# Patient Record
Sex: FEMALE | Race: WHITE | NOT HISPANIC OR LATINO | Employment: OTHER | URBAN - METROPOLITAN AREA
[De-identification: names, ages, dates, MRNs, and addresses within clinical notes are randomized per-mention and may not be internally consistent; named-entity substitution may affect disease eponyms.]

---

## 2017-12-20 RX ORDER — METOPROLOL SUCCINATE 50 MG/1
TABLET, EXTENDED RELEASE ORAL
Qty: 30 TABLET | Refills: 4 | Status: SHIPPED | OUTPATIENT
Start: 2017-12-20 | End: 2018-04-11 | Stop reason: DRUGHIGH

## 2017-12-20 RX ORDER — CITALOPRAM 20 MG/1
TABLET, FILM COATED ORAL
Qty: 30 TABLET | Refills: 4 | Status: SHIPPED | OUTPATIENT
Start: 2017-12-20 | End: 2018-04-11 | Stop reason: SDUPTHER

## 2017-12-20 RX ORDER — PRAMIPEXOLE DIHYDROCHLORIDE 1 MG/1
TABLET ORAL
Qty: 60 TABLET | Refills: 4 | Status: SHIPPED | OUTPATIENT
Start: 2017-12-20 | End: 2018-05-17 | Stop reason: SDUPTHER

## 2018-01-03 ENCOUNTER — OFFICE VISIT (OUTPATIENT)
Dept: PRIMARY CARE CLINIC | Facility: CLINIC | Age: 48
End: 2018-01-03
Payer: MEDICAID

## 2018-01-03 VITALS
TEMPERATURE: 98 F | RESPIRATION RATE: 18 BRPM | WEIGHT: 218 LBS | OXYGEN SATURATION: 100 % | BODY MASS INDEX: 36.32 KG/M2 | SYSTOLIC BLOOD PRESSURE: 146 MMHG | DIASTOLIC BLOOD PRESSURE: 91 MMHG | HEART RATE: 106 BPM | HEIGHT: 65 IN

## 2018-01-03 DIAGNOSIS — N63.0 BREAST NODULE: Primary | ICD-10-CM

## 2018-01-03 PROBLEM — I10 ESSENTIAL HYPERTENSION, BENIGN: Status: ACTIVE | Noted: 2018-01-03

## 2018-01-03 PROBLEM — F32.A DEPRESSION: Status: ACTIVE | Noted: 2018-01-03

## 2018-01-03 PROBLEM — G25.81 RLS (RESTLESS LEGS SYNDROME): Status: ACTIVE | Noted: 2018-01-03

## 2018-01-03 PROBLEM — F41.1 GAD (GENERALIZED ANXIETY DISORDER): Status: ACTIVE | Noted: 2018-01-03

## 2018-01-03 PROBLEM — F41.9 ANXIETY: Status: ACTIVE | Noted: 2018-01-03

## 2018-01-03 PROCEDURE — 99999 PR PBB SHADOW E&M-EST. PATIENT-LVL IV: CPT | Mod: PBBFAC,,, | Performed by: FAMILY MEDICINE

## 2018-01-03 PROCEDURE — 99213 OFFICE O/P EST LOW 20 MIN: CPT | Mod: S$PBB,,, | Performed by: FAMILY MEDICINE

## 2018-01-03 PROCEDURE — 99214 OFFICE O/P EST MOD 30 MIN: CPT | Mod: PBBFAC,PN | Performed by: FAMILY MEDICINE

## 2018-01-03 RX ORDER — LORAZEPAM 1 MG/1
1 TABLET ORAL DAILY PRN
COMMUNITY
End: 2018-01-03 | Stop reason: SDUPTHER

## 2018-01-03 NOTE — PROGRESS NOTES
"Subjective:       Patient ID: Dennis Aldana is a 47 y.o. female.    Chief Complaint: Breast Mass (pt says she has felt 3 lumps in her left breast and 1 in the right )    First noticed single lump in left breast a few months ago, tender initially. Now has 2 or 3 in left breast, and recently noticed similar lump in right breast. Last mammogram >5 years ago, no hx of abnormal mammogram. No known FHx of breast CA. No skin changes or nipple discharge      Review of Systems   Constitutional: Negative for chills, fever and unexpected weight change.   Respiratory: Negative for shortness of breath.    Cardiovascular: Negative for chest pain.       Objective:      Vitals:    01/03/18 1502   BP: (!) 146/91   BP Location: Left arm   Patient Position: Sitting   BP Method: Large (Automatic)   Pulse: 106   Resp: 18   Temp: 98.2 °F (36.8 °C)   TempSrc: Oral   SpO2: 100%   Weight: 98.9 kg (218 lb)   Height: 5' 5" (1.651 m)     Physical Exam   Constitutional: She is oriented to person, place, and time. She appears well-developed and well-nourished.   HENT:   Head: Normocephalic and atraumatic.   Cardiovascular: Normal rate, regular rhythm and normal heart sounds.    Pulmonary/Chest: Effort normal and breath sounds normal. Right breast exhibits mass (subareolar mobile cystic mass ~2cm). Right breast exhibits no inverted nipple, no nipple discharge, no skin change and no tenderness. Left breast exhibits mass (2 mobile central cystic masses, ~2cm each). Left breast exhibits no inverted nipple, no nipple discharge, no skin change and no tenderness. Breasts are symmetrical.   Musculoskeletal: She exhibits no edema.   Neurological: She is alert and oriented to person, place, and time.   Skin: Skin is warm and dry.   Nursing note and vitals reviewed.      Assessment:       1. Breast nodule        Plan:       Breast nodule  Comments:  further treatment pending diagnostic mammography and U/S results  Orders:  -     Mammo Digital Diagnostic " Bilat without CAD; Future; Expected date: 01/03/2018  -     US Breast Bilateral Complete; Future; Expected date: 01/03/2018      Medication List with Changes/Refills   Current Medications    CITALOPRAM (CELEXA) 20 MG TABLET    TAKE ONE TABLET BY MOUTH ONCE DAILY    LORAZEPAM (ATIVAN) 1 MG TABLET    Take 1 mg by mouth daily as needed for Anxiety.    METOPROLOL SUCCINATE (TOPROL-XL) 50 MG 24 HR TABLET    TAKE ONE TABLET BY MOUTH ONCE DAILY    PRAMIPEXOLE (MIRAPEX) 1 MG TABLET    TAKE TWO TABLETS BY MOUTH DAILY AT BEDTIME    RANITIDINE (ZANTAC) 300 MG TABLET    TAKE ONE TABLET BY MOUTH ONCE A DAY AT BEDTIME

## 2018-01-04 RX ORDER — LORAZEPAM 1 MG/1
TABLET ORAL
Qty: 30 TABLET | Refills: 2 | Status: SHIPPED | OUTPATIENT
Start: 2018-01-04 | End: 2018-04-11 | Stop reason: SDUPTHER

## 2018-01-08 ENCOUNTER — OFFICE VISIT (OUTPATIENT)
Dept: PRIMARY CARE CLINIC | Facility: CLINIC | Age: 48
End: 2018-01-08
Payer: MEDICAID

## 2018-01-08 VITALS
TEMPERATURE: 98 F | WEIGHT: 216 LBS | HEART RATE: 101 BPM | DIASTOLIC BLOOD PRESSURE: 86 MMHG | RESPIRATION RATE: 18 BRPM | HEIGHT: 65 IN | BODY MASS INDEX: 35.99 KG/M2 | OXYGEN SATURATION: 97 % | SYSTOLIC BLOOD PRESSURE: 143 MMHG

## 2018-01-08 DIAGNOSIS — J01.00 ACUTE NON-RECURRENT MAXILLARY SINUSITIS: Primary | ICD-10-CM

## 2018-01-08 PROCEDURE — 99213 OFFICE O/P EST LOW 20 MIN: CPT | Mod: S$PBB,,, | Performed by: FAMILY MEDICINE

## 2018-01-08 PROCEDURE — 99213 OFFICE O/P EST LOW 20 MIN: CPT | Mod: PBBFAC,PN | Performed by: FAMILY MEDICINE

## 2018-01-08 PROCEDURE — 99999 PR PBB SHADOW E&M-EST. PATIENT-LVL III: CPT | Mod: PBBFAC,,, | Performed by: FAMILY MEDICINE

## 2018-01-08 RX ORDER — AMOXICILLIN AND CLAVULANATE POTASSIUM 875; 125 MG/1; MG/1
1 TABLET, FILM COATED ORAL EVERY 12 HOURS
Qty: 20 TABLET | Refills: 0 | Status: SHIPPED | OUTPATIENT
Start: 2018-01-08 | End: 2018-01-18

## 2018-01-08 RX ORDER — BETAMETHASONE SODIUM PHOSPHATE AND BETAMETHASONE ACETATE 3; 3 MG/ML; MG/ML
12 INJECTION, SUSPENSION INTRA-ARTICULAR; INTRALESIONAL; INTRAMUSCULAR; SOFT TISSUE
Status: COMPLETED | OUTPATIENT
Start: 2018-01-08 | End: 2018-01-08

## 2018-01-08 RX ORDER — PROMETHAZINE HYDROCHLORIDE AND CODEINE PHOSPHATE 6.25; 1 MG/5ML; MG/5ML
5 SOLUTION ORAL EVERY 6 HOURS PRN
Qty: 180 ML | Refills: 0 | Status: SHIPPED | OUTPATIENT
Start: 2018-01-08 | End: 2018-02-07 | Stop reason: SDUPTHER

## 2018-01-08 RX ADMIN — BETAMETHASONE SODIUM PHOSPHATE AND BETAMETHASONE ACETATE 12 MG: 3; 3 INJECTION, SUSPENSION INTRA-ARTICULAR; INTRALESIONAL; INTRAMUSCULAR at 08:01

## 2018-01-08 NOTE — PROGRESS NOTES
"Subjective:       Patient ID: Dennis Aldana is a 47 y.o. female.    Chief Complaint: Cough and Sore Throat    Sinusitis   This is a new problem. The current episode started in the past 7 days. The problem has been gradually worsening since onset. There has been no fever. Associated symptoms include congestion, coughing, headaches, a hoarse voice, sinus pressure and a sore throat. Pertinent negatives include no chills or shortness of breath. Past treatments include oral decongestants. The treatment provided no relief.     Review of Systems   Constitutional: Negative for chills and fever.   HENT: Positive for congestion, hoarse voice, sinus pressure and sore throat.    Respiratory: Positive for cough. Negative for shortness of breath.    Neurological: Positive for headaches.       Objective:      Vitals:    01/08/18 0825   BP: (!) 143/86   BP Location: Left arm   Patient Position: Sitting   BP Method: Large (Automatic)   Pulse: 101   Resp: 18   Temp: 98.1 °F (36.7 °C)   TempSrc: Oral   SpO2: 97%   Weight: 98 kg (216 lb)   Height: 5' 5" (1.651 m)     Physical Exam   Constitutional: She is oriented to person, place, and time. She appears well-developed and well-nourished.   HENT:   Head: Normocephalic and atraumatic.   Right Ear: Tympanic membrane normal.   Left Ear: Tympanic membrane normal.   Nose: Right sinus exhibits maxillary sinus tenderness. Left sinus exhibits maxillary sinus tenderness.   Mouth/Throat: Oropharynx is clear and moist.   Eyes: EOM are normal. Pupils are equal, round, and reactive to light.   Neck: Neck supple. No JVD present.   Cardiovascular: Normal rate, regular rhythm and normal heart sounds.    Pulmonary/Chest: Effort normal and breath sounds normal.   Musculoskeletal: She exhibits no edema.   Neurological: She is alert and oriented to person, place, and time.   Skin: Skin is warm and dry.   Nursing note and vitals reviewed.      Assessment:       1. Acute non-recurrent maxillary sinusitis  "       Plan:       Acute non-recurrent maxillary sinusitis  -     betamethasone acetate-betamethasone sodium phosphate injection 12 mg; Inject 2 mLs (12 mg total) into the muscle one time.  -     amoxicillin-clavulanate 875-125mg (AUGMENTIN) 875-125 mg per tablet; Take 1 tablet by mouth every 12 (twelve) hours.  Dispense: 20 tablet; Refill: 0  -     promethazine-codeine 6.25-10 mg/5 ml (PHENERGAN WITH CODEINE) 6.25-10 mg/5 mL syrup; Take 5 mLs by mouth every 6 (six) hours as needed for Cough.  Dispense: 180 mL; Refill: 0      Medication List with Changes/Refills   New Medications    AMOXICILLIN-CLAVULANATE 875-125MG (AUGMENTIN) 875-125 MG PER TABLET    Take 1 tablet by mouth every 12 (twelve) hours.    PROMETHAZINE-CODEINE 6.25-10 MG/5 ML (PHENERGAN WITH CODEINE) 6.25-10 MG/5 ML SYRUP    Take 5 mLs by mouth every 6 (six) hours as needed for Cough.   Current Medications    CITALOPRAM (CELEXA) 20 MG TABLET    TAKE ONE TABLET BY MOUTH ONCE DAILY    LORAZEPAM (ATIVAN) 1 MG TABLET    TAKE ONE TABLET BY MOUTH ONCE DAILY AS NEEDED    METOPROLOL SUCCINATE (TOPROL-XL) 50 MG 24 HR TABLET    TAKE ONE TABLET BY MOUTH ONCE DAILY    PRAMIPEXOLE (MIRAPEX) 1 MG TABLET    TAKE TWO TABLETS BY MOUTH DAILY AT BEDTIME    RANITIDINE (ZANTAC) 300 MG TABLET    TAKE ONE TABLET BY MOUTH ONCE A DAY AT BEDTIME

## 2018-01-08 NOTE — PROGRESS NOTES
2 patient identifiers used, ( name &  ), order checked, allergies checked ,  2 Cc's of celestone given per order, using aseptic technique, 0/10 pain scale, patient tolerated well, no bleeding at insertion site; no adverse reactions noted.

## 2018-01-09 ENCOUNTER — TELEPHONE (OUTPATIENT)
Dept: PRIMARY CARE CLINIC | Facility: CLINIC | Age: 48
End: 2018-01-09

## 2018-01-09 DIAGNOSIS — N63.20 LEFT BREAST MASS: Primary | ICD-10-CM

## 2018-01-09 NOTE — TELEPHONE ENCOUNTER
Spoke to patient about ultrasound results and recommendations to follow up with Dr. Huddletson for a biopsy ; patient states understanding, no questions or concerns.

## 2018-01-09 NOTE — TELEPHONE ENCOUNTER
----- Message from Priscila Cherry sent at 1/9/2018  4:57 PM CST -----  Contact: self [phone]  She is being referred to you by Dr Roger for a breast mass.  Your first available is March.  Please call her to fit her in.  Thank you!

## 2018-01-10 ENCOUNTER — PATIENT MESSAGE (OUTPATIENT)
Dept: PRIMARY CARE CLINIC | Facility: CLINIC | Age: 48
End: 2018-01-10

## 2018-01-10 ENCOUNTER — TELEPHONE (OUTPATIENT)
Dept: PRIMARY CARE CLINIC | Facility: CLINIC | Age: 48
End: 2018-01-10

## 2018-01-10 DIAGNOSIS — N63.20 LEFT BREAST MASS: Primary | ICD-10-CM

## 2018-01-10 NOTE — TELEPHONE ENCOUNTER
----- Message from Kaitlin Robertson sent at 1/10/2018  8:48 AM CST -----  Contact: Patient  Dennis, patient [phone], Calling because Dr. Huddleston does not take Medicaid, please refer her to another surgeon. Please advise. Thanks.

## 2018-01-11 ENCOUNTER — PATIENT MESSAGE (OUTPATIENT)
Dept: PRIMARY CARE CLINIC | Facility: CLINIC | Age: 48
End: 2018-01-11

## 2018-01-17 ENCOUNTER — TELEPHONE (OUTPATIENT)
Dept: SURGERY | Facility: CLINIC | Age: 48
End: 2018-01-17

## 2018-01-17 NOTE — TELEPHONE ENCOUNTER
----- Message from Cynthia Calderon LPN sent at 1/9/2018  4:45 PM CST -----  Per Dr. Roger , patient needs to be seen asap for results from US   ----- Message -----  From: Andrew Roger MD  Sent: 1/9/2018   9:28 AM  To: [facility] Staff    Irregular, spiculated mass noted on left breast ultrasound, very suspiciouis.  Needs referral to surgeon for possible biopsy.  Please schedule patient with Dr. Connor Huddleston in [place] ASAP.

## 2018-02-07 ENCOUNTER — TELEPHONE (OUTPATIENT)
Dept: PRIMARY CARE CLINIC | Facility: CLINIC | Age: 48
End: 2018-02-07

## 2018-02-07 DIAGNOSIS — J01.00 ACUTE NON-RECURRENT MAXILLARY SINUSITIS: ICD-10-CM

## 2018-02-07 RX ORDER — AMOXICILLIN AND CLAVULANATE POTASSIUM 875; 125 MG/1; MG/1
1 TABLET, FILM COATED ORAL EVERY 12 HOURS
Qty: 20 TABLET | Refills: 0 | Status: SHIPPED | OUTPATIENT
Start: 2018-02-07 | End: 2018-02-17

## 2018-02-07 RX ORDER — PROMETHAZINE HYDROCHLORIDE AND CODEINE PHOSPHATE 6.25; 1 MG/5ML; MG/5ML
5 SOLUTION ORAL EVERY 6 HOURS PRN
Qty: 180 ML | Refills: 0 | Status: SHIPPED | OUTPATIENT
Start: 2018-02-07 | End: 2018-04-11

## 2018-02-07 NOTE — TELEPHONE ENCOUNTER
"Called patient to come in early for appointment and patient states "can the doctor just call in antibiotics and cough medicine for me? I was just there a few weeks ago. Verbalized to patient will ask provider.   "

## 2018-02-07 NOTE — TELEPHONE ENCOUNTER
Spoke with patient, verbalized provider will call in medications to your pharmacy. Patient was seen on 1/8/18 for same symptoms (sinus pressure and drip, cough) Patient requesting cough medicine also. Patient's pharmacy updated. Please send in scripts. Appointment canceled for today.

## 2018-04-11 ENCOUNTER — OFFICE VISIT (OUTPATIENT)
Dept: PRIMARY CARE CLINIC | Facility: CLINIC | Age: 48
End: 2018-04-11
Payer: MEDICAID

## 2018-04-11 VITALS
HEIGHT: 66 IN | BODY MASS INDEX: 34.39 KG/M2 | TEMPERATURE: 99 F | OXYGEN SATURATION: 100 % | RESPIRATION RATE: 18 BRPM | HEART RATE: 115 BPM | WEIGHT: 214 LBS | SYSTOLIC BLOOD PRESSURE: 154 MMHG | DIASTOLIC BLOOD PRESSURE: 98 MMHG

## 2018-04-11 DIAGNOSIS — I10 ESSENTIAL HYPERTENSION, BENIGN: Primary | ICD-10-CM

## 2018-04-11 DIAGNOSIS — K21.9 GASTROESOPHAGEAL REFLUX DISEASE, ESOPHAGITIS PRESENCE NOT SPECIFIED: ICD-10-CM

## 2018-04-11 DIAGNOSIS — F32.A DEPRESSION, UNSPECIFIED DEPRESSION TYPE: ICD-10-CM

## 2018-04-11 DIAGNOSIS — R00.0 SINUS TACHYCARDIA: ICD-10-CM

## 2018-04-11 DIAGNOSIS — C50.912 MALIGNANT NEOPLASM OF LEFT FEMALE BREAST, UNSPECIFIED ESTROGEN RECEPTOR STATUS, UNSPECIFIED SITE OF BREAST: ICD-10-CM

## 2018-04-11 DIAGNOSIS — F41.1 GAD (GENERALIZED ANXIETY DISORDER): ICD-10-CM

## 2018-04-11 PROCEDURE — 99214 OFFICE O/P EST MOD 30 MIN: CPT | Mod: S$PBB,,, | Performed by: FAMILY MEDICINE

## 2018-04-11 PROCEDURE — 99999 PR PBB SHADOW E&M-EST. PATIENT-LVL III: CPT | Mod: PBBFAC,,, | Performed by: FAMILY MEDICINE

## 2018-04-11 PROCEDURE — 99213 OFFICE O/P EST LOW 20 MIN: CPT | Mod: PBBFAC,PN | Performed by: FAMILY MEDICINE

## 2018-04-11 RX ORDER — METOPROLOL SUCCINATE 100 MG/1
100 TABLET, EXTENDED RELEASE ORAL DAILY
Qty: 30 TABLET | Refills: 5 | Status: SHIPPED | OUTPATIENT
Start: 2018-04-11 | End: 2018-05-11 | Stop reason: SDUPTHER

## 2018-04-11 RX ORDER — TAMOXIFEN CITRATE 20 MG/1
20 TABLET ORAL DAILY
COMMUNITY
End: 2020-01-07 | Stop reason: ALTCHOICE

## 2018-04-11 RX ORDER — LORAZEPAM 1 MG/1
1 TABLET ORAL DAILY
Qty: 30 TABLET | Refills: 5 | Status: SHIPPED | OUTPATIENT
Start: 2018-04-11 | End: 2018-10-22 | Stop reason: SDUPTHER

## 2018-04-11 RX ORDER — CITALOPRAM 20 MG/1
20 TABLET, FILM COATED ORAL DAILY
Qty: 30 TABLET | Refills: 5 | Status: SHIPPED | OUTPATIENT
Start: 2018-04-11 | End: 2018-07-09 | Stop reason: SDUPTHER

## 2018-04-11 RX ORDER — HYDROCODONE BITARTRATE AND ACETAMINOPHEN 7.5; 325 MG/1; MG/1
1 TABLET ORAL EVERY 6 HOURS PRN
Refills: 0 | COMMUNITY
Start: 2018-03-07 | End: 2018-05-11 | Stop reason: SDUPTHER

## 2018-04-11 NOTE — PROGRESS NOTES
"Subjective:       Patient ID: Dennis Aldana is a 47 y.o. female.    Chief Complaint: Hypertension (Patient says she was on 2 medications once before but she can't remember what she was on )    HR and BP have been running consistently high lately. Had been on both metoprolol and verapamil, but stopped taking verapamil a few months ago (says she kept forgetting to take).   Recently had breast CA excised from left breast, started tamoxifen last month, just started radiation last week, will be doing daily until 5/22, and undergoing genetic testing to see if she needs chemo  TATYANA/depression - stable on current regimen, no adverse SE      Review of Systems   Constitutional: Negative for fever.   Respiratory: Negative for shortness of breath.    Cardiovascular: Positive for palpitations. Negative for chest pain.   Gastrointestinal: Positive for nausea.   Skin: Negative for rash.   Neurological: Negative for dizziness and light-headedness.   Hematological: Does not bruise/bleed easily.   Psychiatric/Behavioral: Positive for sleep disturbance. The patient is nervous/anxious.        Objective:      Vitals:    04/11/18 1230   BP: (!) 154/98   BP Location: Right arm   Patient Position: Sitting   BP Method: Large (Automatic)   Pulse: (!) 115   Resp: 18   Temp: 98.6 °F (37 °C)   TempSrc: Oral   SpO2: 100%   Weight: 97.1 kg (214 lb)   Height: 5' 6" (1.676 m)     Physical Exam   Constitutional: She is oriented to person, place, and time. She appears well-developed and well-nourished.   HENT:   Head: Normocephalic and atraumatic.   Eyes: EOM are normal.   Neck: Neck supple. No JVD present.   Cardiovascular: Regular rhythm and normal heart sounds.  Tachycardia present.    Pulmonary/Chest: Effort normal and breath sounds normal.   Musculoskeletal: She exhibits no edema.   Neurological: She is alert and oriented to person, place, and time.   Skin: Skin is warm and dry.   Psychiatric: She has a normal mood and affect. Her behavior is " normal.   Nursing note and vitals reviewed.      Assessment:       1. Essential hypertension, benign Sub-optimally controlled   2. Sinus tachycardia    3. Malignant neoplasm of left female breast, unspecified estrogen receptor status, unspecified site of breast    4. TATYANA (generalized anxiety disorder) Stable   5. Depression, unspecified depression type Stable   6. Gastroesophageal reflux disease, esophagitis presence not specified        Plan:       Essential hypertension, benign  -     metoprolol succinate (TOPROL-XL) 100 MG 24 hr tablet; Take 1 tablet (100 mg total) by mouth once daily.  Dispense: 30 tablet; Refill: 5    Sinus tachycardia    Malignant neoplasm of left female breast, unspecified estrogen receptor status, unspecified site of breast  Comments:  f/u with radiation oncologist as scheduled    TATYANA (generalized anxiety disorder)  -     LORazepam (ATIVAN) 1 MG tablet; Take 1 tablet (1 mg total) by mouth once daily.  Dispense: 30 tablet; Refill: 5    Depression, unspecified depression type  -     citalopram (CELEXA) 20 MG tablet; Take 1 tablet (20 mg total) by mouth once daily.  Dispense: 30 tablet; Refill: 5    Gastroesophageal reflux disease, esophagitis presence not specified  -     ranitidine (ZANTAC) 300 MG tablet; Take 1 tablet (300 mg total) by mouth nightly.  Dispense: 30 tablet; Refill: 5      Medication List with Changes/Refills   New Medications    METOPROLOL SUCCINATE (TOPROL-XL) 100 MG 24 HR TABLET    Take 1 tablet (100 mg total) by mouth once daily.   Current Medications    HYDROCODONE-ACETAMINOPHEN 7.5-325MG (NORCO) 7.5-325 MG PER TABLET    Take 1 tablet by mouth every 6 (six) hours as needed.    PRAMIPEXOLE (MIRAPEX) 1 MG TABLET    TAKE TWO TABLETS BY MOUTH DAILY AT BEDTIME    TAMOXIFEN (NOLVADEX) 20 MG TAB    Take 20 mg by mouth once daily.   Changed and/or Refilled Medications    Modified Medication Previous Medication    CITALOPRAM (CELEXA) 20 MG TABLET citalopram (CELEXA) 20 MG tablet        Take 1 tablet (20 mg total) by mouth once daily.    TAKE ONE TABLET BY MOUTH ONCE DAILY    LORAZEPAM (ATIVAN) 1 MG TABLET LORazepam (ATIVAN) 1 MG tablet       Take 1 tablet (1 mg total) by mouth once daily.    TAKE ONE TABLET BY MOUTH ONCE DAILY AS NEEDED    RANITIDINE (ZANTAC) 300 MG TABLET ranitidine (ZANTAC) 300 MG tablet       Take 1 tablet (300 mg total) by mouth nightly.    TAKE ONE TABLET BY MOUTH ONCE A DAY AT BEDTIME   Discontinued Medications    METOPROLOL SUCCINATE (TOPROL-XL) 50 MG 24 HR TABLET    TAKE ONE TABLET BY MOUTH ONCE DAILY    PROMETHAZINE-CODEINE 6.25-10 MG/5 ML (PHENERGAN WITH CODEINE) 6.25-10 MG/5 ML SYRUP    Take 5 mLs by mouth every 6 (six) hours as needed for Cough.

## 2018-05-11 ENCOUNTER — OFFICE VISIT (OUTPATIENT)
Dept: PRIMARY CARE CLINIC | Facility: CLINIC | Age: 48
End: 2018-05-11
Payer: MEDICAID

## 2018-05-11 VITALS
TEMPERATURE: 98 F | DIASTOLIC BLOOD PRESSURE: 90 MMHG | HEIGHT: 66 IN | OXYGEN SATURATION: 99 % | HEART RATE: 120 BPM | SYSTOLIC BLOOD PRESSURE: 143 MMHG | WEIGHT: 217 LBS | BODY MASS INDEX: 34.87 KG/M2 | RESPIRATION RATE: 18 BRPM

## 2018-05-11 DIAGNOSIS — C50.912 MALIGNANT NEOPLASM OF LEFT FEMALE BREAST, UNSPECIFIED ESTROGEN RECEPTOR STATUS, UNSPECIFIED SITE OF BREAST: ICD-10-CM

## 2018-05-11 DIAGNOSIS — I10 ESSENTIAL HYPERTENSION, BENIGN: Primary | ICD-10-CM

## 2018-05-11 DIAGNOSIS — R00.0 SINUS TACHYCARDIA: ICD-10-CM

## 2018-05-11 PROCEDURE — 99999 PR PBB SHADOW E&M-EST. PATIENT-LVL III: CPT | Mod: PBBFAC,,, | Performed by: FAMILY MEDICINE

## 2018-05-11 PROCEDURE — 99213 OFFICE O/P EST LOW 20 MIN: CPT | Mod: PBBFAC,PN | Performed by: FAMILY MEDICINE

## 2018-05-11 PROCEDURE — 99213 OFFICE O/P EST LOW 20 MIN: CPT | Mod: S$PBB,,, | Performed by: FAMILY MEDICINE

## 2018-05-11 RX ORDER — HYDROCODONE BITARTRATE AND ACETAMINOPHEN 7.5; 325 MG/1; MG/1
1 TABLET ORAL EVERY 6 HOURS PRN
Qty: 30 TABLET | Refills: 0 | Status: SHIPPED | OUTPATIENT
Start: 2018-05-11 | End: 2018-07-09

## 2018-05-11 RX ORDER — METOPROLOL SUCCINATE 100 MG/1
TABLET, EXTENDED RELEASE ORAL
Qty: 45 TABLET | Refills: 5 | Status: SHIPPED | OUTPATIENT
Start: 2018-05-11 | End: 2018-07-09 | Stop reason: SDUPTHER

## 2018-05-17 RX ORDER — PRAMIPEXOLE DIHYDROCHLORIDE 1 MG/1
TABLET ORAL
Qty: 60 TABLET | Refills: 5 | Status: SHIPPED | OUTPATIENT
Start: 2018-05-17 | End: 2018-11-06 | Stop reason: SDUPTHER

## 2018-07-09 ENCOUNTER — OFFICE VISIT (OUTPATIENT)
Dept: PRIMARY CARE CLINIC | Facility: CLINIC | Age: 48
End: 2018-07-09
Payer: MEDICAID

## 2018-07-09 VITALS
DIASTOLIC BLOOD PRESSURE: 87 MMHG | BODY MASS INDEX: 37.09 KG/M2 | TEMPERATURE: 99 F | HEART RATE: 114 BPM | RESPIRATION RATE: 18 BRPM | WEIGHT: 222.63 LBS | SYSTOLIC BLOOD PRESSURE: 142 MMHG | HEIGHT: 65 IN | OXYGEN SATURATION: 98 %

## 2018-07-09 DIAGNOSIS — R00.0 SINUS TACHYCARDIA: ICD-10-CM

## 2018-07-09 DIAGNOSIS — F32.A DEPRESSION, UNSPECIFIED DEPRESSION TYPE: ICD-10-CM

## 2018-07-09 DIAGNOSIS — I10 ESSENTIAL HYPERTENSION, BENIGN: Primary | ICD-10-CM

## 2018-07-09 PROCEDURE — 99213 OFFICE O/P EST LOW 20 MIN: CPT | Mod: PBBFAC,PN | Performed by: FAMILY MEDICINE

## 2018-07-09 PROCEDURE — 99999 PR PBB SHADOW E&M-EST. PATIENT-LVL III: CPT | Mod: PBBFAC,,, | Performed by: FAMILY MEDICINE

## 2018-07-09 PROCEDURE — 99214 OFFICE O/P EST MOD 30 MIN: CPT | Mod: S$PBB,,, | Performed by: FAMILY MEDICINE

## 2018-07-09 RX ORDER — CITALOPRAM 40 MG/1
40 TABLET, FILM COATED ORAL DAILY
Qty: 30 TABLET | Refills: 5 | Status: SHIPPED | OUTPATIENT
Start: 2018-07-09 | End: 2019-01-11 | Stop reason: SDUPTHER

## 2018-07-09 RX ORDER — METOPROLOL SUCCINATE 200 MG/1
TABLET, EXTENDED RELEASE ORAL
Qty: 30 TABLET | Refills: 5 | Status: SHIPPED | OUTPATIENT
Start: 2018-07-09 | End: 2019-01-11 | Stop reason: SDUPTHER

## 2018-07-09 NOTE — PROGRESS NOTES
Subjective:       Patient ID: Dennis Aldana is a 47 y.o. female.    Chief Complaint: Follow-up (hypertension)    Hypertension   This is a chronic problem. The current episode started more than 1 year ago. The problem has been gradually improving since onset. The problem is resistant. Associated symptoms include anxiety, malaise/fatigue and sweats. Pertinent negatives include no blurred vision, chest pain, headaches, neck pain, orthopnea, palpitations, peripheral edema, PND or shortness of breath. There are no associated agents to hypertension. Risk factors for coronary artery disease include family history, obesity and stress. Past treatments include beta blockers. The current treatment provides moderate improvement. Compliance problems include exercise and medication side effects.    Depression   Visit Type: follow-up  Patient presents with the following symptoms: irritability and restlessness.  Patient is not experiencing: chest pain, compulsions, hypersomnia, palpitations, psychomotor agitation, psychomotor retardation, shortness of breath, suicidal ideas, suicidal planning and thoughts of death.      Review of Systems   Constitutional: Positive for irritability and malaise/fatigue. Negative for fever.   HENT: Negative for trouble swallowing.    Eyes: Negative for blurred vision and visual disturbance.   Respiratory: Negative for shortness of breath.    Cardiovascular: Negative for chest pain, palpitations, orthopnea, leg swelling and PND.   Genitourinary: Negative for difficulty urinating.   Musculoskeletal: Negative for neck pain.   Skin: Negative for rash.   Neurological: Negative for dizziness and headaches.   Hematological: Does not bruise/bleed easily.   Psychiatric/Behavioral: Positive for depression. Negative for suicidal ideas.       Objective:      Vitals:    07/09/18 1407   BP: (!) 142/87   BP Location: Right arm   Patient Position: Sitting   BP Method: Large (Automatic)   Pulse: (!) 114   Resp: 18  "  Temp: 98.7 °F (37.1 °C)   TempSrc: Oral   SpO2: 98%   Weight: 101 kg (222 lb 9.6 oz)   Height: 5' 5" (1.651 m)     Physical Exam   Constitutional: She is oriented to person, place, and time. She appears well-developed and well-nourished.   HENT:   Head: Normocephalic and atraumatic.   Neck: No JVD present. Carotid bruit is not present.   Cardiovascular: Normal rate, regular rhythm and normal heart sounds.    Pulmonary/Chest: Effort normal and breath sounds normal.   Musculoskeletal: She exhibits no edema.   Neurological: She is alert and oriented to person, place, and time.   Skin: Skin is warm and dry.   Psychiatric: She has a normal mood and affect. Her behavior is normal.   Nursing note and vitals reviewed.      Assessment:       1. Essential hypertension, benign Sub-optimally controlled   2. Sinus tachycardia    3. Depression, unspecified depression type Sub-optimally controlled       Plan:       Essential hypertension, benign  Comments:  increase beta blocker  Orders:  -     metoprolol succinate (TOPROL-XL) 200 MG 24 hr tablet; 1 tablet by mouth daily  Dispense: 30 tablet; Refill: 5    Sinus tachycardia    Depression, unspecified depression type  -     citalopram (CELEXA) 40 MG tablet; Take 1 tablet (40 mg total) by mouth once daily.  Dispense: 30 tablet; Refill: 5      Medication List with Changes/Refills   Current Medications    LORAZEPAM (ATIVAN) 1 MG TABLET    Take 1 tablet (1 mg total) by mouth once daily.    PRAMIPEXOLE (MIRAPEX) 1 MG TABLET    TAKE TWO TABLETS BY MOUTH DAILY AT BEDTIME    RANITIDINE (ZANTAC) 300 MG TABLET    Take 1 tablet (300 mg total) by mouth nightly.    TAMOXIFEN (NOLVADEX) 20 MG TAB    Take 20 mg by mouth once daily.   Changed and/or Refilled Medications    Modified Medication Previous Medication    CITALOPRAM (CELEXA) 40 MG TABLET citalopram (CELEXA) 20 MG tablet       Take 1 tablet (40 mg total) by mouth once daily.    Take 1 tablet (20 mg total) by mouth once daily.    " METOPROLOL SUCCINATE (TOPROL-XL) 200 MG 24 HR TABLET metoprolol succinate (TOPROL-XL) 100 MG 24 hr tablet       1 tablet by mouth daily    1.5 tablets by mouth daily   Discontinued Medications    HYDROCODONE-ACETAMINOPHEN 7.5-325MG (NORCO) 7.5-325 MG PER TABLET    Take 1 tablet by mouth every 6 (six) hours as needed for Pain.

## 2018-09-10 ENCOUNTER — OFFICE VISIT (OUTPATIENT)
Dept: PRIMARY CARE CLINIC | Facility: CLINIC | Age: 48
End: 2018-09-10

## 2018-09-10 VITALS
HEART RATE: 87 BPM | TEMPERATURE: 98 F | BODY MASS INDEX: 35.16 KG/M2 | WEIGHT: 211 LBS | HEIGHT: 65 IN | DIASTOLIC BLOOD PRESSURE: 89 MMHG | OXYGEN SATURATION: 97 % | SYSTOLIC BLOOD PRESSURE: 143 MMHG | RESPIRATION RATE: 18 BRPM

## 2018-09-10 DIAGNOSIS — F32.A DEPRESSION, UNSPECIFIED DEPRESSION TYPE: ICD-10-CM

## 2018-09-10 DIAGNOSIS — Z12.4 CERVICAL CANCER SCREENING: ICD-10-CM

## 2018-09-10 DIAGNOSIS — I10 ESSENTIAL HYPERTENSION, BENIGN: Primary | ICD-10-CM

## 2018-09-10 PROCEDURE — 99999 PR PBB SHADOW E&M-EST. PATIENT-LVL III: CPT | Mod: PBBFAC,,, | Performed by: FAMILY MEDICINE

## 2018-09-10 PROCEDURE — 99213 OFFICE O/P EST LOW 20 MIN: CPT | Mod: S$PBB,,, | Performed by: FAMILY MEDICINE

## 2018-09-10 PROCEDURE — 99213 OFFICE O/P EST LOW 20 MIN: CPT | Mod: PBBFAC,PN | Performed by: FAMILY MEDICINE

## 2018-09-10 NOTE — PROGRESS NOTES
"Subjective:       Patient ID: Dennis Aldana is a 48 y.o. female.    Chief Complaint: Hypertension (patient is here to follow up since the increase of her Metoprolol )    HTN - not checking HR or BP, but tolerating higher dose of metoprolol w/o difficulty  Depression/anxiety - better since increasing dose of Celexa a few months ago. Eating OK, still not sleeping particularly well. Not currently exercising, but intends to start back soon.      Review of Systems   Constitutional: Negative for unexpected weight change.   Respiratory: Negative for shortness of breath.    Cardiovascular: Negative for chest pain and palpitations.   Gastrointestinal: Negative for nausea and vomiting.   Genitourinary: Negative for difficulty urinating.   Skin: Negative for rash.   Neurological: Negative for dizziness and light-headedness.   Psychiatric/Behavioral: Positive for sleep disturbance. Negative for self-injury and suicidal ideas.       Objective:      Vitals:    09/10/18 0907   BP: (!) 143/89   BP Location: Left arm   Patient Position: Sitting   BP Method: Large (Automatic)   Pulse: 87   Resp: 18   Temp: 98.1 °F (36.7 °C)   TempSrc: Oral   SpO2: 97%   Weight: 95.7 kg (211 lb)   Height: 5' 5" (1.651 m)     Physical Exam   Constitutional: She is oriented to person, place, and time. She appears well-developed and well-nourished.   HENT:   Head: Normocephalic and atraumatic.   Neck: Neck supple. No JVD present.   Cardiovascular: Normal rate, regular rhythm and normal heart sounds.   Pulmonary/Chest: Effort normal and breath sounds normal.   Abdominal: Soft. She exhibits no distension. There is no tenderness.   Musculoskeletal: She exhibits no edema.   Neurological: She is alert and oriented to person, place, and time.   Skin: Skin is warm and dry.   Psychiatric: She has a normal mood and affect. Her behavior is normal.   Nursing note and vitals reviewed.      Assessment:       1. Essential hypertension, benign    2. Depression, " unspecified depression type    3. Cervical cancer screening        Plan:       Essential hypertension, benign  Comments:  stable, continue current Rx    Depression, unspecified depression type  Comments:  better on higher dose of SSRI    Cervical cancer screening  Comments:  recently lost insurance, trying to find GYN         Medication List           Accurate as of 9/10/18  9:20 AM. If you have any questions, ask your nurse or doctor.               CONTINUE taking these medications    citalopram 40 MG tablet  Commonly known as:  CELEXA  Take 1 tablet (40 mg total) by mouth once daily.     LORazepam 1 MG tablet  Commonly known as:  ATIVAN  Take 1 tablet (1 mg total) by mouth once daily.     metoprolol succinate 200 MG 24 hr tablet  Commonly known as:  TOPROL-XL  1 tablet by mouth daily     pramipexole 1 MG tablet  Commonly known as:  MIRAPEX  TAKE TWO TABLETS BY MOUTH DAILY AT BEDTIME     ranitidine 300 MG tablet  Commonly known as:  ZANTAC  Take 1 tablet (300 mg total) by mouth nightly.     tamoxifen 20 MG Tab  Commonly known as:  NOLVADEX

## 2018-10-22 DIAGNOSIS — F41.1 GAD (GENERALIZED ANXIETY DISORDER): ICD-10-CM

## 2018-10-23 RX ORDER — LORAZEPAM 1 MG/1
TABLET ORAL
Qty: 30 TABLET | Refills: 3 | Status: SHIPPED | OUTPATIENT
Start: 2018-10-23 | End: 2020-01-07 | Stop reason: SDUPTHER

## 2018-11-06 DIAGNOSIS — K21.9 GASTROESOPHAGEAL REFLUX DISEASE, ESOPHAGITIS PRESENCE NOT SPECIFIED: ICD-10-CM

## 2018-11-06 RX ORDER — PRAMIPEXOLE DIHYDROCHLORIDE 1 MG/1
TABLET ORAL
Qty: 60 TABLET | Refills: 5 | Status: SHIPPED | OUTPATIENT
Start: 2018-11-06 | End: 2019-04-06 | Stop reason: SDUPTHER

## 2019-01-11 DIAGNOSIS — F32.A DEPRESSION, UNSPECIFIED DEPRESSION TYPE: ICD-10-CM

## 2019-01-11 DIAGNOSIS — I10 ESSENTIAL HYPERTENSION, BENIGN: ICD-10-CM

## 2019-01-11 RX ORDER — METOPROLOL SUCCINATE 200 MG/1
TABLET, EXTENDED RELEASE ORAL
Qty: 30 TABLET | Refills: 5 | Status: SHIPPED | OUTPATIENT
Start: 2019-01-11 | End: 2019-06-16 | Stop reason: SDUPTHER

## 2019-01-11 RX ORDER — CITALOPRAM 40 MG/1
TABLET, FILM COATED ORAL
Qty: 30 TABLET | Refills: 5 | Status: SHIPPED | OUTPATIENT
Start: 2019-01-11 | End: 2019-06-05 | Stop reason: SDUPTHER

## 2019-03-11 ENCOUNTER — OFFICE VISIT (OUTPATIENT)
Dept: PRIMARY CARE CLINIC | Facility: CLINIC | Age: 49
End: 2019-03-11

## 2019-03-11 VITALS
RESPIRATION RATE: 18 BRPM | HEIGHT: 65 IN | TEMPERATURE: 99 F | OXYGEN SATURATION: 100 % | BODY MASS INDEX: 38.32 KG/M2 | WEIGHT: 230 LBS | HEART RATE: 111 BPM | SYSTOLIC BLOOD PRESSURE: 131 MMHG | DIASTOLIC BLOOD PRESSURE: 85 MMHG

## 2019-03-11 DIAGNOSIS — I10 ESSENTIAL HYPERTENSION, BENIGN: Primary | ICD-10-CM

## 2019-03-11 DIAGNOSIS — F51.01 PRIMARY INSOMNIA: ICD-10-CM

## 2019-03-11 DIAGNOSIS — Z12.4 CERVICAL CANCER SCREENING: ICD-10-CM

## 2019-03-11 DIAGNOSIS — F41.1 GAD (GENERALIZED ANXIETY DISORDER): ICD-10-CM

## 2019-03-11 PROCEDURE — 99999 PR PBB SHADOW E&M-EST. PATIENT-LVL III: ICD-10-PCS | Mod: PBBFAC,,, | Performed by: FAMILY MEDICINE

## 2019-03-11 PROCEDURE — 99213 OFFICE O/P EST LOW 20 MIN: CPT | Mod: PBBFAC,PN | Performed by: FAMILY MEDICINE

## 2019-03-11 PROCEDURE — 99213 OFFICE O/P EST LOW 20 MIN: CPT | Mod: S$PBB,,, | Performed by: FAMILY MEDICINE

## 2019-03-11 PROCEDURE — 99999 PR PBB SHADOW E&M-EST. PATIENT-LVL III: CPT | Mod: PBBFAC,,, | Performed by: FAMILY MEDICINE

## 2019-03-11 PROCEDURE — 99213 PR OFFICE/OUTPT VISIT, EST, LEVL III, 20-29 MIN: ICD-10-PCS | Mod: S$PBB,,, | Performed by: FAMILY MEDICINE

## 2019-03-11 RX ORDER — ZOLPIDEM TARTRATE 5 MG/1
5 TABLET ORAL NIGHTLY PRN
Qty: 30 TABLET | Refills: 2 | Status: SHIPPED | OUTPATIENT
Start: 2019-03-11 | End: 2020-01-07 | Stop reason: SDUPTHER

## 2019-03-11 NOTE — PROGRESS NOTES
"Subjective:       Patient ID: Dennis Aldana is a 48 y.o. female.    Chief Complaint: Hypertension (Patient is here for a follow up )    Anxiety - better since her grandmother passed away, had been taking care of her full-time  Hx of breast CA - followed by Dr. Low      Hypertension   This is a chronic problem. The current episode started more than 1 year ago. The problem is controlled. Associated symptoms include anxiety (improving) and peripheral edema. Pertinent negatives include no blurred vision, chest pain, headaches, palpitations, shortness of breath or sweats. There are no associated agents to hypertension. Past treatments include beta blockers. The current treatment provides moderate improvement. There are no compliance problems.      Review of Systems   Constitutional: Negative for chills and fever.   Eyes: Negative for blurred vision.   Respiratory: Negative for shortness of breath.    Cardiovascular: Negative for chest pain and palpitations.   Skin: Negative for rash.   Neurological: Negative for headaches.   Psychiatric/Behavioral: Positive for sleep disturbance.       Objective:      Vitals:    03/11/19 0811   BP: 131/85   BP Location: Left arm   Patient Position: Sitting   BP Method: Large (Automatic)   Pulse: (!) 111   Resp: 18   Temp: 98.8 °F (37.1 °C)   TempSrc: Oral   SpO2: 100%   Weight: 104.3 kg (230 lb)   Height: 5' 5" (1.651 m)     Physical Exam   Constitutional: She is oriented to person, place, and time. She appears well-developed and well-nourished.   HENT:   Head: Normocephalic and atraumatic.   Cardiovascular: Normal rate, regular rhythm and normal heart sounds.   Pulmonary/Chest: Effort normal and breath sounds normal.   Musculoskeletal: She exhibits no edema.   Neurological: She is alert and oriented to person, place, and time.   Skin: Skin is warm and dry.   Psychiatric: She has a normal mood and affect. Her behavior is normal.   Nursing note and vitals reviewed.      Assessment: "       1. Essential hypertension, benign    2. TATYANA (generalized anxiety disorder)    3. Primary insomnia    4. Cervical cancer screening - patient to schedule Pap with GYN       Plan:       Essential hypertension, benign  Stable on current regimen  TATYANA (generalized anxiety disorder)  stable  Primary insomnia  -     zolpidem (AMBIEN) 5 MG Tab; Take 1 tablet (5 mg total) by mouth nightly as needed.  Dispense: 30 tablet; Refill: 2  Advised to use meds sparingly    Medication List with Changes/Refills   New Medications    ZOLPIDEM (AMBIEN) 5 MG TAB    Take 1 tablet (5 mg total) by mouth nightly as needed.   Current Medications    CITALOPRAM (CELEXA) 40 MG TABLET    TAKE 1 TABLET BY MOUTH EVERY DAY    LORAZEPAM (ATIVAN) 1 MG TABLET    TAKE 1 TABLET BY MOUTH EVERY DAY    METOPROLOL SUCCINATE (TOPROL-XL) 200 MG 24 HR TABLET    TAKE 1 TABLET BY MOUTH EVERY DAY    PRAMIPEXOLE (MIRAPEX) 1 MG TABLET    TAKE TWO TABLETS BY MOUTH DAILY AT BEDTIME    RANITIDINE (ZANTAC) 300 MG TABLET    TAKE 1 TABLET BY MOUTH EVERY DAY AT BEDTIME    TAMOXIFEN (NOLVADEX) 20 MG TAB    Take 20 mg by mouth once daily.

## 2019-04-06 DIAGNOSIS — K21.9 GASTROESOPHAGEAL REFLUX DISEASE, ESOPHAGITIS PRESENCE NOT SPECIFIED: ICD-10-CM

## 2019-04-08 RX ORDER — PRAMIPEXOLE DIHYDROCHLORIDE 1 MG/1
TABLET ORAL
Qty: 60 TABLET | Refills: 5 | Status: SHIPPED | OUTPATIENT
Start: 2019-04-08 | End: 2019-10-30 | Stop reason: SDUPTHER

## 2019-06-05 DIAGNOSIS — F32.A DEPRESSION, UNSPECIFIED DEPRESSION TYPE: ICD-10-CM

## 2019-06-06 RX ORDER — CITALOPRAM 40 MG/1
TABLET, FILM COATED ORAL
Qty: 30 TABLET | Refills: 4 | Status: SHIPPED | OUTPATIENT
Start: 2019-06-06 | End: 2019-09-13 | Stop reason: SDUPTHER

## 2019-06-16 DIAGNOSIS — I10 ESSENTIAL HYPERTENSION, BENIGN: ICD-10-CM

## 2019-06-17 RX ORDER — METOPROLOL SUCCINATE 200 MG/1
TABLET, EXTENDED RELEASE ORAL
Qty: 30 TABLET | Refills: 5 | Status: SHIPPED | OUTPATIENT
Start: 2019-06-17 | End: 2020-01-07 | Stop reason: SDUPTHER

## 2019-09-13 DIAGNOSIS — F32.A DEPRESSION, UNSPECIFIED DEPRESSION TYPE: ICD-10-CM

## 2019-09-16 RX ORDER — CITALOPRAM 40 MG/1
TABLET, FILM COATED ORAL
Qty: 90 TABLET | Refills: 1 | Status: SHIPPED | OUTPATIENT
Start: 2019-09-16 | End: 2020-01-07 | Stop reason: SDUPTHER

## 2019-10-31 RX ORDER — PRAMIPEXOLE DIHYDROCHLORIDE 1 MG/1
TABLET ORAL
Qty: 60 TABLET | Refills: 5 | Status: SHIPPED | OUTPATIENT
Start: 2019-10-31 | End: 2020-01-07 | Stop reason: SDUPTHER

## 2020-01-03 ENCOUNTER — PATIENT OUTREACH (OUTPATIENT)
Dept: ADMINISTRATIVE | Facility: OTHER | Age: 50
End: 2020-01-03

## 2020-01-03 DIAGNOSIS — Z12.31 BREAST CANCER SCREENING BY MAMMOGRAM: Primary | ICD-10-CM

## 2020-01-06 ENCOUNTER — TELEPHONE (OUTPATIENT)
Dept: PRIMARY CARE CLINIC | Facility: CLINIC | Age: 50
End: 2020-01-06

## 2020-01-06 DIAGNOSIS — Z12.31 ENCOUNTER FOR SCREENING MAMMOGRAM FOR BREAST CANCER: Primary | ICD-10-CM

## 2020-01-06 NOTE — TELEPHONE ENCOUNTER
Attempted to schedule pt mammo, unable to schedule here due to order type. I see patient has had abnormalities with mammograms in the past, is this the order you wanted her to get? I just wanted to verify this is what you wanted before scheduling the patient elsewhere. Also patient wanted me to let you know she has OB/GYN appointment with Dr. Morris

## 2020-01-06 NOTE — TELEPHONE ENCOUNTER
----- Message from Ciro Olea sent at 1/6/2020 10:23 AM CST -----  Contact: [phone] / self   Patient would like to get a mammogram scheduled. Please Advise.

## 2020-01-07 ENCOUNTER — OFFICE VISIT (OUTPATIENT)
Dept: PRIMARY CARE CLINIC | Facility: CLINIC | Age: 50
End: 2020-01-07
Payer: COMMERCIAL

## 2020-01-07 ENCOUNTER — PATIENT OUTREACH (OUTPATIENT)
Dept: ADMINISTRATIVE | Facility: HOSPITAL | Age: 50
End: 2020-01-07

## 2020-01-07 ENCOUNTER — OFFICE VISIT (OUTPATIENT)
Dept: OBSTETRICS AND GYNECOLOGY | Facility: CLINIC | Age: 50
End: 2020-01-07
Payer: COMMERCIAL

## 2020-01-07 VITALS
SYSTOLIC BLOOD PRESSURE: 164 MMHG | OXYGEN SATURATION: 97 % | BODY MASS INDEX: 36.75 KG/M2 | HEIGHT: 65 IN | DIASTOLIC BLOOD PRESSURE: 98 MMHG | HEART RATE: 120 BPM | WEIGHT: 220.56 LBS | TEMPERATURE: 99 F | RESPIRATION RATE: 18 BRPM

## 2020-01-07 VITALS
BODY MASS INDEX: 36.73 KG/M2 | HEIGHT: 65 IN | SYSTOLIC BLOOD PRESSURE: 162 MMHG | DIASTOLIC BLOOD PRESSURE: 98 MMHG | WEIGHT: 220.44 LBS

## 2020-01-07 DIAGNOSIS — G25.81 RLS (RESTLESS LEGS SYNDROME): ICD-10-CM

## 2020-01-07 DIAGNOSIS — F51.01 PRIMARY INSOMNIA: ICD-10-CM

## 2020-01-07 DIAGNOSIS — Z85.3 HISTORY OF LEFT BREAST CANCER: ICD-10-CM

## 2020-01-07 DIAGNOSIS — K21.9 GASTROESOPHAGEAL REFLUX DISEASE, ESOPHAGITIS PRESENCE NOT SPECIFIED: ICD-10-CM

## 2020-01-07 DIAGNOSIS — Z13.6 ENCOUNTER FOR SCREENING FOR CARDIOVASCULAR DISORDERS: ICD-10-CM

## 2020-01-07 DIAGNOSIS — Z23 NEED FOR VACCINATION: ICD-10-CM

## 2020-01-07 DIAGNOSIS — F32.A DEPRESSION, UNSPECIFIED DEPRESSION TYPE: ICD-10-CM

## 2020-01-07 DIAGNOSIS — F41.1 GAD (GENERALIZED ANXIETY DISORDER): ICD-10-CM

## 2020-01-07 DIAGNOSIS — I10 ESSENTIAL HYPERTENSION, BENIGN: Primary | ICD-10-CM

## 2020-01-07 DIAGNOSIS — M79.672 LEFT FOOT PAIN: ICD-10-CM

## 2020-01-07 DIAGNOSIS — Z11.3 SCREENING EXAMINATION FOR STD (SEXUALLY TRANSMITTED DISEASE): ICD-10-CM

## 2020-01-07 DIAGNOSIS — Z01.419 WELL WOMAN EXAM WITH ROUTINE GYNECOLOGICAL EXAM: ICD-10-CM

## 2020-01-07 DIAGNOSIS — Z85.3 HISTORY OF BREAST CANCER IN FEMALE: Primary | ICD-10-CM

## 2020-01-07 DIAGNOSIS — N93.9 ABNORMAL UTERINE BLEEDING: ICD-10-CM

## 2020-01-07 DIAGNOSIS — N80.9 ENDOMETRIOSIS: ICD-10-CM

## 2020-01-07 PROCEDURE — 3008F BODY MASS INDEX DOCD: CPT | Mod: CPTII,S$GLB,, | Performed by: OBSTETRICS & GYNECOLOGY

## 2020-01-07 PROCEDURE — 99999 PR PBB SHADOW E&M-EST. PATIENT-LVL III: ICD-10-PCS | Mod: PBBFAC,,, | Performed by: OBSTETRICS & GYNECOLOGY

## 2020-01-07 PROCEDURE — 3077F SYST BP >= 140 MM HG: CPT | Mod: CPTII,S$GLB,, | Performed by: OBSTETRICS & GYNECOLOGY

## 2020-01-07 PROCEDURE — 87491 CHLMYD TRACH DNA AMP PROBE: CPT | Mod: 59

## 2020-01-07 PROCEDURE — 3008F BODY MASS INDEX DOCD: CPT | Mod: CPTII,S$GLB,, | Performed by: FAMILY MEDICINE

## 2020-01-07 PROCEDURE — 3008F PR BODY MASS INDEX (BMI) DOCUMENTED: ICD-10-PCS | Mod: CPTII,S$GLB,, | Performed by: OBSTETRICS & GYNECOLOGY

## 2020-01-07 PROCEDURE — 99999 PR PBB SHADOW E&M-EST. PATIENT-LVL IV: CPT | Mod: PBBFAC,,, | Performed by: FAMILY MEDICINE

## 2020-01-07 PROCEDURE — 3008F PR BODY MASS INDEX (BMI) DOCUMENTED: ICD-10-PCS | Mod: CPTII,S$GLB,, | Performed by: FAMILY MEDICINE

## 2020-01-07 PROCEDURE — 3077F SYST BP >= 140 MM HG: CPT | Mod: CPTII,S$GLB,, | Performed by: FAMILY MEDICINE

## 2020-01-07 PROCEDURE — 99214 OFFICE O/P EST MOD 30 MIN: CPT | Mod: 25,S$GLB,, | Performed by: FAMILY MEDICINE

## 2020-01-07 PROCEDURE — 99386 PR PREVENTIVE VISIT,NEW,40-64: ICD-10-PCS | Mod: S$GLB,,, | Performed by: OBSTETRICS & GYNECOLOGY

## 2020-01-07 PROCEDURE — 99386 PREV VISIT NEW AGE 40-64: CPT | Mod: S$GLB,,, | Performed by: OBSTETRICS & GYNECOLOGY

## 2020-01-07 PROCEDURE — 90471 IMMUNIZATION ADMIN: CPT | Mod: S$GLB,,, | Performed by: FAMILY MEDICINE

## 2020-01-07 PROCEDURE — 3080F PR MOST RECENT DIASTOLIC BLOOD PRESSURE >= 90 MM HG: ICD-10-PCS | Mod: CPTII,S$GLB,, | Performed by: OBSTETRICS & GYNECOLOGY

## 2020-01-07 PROCEDURE — 88141 CYTOPATH C/V INTERPRET: CPT | Mod: ,,, | Performed by: PATHOLOGY

## 2020-01-07 PROCEDURE — 87661 TRICHOMONAS VAGINALIS AMPLIF: CPT

## 2020-01-07 PROCEDURE — 87481 CANDIDA DNA AMP PROBE: CPT | Mod: 59

## 2020-01-07 PROCEDURE — 99214 PR OFFICE/OUTPT VISIT, EST, LEVL IV, 30-39 MIN: ICD-10-PCS | Mod: 25,S$GLB,, | Performed by: FAMILY MEDICINE

## 2020-01-07 PROCEDURE — 99999 PR PBB SHADOW E&M-EST. PATIENT-LVL IV: ICD-10-PCS | Mod: PBBFAC,,, | Performed by: FAMILY MEDICINE

## 2020-01-07 PROCEDURE — 90471 TDAP VACCINE GREATER THAN OR EQUAL TO 7YO IM: ICD-10-PCS | Mod: S$GLB,,, | Performed by: FAMILY MEDICINE

## 2020-01-07 PROCEDURE — 3077F PR MOST RECENT SYSTOLIC BLOOD PRESSURE >= 140 MM HG: ICD-10-PCS | Mod: CPTII,S$GLB,, | Performed by: FAMILY MEDICINE

## 2020-01-07 PROCEDURE — 90715 TDAP VACCINE 7 YRS/> IM: CPT | Mod: S$GLB,,, | Performed by: FAMILY MEDICINE

## 2020-01-07 PROCEDURE — 88175 CYTOPATH C/V AUTO FLUID REDO: CPT | Performed by: PATHOLOGY

## 2020-01-07 PROCEDURE — 3080F DIAST BP >= 90 MM HG: CPT | Mod: CPTII,S$GLB,, | Performed by: OBSTETRICS & GYNECOLOGY

## 2020-01-07 PROCEDURE — 3080F PR MOST RECENT DIASTOLIC BLOOD PRESSURE >= 90 MM HG: ICD-10-PCS | Mod: CPTII,S$GLB,, | Performed by: FAMILY MEDICINE

## 2020-01-07 PROCEDURE — 99999 PR PBB SHADOW E&M-EST. PATIENT-LVL III: CPT | Mod: PBBFAC,,, | Performed by: OBSTETRICS & GYNECOLOGY

## 2020-01-07 PROCEDURE — 87624 HPV HI-RISK TYP POOLED RSLT: CPT

## 2020-01-07 PROCEDURE — 88141 PR  CYTOPATH CERV/VAG INTERPRET: ICD-10-PCS | Mod: ,,, | Performed by: PATHOLOGY

## 2020-01-07 PROCEDURE — 90715 TDAP VACCINE GREATER THAN OR EQUAL TO 7YO IM: ICD-10-PCS | Mod: S$GLB,,, | Performed by: FAMILY MEDICINE

## 2020-01-07 PROCEDURE — 3077F PR MOST RECENT SYSTOLIC BLOOD PRESSURE >= 140 MM HG: ICD-10-PCS | Mod: CPTII,S$GLB,, | Performed by: OBSTETRICS & GYNECOLOGY

## 2020-01-07 PROCEDURE — 3080F DIAST BP >= 90 MM HG: CPT | Mod: CPTII,S$GLB,, | Performed by: FAMILY MEDICINE

## 2020-01-07 RX ORDER — METOPROLOL SUCCINATE 200 MG/1
200 TABLET, EXTENDED RELEASE ORAL DAILY
Qty: 90 TABLET | Refills: 1 | Status: SHIPPED | OUTPATIENT
Start: 2020-01-07 | End: 2020-08-13

## 2020-01-07 RX ORDER — LORAZEPAM 1 MG/1
1 TABLET ORAL DAILY
Qty: 30 TABLET | Refills: 3 | Status: SHIPPED | OUTPATIENT
Start: 2020-01-07 | End: 2020-07-27

## 2020-01-07 RX ORDER — ZOLPIDEM TARTRATE 5 MG/1
5 TABLET ORAL NIGHTLY PRN
Qty: 30 TABLET | Refills: 3 | Status: SHIPPED | OUTPATIENT
Start: 2020-01-07 | End: 2020-07-27

## 2020-01-07 RX ORDER — FAMOTIDINE 40 MG/1
40 TABLET, FILM COATED ORAL NIGHTLY
Qty: 90 TABLET | Refills: 1 | Status: SHIPPED | OUTPATIENT
Start: 2020-01-07 | End: 2020-07-01

## 2020-01-07 RX ORDER — TAMOXIFEN CITRATE 20 MG/1
20 TABLET ORAL DAILY
Qty: 30 TABLET | Refills: 1 | Status: SHIPPED | OUTPATIENT
Start: 2020-01-07 | End: 2020-05-07

## 2020-01-07 RX ORDER — PRAMIPEXOLE DIHYDROCHLORIDE 1 MG/1
2 TABLET ORAL NIGHTLY
Qty: 180 TABLET | Refills: 1 | Status: SHIPPED | OUTPATIENT
Start: 2020-01-07 | End: 2020-07-01

## 2020-01-07 RX ORDER — CITALOPRAM 40 MG/1
40 TABLET, FILM COATED ORAL DAILY
Qty: 90 TABLET | Refills: 1 | Status: SHIPPED | OUTPATIENT
Start: 2020-01-07 | End: 2020-07-01

## 2020-01-07 NOTE — PROGRESS NOTES
"Subjective:       Patient ID: Dennis Aldana is a 49 y.o. female.    Chief Complaint: Foot Pain (she says she feels like she's walking [place] golf ball) and Back Pain (says she feels a lump on her back that only hurts when she does a lot )    Lost insurance for a while, so was off all meds except Mirapex and Celexa for several months. Can tell her BP and HR has been high, but no chest pain or palpitations. Not dizzy or lightheaded.  BP generally good on beta blocker.  Noticed non tender 'lump' on left upper back a few months. No injury, no change in size or color, no drainage.  Also c/o left foot pain, feels "like I'm walking on a golf ball" on the ball of foot. No injury. Pain worse when ambulating without shoes  Has GYN appointment this afternoon, overdue for mammogram    Review of Systems   Constitutional: Negative for activity change and unexpected weight change.   HENT: Negative for hearing loss, rhinorrhea and trouble swallowing.    Eyes: Negative for discharge and visual disturbance.   Respiratory: Negative for chest tightness and wheezing.    Cardiovascular: Negative for chest pain and palpitations.   Gastrointestinal: Negative for blood in stool, constipation, diarrhea and vomiting.   Endocrine: Negative for polydipsia and polyuria.   Genitourinary: Positive for menstrual problem. Negative for difficulty urinating, dysuria and hematuria.   Musculoskeletal: Positive for arthralgias. Negative for joint swelling and neck pain.   Neurological: Negative for weakness and headaches.   Psychiatric/Behavioral: Negative for confusion and dysphoric mood.       Objective:      Vitals:    01/07/20 0932   BP: (!) 164/98   BP Location: Left arm   Patient Position: Sitting   BP Method: Large (Manual)   Pulse: (!) 120   Resp: 18   Temp: 99.2 °F (37.3 °C)   TempSrc: Oral   SpO2: 97%   Weight: 100 kg (220 lb 9.1 oz)   Height: 5' 5" (1.651 m)     Physical Exam   Constitutional: She is oriented to person, place, and time. She " appears well-developed and well-nourished.   HENT:   Head: Normocephalic and atraumatic.   Cardiovascular: Regular rhythm and normal heart sounds. Tachycardia present.   Pulmonary/Chest: Effort normal and breath sounds normal.   Musculoskeletal: She exhibits no edema.        Feet:    Neurological: She is alert and oriented to person, place, and time.   Skin: Skin is warm and dry.        Psychiatric: She has a normal mood and affect. Her behavior is normal.   Nursing note and vitals reviewed.      Lab Results   Component Value Date    WBC 12.21 (H) 05/01/2010    HGB 13.8 05/01/2010    HCT 43.3 05/01/2010     (H) 05/01/2010    CHOL 185 05/01/2010    TRIG 89 05/01/2010    HDL 49 05/01/2010    ALT 21 05/01/2010    AST 14 05/01/2010     05/01/2010    K 5.1 05/01/2010     05/01/2010    CREATININE 0.8 05/01/2010    BUN 13 05/01/2010    CO2 27 05/01/2010    TSH 1.51 05/01/2010      Assessment:       1. Essential hypertension, benign    2. Depression, unspecified depression type    3. TATYANA (generalized anxiety disorder)    4. Gastroesophageal reflux disease, esophagitis presence not specified    5. Primary insomnia    6. History of left breast cancer    7. Need for vaccination    8. Encounter for screening for cardiovascular disorders    9. Left foot pain    10. RLS (restless legs syndrome)        Plan:       Essential hypertension, benign  -     metoprolol succinate (TOPROL-XL) 200 MG 24 hr tablet; Take 1 tablet (200 mg total) by mouth once daily.  Dispense: 90 tablet; Refill: 1  -     CBC auto differential; Future; Expected date: 01/07/2020  -     Comprehensive metabolic panel; Future; Expected date: 01/07/2020  -     TSH; Future; Expected date: 01/07/2020  Restart medications, blood pressure check in 2 weeks  Depression, unspecified depression type  -     citalopram (CELEXA) 40 MG tablet; Take 1 tablet (40 mg total) by mouth once daily.  Dispense: 90 tablet; Refill: 1  -     TSH; Future; Expected date:  01/07/2020  Stable on current regimen  TATYANA (generalized anxiety disorder)  -     LORazepam (ATIVAN) 1 MG tablet; Take 1 tablet (1 mg total) by mouth once daily. As needed for anxiety  Dispense: 30 tablet; Refill: 3  -     TSH; Future; Expected date: 01/07/2020    Gastroesophageal reflux disease, esophagitis presence not specified  -     famotidine (PEPCID) 40 MG tablet; Take 1 tablet (40 mg total) by mouth every evening.  Dispense: 90 tablet; Refill: 1    Primary insomnia  -     zolpidem (AMBIEN) 5 MG Tab; Take 1 tablet (5 mg total) by mouth nightly as needed.  Dispense: 30 tablet; Refill: 3    History of left breast cancer  Completed treatment in 2018  Need for vaccination  -     Tdap Vaccine    Encounter for screening for cardiovascular disorders  -     Lipid panel; Future; Expected date: 01/07/2020    Left foot pain  -     X-Ray Foot Complete 3 view Left; Future; Expected date: 01/07/2020  Unclear etiology, will get x-ray.  See Podiatry if symptoms persist  RLS (restless legs syndrome)  -     pramipexole (MIRAPEX) 1 MG tablet; Take 2 tablets (2 mg total) by mouth every evening.  Dispense: 180 tablet; Refill: 1      Medication List with Changes/Refills   New Medications    FAMOTIDINE (PEPCID) 40 MG TABLET    Take 1 tablet (40 mg total) by mouth every evening.   Changed and/or Refilled Medications    Modified Medication Previous Medication    CITALOPRAM (CELEXA) 40 MG TABLET citalopram (CELEXA) 40 MG tablet       Take 1 tablet (40 mg total) by mouth once daily.    TAKE 1 TABLET BY MOUTH EVERY DAY    LORAZEPAM (ATIVAN) 1 MG TABLET LORazepam (ATIVAN) 1 MG tablet       Take 1 tablet (1 mg total) by mouth once daily. As needed for anxiety    TAKE 1 TABLET BY MOUTH EVERY DAY    METOPROLOL SUCCINATE (TOPROL-XL) 200 MG 24 HR TABLET metoprolol succinate (TOPROL-XL) 200 MG 24 hr tablet       Take 1 tablet (200 mg total) by mouth once daily.    TAKE 1 TABLET BY MOUTH EVERY DAY    PRAMIPEXOLE (MIRAPEX) 1 MG TABLET pramipexole  (MIRAPEX) 1 MG tablet       Take 2 tablets (2 mg total) by mouth every evening.    TAKE TWO TABLETS BY MOUTH DAILY AT BEDTIME    ZOLPIDEM (AMBIEN) 5 MG TAB zolpidem (AMBIEN) 5 MG Tab       Take 1 tablet (5 mg total) by mouth nightly as needed.    Take 1 tablet (5 mg total) by mouth nightly as needed.   Discontinued Medications    RANITIDINE (ZANTAC) 300 MG TABLET    TAKE 1 TABLET BY MOUTH EVERY DAY AT BEDTIME    TAMOXIFEN (NOLVADEX) 20 MG TAB    Take 20 mg by mouth once daily.

## 2020-01-07 NOTE — PROGRESS NOTES
SUBJECTIVE:   49 y.o. female   for annual routine Pap and checkup. Patient's last menstrual period was 2019..  PMHx significant for the morbidities listed below. Denies bleeding. Denies vaginal discharge. Denies urinary incontinence. Not menopause. Is not taking HRT. Denies regular exercise. Denies tobacco. Is taking calcium and vitamin D supplements. Is currently sexually active,  no issues. Denies contraception.Denies domestic violence.     In addition she complains of endometriosis, she has had 4 diagnostic laparoscopies, she reports pain but most pain is associated with he menses which are heavy, lasting often longer than 7 days, she had a D&C a decade ago. The menorrhagia was present before starting the Tamoxifen.      Personal history of breast cancer: tx'd in 2018, lumpectomy left, negative nodes, ER/SD positive, hx of tamoxifen dc\d due to loss of insurance, was following with physician at   Family history: denies family history breast, ovarian, endometrial and colon cancer  Abdominal surgeries: 4 diagnostic laparoscopy for endometriosis    Pap : around 10 years ago, hx of colpo but no excision  MMG : 1 year ago, ordered today  Colonoscopy not indicated           Past Medical History:   Diagnosis Date    Anxiety     Depression     Essential hypertension, benign 1/3/2018    Hypertension     RLS (restless legs syndrome)      Past Surgical History:   Procedure Laterality Date    BREAST LUMPECTOMY      KNEE SURGERY      SHOULDER SURGERY      TONSILLECTOMY       Social History     Socioeconomic History    Marital status:      Spouse name: Not on file    Number of children: Not on file    Years of education: Not on file    Highest education level: Not on file   Occupational History    Not on file   Social Needs    Financial resource strain: Not on file    Food insecurity:     Worry: Not on file     Inability: Not on file    Transportation needs:     Medical: Not on  file     Non-medical: Not on file   Tobacco Use    Smoking status: Never Smoker    Smokeless tobacco: Never Used   Substance and Sexual Activity    Alcohol use: Yes     Comment: occ.     Drug use: No    Sexual activity: Yes     Partners: Male     Birth control/protection: None   Lifestyle    Physical activity:     Days per week: Not on file     Minutes per session: Not on file    Stress: Not on file   Relationships    Social connections:     Talks on phone: Not on file     Gets together: Not on file     Attends Synagogue service: Not on file     Active member of club or organization: Not on file     Attends meetings of clubs or organizations: Not on file     Relationship status: Not on file   Other Topics Concern    Not on file   Social History Narrative    Not on file     Family History   Problem Relation Age of Onset    Hypertension Mother     Diabetes Mother     Diabetes Father     Colon cancer Neg Hx     Ovarian cancer Neg Hx      OB History    Para Term  AB Living   1 1 1         SAB TAB Ectopic Multiple Live Births                  # Outcome Date GA Lbr Epi/2nd Weight Sex Delivery Anes PTL Lv   1 Term                  Current Outpatient Medications   Medication Sig Dispense Refill    citalopram (CELEXA) 40 MG tablet Take 1 tablet (40 mg total) by mouth once daily. 90 tablet 1    famotidine (PEPCID) 40 MG tablet Take 1 tablet (40 mg total) by mouth every evening. 90 tablet 1    LORazepam (ATIVAN) 1 MG tablet Take 1 tablet (1 mg total) by mouth once daily. As needed for anxiety 30 tablet 3    metoprolol succinate (TOPROL-XL) 200 MG 24 hr tablet Take 1 tablet (200 mg total) by mouth once daily. 90 tablet 1    pramipexole (MIRAPEX) 1 MG tablet Take 2 tablets (2 mg total) by mouth every evening. 180 tablet 1    zolpidem (AMBIEN) 5 MG Tab Take 1 tablet (5 mg total) by mouth nightly as needed. 30 tablet 3    tamoxifen (NOLVADEX) 20 MG Tab Take 1 tablet (20 mg total) by mouth once  "daily. 30 tablet 1     No current facility-administered medications for this visit.      Allergies: Patient has no known allergies.     ROS:  Constitutional: no weight loss, weight gain, fever, fatigue  Eyes:  No vision changes, glasses/contacts  ENT/Mouth: No ulcers, sinus problems, ears ringing, headache  Cardiovascular: No inability to lie flat, chest pain, exercise intolerance, swelling, heart palpitations  Respiratory: No wheezing, coughing blood, shortness of breath, or cough  Gastrointestinal: No diarrhea, bloody stool, nausea/vomiting, constipation, gas, hemorrhoids  Genitourinary: No blood in urine, painful urination, urgency of urination, frequency of urination, incomplete emptying, incontinence, abnormal bleeding, painful periods, heavy periods, vaginal discharge, vaginal odor, painful intercourse, sexual problems, bleeding after intercourse.  Musculoskeletal: No muscle weakness  Skin/Breast: No painful breasts, nipple discharge, masses, rash, ulcers  Neurological: No passing out, seizures, numbness, headache  Endocrine: No diabetes, hypothyroid, hyperthyroid, hot flashes, hair loss, abnormal hair growth, ance  Psychiatric: No depression, crying  Hematologic: No bruises, bleeding, swollen lymph nodes, anemia.      OBJECTIVE:   The patient appears well, alert, oriented x 3, in no distress.  BP (!) 162/98   Ht 5' 5" (1.651 m)   Wt 100 kg (220 lb 7.4 oz)   LMP 12/30/2019   BMI 36.69 kg/m²   NECK: negative, no thyromegaly, trachea midline  SKIN: normal and no acne, striae, hirsutism  BREAST EXAM: breasts appear normal, no suspicious masses, no skin or nipple changes or axillary nodes, lumpectomy scar present 6 o'clock left  ABDOMEN: soft, non-tender; bowel sounds normal; no masses,  no organomegaly and no hernias, masses, or hepatosplenomegaly  GENITALIA: normal external genitalia, no erythema, no discharge  URETHRA: normal appearing urethra with no masses, tenderness or lesions and normal urethra, normal " urethral meatus  VAGINA: Normal  CERVIX: no lesions or cervical motion tenderness  UTERUS: normal size, contour, position, consistency, mobility, non-tender  ADNEXA: normal adnexa    \  ASSESSMENT:   Dennis was seen today for well woman.    Diagnoses and all orders for this visit:    History of breast cancer in female  -     tamoxifen (NOLVADEX) 20 MG Tab; Take 1 tablet (20 mg total) by mouth once daily. She will follow-up with her primary oncologist for further prescription.    Well woman exam with routine gynecological exam  -     Liquid-Based Pap Smear, Screening  -     HPV High Risk Genotypes, PCR    Endometriosis  - discussed at length management options, patient is 4 years from the average age of menopause and given her history of 4 laparoscopies it is likely she has substantial disease burden, and after discussing this with the patient that menopause will likely improve her symptoms she elects for expectant management given potential for operative complication (e.g. Bowel and/or bladder injury)    Screening examination for STD (sexually transmitted disease)  -     Vaginosis Screen by DNA Probe  -     C. trachomatis/N. gonorrhoeae by AMP DNA  -     HIV 1/2 Ag/Ab (4th Gen); Future  -     RPR; Future  -     Hepatitis panel, acute; Future    Abnormal uterine bleeding  - RTC for embx    Orders Placed This Encounter   Procedures    HPV High Risk Genotypes, PCR    Vaginosis Screen by DNA Probe    C. trachomatis/N. gonorrhoeae by AMP DNA    HIV 1/2 Ag/Ab (4th Gen)    RPR    Hepatitis panel, acute       Follow up in about 2 weeks (around 1/21/2020) for Endometrial biopsy.

## 2020-01-07 NOTE — PROGRESS NOTES
Verified pt ID using name and . NKDA. Administered Tdap vaccine in left deltoid per physician order using aseptic technique. Aspirated and no blood return noted. Pt tolerated well with no adverse reactions noted.

## 2020-01-08 LAB
BACTERIAL VAGINOSIS DNA: NEGATIVE
C TRACH DNA SPEC QL NAA+PROBE: NOT DETECTED
CANDIDA GLABRATA DNA: NEGATIVE
CANDIDA KRUSEI DNA: NEGATIVE
CANDIDA RRNA VAG QL PROBE: NEGATIVE
N GONORRHOEA DNA SPEC QL NAA+PROBE: NOT DETECTED
T VAGINALIS RRNA GENITAL QL PROBE: NEGATIVE

## 2020-01-08 NOTE — PROGRESS NOTES
Immunizations reviewed. [facility] reviewed. Care Everywhere reviewed. [facility] reviewed w/ no results yielded. PAP Smear w/ HPV Co-test done 01/07/20. Fasting labs scheduled for 01/21/20 including Lipid Panel. Mammogram scheduled for 01/27/20.  Pre-visit chart review completed.

## 2020-01-10 LAB
HPV HR 12 DNA SPEC QL NAA+PROBE: POSITIVE
HPV16 AG SPEC QL: POSITIVE
HPV18 DNA SPEC QL NAA+PROBE: NEGATIVE

## 2020-01-22 LAB
FINAL PATHOLOGIC DIAGNOSIS: NORMAL
Lab: NORMAL

## 2020-01-23 ENCOUNTER — TELEPHONE (OUTPATIENT)
Dept: OBSTETRICS AND GYNECOLOGY | Facility: CLINIC | Age: 50
End: 2020-01-23

## 2020-01-23 NOTE — TELEPHONE ENCOUNTER
----- Message from ADA Morris MD sent at 1/23/2020  8:51 AM CST -----  Pap normal but HPV 16 positive, will need colpo, please contact pt and schedule

## 2020-01-24 ENCOUNTER — TELEPHONE (OUTPATIENT)
Dept: OBSTETRICS AND GYNECOLOGY | Facility: CLINIC | Age: 50
End: 2020-01-24

## 2020-01-27 ENCOUNTER — TELEPHONE (OUTPATIENT)
Dept: OBSTETRICS AND GYNECOLOGY | Facility: CLINIC | Age: 50
End: 2020-01-27

## 2020-01-27 NOTE — TELEPHONE ENCOUNTER
----- Message from Steven Longo sent at 1/27/2020  7:51 AM CST -----  Contact: SHIRA GIVENS   Name of Who is Calling: SHIRA GIVENS       What is the request in detail: PATIENT IS REQUESTING TO RESCHEDULE HER PROCEDURE WITH DR. MILLER. Please contact to further advise.      Can the clinic reply by [facility]: NO      What Number [facility] Back [facility] [phone]

## 2020-01-27 NOTE — TELEPHONE ENCOUNTER
Spoke with pt. Pt states she needs to reschedule her procedures for today. Appt rescheduled for 1/30/20. Pt verbalized understanding.

## 2020-01-30 ENCOUNTER — CLINICAL SUPPORT (OUTPATIENT)
Dept: PRIMARY CARE CLINIC | Facility: CLINIC | Age: 50
End: 2020-01-30
Payer: COMMERCIAL

## 2020-01-30 ENCOUNTER — PROCEDURE VISIT (OUTPATIENT)
Dept: OBSTETRICS AND GYNECOLOGY | Facility: CLINIC | Age: 50
End: 2020-01-30
Payer: COMMERCIAL

## 2020-01-30 VITALS
SYSTOLIC BLOOD PRESSURE: 132 MMHG | WEIGHT: 219 LBS | DIASTOLIC BLOOD PRESSURE: 88 MMHG | HEIGHT: 65 IN | BODY MASS INDEX: 36.49 KG/M2

## 2020-01-30 VITALS — SYSTOLIC BLOOD PRESSURE: 124 MMHG | HEART RATE: 83 BPM | DIASTOLIC BLOOD PRESSURE: 68 MMHG

## 2020-01-30 DIAGNOSIS — N89.8 VAGINAL DISCHARGE: ICD-10-CM

## 2020-01-30 DIAGNOSIS — N93.9 ABNORMAL UTERINE BLEEDING (AUB): Primary | ICD-10-CM

## 2020-01-30 DIAGNOSIS — R87.810 CERVICAL HIGH RISK HPV (HUMAN PAPILLOMAVIRUS) TEST POSITIVE: ICD-10-CM

## 2020-01-30 DIAGNOSIS — I10 ESSENTIAL HYPERTENSION, BENIGN: Primary | ICD-10-CM

## 2020-01-30 LAB
B-HCG UR QL: NEGATIVE
CTP QC/QA: YES

## 2020-01-30 PROCEDURE — 88342 IMHCHEM/IMCYTCHM 1ST ANTB: CPT | Mod: 26,,, | Performed by: PATHOLOGY

## 2020-01-30 PROCEDURE — 99999 PR PBB SHADOW E&M-EST. PATIENT-LVL I: ICD-10-PCS | Mod: PBBFAC,,,

## 2020-01-30 PROCEDURE — 57454 BX/CURETT OF CERVIX W/SCOPE: CPT | Mod: S$GLB,,, | Performed by: OBSTETRICS & GYNECOLOGY

## 2020-01-30 PROCEDURE — 88342 CHG IMMUNOCYTOCHEMISTRY: ICD-10-PCS | Mod: 26,,, | Performed by: PATHOLOGY

## 2020-01-30 PROCEDURE — 88305 TISSUE EXAM BY PATHOLOGIST: CPT | Performed by: PATHOLOGY

## 2020-01-30 PROCEDURE — 88305 TISSUE EXAM BY PATHOLOGIST: CPT | Mod: 26,,, | Performed by: PATHOLOGY

## 2020-01-30 PROCEDURE — 57454 COLPOSCOPY: ICD-10-PCS | Mod: S$GLB,,, | Performed by: OBSTETRICS & GYNECOLOGY

## 2020-01-30 PROCEDURE — 99999 PR PBB SHADOW E&M-EST. PATIENT-LVL I: CPT | Mod: PBBFAC,,,

## 2020-01-30 PROCEDURE — 88305 TISSUE EXAM BY PATHOLOGIST: CPT | Mod: 59 | Performed by: PATHOLOGY

## 2020-01-30 PROCEDURE — 88305 TISSUE EXAM BY PATHOLOGIST: ICD-10-PCS | Mod: 26,,, | Performed by: PATHOLOGY

## 2020-01-30 PROCEDURE — 81025 POCT URINE PREGNANCY: ICD-10-PCS | Mod: S$GLB,,, | Performed by: OBSTETRICS & GYNECOLOGY

## 2020-01-30 PROCEDURE — 81025 URINE PREGNANCY TEST: CPT | Mod: S$GLB,,, | Performed by: OBSTETRICS & GYNECOLOGY

## 2020-01-30 NOTE — PROCEDURES
Procedures     01/30/2020    11:15 AM CST    PROCEDURE: Endometrial biopsy    INDICATION: AUB    PATIENT CONSENT:      PRE ENDOMETRIAL BIOPSY COUNSELING:   The patient was informed of the risk of bleeding, infection, uterine perforation and  pain and that the test will rule-out endometrial cancer with accuracy greater than   95%. She was counseled on the alternatives to endometrial biopsy.  All of her   questions were answered.  Patient gives written consent    ANESTHESIA: None    Labs: UPT performed prior to the procedure was negative.    PROCEDURE NOTE:  The cervix was visualized with a speculum and swabbed with Betadinex3.  The anterior lip of the cervix was grasped with a single toothed tenaculum, **patient does not pull** and a sterile endometrial pipelle was inserted into the uterus to a sound length of 9 cm. 1 passes were made with the pipelle and moderate amount of tissue was obtained. The specimen was placed in formalin and sent to Pathology for evaluation.     COMPLICATIONS: None    DISPOSITION: The patient tolerated the above procedure well.      POST ENDOMETRIAL BIOPSY COUNSELING:  - Manage post biopsy cramping with NSAIDs or Tylenol.  - Expect spotting or light bleeding for a few days.  - Report bleeding heavier than a period, fever > 101.0 F, worsening pain or a foul  smelling vaginal discharge.      Vitaliy Morris MD

## 2020-01-30 NOTE — PROCEDURES
Colposcopy  Date/Time: 1/30/2020 11:15 AM  Performed by: ADA Morris MD  Authorized by: ADA Morris MD     Consent Done?:  Yes (Written)  Assistants?: Yes    List of assistants:  Travon Camarillo    Colposcopy Site:  Cervix  Acrowhite Lesion? Yes (Faint near 12 o'clock)    Atypical Vessels: No    Transformation Zone Adequate?: Yes    Biopsy?: Yes         Location:  Cervix ((12 00))  ECC Performed?: Yes    LEEP Performed?: No

## 2020-01-30 NOTE — PROGRESS NOTES
Patient identified by name and date of birth. States here for a blood pressure check. Medications and allergies reviewed. Blood pressure and pulse obtained, tolerated well by pt. Provider notified of results. No changes to medication regimen at this time per Dr. Roger. Patient notified and states understanding.

## 2020-02-07 DIAGNOSIS — R73.01 FASTING HYPERGLYCEMIA: ICD-10-CM

## 2020-02-07 DIAGNOSIS — Z12.11 COLON CANCER SCREENING: Primary | ICD-10-CM

## 2020-02-07 DIAGNOSIS — D50.9 MICROCYTIC ANEMIA: ICD-10-CM

## 2020-02-12 LAB
FINAL PATHOLOGIC DIAGNOSIS: NORMAL
GROSS: NORMAL

## 2020-02-13 ENCOUNTER — CLINICAL SUPPORT (OUTPATIENT)
Dept: PRIMARY CARE CLINIC | Facility: CLINIC | Age: 50
End: 2020-02-13
Payer: COMMERCIAL

## 2020-02-13 ENCOUNTER — TELEPHONE (OUTPATIENT)
Dept: OBSTETRICS AND GYNECOLOGY | Facility: CLINIC | Age: 50
End: 2020-02-13

## 2020-02-13 DIAGNOSIS — R73.01 FASTING HYPERGLYCEMIA: ICD-10-CM

## 2020-02-13 DIAGNOSIS — D50.9 MICROCYTIC ANEMIA: ICD-10-CM

## 2020-02-13 LAB
BASOPHILS # BLD AUTO: 0.1 K/UL (ref 0–0.2)
BASOPHILS NFR BLD: 0.5 % (ref 0–1.9)
DIFFERENTIAL METHOD: ABNORMAL
EOSINOPHIL # BLD AUTO: 0.1 K/UL (ref 0–0.5)
EOSINOPHIL NFR BLD: 1.3 % (ref 0–8)
ERYTHROCYTE [DISTWIDTH] IN BLOOD BY AUTOMATED COUNT: 19.3 % (ref 11.5–14.5)
ESTIMATED AVG GLUCOSE: 160 MG/DL (ref 68–131)
FERRITIN SERPL-MCNC: 2 NG/ML (ref 20–300)
GLUCOSE SERPL-MCNC: 122 MG/DL (ref 74–118)
HBA1C MFR BLD HPLC: 7.2 % (ref 4–5.6)
HCT VFR BLD AUTO: 30.1 % (ref 37–48.5)
HGB BLD-MCNC: 9.2 G/DL (ref 12–16)
IRON SERPL-MCNC: 17 UG/DL (ref 30–160)
LYMPHOCYTES # BLD AUTO: 2 K/UL (ref 1–4.8)
LYMPHOCYTES NFR BLD: 20.4 % (ref 18–48)
MCH RBC QN AUTO: 19.3 PG (ref 27–31)
MCHC RBC AUTO-ENTMCNC: 30.6 G/DL (ref 32–36)
MCV RBC AUTO: 63 FL (ref 82–98)
MONOCYTES # BLD AUTO: 0.6 K/UL (ref 0.3–1)
MONOCYTES NFR BLD: 5.8 % (ref 4–15)
NEUTROPHILS # BLD AUTO: 7.2 K/UL (ref 1.8–7.7)
NEUTROPHILS NFR BLD: 72 % (ref 38–73)
PLATELET # BLD AUTO: 379 K/UL (ref 150–350)
PMV BLD AUTO: 8.5 FL (ref 9.2–12.9)
RBC # BLD AUTO: 4.77 M/UL (ref 4–5.4)
SATURATED IRON: 3 % (ref 20–50)
TOTAL IRON BINDING CAPACITY: 564 UG/DL (ref 250–450)
TRANSFERRIN SERPL-MCNC: 381 MG/DL (ref 200–375)
WBC # BLD AUTO: 10 K/UL (ref 3.9–12.7)

## 2020-02-13 PROCEDURE — 82728 ASSAY OF FERRITIN: CPT

## 2020-02-13 PROCEDURE — 83540 ASSAY OF IRON: CPT

## 2020-02-13 PROCEDURE — 82947 ASSAY GLUCOSE BLOOD QUANT: CPT

## 2020-02-13 PROCEDURE — 83036 HEMOGLOBIN GLYCOSYLATED A1C: CPT

## 2020-02-13 PROCEDURE — 36415 PR COLLECTION VENOUS BLOOD,VENIPUNCTURE: ICD-10-PCS | Mod: S$GLB,,, | Performed by: FAMILY MEDICINE

## 2020-02-13 PROCEDURE — 36415 COLL VENOUS BLD VENIPUNCTURE: CPT | Mod: S$GLB,,, | Performed by: FAMILY MEDICINE

## 2020-02-13 PROCEDURE — 85025 COMPLETE CBC W/AUTO DIFF WBC: CPT

## 2020-02-14 PROBLEM — D50.9 IRON DEFICIENCY ANEMIA: Status: ACTIVE | Noted: 2020-02-14

## 2020-02-14 PROBLEM — E78.5 HYPERLIPIDEMIA: Status: ACTIVE | Noted: 2020-02-14

## 2020-02-14 PROBLEM — E78.5 TYPE 2 DIABETES MELLITUS WITH HYPERLIPIDEMIA: Status: ACTIVE | Noted: 2020-02-14

## 2020-02-14 PROBLEM — E11.69 TYPE 2 DIABETES MELLITUS WITH HYPERLIPIDEMIA: Status: ACTIVE | Noted: 2020-02-14

## 2020-02-14 LAB
FINAL PATHOLOGIC DIAGNOSIS: NORMAL
GROSS: NORMAL

## 2020-02-20 DIAGNOSIS — E11.9 TYPE 2 DIABETES MELLITUS WITHOUT COMPLICATION: ICD-10-CM

## 2020-02-27 ENCOUNTER — OFFICE VISIT (OUTPATIENT)
Dept: PRIMARY CARE CLINIC | Facility: CLINIC | Age: 50
End: 2020-02-27
Payer: COMMERCIAL

## 2020-02-27 VITALS
WEIGHT: 216 LBS | SYSTOLIC BLOOD PRESSURE: 136 MMHG | RESPIRATION RATE: 18 BRPM | OXYGEN SATURATION: 98 % | TEMPERATURE: 99 F | DIASTOLIC BLOOD PRESSURE: 84 MMHG | BODY MASS INDEX: 35.99 KG/M2 | HEIGHT: 65 IN | HEART RATE: 103 BPM

## 2020-02-27 DIAGNOSIS — E11.69 TYPE 2 DIABETES MELLITUS WITH HYPERLIPIDEMIA: ICD-10-CM

## 2020-02-27 DIAGNOSIS — Z85.3 HISTORY OF LEFT BREAST CANCER: ICD-10-CM

## 2020-02-27 DIAGNOSIS — E78.5 TYPE 2 DIABETES MELLITUS WITH HYPERLIPIDEMIA: ICD-10-CM

## 2020-02-27 DIAGNOSIS — J32.9 SINUSITIS, UNSPECIFIED CHRONICITY, UNSPECIFIED LOCATION: Primary | ICD-10-CM

## 2020-02-27 DIAGNOSIS — K21.9 GASTROESOPHAGEAL REFLUX DISEASE, ESOPHAGITIS PRESENCE NOT SPECIFIED: ICD-10-CM

## 2020-02-27 DIAGNOSIS — J40 BRONCHITIS: ICD-10-CM

## 2020-02-27 DIAGNOSIS — R00.0 SINUS TACHYCARDIA: ICD-10-CM

## 2020-02-27 DIAGNOSIS — E78.5 HYPERLIPIDEMIA, UNSPECIFIED HYPERLIPIDEMIA TYPE: ICD-10-CM

## 2020-02-27 DIAGNOSIS — I10 ESSENTIAL HYPERTENSION, BENIGN: ICD-10-CM

## 2020-02-27 PROCEDURE — 3008F PR BODY MASS INDEX (BMI) DOCUMENTED: ICD-10-PCS | Mod: CPTII,S$GLB,, | Performed by: FAMILY MEDICINE

## 2020-02-27 PROCEDURE — 3051F HG A1C>EQUAL 7.0%<8.0%: CPT | Mod: CPTII,S$GLB,, | Performed by: FAMILY MEDICINE

## 2020-02-27 PROCEDURE — 99213 PR OFFICE/OUTPT VISIT, EST, LEVL III, 20-29 MIN: ICD-10-PCS | Mod: 25,S$GLB,, | Performed by: FAMILY MEDICINE

## 2020-02-27 PROCEDURE — 96372 PR INJECTION,THERAP/PROPH/DIAG2ST, IM OR SUBCUT: ICD-10-PCS | Mod: S$GLB,,, | Performed by: FAMILY MEDICINE

## 2020-02-27 PROCEDURE — 3079F PR MOST RECENT DIASTOLIC BLOOD PRESSURE 80-89 MM HG: ICD-10-PCS | Mod: CPTII,S$GLB,, | Performed by: FAMILY MEDICINE

## 2020-02-27 PROCEDURE — 3079F DIAST BP 80-89 MM HG: CPT | Mod: CPTII,S$GLB,, | Performed by: FAMILY MEDICINE

## 2020-02-27 PROCEDURE — 96372 THER/PROPH/DIAG INJ SC/IM: CPT | Mod: S$GLB,,, | Performed by: FAMILY MEDICINE

## 2020-02-27 PROCEDURE — 99999 PR PBB SHADOW E&M-EST. PATIENT-LVL IV: ICD-10-PCS | Mod: PBBFAC,,, | Performed by: FAMILY MEDICINE

## 2020-02-27 PROCEDURE — 99213 OFFICE O/P EST LOW 20 MIN: CPT | Mod: 25,S$GLB,, | Performed by: FAMILY MEDICINE

## 2020-02-27 PROCEDURE — 3075F PR MOST RECENT SYSTOLIC BLOOD PRESS GE 130-139MM HG: ICD-10-PCS | Mod: CPTII,S$GLB,, | Performed by: FAMILY MEDICINE

## 2020-02-27 PROCEDURE — 3051F PR MOST RECENT HEMOGLOBIN A1C LEVEL 7.0 - < 8.0%: ICD-10-PCS | Mod: CPTII,S$GLB,, | Performed by: FAMILY MEDICINE

## 2020-02-27 PROCEDURE — 99999 PR PBB SHADOW E&M-EST. PATIENT-LVL IV: CPT | Mod: PBBFAC,,, | Performed by: FAMILY MEDICINE

## 2020-02-27 PROCEDURE — 3008F BODY MASS INDEX DOCD: CPT | Mod: CPTII,S$GLB,, | Performed by: FAMILY MEDICINE

## 2020-02-27 PROCEDURE — 3075F SYST BP GE 130 - 139MM HG: CPT | Mod: CPTII,S$GLB,, | Performed by: FAMILY MEDICINE

## 2020-02-27 RX ORDER — AZITHROMYCIN 250 MG/1
TABLET, FILM COATED ORAL
Qty: 6 TABLET | Refills: 0 | Status: SHIPPED | OUTPATIENT
Start: 2020-02-27 | End: 2020-03-02

## 2020-02-27 RX ORDER — TRIAMCINOLONE ACETONIDE 40 MG/ML
40 INJECTION, SUSPENSION INTRA-ARTICULAR; INTRAMUSCULAR ONCE
Status: COMPLETED | OUTPATIENT
Start: 2020-02-27 | End: 2020-02-27

## 2020-02-27 RX ORDER — FERROUS SULFATE 324(65)MG
325 TABLET, DELAYED RELEASE (ENTERIC COATED) ORAL DAILY
Qty: 100 TABLET | Refills: 1 | Status: SHIPPED | OUTPATIENT
Start: 2020-02-27 | End: 2020-12-22

## 2020-02-27 RX ORDER — METFORMIN HYDROCHLORIDE 500 MG/1
500 TABLET ORAL
Qty: 60 TABLET | Refills: 5 | Status: SHIPPED | OUTPATIENT
Start: 2020-02-27 | End: 2021-03-12

## 2020-02-27 RX ORDER — PROMETHAZINE HYDROCHLORIDE AND CODEINE PHOSPHATE 6.25; 1 MG/5ML; MG/5ML
5 SOLUTION ORAL EVERY 6 HOURS PRN
Qty: 180 ML | Refills: 0 | Status: SHIPPED | OUTPATIENT
Start: 2020-02-27 | End: 2020-11-30

## 2020-02-27 RX ADMIN — TRIAMCINOLONE ACETONIDE 40 MG: 40 INJECTION, SUSPENSION INTRA-ARTICULAR; INTRAMUSCULAR at 12:02

## 2020-02-27 NOTE — PROGRESS NOTES
Subjective:       Patient ID: Dennis Aldana is a 49 y.o. female.    Chief Complaint: Nasal Congestion; Cough; and Sore Throat    HPI:  49-year-old female in for cold--started 3-4 days ago--fever highest 101.6--+runny nose--+sore throat--+cough--+phlegm--green.  No pneumonia asthma TB no smoking    ROS:  Skin: no psoriasis, eczema, skin cancer  HEENT: No headache, ocular pain, blurred vision, diplopia, epistaxis, hoarseness change in voice, thyroid trouble  Lung: No pneumonia, asthma, Tb, wheezing, SOB, no smoking  Heart: No chest pain, ankle edema, palpitations, MI, minal murmur, +hypertension,no  hyperlipidemia no stent bypass arrhythmia  Abdomen: No nausea, vomiting, diarrhea, constipation, ulcers, hepatitis, gallbladder disease, melena, hematochezia, hematemesis  : no UTI, renal disease, + history stones  GYh LMP 02/02/2020 history breast cancer last mammogram few weeks ago any  MS: no fractures, O/A, lupus, rheumatoid, gout history restless leg syndrome  Neuro: No dizziness, LOC, seizures   No diabetes, no anemia, no anxiety, no depression   , 1 child, work home, lives with son and daughter-- lives in [place] patient [facility]    Objective:   Physical Exam:  General: Well nourished, well developed, no acute distress + obesity   Skin: No lesions  HEENT: Eyes PERRLA, EOM intact, nose clear discharge, throat +1/4 erythematous ears TMs clear  NECK: Supple, no bruits, No JVD, no nodes  Lungs: Clear, no rales, rhonchi, wheezing +coarse cough  Heart: Regular rate and rhythm, no murmurs, gallops, or rubs  Abdomen: flat, bowel sounds positive, no tenderness, or organomegaly  MS: Range of motion and muscle strength intact  Neuro: Alert, CN intact, oriented X 3  Extremities: No cyanosis, clubbing, or edema         Assessment:       1. Sinusitis, unspecified chronicity, unspecified location    2. Bronchitis    3. Essential hypertension, benign    4. Hyperlipidemia, unspecified hyperlipidemia type     5. Sinus tachycardia    6. History of left breast cancer    7. Type 2 diabetes mellitus with hyperlipidemia    8. Gastroesophageal reflux disease, esophagitis presence not specified        Plan:       Sinusitis, unspecified chronicity, unspecified location    Bronchitis    Essential hypertension, benign    Hyperlipidemia, unspecified hyperlipidemia type    Sinus tachycardia    History of left breast cancer    Type 2 diabetes mellitus with hyperlipidemia    Gastroesophageal reflux disease, esophagitis presence not specified    Other orders  -     triamcinolone acetonide injection 40 mg  -     azithromycin (Z-BIGG) 250 MG tablet; 2 tabs by mouth day 1, then 1 tab by mouth daily x 4 days  Dispense: 6 tablet; Refill: 0  -     promethazine-codeine 6.25-10 mg/5 ml (PHENERGAN WITH CODEINE) 6.25-10 mg/5 mL syrup; Take 5 mLs by mouth every 6 (six) hours as needed for Cough.  Dispense: 180 mL; Refill: 0  -     ferrous sulfate 324 mg (65 mg iron) TbEC; Take 1 tablet (324 mg total) by mouth once daily.  Dispense: 100 tablet; Refill: 1  -     metFORMIN (GLUCOPHAGE) 500 MG tablet; Take 1 tablet (500 mg total) by mouth daily with breakfast.  Dispense: 60 tablet; Refill: 5      1800 calorie ADA low-sodium diet  Metformin 500 ad.--needs glucometer with glucose strips lancets--see Luis Mena go over diet and get gulcometer   Cold--Kenalog/Zithromax/Phenergan with codeine--due to diabetes is will hold Medrol Dosepa  Type 2 diet-- glucose 122 hemoglobin A1c 7.2 will start on 1800 calorie ADA diet start on metformin 100 mg q.d.  Anemia--iron deficiency hematocrit 30.1 iron level 17 ferritin level to start on ferrous sulfate 1241 p.o. Q.d.  Ret 3 mo see Dr Roger with accu checks and see if he wants lab then or in 6 mo CBCs CMP lipid hemoglobin A1c serum iron total iron binding capacity

## 2020-03-31 ENCOUNTER — PATIENT MESSAGE (OUTPATIENT)
Dept: OBSTETRICS AND GYNECOLOGY | Facility: CLINIC | Age: 50
End: 2020-03-31

## 2020-05-07 DIAGNOSIS — Z85.3 HISTORY OF BREAST CANCER IN FEMALE: ICD-10-CM

## 2020-05-07 RX ORDER — TAMOXIFEN CITRATE 20 MG/1
TABLET ORAL
Qty: 30 TABLET | Refills: 11 | Status: SHIPPED | OUTPATIENT
Start: 2020-05-07 | End: 2021-04-14

## 2020-05-14 ENCOUNTER — PATIENT OUTREACH (OUTPATIENT)
Dept: ADMINISTRATIVE | Facility: HOSPITAL | Age: 50
End: 2020-05-14

## 2020-05-14 DIAGNOSIS — E78.5 TYPE 2 DIABETES MELLITUS WITH HYPERLIPIDEMIA: Primary | ICD-10-CM

## 2020-05-14 DIAGNOSIS — E11.69 TYPE 2 DIABETES MELLITUS WITH HYPERLIPIDEMIA: Primary | ICD-10-CM

## 2020-05-14 NOTE — PROGRESS NOTES
Received return call from patient before portal message sent. Discussed w/ patient overdue for labs/urine. Patient advised will fast (nothing to eat or drink except h20) midnight the night before her appointment 05.28.20 and have labs drawn the same day as office visit.

## 2020-05-14 NOTE — PROGRESS NOTES
Immunizations reviewed. [facility] reviewed. Care Everywhere reviewed. [facility] reviewed w/ no results yielded. Attempted to contact patient to discuss/schedule overdue HM. LMOM for return call. Portal message sent to patient as alternate point of contact. Chart review completed.

## 2020-05-28 ENCOUNTER — OFFICE VISIT (OUTPATIENT)
Dept: PRIMARY CARE CLINIC | Facility: CLINIC | Age: 50
End: 2020-05-28
Payer: COMMERCIAL

## 2020-05-28 ENCOUNTER — CLINICAL SUPPORT (OUTPATIENT)
Dept: PRIMARY CARE CLINIC | Facility: CLINIC | Age: 50
End: 2020-05-28
Payer: COMMERCIAL

## 2020-05-28 VITALS
HEIGHT: 65 IN | HEART RATE: 97 BPM | WEIGHT: 216.38 LBS | DIASTOLIC BLOOD PRESSURE: 80 MMHG | SYSTOLIC BLOOD PRESSURE: 130 MMHG | TEMPERATURE: 98 F | RESPIRATION RATE: 18 BRPM | OXYGEN SATURATION: 99 % | BODY MASS INDEX: 36.05 KG/M2

## 2020-05-28 DIAGNOSIS — I10 ESSENTIAL HYPERTENSION, BENIGN: ICD-10-CM

## 2020-05-28 DIAGNOSIS — E11.69 TYPE 2 DIABETES MELLITUS WITH HYPERLIPIDEMIA: Primary | ICD-10-CM

## 2020-05-28 DIAGNOSIS — Z11.4 SCREENING FOR HIV (HUMAN IMMUNODEFICIENCY VIRUS): ICD-10-CM

## 2020-05-28 DIAGNOSIS — M17.11 PRIMARY OSTEOARTHRITIS OF RIGHT KNEE: ICD-10-CM

## 2020-05-28 DIAGNOSIS — E11.69 TYPE 2 DIABETES MELLITUS WITH HYPERLIPIDEMIA: ICD-10-CM

## 2020-05-28 DIAGNOSIS — M79.10 MYALGIA: ICD-10-CM

## 2020-05-28 DIAGNOSIS — E78.5 HYPERLIPIDEMIA, UNSPECIFIED HYPERLIPIDEMIA TYPE: ICD-10-CM

## 2020-05-28 DIAGNOSIS — D50.9 IRON DEFICIENCY ANEMIA, UNSPECIFIED IRON DEFICIENCY ANEMIA TYPE: ICD-10-CM

## 2020-05-28 DIAGNOSIS — E78.5 TYPE 2 DIABETES MELLITUS WITH HYPERLIPIDEMIA: Primary | ICD-10-CM

## 2020-05-28 DIAGNOSIS — Z23 NEED FOR VACCINATION: ICD-10-CM

## 2020-05-28 DIAGNOSIS — E78.5 TYPE 2 DIABETES MELLITUS WITH HYPERLIPIDEMIA: ICD-10-CM

## 2020-05-28 LAB
ALBUMIN SERPL BCP-MCNC: 3.7 G/DL (ref 3.5–5.2)
ALP SERPL-CCNC: 65 U/L (ref 38–126)
ALT SERPL W/O P-5'-P-CCNC: 16 U/L (ref 14–54)
ANION GAP SERPL CALC-SCNC: 9 MMOL/L (ref 8–16)
AST SERPL-CCNC: 14 U/L (ref 15–41)
BASOPHILS # BLD AUTO: 0.1 K/UL (ref 0–0.2)
BASOPHILS NFR BLD: 0.5 % (ref 0–1.9)
BILIRUB SERPL-MCNC: 0.8 MG/DL (ref 0.3–1.2)
BUN SERPL-MCNC: 18 MG/DL (ref 6–20)
CALCIUM SERPL-MCNC: 8.7 MG/DL (ref 8.6–10)
CHLORIDE SERPL-SCNC: 106 MMOL/L (ref 101–111)
CHOLEST SERPL-MCNC: 188 MG/DL (ref 80–200)
CHOLEST/HDLC SERPL: 4.1 {RATIO} (ref 2–5)
CK SERPL-CCNC: 39 U/L (ref 38–234)
CO2 SERPL-SCNC: 25 MMOL/L (ref 23–29)
CREAT SERPL-MCNC: 0.9 MG/DL (ref 0.5–1.4)
DIFFERENTIAL METHOD: ABNORMAL
EOSINOPHIL # BLD AUTO: 0.1 K/UL (ref 0–0.5)
EOSINOPHIL NFR BLD: 0.7 % (ref 0–8)
ERYTHROCYTE [DISTWIDTH] IN BLOOD BY AUTOMATED COUNT: 18.4 % (ref 11.5–14.5)
EST. GFR  (AFRICAN AMERICAN): >60 ML/MIN/1.73 M^2
EST. GFR  (NON AFRICAN AMERICAN): >60 ML/MIN/1.73 M^2
FERRITIN SERPL-MCNC: 2 NG/ML (ref 20–300)
GLUCOSE SERPL-MCNC: 105 MG/DL (ref 74–118)
HCT VFR BLD AUTO: 33.5 % (ref 37–48.5)
HDLC SERPL-MCNC: 46 MG/DL (ref 40–75)
HDLC SERPL: 24.5 % (ref 20–50)
HGB BLD-MCNC: 10.2 G/DL (ref 12–16)
IRON SERPL-MCNC: 20 UG/DL (ref 30–160)
LDLC SERPL CALC-MCNC: 115 MG/DL
LYMPHOCYTES # BLD AUTO: 1.9 K/UL (ref 1–4.8)
LYMPHOCYTES NFR BLD: 18 % (ref 18–48)
MCH RBC QN AUTO: 20 PG (ref 27–31)
MCHC RBC AUTO-ENTMCNC: 30.3 G/DL (ref 32–36)
MCV RBC AUTO: 66 FL (ref 82–98)
MONOCYTES # BLD AUTO: 0.8 K/UL (ref 0.3–1)
MONOCYTES NFR BLD: 7.3 % (ref 4–15)
NEUTROPHILS # BLD AUTO: 8 K/UL (ref 1.8–7.7)
NEUTROPHILS NFR BLD: 73.5 % (ref 38–73)
NONHDLC SERPL-MCNC: 142 MG/DL
PLATELET # BLD AUTO: 362 K/UL (ref 150–350)
PMV BLD AUTO: 8.4 FL (ref 9.2–12.9)
POTASSIUM SERPL-SCNC: 4.1 MMOL/L (ref 3.5–5.1)
PROT SERPL-MCNC: 6.9 G/DL (ref 6–8.4)
RBC # BLD AUTO: 5.08 M/UL (ref 4–5.4)
SATURATED IRON: 4 % (ref 20–50)
SODIUM SERPL-SCNC: 140 MMOL/L (ref 136–145)
TOTAL IRON BINDING CAPACITY: 567 UG/DL (ref 250–450)
TRANSFERRIN SERPL-MCNC: 383 MG/DL (ref 200–375)
TRIGL SERPL-MCNC: 137 MG/DL (ref 30–150)
WBC # BLD AUTO: 10.9 K/UL (ref 3.9–12.7)

## 2020-05-28 PROCEDURE — 3075F SYST BP GE 130 - 139MM HG: CPT | Mod: CPTII,S$GLB,, | Performed by: FAMILY MEDICINE

## 2020-05-28 PROCEDURE — 90471 PNEUMOCOCCAL POLYSACCHARIDE VACCINE 23-VALENT =>2YO SQ IM: ICD-10-PCS | Mod: S$GLB,,, | Performed by: FAMILY MEDICINE

## 2020-05-28 PROCEDURE — 86703 HIV-1/HIV-2 1 RESULT ANTBDY: CPT

## 2020-05-28 PROCEDURE — 36415 COLL VENOUS BLD VENIPUNCTURE: CPT | Mod: S$GLB,,, | Performed by: FAMILY MEDICINE

## 2020-05-28 PROCEDURE — 3051F PR MOST RECENT HEMOGLOBIN A1C LEVEL 7.0 - < 8.0%: ICD-10-PCS | Mod: CPTII,S$GLB,, | Performed by: FAMILY MEDICINE

## 2020-05-28 PROCEDURE — 36415 PR COLLECTION VENOUS BLOOD,VENIPUNCTURE: ICD-10-PCS | Mod: S$GLB,,, | Performed by: FAMILY MEDICINE

## 2020-05-28 PROCEDURE — 99999 PR PBB SHADOW E&M-EST. PATIENT-LVL IV: ICD-10-PCS | Mod: PBBFAC,,, | Performed by: FAMILY MEDICINE

## 2020-05-28 PROCEDURE — 82306 VITAMIN D 25 HYDROXY: CPT

## 2020-05-28 PROCEDURE — 3079F DIAST BP 80-89 MM HG: CPT | Mod: CPTII,S$GLB,, | Performed by: FAMILY MEDICINE

## 2020-05-28 PROCEDURE — 80061 LIPID PANEL: CPT

## 2020-05-28 PROCEDURE — 99999 PR PBB SHADOW E&M-EST. PATIENT-LVL IV: CPT | Mod: PBBFAC,,, | Performed by: FAMILY MEDICINE

## 2020-05-28 PROCEDURE — 3079F PR MOST RECENT DIASTOLIC BLOOD PRESSURE 80-89 MM HG: ICD-10-PCS | Mod: CPTII,S$GLB,, | Performed by: FAMILY MEDICINE

## 2020-05-28 PROCEDURE — 82043 UR ALBUMIN QUANTITATIVE: CPT

## 2020-05-28 PROCEDURE — 90471 IMMUNIZATION ADMIN: CPT | Mod: S$GLB,,, | Performed by: FAMILY MEDICINE

## 2020-05-28 PROCEDURE — 83540 ASSAY OF IRON: CPT

## 2020-05-28 PROCEDURE — 99999 PR PBB SHADOW E&M-EST. PATIENT-LVL I: CPT | Mod: PBBFAC,,,

## 2020-05-28 PROCEDURE — 90732 PNEUMOCOCCAL POLYSACCHARIDE VACCINE 23-VALENT =>2YO SQ IM: ICD-10-PCS | Mod: S$GLB,,, | Performed by: FAMILY MEDICINE

## 2020-05-28 PROCEDURE — 99999 PR PBB SHADOW E&M-EST. PATIENT-LVL I: ICD-10-PCS | Mod: PBBFAC,,,

## 2020-05-28 PROCEDURE — 85025 COMPLETE CBC W/AUTO DIFF WBC: CPT

## 2020-05-28 PROCEDURE — 99214 OFFICE O/P EST MOD 30 MIN: CPT | Mod: 25,S$GLB,, | Performed by: FAMILY MEDICINE

## 2020-05-28 PROCEDURE — 82550 ASSAY OF CK (CPK): CPT

## 2020-05-28 PROCEDURE — 3008F BODY MASS INDEX DOCD: CPT | Mod: CPTII,S$GLB,, | Performed by: FAMILY MEDICINE

## 2020-05-28 PROCEDURE — 99214 PR OFFICE/OUTPT VISIT, EST, LEVL IV, 30-39 MIN: ICD-10-PCS | Mod: 25,S$GLB,, | Performed by: FAMILY MEDICINE

## 2020-05-28 PROCEDURE — 80053 COMPREHEN METABOLIC PANEL: CPT

## 2020-05-28 PROCEDURE — 3075F PR MOST RECENT SYSTOLIC BLOOD PRESS GE 130-139MM HG: ICD-10-PCS | Mod: CPTII,S$GLB,, | Performed by: FAMILY MEDICINE

## 2020-05-28 PROCEDURE — 3051F HG A1C>EQUAL 7.0%<8.0%: CPT | Mod: CPTII,S$GLB,, | Performed by: FAMILY MEDICINE

## 2020-05-28 PROCEDURE — 3008F PR BODY MASS INDEX (BMI) DOCUMENTED: ICD-10-PCS | Mod: CPTII,S$GLB,, | Performed by: FAMILY MEDICINE

## 2020-05-28 PROCEDURE — 82728 ASSAY OF FERRITIN: CPT

## 2020-05-28 PROCEDURE — 83036 HEMOGLOBIN GLYCOSYLATED A1C: CPT

## 2020-05-28 PROCEDURE — 90732 PPSV23 VACC 2 YRS+ SUBQ/IM: CPT | Mod: S$GLB,,, | Performed by: FAMILY MEDICINE

## 2020-05-28 RX ORDER — MELOXICAM 15 MG/1
15 TABLET ORAL DAILY PRN
Qty: 30 TABLET | Refills: 3 | Status: SHIPPED | OUTPATIENT
Start: 2020-05-28 | End: 2020-11-04

## 2020-05-28 NOTE — PROGRESS NOTES
Subjective:       Patient ID: Dennis Aldana is a 49 y.o. female.    Chief Complaint: Follow-up (here for follow up on BP and blood sugar, also needs lab)    Also c/o generalized muscle aches the past month or two, not sure if due to meds or due to increased physical activity working around her home.  HTN - BP has been lower at times, so has skipped a few doses of metoprolol    Diabetes   She presents for her follow-up diabetic visit. She has type 2 diabetes mellitus. No MedicAlert identification noted. The initial diagnosis of diabetes was made 4 months ago. Pertinent negatives for hypoglycemia include no confusion, dizziness, headaches, hunger, mood changes, nervousness/anxiousness, pallor, seizures, sleepiness, speech difficulty, sweats or tremors. Associated symptoms include fatigue, foot paresthesias, visual change and weakness. Pertinent negatives for diabetes include no blurred vision, no chest pain, no foot ulcerations, no polydipsia, no polyphagia, no polyuria and no weight loss. Pertinent negatives for hypoglycemia complications include no blackouts, no hospitalization, no nocturnal hypoglycemia, no required assistance and no required glucagon injection. Symptoms are stable. Pertinent negatives for diabetic complications include no autonomic neuropathy, CVA, heart disease, impotence, nephropathy, peripheral neuropathy, PVD or retinopathy. Risk factors for coronary artery disease include hypertension and obesity. Current diabetic treatment includes oral agent (monotherapy). Her weight is stable. She has not had a previous visit with a dietitian. An ACE inhibitor/angiotensin II receptor blocker is not being taken. She does not see a podiatrist.Eye exam is not current.     Review of Systems   Constitutional: Positive for fatigue. Negative for weight loss.   HENT: Negative for trouble swallowing.    Eyes: Negative for blurred vision and visual disturbance.   Respiratory: Negative for shortness of breath.   "  Cardiovascular: Negative for chest pain.   Endocrine: Negative for polydipsia, polyphagia and polyuria.   Genitourinary: Negative for difficulty urinating and impotence.   Musculoskeletal: Positive for myalgias.   Skin: Negative for pallor.   Allergic/Immunologic: Negative for immunocompromised state.   Neurological: Positive for weakness. Negative for dizziness, tremors, seizures, speech difficulty and headaches.   Hematological: Does not bruise/bleed easily.   Psychiatric/Behavioral: Negative for confusion. The patient is not nervous/anxious.        Objective:      Vitals:    05/28/20 0743   BP: 130/80   BP Location: Right arm   Patient Position: Sitting   BP Method: Large (Manual)   Pulse: 97   Resp: 18   Temp: 98.4 °F (36.9 °C)   TempSrc: Oral   SpO2: 99%   Weight: 98.1 kg (216 lb 6.1 oz)   Height: 5' 5" (1.651 m)     Physical Exam   Constitutional: She is oriented to person, place, and time. She appears well-developed and well-nourished.   HENT:   Head: Normocephalic and atraumatic.   Cardiovascular: Normal rate, regular rhythm and normal heart sounds.   Pulses:       Dorsalis pedis pulses are 1+ on the right side, and 1+ on the left side.   Pulmonary/Chest: Effort normal and breath sounds normal.   Musculoskeletal: She exhibits no edema.   Feet:   Right Foot:   Protective Sensation: 10 sites tested. 10 sites sensed.   Skin Integrity: Negative for ulcer, blister or skin breakdown.   Left Foot:   Protective Sensation: 10 sites tested. 10 sites sensed.   Skin Integrity: Negative for ulcer, blister or skin breakdown.   Neurological: She is alert and oriented to person, place, and time.   Skin: Skin is warm and dry.   Nursing note and vitals reviewed.      Lab Results   Component Value Date    WBC 10.00 02/13/2020    HGB 9.2 (L) 02/13/2020    HCT 30.1 (L) 02/13/2020     (H) 02/13/2020    CHOL 180 01/30/2020    TRIG 100 01/30/2020    HDL 40 01/30/2020    ALT 16 01/30/2020    AST 14 (L) 01/30/2020     " 01/30/2020    K 4.1 01/30/2020     01/30/2020    CREATININE 0.9 01/30/2020    BUN 16 01/30/2020    CO2 24 01/30/2020    TSH 2.35 01/30/2020    GLUF 122 (H) 02/13/2020    HGBA1C 7.2 (H) 02/13/2020      Assessment:       1. Type 2 diabetes mellitus with hyperlipidemia    2. Hyperlipidemia, unspecified hyperlipidemia type    3. Essential hypertension, benign    4. Iron deficiency anemia, unspecified iron deficiency anemia type    5. Myalgia    6. Screening for HIV (human immunodeficiency virus)    7. Need for vaccination    8. Primary osteoarthritis of right knee        Plan:       Type 2 diabetes mellitus with hyperlipidemia  -      DIABETES FOOT EXAM  -     Comprehensive metabolic panel; Future; Expected date: 05/28/2020  -     Hemoglobin A1C; Future; Expected date: 05/28/2020  -     Microalbumin/creatinine urine ratio; Future; Expected date: 05/28/2020  Labs today, adjust meds if needed  Hyperlipidemia, unspecified hyperlipidemia type  -     Comprehensive metabolic panel; Future; Expected date: 05/28/2020  -     Lipid Panel; Future; Expected date: 05/28/2020    Essential hypertension, benign  stable  Iron deficiency anemia, unspecified iron deficiency anemia type  -     CBC auto differential; Future; Expected date: 05/28/2020  -     Iron and TIBC; Future; Expected date: 05/28/2020  -     Ferritin; Future; Expected date: 05/28/2020    Myalgia  -     Vitamin D; Future; Expected date: 05/28/2020  -     CK; Future; Expected date: 05/28/2020    Screening for HIV (human immunodeficiency virus)  -     HIV 1/2 Ag/Ab (4th Gen); Future; Expected date: 05/28/2020    Need for vaccination  -     Pneumococcal Polysaccharide Vaccine (23 Valent) (SQ/IM)      Medication List with Changes/Refills   New Medications    MELOXICAM (MOBIC) 15 MG TABLET    Take 1 tablet (15 mg total) by mouth daily as needed for Pain.   Current Medications    CITALOPRAM (CELEXA) 40 MG TABLET    Take 1 tablet (40 mg total) by mouth once daily.     FAMOTIDINE (PEPCID) 40 MG TABLET    Take 1 tablet (40 mg total) by mouth every evening.    FERROUS SULFATE 324 MG (65 MG IRON) TBEC    Take 1 tablet (324 mg total) by mouth once daily.    LORAZEPAM (ATIVAN) 1 MG TABLET    Take 1 tablet (1 mg total) by mouth once daily. As needed for anxiety    METFORMIN (GLUCOPHAGE) 500 MG TABLET    Take 1 tablet (500 mg total) by mouth daily with breakfast.    METOPROLOL SUCCINATE (TOPROL-XL) 200 MG 24 HR TABLET    Take 1 tablet (200 mg total) by mouth once daily.    PRAMIPEXOLE (MIRAPEX) 1 MG TABLET    Take 2 tablets (2 mg total) by mouth every evening.    PROMETHAZINE-CODEINE 6.25-10 MG/5 ML (PHENERGAN WITH CODEINE) 6.25-10 MG/5 ML SYRUP    Take 5 mLs by mouth every 6 (six) hours as needed for Cough.    RANITIDINE (ZANTAC) 300 MG TABLET    Take 300 mg by mouth nightly.    TAMOXIFEN (NOLVADEX) 20 MG TAB    TAKE 1 TABLET BY MOUTH EVERY DAY    ZOLPIDEM (AMBIEN) 5 MG TAB    Take 1 tablet (5 mg total) by mouth nightly as needed.

## 2020-05-29 LAB
25(OH)D3+25(OH)D2 SERPL-MCNC: 24 NG/ML (ref 30–96)
ALBUMIN/CREAT UR: 185.4 UG/MG (ref 0–30)
CREAT UR-MCNC: 48 MG/DL (ref 15–325)
ESTIMATED AVG GLUCOSE: 137 MG/DL (ref 68–131)
HBA1C MFR BLD HPLC: 6.4 % (ref 4–5.6)
HIV 1+2 AB+HIV1 P24 AG SERPL QL IA: NEGATIVE
MICROALBUMIN UR DL<=1MG/L-MCNC: 89 UG/ML

## 2020-06-02 ENCOUNTER — TELEPHONE (OUTPATIENT)
Dept: PRIMARY CARE CLINIC | Facility: CLINIC | Age: 50
End: 2020-06-02

## 2020-06-02 DIAGNOSIS — E55.9 VITAMIN D DEFICIENCY: ICD-10-CM

## 2020-06-02 DIAGNOSIS — D50.9 IRON DEFICIENCY ANEMIA, UNSPECIFIED IRON DEFICIENCY ANEMIA TYPE: Primary | ICD-10-CM

## 2020-06-02 RX ORDER — ERGOCALCIFEROL 1.25 MG/1
50000 CAPSULE ORAL
Qty: 12 CAPSULE | Refills: 1 | Status: SHIPPED | OUTPATIENT
Start: 2020-06-02 | End: 2020-12-16

## 2020-06-02 NOTE — TELEPHONE ENCOUNTER
Spoke to patient, states that Dr. Carranza's office needs office notes and lab results before they can schedule an appointment. Office notes and labs from last visit faxed to Dr. Carranza's office

## 2020-06-21 PROBLEM — Z79.810 USE OF TAMOXIFEN (NOLVADEX): Status: ACTIVE | Noted: 2020-06-21

## 2020-06-21 PROBLEM — Z98.890 S/P LUMPECTOMY, LEFT BREAST: Status: ACTIVE | Noted: 2020-06-21

## 2020-06-21 PROBLEM — D50.9 MICROCYTIC ANEMIA: Status: ACTIVE | Noted: 2020-06-21

## 2020-07-02 DIAGNOSIS — Z12.39 BREAST CANCER SCREENING: ICD-10-CM

## 2020-07-21 DIAGNOSIS — E11.9 TYPE 2 DIABETES MELLITUS: ICD-10-CM

## 2020-07-26 DIAGNOSIS — F51.01 PRIMARY INSOMNIA: ICD-10-CM

## 2020-07-26 DIAGNOSIS — F41.1 GAD (GENERALIZED ANXIETY DISORDER): ICD-10-CM

## 2020-07-27 RX ORDER — LORAZEPAM 1 MG/1
1 TABLET ORAL DAILY
Qty: 30 TABLET | Refills: 1 | Status: SHIPPED | OUTPATIENT
Start: 2020-07-27 | End: 2020-12-18

## 2020-07-27 RX ORDER — ZOLPIDEM TARTRATE 5 MG/1
5 TABLET ORAL NIGHTLY PRN
Qty: 30 TABLET | Refills: 1 | Status: SHIPPED | OUTPATIENT
Start: 2020-07-27 | End: 2020-12-18

## 2020-08-11 ENCOUNTER — PATIENT OUTREACH (OUTPATIENT)
Dept: OTHER | Facility: OTHER | Age: 50
End: 2020-08-11

## 2020-08-11 NOTE — LETTER
November 2, 2020     Dennis Aldana  [address] [place] [address] [address] [place] [address]       Dear Dennis,    [facility] Our goal is to make care effective, proactive and convenient by using data you send us from home to better treat your chronic conditions.                My name is Elise Rubin, and I am your dedicated Digital Medicine clinician. As an expert in medication management, I will help ensure that the medications you are taking continue to provide the intended benefits and help you reach your goals. You can reach me directly at [phone] or by sending me a message directly through your [facility] account.      I am Angélica Michael and I will be your health . My job is to help you identify lifestyle changes to improve your disease control. We will talk about nutrition, exercise, and other ways you may be able to adjust your current habits to better your health. Additionally, we will help ensure you are completing the tests and screenings that are necessary to help manage your conditions. You can reach me directly at [phone] or by sending me a message directly through your [facility] account.    Most importantly, YOU are at the center of this team. Together, we will work to improve your overall health and encourage you to meet your goals for a healthier lifestyle.     What we expect from YOU:  · Please take frequent home blood sugar measurements according to the frequency your physician and Digital Medicine care team specify. It is important that your team see both fasting and after meal readings.      Be available to receive phone calls or [facility] messages, when appropriate, from your care team. Please let us know if there are any specific days or times that work best for us to reach you via phone.     Complete routine tests and screenings. Dont worry, we will help keep you on track!           What you should expect from your Digital Medicine Care Team:   We will work with you  to create a personalized plan of care and provide you with encouragement and education, including regarding lifestyle changes, that could help you manage your disease states.     We will adjust your current medications, if needed, and continue to monitor your long-term progress.     We will provide you and your physician with monthly progress reports after you have been in the program for more than 30 days.     We will send you reminders through [facility] and text messages to help ensure you do not miss any testing deadlines to help manage your disease states.    You will be able to reach us by phone or through your [facility] account by clicking our names under Care Team on the right side of the home screen.    We look forward to working with you to help manage your health,    Sincerely,    Your Digital Medicine Team    Please visit our websites to learn more:   · Diabetes: [email or web address]      Remember, we are not available for emergencies. If you have an emergency, please contact your doctors office directly or call [facility] on-call ([phone] or [phone]) or 911.    Diabetes: We want help you get important tests and screenings done regularly to assure that your health needs are met. We have put a new system in place, called CareTouch that will help us improve how we monitor and reach out to you about the following lab tests that you will need to help manage your diabetes.  · Hemoglobin A1c testing (Frequency: Every 3 to 6 months, dependent on A1c goal)  · Nephropathy Assessment, generally urine micro albumin testing (Frequency: Yearly)  · Eye exam through a quick 30-minute Eye Photo Exam (Frequency: 1-2 Years, depending on result)    When necessary you can come in to one of the lab locations between 10:30 am and 4:00 pm to have your tests done prior to their due date. Tell the  you received a CareTouch letter, or just look for the CareTouch sign.    For CareTouch  lab locations, click here.

## 2020-08-16 PROBLEM — Z17.0 ER+ (ESTROGEN RECEPTOR POSITIVE STATUS): Status: ACTIVE | Noted: 2020-08-16

## 2020-08-16 PROBLEM — N92.0 EXCESSIVE MENSTRUATION: Status: ACTIVE | Noted: 2020-08-16

## 2020-08-16 NOTE — PROGRESS NOTES
[facility] Hematolgy/Oncology  History & Physical    Subjective:      Patient ID:   NAME: Dennis Aldana : 1970     50 y.o. female    Referring Doc: Andrew Roger MD  Other Physicians: Laya Morris (Gyn)        Chief Complaint: anemia/hx/of lft breast cancer      HPI:  50 y.o. female with diagnosis of anemia and hx/of left breast cancer who has been referred by Andrew Roger MD for evaluation by medical hematology/oncology. She is here by herself. She used to have heavy menstrual cycles until she started taking tamoxifen. She denies any blood loss. She has history of stage I breast cancer back in 2018. She denies any CP, SOB, HA's or N/V. No BRBPR. She is unable to tolerate oral iron.       Discussed covid19 precautions              ROS:   GEN: normal without any fever, night sweats or weight loss  HEENT: normal with no HA's, sore throat, stiff neck, changes in vision  CV: normal with no CP, SOB, PND, GARCIA or orthopnea  PULM: normal with no SOB, cough, hemoptysis, sputum or pleuritic pain  GI: normal with no abdominal pain, nausea, vomiting, constipation, diarrhea, melanotic stools, BRBPR, or hematemesis  : normal with no hematuria, dysuria  BREAST: normal with no mass, discharge, pain; s/p left lumpectomy  SKIN: normal with no rash, erythema, bruising, or swelling       Past Medical/Surgical History:  Past Medical History:   Diagnosis Date    Anxiety     Breast cancer 2018    left w/ radiation    Depression     ER+ (estrogen receptor positive status) 2020    Essential hypertension, benign 1/3/2018    Excessive menstruation 2020    Hypertension     Microcytic anemia 2020    RLS (restless legs syndrome)     S/P lumpectomy, left breast 2020    Use of tamoxifen (Nolvadex) 2020     Past Surgical History:   Procedure Laterality Date    BREAST LUMPECTOMY Left 2018    KNEE SURGERY      SHOULDER SURGERY      TONSILLECTOMY           Allergies:  Review of patient's  allergies indicates:  No Known Allergies    Social/Family History:  Social History     Socioeconomic History    Marital status:      Spouse name: Not on file    Number of children: Not on file    Years of education: Not on file    Highest education level: Not on file   Occupational History    Not on file   Social Needs    Financial resource strain: Somewhat hard    Food insecurity     Worry: Often true     Inability: Sometimes true    Transportation needs     Medical: No     Non-medical: No   Tobacco Use    Smoking status: Never Smoker    Smokeless tobacco: Never Used   Substance and Sexual Activity    Alcohol use: Yes     Frequency: Monthly or less     Drinks per session: 1 or 2     Binge frequency: Never     Comment: occ.     Drug use: No    Sexual activity: Yes     Partners: Male     Birth control/protection: None   Lifestyle    Physical activity     Days per week: Not on file     Minutes per session: Not on file    Stress: Not on file   Relationships    Social connections     Talks on phone: Twice a week     Gets together: Twice a week     Attends [employer] service: Never     Active member of club or organization: No     Attends meetings of clubs or organizations: Never     Relationship status:    Other Topics Concern    Not on file   Social History Narrative    Not on file     Family History   Problem Relation Age of Onset    Hypertension Mother     Diabetes Mother     Diabetes Father     Breast cancer Maternal Grandmother     Colon cancer Neg Hx     Ovarian cancer Neg Hx          Medications:    Current Outpatient Medications:     citalopram (CELEXA) 40 MG tablet, TAKE 1 TABLET BY MOUTH EVERY DAY, Disp: 90 tablet, Rfl: 1    ergocalciferol (ERGOCALCIFEROL) 50,000 unit Cap, Take 1 capsule (50,000 Units total) by mouth every 7 days., Disp: 12 capsule, Rfl: 1    famotidine (PEPCID) 40 MG tablet, TAKE 1 TABLET BY MOUTH EVERY DAY IN THE EVENING, Disp: 90 tablet, Rfl: 1     "ferrous sulfate 324 mg (65 mg iron) TbEC, Take 1 tablet (324 mg total) by mouth once daily., Disp: 100 tablet, Rfl: 1    LORazepam (ATIVAN) 1 MG tablet, TAKE 1 TABLET (1 MG TOTAL) BY MOUTH ONCE DAILY. AS NEEDED FOR ANXIETY, Disp: 30 tablet, Rfl: 1    meloxicam (MOBIC) 15 MG tablet, Take 1 tablet (15 mg total) by mouth daily as needed for Pain., Disp: 30 tablet, Rfl: 3    metFORMIN (GLUCOPHAGE) 500 MG tablet, Take 1 tablet (500 mg total) by mouth daily with breakfast., Disp: 60 tablet, Rfl: 5    metoprolol succinate (TOPROL-XL) 200 MG 24 hr tablet, TAKE 1 TABLET BY MOUTH EVERY DAY, Disp: 90 tablet, Rfl: 1    pramipexole (MIRAPEX) 1 MG tablet, TAKE 2 TABLETS (2 MG TOTAL) BY MOUTH EVERY EVENING., Disp: 180 tablet, Rfl: 1    promethazine-codeine 6.25-10 mg/5 ml (PHENERGAN WITH CODEINE) 6.25-10 mg/5 mL syrup, Take 5 mLs by mouth every 6 (six) hours as needed for Cough., Disp: 180 mL, Rfl: 0    ranitidine (ZANTAC) 300 MG tablet, Take 300 mg by mouth nightly., Disp: , Rfl:     tamoxifen (NOLVADEX) 20 MG Tab, TAKE 1 TABLET BY MOUTH EVERY DAY, Disp: 30 tablet, Rfl: 11    zolpidem (AMBIEN) 5 MG Tab, TAKE 1 TABLET (5 MG TOTAL) BY MOUTH NIGHTLY AS NEEDED., Disp: 30 tablet, Rfl: 1      Pathology:  Cancer Staging  No matching staging information was found for the patient.      Objective:   Vitals:  Blood pressure 124/84, pulse 101, temperature 98.3 °F (36.8 °C), resp. rate 18, height 5' 5" (1.651 m), weight 97.5 kg (214 lb 14.4 oz).    Physical Examination:   GEN: no apparent distress, comfortable; AAOx3  HEAD: atraumatic and normocephalic  EYES: no pallor, no icterus, PERRLA  ENT: OMM, no pharyngeal erythema, external ears WNL; no nasal discharge; no thrush  NECK: no masses, thyroid normal, trachea midline, no LAD/LN's, supple  CV: RRR with no murmur; normal pulse; normal S1 and S2; no pedal edema  CHEST: Normal respiratory effort; CTAB; normal breath sounds; no wheeze or crackles  ABDOM: nontender and nondistended; " soft; normal bowel sounds; no rebound/guarding  MUSC/Skeletal: ROM normal; no crepitus; joints normal; no deformities or arthropathy  EXTREM: no clubbing, cyanosis, inflammation or swelling  SKIN: no rashes, lesions, ulcers, petechiae or subcutaneous nodules; tattoos  : no quinonez  NEURO: grossly intact; motor/sensory WNL; AAOx3; no tremors  PSYCH: normal mood, affect and behavior  LYMPH: normal cervical, supraclavicular, axillary and groin LN's  Breast: s/p left lumpectomy      Labs:   Lab Results   Component Value Date    WBC 10.90 05/28/2020    HGB 10.2 (L) 05/28/2020    HCT 33.5 (L) 05/28/2020    MCV 66 (L) 05/28/2020     (H) 05/28/2020    CMP  Sodium   Date Value Ref Range Status   05/28/2020 140 136 - 145 mmol/L Final     Potassium   Date Value Ref Range Status   05/28/2020 4.1 3.5 - 5.1 mmol/L Final     Chloride   Date Value Ref Range Status   05/28/2020 106 101 - 111 mmol/L Final     CO2   Date Value Ref Range Status   05/28/2020 25 23 - 29 mmol/L Final     Glucose   Date Value Ref Range Status   05/28/2020 105 74 - 118 mg/dL Final     BUN, Bld   Date Value Ref Range Status   05/28/2020 18 6 - 20 mg/dL Final     Creatinine   Date Value Ref Range Status   05/28/2020 0.9 0.5 - 1.4 mg/dL Final     Calcium   Date Value Ref Range Status   05/28/2020 8.7 8.6 - 10.0 mg/dL Final     Total Protein   Date Value Ref Range Status   05/28/2020 6.9 6.0 - 8.4 g/dL Final     Albumin   Date Value Ref Range Status   05/28/2020 3.7 3.5 - 5.2 g/dL Final     Total Bilirubin   Date Value Ref Range Status   05/28/2020 0.8 0.3 - 1.2 mg/dL Final     Comment:     For infants and newborns, interpretation of results should be based  on gestational age, weight and in agreement with clinical  observations.  Premature Infant recommended reference ranges:  Up to 24 hours.............<8.0 mg/dL  Up to 48 hours............<12.0 mg/dL  3-5 days..................<15.0 mg/dL  6-29 days.................<15.0 mg/dL       Alkaline  Phosphatase   Date Value Ref Range Status   05/28/2020 65 38 - 126 U/L Final     AST   Date Value Ref Range Status   05/28/2020 14 (L) 15 - 41 U/L Final     ALT   Date Value Ref Range Status   05/28/2020 16 14 - 54 U/L Final     Anion Gap   Date Value Ref Range Status   05/28/2020 9 8 - 16 mmol/L Final     eGFR if    Date Value Ref Range Status   05/28/2020 >60.0 >60 mL/min/1.73 m^2 Final     eGFR if non    Date Value Ref Range Status   05/28/2020 >60.0 >60 mL/min/1.73 m^2 Final     Comment:     Calculation used to obtain the estimated glomerular filtration  rate (eGFR) is the CKD-EPI equation.        Lab Results   Component Value Date    IRON 20 (L) 05/28/2020    TIBC 567 (H) 05/28/2020    FERRITIN 2 (L) 05/28/2020         Radiology/Diagnostic Studies:          All lab results and imaging results have been reviewed and discussed with the patient    Assessment:   (1) 50 y.o. female with diagnosis of anemia referred by Dr Wade  - microcytic indices  - hgb at 10.2 and MCV at 66  - bilirubin is wnl, so I do not suspect any hemolysis at this time  - low iron and ferritin with elevated TIBC most consistent with an iron deficiency condition  - hx/of endometriosis and heavy menstrual cycles - followed by Dr Dagoberto Morris with GYN at [facility] (prior laparoscopy x4)    (2) Hx/of left breast cancer diagnosed May 2018 s/p lumpectomy and radiation, oral antihormone with tamoxifen - s/p lumpectomy and XRT at [facility]  - Stage I  - ER positive  - she saw a female oncologist at [facility] in past   - she gets regular mammograms and had one this past Jan 2020  - she does not follow-up with oncology    (3) HTN and hypercholesterolemia    (4) Type II DM    (5) GERD    (6) TATYANA/Depression and RLS    (7) OA of right knee          VISIT DIAGNOSES:              Iron deficiency anemia, unspecified iron deficiency anemia type  -     Ambulatory referral/consult to Hematology / Oncology    Microcytic anemia    S/P  lumpectomy, left breast    History of left breast cancer    Use of tamoxifen (Nolvadex)    Menorrhagia with regular cycle    ER+ (estrogen receptor positive status)            Plan:     PLAN:  1. Check up to date CXR  2. Start IV iron - discussed the side-effect profile, provided literature and training  3. Check labs monthly  4. Continue tamoxifen for now and mammogram yearly recommended   5. She needs see GYN yearly  6. Need all records from [facility]  7. F/u with PCP, etc  RTC in 6 weeks   Fax note to Andrew Roger MD; Laya Morris      COVID-19 Discussion:    I had long discussion with patient and any applicable family about the COVID-19 coronavirus epidemic and the recommended precautions with regard to cancer and/or hematology patients. I have re-iterated the CDC recommendations for adequate hand washing, use of hand -like products, and coughing into elbow, etc. In addition, especially for our patients who are on chemotherapy and/or our otherwise immunocompromised patients, I have recommended avoidance of crowds, including movie theaters, restaurants, churches, etc. I have recommended avoidance of any sick or symptomatic family members and/or friends. I have also recommended avoidance of any raw and unwashed food products, and general avoidance of food items that have not been prepared by themselves. The patient has been asked to call us immediately with any symptom developments, issues, questions or other general concerns.     Iron Infusion Therapy Discussion:     I provided literature/learning materials on the particular IV iron regimen and discussed the potential side-effect profiles of the drug(s). I discussed the importance of compliance with obtaining and monitoring requested lab work, and went over the potential risk for the development of anaphylactic shock, bronchospasm, dysrhythmia, liver and/or kidney damage, and respiratory/cardiovascular arrest and/or failure. I discussed the  potential risks for development of alopecia, fevers, itching, chills and/or rigors, cold sensory issues, ringing in ears, vertigo and neuropathy, all of which are usually acute but sometimes could end up being chronic and life-long. I discussed the risks of hand-foot syndrome and rashes, and development of other autoimmune mediated processes such as pneumonitis and colitis which could be life threatening.     The patient's consent has been obtained to proceed with the IV iron therapy.The patient will be referred to [facility] for training and education on IV iron therapy, use of antiemetics and/or anti-diarrheals, use of NSAID's, potential IV iron therapy side-effects, and any specific recommendations and precautions with the particular IV iron agents.      I answered all of the patient's (and family's, if applicable) questions to the best of my ability and to their complete satisfaction. The patient acknowledged full understanding of the risks, recommendations and plan(s).         Pathology Discussion:    I reviewed and discussed the pathology report(s) and radiograph reports (if available) in as simple to understand and/or laymen's terms to the best of my ability. I had an indepth conversation with the patient and went over the patient's individual diagnosis based on the information that was currently available. I discussed the TNM staging process with regard to the patient's particular cancer type, and the calculated stage based on the currently available TNM data and literature. I discussed the available prognostic data with regard to the current staging information and how it relates to the prognosis of their particular neoplastic process.        NCCN Guidelines:    I discussed the available treatment option(s) in accordance with the latest literature from the NCCN Clinical Practice Guidelines for the patient's particular type of cancer disorder. The NCCN Guidelines provide a  ""document evidence-based (and) consensus-driven management" of the care of oncology patients. The treatment recommendations were made not only in accordance to the NCCN guidelines, but also factored in to account the patient's overall age, condition, performance status and their medical co-morbidities. I went over the risks and benefits of the the treatment options (if any could be made) with regard to their particular cancer type, their cancer stage, their age, and their co-morbidities.     Antihormone Therapy Discussion:    I discussed the advantages of antihormone therapy with the patient with regard to their particular neoplastic or carcinoma in situ condition. I went over the side-effect profile of the medication including risk for potential development of endometrial cancer and/or hyperplasia in women who still have a uterus and the need for yearly GYN evaluation and follow-up. I discussed the risks for thromboembolic events such as DVT's, pulmonary emboli, CVA's, retinal vascular clots, phlebitis, and TIA's. I discussed the potential risks for development of ocular disturbances, retinopathy, cataracts, corneal changes, flushing, hot flashes, amenorrhea, altered menses, fluid retention, weight changes, elevations in LFT's, liver damage, and mood disturbances. I discussed the potential risk of arthropathy and joint pains/aches which could be chronic and debilitating. I discussed potential adverse effects on bone mineralization and the risk of osteopenia and/or osteoporosis which could led to increase risk of fractures.   A consent form was obtained and a copy was provided to the patient.          I have explained and the patient understands all of  the current recommendation(s). I have answered all of their questions to the best of my ability and to their complete satisfaction.             Thank you for allowing me to participate in this patient's care. Please call with any questions or " concerns.    Electronically signed Hossein Carranza MD    Answers for HPI/ROS submitted by the patient on 8/16/2020   appetite change : No  unexpected weight change: No  visual disturbance: Yes  cough: No  shortness of breath: No  chest pain: No  abdominal pain: No  diarrhea: No  frequency: No  back pain: No  rash: No  headaches: No  adenopathy: No  nervous/ anxious: No

## 2020-08-17 ENCOUNTER — OFFICE VISIT (OUTPATIENT)
Dept: HEMATOLOGY/ONCOLOGY | Facility: CLINIC | Age: 50
End: 2020-08-17
Payer: COMMERCIAL

## 2020-08-17 VITALS
WEIGHT: 214.88 LBS | SYSTOLIC BLOOD PRESSURE: 124 MMHG | DIASTOLIC BLOOD PRESSURE: 84 MMHG | HEART RATE: 101 BPM | TEMPERATURE: 98 F | RESPIRATION RATE: 18 BRPM | BODY MASS INDEX: 35.8 KG/M2 | HEIGHT: 65 IN

## 2020-08-17 DIAGNOSIS — N92.0 MENORRHAGIA WITH REGULAR CYCLE: ICD-10-CM

## 2020-08-17 DIAGNOSIS — Z85.3 HISTORY OF LEFT BREAST CANCER: ICD-10-CM

## 2020-08-17 DIAGNOSIS — D50.9 IRON DEFICIENCY ANEMIA, UNSPECIFIED IRON DEFICIENCY ANEMIA TYPE: Primary | ICD-10-CM

## 2020-08-17 DIAGNOSIS — Z17.0 ER+ (ESTROGEN RECEPTOR POSITIVE STATUS): ICD-10-CM

## 2020-08-17 DIAGNOSIS — D50.9 MICROCYTIC ANEMIA: ICD-10-CM

## 2020-08-17 DIAGNOSIS — Z98.890 S/P LUMPECTOMY, LEFT BREAST: ICD-10-CM

## 2020-08-17 DIAGNOSIS — Z79.810 USE OF TAMOXIFEN (NOLVADEX): ICD-10-CM

## 2020-08-17 PROCEDURE — 3079F PR MOST RECENT DIASTOLIC BLOOD PRESSURE 80-89 MM HG: ICD-10-PCS | Mod: S$GLB,,, | Performed by: INTERNAL MEDICINE

## 2020-08-17 PROCEDURE — 3074F SYST BP LT 130 MM HG: CPT | Mod: S$GLB,,, | Performed by: INTERNAL MEDICINE

## 2020-08-17 PROCEDURE — 3008F BODY MASS INDEX DOCD: CPT | Mod: S$GLB,,, | Performed by: INTERNAL MEDICINE

## 2020-08-17 PROCEDURE — 3079F DIAST BP 80-89 MM HG: CPT | Mod: S$GLB,,, | Performed by: INTERNAL MEDICINE

## 2020-08-17 PROCEDURE — 99204 PR OFFICE/OUTPT VISIT, NEW, LEVL IV, 45-59 MIN: ICD-10-PCS | Mod: S$GLB,,, | Performed by: INTERNAL MEDICINE

## 2020-08-17 PROCEDURE — 3008F PR BODY MASS INDEX (BMI) DOCUMENTED: ICD-10-PCS | Mod: S$GLB,,, | Performed by: INTERNAL MEDICINE

## 2020-08-17 PROCEDURE — 3074F PR MOST RECENT SYSTOLIC BLOOD PRESSURE < 130 MM HG: ICD-10-PCS | Mod: S$GLB,,, | Performed by: INTERNAL MEDICINE

## 2020-08-17 PROCEDURE — 99204 OFFICE O/P NEW MOD 45 MIN: CPT | Mod: S$GLB,,, | Performed by: INTERNAL MEDICINE

## 2020-08-17 RX ORDER — DIPHENHYDRAMINE HYDROCHLORIDE 50 MG/ML
50 INJECTION INTRAMUSCULAR; INTRAVENOUS ONCE AS NEEDED
Status: CANCELLED | OUTPATIENT
Start: 2020-08-24

## 2020-08-17 RX ORDER — METHYLPREDNISOLONE SOD SUCC 125 MG
125 VIAL (EA) INJECTION ONCE AS NEEDED
Status: CANCELLED | OUTPATIENT
Start: 2020-08-24

## 2020-08-17 RX ORDER — HEPARIN 100 UNIT/ML
5 SYRINGE INTRAVENOUS
Status: CANCELLED | OUTPATIENT
Start: 2020-08-24

## 2020-08-17 RX ORDER — SODIUM CHLORIDE 9 MG/ML
INJECTION, SOLUTION INTRAVENOUS CONTINUOUS
Status: CANCELLED | OUTPATIENT
Start: 2020-08-24

## 2020-08-17 RX ORDER — EPINEPHRINE 0.3 MG/.3ML
0.3 INJECTION SUBCUTANEOUS ONCE AS NEEDED
Status: CANCELLED | OUTPATIENT
Start: 2020-08-24

## 2020-08-17 RX ORDER — SODIUM CHLORIDE 0.9 % (FLUSH) 0.9 %
10 SYRINGE (ML) INJECTION
Status: CANCELLED | OUTPATIENT
Start: 2020-08-24

## 2020-08-27 ENCOUNTER — TELEPHONE (OUTPATIENT)
Dept: INFUSION THERAPY | Facility: HOSPITAL | Age: 50
End: 2020-08-27

## 2020-09-01 ENCOUNTER — INFUSION (OUTPATIENT)
Dept: INFUSION THERAPY | Facility: HOSPITAL | Age: 50
End: 2020-09-01
Attending: INTERNAL MEDICINE
Payer: COMMERCIAL

## 2020-09-01 VITALS
HEART RATE: 80 BPM | BODY MASS INDEX: 35.88 KG/M2 | SYSTOLIC BLOOD PRESSURE: 121 MMHG | WEIGHT: 215.63 LBS | RESPIRATION RATE: 18 BRPM | DIASTOLIC BLOOD PRESSURE: 87 MMHG | TEMPERATURE: 98 F

## 2020-09-01 DIAGNOSIS — D50.9 IRON DEFICIENCY ANEMIA, UNSPECIFIED IRON DEFICIENCY ANEMIA TYPE: ICD-10-CM

## 2020-09-01 DIAGNOSIS — D50.9 MICROCYTIC ANEMIA: Primary | ICD-10-CM

## 2020-09-01 PROCEDURE — 25000003 PHARM REV CODE 250: Performed by: INTERNAL MEDICINE

## 2020-09-01 PROCEDURE — 63600175 PHARM REV CODE 636 W HCPCS: Mod: JG | Performed by: INTERNAL MEDICINE

## 2020-09-01 PROCEDURE — 96365 THER/PROPH/DIAG IV INF INIT: CPT

## 2020-09-01 RX ORDER — SODIUM CHLORIDE 0.9 % (FLUSH) 0.9 %
10 SYRINGE (ML) INJECTION
Status: DISCONTINUED | OUTPATIENT
Start: 2020-09-01 | End: 2020-09-01 | Stop reason: HOSPADM

## 2020-09-01 RX ORDER — METHYLPREDNISOLONE SOD SUCC 125 MG
125 VIAL (EA) INJECTION ONCE AS NEEDED
Status: DISCONTINUED | OUTPATIENT
Start: 2020-09-01 | End: 2020-09-01 | Stop reason: HOSPADM

## 2020-09-01 RX ORDER — SODIUM CHLORIDE 9 MG/ML
INJECTION, SOLUTION INTRAVENOUS CONTINUOUS
Status: CANCELLED | OUTPATIENT
Start: 2020-09-08

## 2020-09-01 RX ORDER — HEPARIN 100 UNIT/ML
5 SYRINGE INTRAVENOUS
Status: CANCELLED | OUTPATIENT
Start: 2020-09-08

## 2020-09-01 RX ORDER — SODIUM CHLORIDE 9 MG/ML
INJECTION, SOLUTION INTRAVENOUS CONTINUOUS
Status: DISCONTINUED | OUTPATIENT
Start: 2020-09-01 | End: 2020-09-01 | Stop reason: HOSPADM

## 2020-09-01 RX ORDER — EPINEPHRINE 0.3 MG/.3ML
0.3 INJECTION SUBCUTANEOUS ONCE AS NEEDED
Status: DISCONTINUED | OUTPATIENT
Start: 2020-09-01 | End: 2020-09-01 | Stop reason: HOSPADM

## 2020-09-01 RX ORDER — EPINEPHRINE 0.3 MG/.3ML
0.3 INJECTION SUBCUTANEOUS ONCE AS NEEDED
Status: CANCELLED | OUTPATIENT
Start: 2020-09-08

## 2020-09-01 RX ORDER — SODIUM CHLORIDE 0.9 % (FLUSH) 0.9 %
10 SYRINGE (ML) INJECTION
Status: CANCELLED | OUTPATIENT
Start: 2020-09-08

## 2020-09-01 RX ORDER — DIPHENHYDRAMINE HYDROCHLORIDE 50 MG/ML
50 INJECTION INTRAMUSCULAR; INTRAVENOUS ONCE AS NEEDED
Status: DISCONTINUED | OUTPATIENT
Start: 2020-09-01 | End: 2020-09-01 | Stop reason: HOSPADM

## 2020-09-01 RX ORDER — DIPHENHYDRAMINE HYDROCHLORIDE 50 MG/ML
50 INJECTION INTRAMUSCULAR; INTRAVENOUS ONCE AS NEEDED
Status: CANCELLED | OUTPATIENT
Start: 2020-09-08

## 2020-09-01 RX ORDER — METHYLPREDNISOLONE SOD SUCC 125 MG
125 VIAL (EA) INJECTION ONCE AS NEEDED
Status: CANCELLED | OUTPATIENT
Start: 2020-09-08

## 2020-09-01 RX ORDER — HEPARIN 100 UNIT/ML
5 SYRINGE INTRAVENOUS
Status: DISCONTINUED | OUTPATIENT
Start: 2020-09-01 | End: 2020-09-01 | Stop reason: HOSPADM

## 2020-09-01 RX ADMIN — FERRIC CARBOXYMALTOSE INJECTION 750 MG: 50 INJECTION, SOLUTION INTRAVENOUS at 02:09

## 2020-09-01 NOTE — PLAN OF CARE
Problem: Anemia  Goal: Anemia Symptom Improvement  Outcome: Ongoing, Progressing  Intervention: Monitor and Manage Anemia  Flowsheets (Taken 9/1/2020 1436)  Oral Nutrition Promotion:   rest periods promoted   medicated  Fatigue Management:   paced activity encouraged   frequent rest breaks encouraged

## 2020-09-03 ENCOUNTER — HOSPITAL ENCOUNTER (OUTPATIENT)
Dept: RADIOLOGY | Facility: HOSPITAL | Age: 50
Discharge: HOME | End: 2020-09-03
Attending: INTERNAL MEDICINE
Payer: COMMERCIAL

## 2020-09-03 DIAGNOSIS — D50.9 MICROCYTIC ANEMIA: ICD-10-CM

## 2020-09-03 DIAGNOSIS — D50.9 IRON DEFICIENCY ANEMIA, UNSPECIFIED IRON DEFICIENCY ANEMIA TYPE: ICD-10-CM

## 2020-09-03 DIAGNOSIS — Z98.890 S/P LUMPECTOMY, LEFT BREAST: ICD-10-CM

## 2020-09-03 PROCEDURE — 71046 X-RAY EXAM CHEST 2 VIEWS: CPT | Mod: TC

## 2020-09-11 ENCOUNTER — INFUSION (OUTPATIENT)
Dept: INFUSION THERAPY | Facility: HOSPITAL | Age: 50
End: 2020-09-11
Attending: INTERNAL MEDICINE
Payer: COMMERCIAL

## 2020-09-11 VITALS
WEIGHT: 218.31 LBS | DIASTOLIC BLOOD PRESSURE: 85 MMHG | HEART RATE: 84 BPM | TEMPERATURE: 98 F | OXYGEN SATURATION: 100 % | SYSTOLIC BLOOD PRESSURE: 132 MMHG | RESPIRATION RATE: 18 BRPM | HEIGHT: 66 IN | BODY MASS INDEX: 35.08 KG/M2

## 2020-09-11 DIAGNOSIS — D50.9 IRON DEFICIENCY ANEMIA, UNSPECIFIED IRON DEFICIENCY ANEMIA TYPE: ICD-10-CM

## 2020-09-11 DIAGNOSIS — D50.9 MICROCYTIC ANEMIA: Primary | ICD-10-CM

## 2020-09-11 PROCEDURE — 63600175 PHARM REV CODE 636 W HCPCS: Mod: JG | Performed by: INTERNAL MEDICINE

## 2020-09-11 PROCEDURE — 25000003 PHARM REV CODE 250: Performed by: INTERNAL MEDICINE

## 2020-09-11 PROCEDURE — 96365 THER/PROPH/DIAG IV INF INIT: CPT

## 2020-09-11 RX ORDER — SODIUM CHLORIDE 9 MG/ML
INJECTION, SOLUTION INTRAVENOUS CONTINUOUS
Status: CANCELLED | OUTPATIENT
Start: 2020-09-11

## 2020-09-11 RX ORDER — SODIUM CHLORIDE 0.9 % (FLUSH) 0.9 %
10 SYRINGE (ML) INJECTION
Status: CANCELLED | OUTPATIENT
Start: 2020-09-11

## 2020-09-11 RX ORDER — SODIUM CHLORIDE 0.9 % (FLUSH) 0.9 %
10 SYRINGE (ML) INJECTION
Status: DISCONTINUED | OUTPATIENT
Start: 2020-09-11 | End: 2020-09-11 | Stop reason: HOSPADM

## 2020-09-11 RX ORDER — SODIUM CHLORIDE 9 MG/ML
INJECTION, SOLUTION INTRAVENOUS CONTINUOUS
Status: DISCONTINUED | OUTPATIENT
Start: 2020-09-11 | End: 2020-09-11 | Stop reason: HOSPADM

## 2020-09-11 RX ORDER — HEPARIN 100 UNIT/ML
5 SYRINGE INTRAVENOUS
Status: DISCONTINUED | OUTPATIENT
Start: 2020-09-11 | End: 2020-09-11 | Stop reason: HOSPADM

## 2020-09-11 RX ORDER — METHYLPREDNISOLONE SOD SUCC 125 MG
125 VIAL (EA) INJECTION ONCE AS NEEDED
Status: CANCELLED | OUTPATIENT
Start: 2020-09-11

## 2020-09-11 RX ORDER — EPINEPHRINE 0.3 MG/.3ML
0.3 INJECTION SUBCUTANEOUS ONCE AS NEEDED
Status: CANCELLED | OUTPATIENT
Start: 2020-09-11

## 2020-09-11 RX ORDER — DIPHENHYDRAMINE HYDROCHLORIDE 50 MG/ML
50 INJECTION INTRAMUSCULAR; INTRAVENOUS ONCE AS NEEDED
Status: CANCELLED | OUTPATIENT
Start: 2020-09-11

## 2020-09-11 RX ORDER — HEPARIN 100 UNIT/ML
5 SYRINGE INTRAVENOUS
Status: CANCELLED | OUTPATIENT
Start: 2020-09-11

## 2020-09-11 RX ADMIN — FERRIC CARBOXYMALTOSE INJECTION 750 MG: 50 INJECTION, SOLUTION INTRAVENOUS at 03:09

## 2020-09-11 NOTE — PLAN OF CARE
Problem: Anemia  Goal: Anemia Symptom Improvement  Outcome: Ongoing, Progressing  Intervention: Monitor and Manage Anemia  Flowsheets (Taken 9/11/2020 1440)  Fatigue Management:   paced activity encouraged   frequent rest breaks encouraged

## 2020-10-15 ENCOUNTER — LAB VISIT (OUTPATIENT)
Dept: LAB | Facility: HOSPITAL | Age: 50
End: 2020-10-15
Attending: INTERNAL MEDICINE
Payer: COMMERCIAL

## 2020-10-15 DIAGNOSIS — D50.9 MICROCYTIC ANEMIA: ICD-10-CM

## 2020-10-15 DIAGNOSIS — D50.9 IRON DEFICIENCY ANEMIA, UNSPECIFIED IRON DEFICIENCY ANEMIA TYPE: ICD-10-CM

## 2020-10-15 DIAGNOSIS — Z98.890 S/P LUMPECTOMY, LEFT BREAST: ICD-10-CM

## 2020-10-15 LAB
ALBUMIN SERPL BCP-MCNC: 3.8 G/DL (ref 3.5–5.2)
ALP SERPL-CCNC: 76 U/L (ref 55–135)
ALT SERPL W/O P-5'-P-CCNC: 17 U/L (ref 10–44)
ANION GAP SERPL CALC-SCNC: 13 MMOL/L (ref 8–16)
AST SERPL-CCNC: 13 U/L (ref 10–40)
BASOPHILS # BLD AUTO: 0.05 K/UL (ref 0–0.2)
BASOPHILS NFR BLD: 0.5 % (ref 0–1.9)
BILIRUB SERPL-MCNC: 0.6 MG/DL (ref 0.1–1)
BUN SERPL-MCNC: 18 MG/DL (ref 6–20)
CALCIUM SERPL-MCNC: 9.3 MG/DL (ref 8.7–10.5)
CHLORIDE SERPL-SCNC: 99 MMOL/L (ref 95–110)
CO2 SERPL-SCNC: 27 MMOL/L (ref 23–29)
CREAT SERPL-MCNC: 0.8 MG/DL (ref 0.5–1.4)
DIFFERENTIAL METHOD: ABNORMAL
EOSINOPHIL # BLD AUTO: 0.2 K/UL (ref 0–0.5)
EOSINOPHIL NFR BLD: 1.8 % (ref 0–8)
ERYTHROCYTE [DISTWIDTH] IN BLOOD BY AUTOMATED COUNT: 21.2 % (ref 11.5–14.5)
EST. GFR  (AFRICAN AMERICAN): >60 ML/MIN/1.73 M^2
EST. GFR  (NON AFRICAN AMERICAN): >60 ML/MIN/1.73 M^2
FERRITIN SERPL-MCNC: 308 NG/ML (ref 20–300)
GLUCOSE SERPL-MCNC: 116 MG/DL (ref 70–110)
HCT VFR BLD AUTO: 44 % (ref 37–48.5)
HGB BLD-MCNC: 13.8 G/DL (ref 12–16)
IMM GRANULOCYTES # BLD AUTO: 0.08 K/UL (ref 0–0.04)
IMM GRANULOCYTES NFR BLD AUTO: 0.7 % (ref 0–0.5)
IRON SERPL-MCNC: 89 UG/DL (ref 30–160)
LYMPHOCYTES # BLD AUTO: 3 K/UL (ref 1–4.8)
LYMPHOCYTES NFR BLD: 27.5 % (ref 18–48)
MCH RBC QN AUTO: 26.5 PG (ref 27–31)
MCHC RBC AUTO-ENTMCNC: 31.4 G/DL (ref 32–36)
MCV RBC AUTO: 85 FL (ref 82–98)
MONOCYTES # BLD AUTO: 0.9 K/UL (ref 0.3–1)
MONOCYTES NFR BLD: 7.8 % (ref 4–15)
NEUTROPHILS # BLD AUTO: 6.7 K/UL (ref 1.8–7.7)
NEUTROPHILS NFR BLD: 61.7 % (ref 38–73)
NRBC BLD-RTO: 0 /100 WBC
PLATELET # BLD AUTO: 287 K/UL (ref 150–350)
PMV BLD AUTO: 10.3 FL (ref 9.2–12.9)
POTASSIUM SERPL-SCNC: 4.1 MMOL/L (ref 3.5–5.1)
PROT SERPL-MCNC: 7 G/DL (ref 6–8.4)
RBC # BLD AUTO: 5.21 M/UL (ref 4–5.4)
SATURATED IRON: 26 % (ref 20–50)
SODIUM SERPL-SCNC: 139 MMOL/L (ref 136–145)
TOTAL IRON BINDING CAPACITY: 340 UG/DL (ref 250–450)
TRANSFERRIN SERPL-MCNC: 243 MG/DL (ref 200–375)
WBC # BLD AUTO: 10.85 K/UL (ref 3.9–12.7)

## 2020-10-15 PROCEDURE — 36415 COLL VENOUS BLD VENIPUNCTURE: CPT

## 2020-10-15 PROCEDURE — 83540 ASSAY OF IRON: CPT

## 2020-10-15 PROCEDURE — 82728 ASSAY OF FERRITIN: CPT

## 2020-10-15 PROCEDURE — 85025 COMPLETE CBC W/AUTO DIFF WBC: CPT

## 2020-10-15 PROCEDURE — 80053 COMPREHEN METABOLIC PANEL: CPT

## 2020-10-19 NOTE — PROGRESS NOTES
[facility] Hematology/Oncology  PROGRESS NOTE - 2nd Follow-up Visit      Subjective:       Patient ID:   NAME: Dennis Aldana : 1970     50 y.o. female    Referring Doc: Kana  Other Physicians: Laya Morris (Gyn)    Chief Complaint:  anemia/hx/of Lft breast cancer f/u    History of Present Illness:     Patient returns today for a 2nd regularly scheduled follow-up visit.  The patient is here today to go over the results of the recently ordered labs, tests and studies. She has subcutaneous nodule along the left lower medial scapula and also in the left posterior axilla region. It is fairly deep but could be a cyst or lipoma. She is otherwise doing ok. She had IV iron and did well with a boost in energy. No CP, SOB, HA's or n/V.     Discussed covid19 precautions             ROS:   GEN: normal without any fever, night sweats or weight loss  HEENT: normal with no HA's, sore throat, stiff neck, changes in vision  CV: normal with no CP, SOB, PND, GARCIA or orthopnea  PULM: normal with no SOB, cough, hemoptysis, sputum or pleuritic pain  GI: normal with no abdominal pain, nausea, vomiting, constipation, diarrhea, melanotic stools, BRBPR, or hematemesis  : normal with no hematuria, dysuria  BREAST: normal with no mass, discharge, pain; s/p left lumpectomy  SKIN: normal with no rash, erythema, bruising, or swelling; nodule in skin    Pain Scale:    Allergies:  Review of patient's allergies indicates:  No Known Allergies    Medications:    Current Outpatient Medications:     citalopram (CELEXA) 40 MG tablet, TAKE 1 TABLET BY MOUTH EVERY DAY, Disp: 90 tablet, Rfl: 1    ergocalciferol (ERGOCALCIFEROL) 50,000 unit Cap, Take 1 capsule (50,000 Units total) by mouth every 7 days., Disp: 12 capsule, Rfl: 1    famotidine (PEPCID) 40 MG tablet, TAKE 1 TABLET BY MOUTH EVERY DAY IN THE EVENING, Disp: 90 tablet, Rfl: 1    ferrous sulfate 324 mg (65 mg iron) TbEC, Take 1 tablet (324 mg total) by mouth once daily., Disp: 100  tablet, Rfl: 1    LORazepam (ATIVAN) 1 MG tablet, TAKE 1 TABLET (1 MG TOTAL) BY MOUTH ONCE DAILY. AS NEEDED FOR ANXIETY, Disp: 30 tablet, Rfl: 1    meloxicam (MOBIC) 15 MG tablet, Take 1 tablet (15 mg total) by mouth daily as needed for Pain., Disp: 30 tablet, Rfl: 3    metFORMIN (GLUCOPHAGE) 500 MG tablet, Take 1 tablet (500 mg total) by mouth daily with breakfast., Disp: 60 tablet, Rfl: 5    metoprolol succinate (TOPROL-XL) 200 MG 24 hr tablet, TAKE 1 TABLET BY MOUTH EVERY DAY, Disp: 90 tablet, Rfl: 1    pramipexole (MIRAPEX) 1 MG tablet, TAKE 2 TABLETS (2 MG TOTAL) BY MOUTH EVERY EVENING., Disp: 180 tablet, Rfl: 1    promethazine-codeine 6.25-10 mg/5 ml (PHENERGAN WITH CODEINE) 6.25-10 mg/5 mL syrup, Take 5 mLs by mouth every 6 (six) hours as needed for Cough., Disp: 180 mL, Rfl: 0    ranitidine (ZANTAC) 300 MG tablet, Take 300 mg by mouth nightly., Disp: , Rfl:     tamoxifen (NOLVADEX) 20 MG Tab, TAKE 1 TABLET BY MOUTH EVERY DAY, Disp: 30 tablet, Rfl: 11    zolpidem (AMBIEN) 5 MG Tab, TAKE 1 TABLET (5 MG TOTAL) BY MOUTH NIGHTLY AS NEEDED., Disp: 30 tablet, Rfl: 1    PMHx/PSHx Updates:  See patient's last visit with me on 8/17/2020.  See H&P on 8/17/2020        Pathology:  Cancer Staging  No matching staging information was found for the patient.          Objective:     Vitals:  Blood pressure 137/89, pulse 97, temperature 98.1 °F (36.7 °C), resp. rate 18, weight 98.7 kg (217 lb 11.2 oz).    Physical Examination:   GEN: no apparent distress, comfortable; AAOx3  HEAD: atraumatic and normocephalic  EYES: no pallor, no icterus, PERRLA  ENT: OMM, no pharyngeal erythema, external ears WNL; no nasal discharge; no thrush  NECK: no masses, thyroid normal, trachea midline, no LAD/LN's, supple  CV: RRR with no murmur; normal pulse; normal S1 and S2; no pedal edema  CHEST: Normal respiratory effort; CTAB; normal breath sounds; no wheeze or crackles  ABDOM: nontender and nondistended; soft; normal bowel sounds; no  rebound/guarding  MUSC/Skeletal: ROM normal; no crepitus; joints normal; no deformities or arthropathy  EXTREM: no clubbing, cyanosis, inflammation or swelling  SKIN: no rashes, lesions, ulcers, petechiae or subcutaneous nodules; tattoos; subcutaneous nodule along the left lower medial scapula and also in the left posterior axilla region  : no quinonez  NEURO: grossly intact; motor/sensory WNL; AAOx3; no tremors  PSYCH: normal mood, affect and behavior  LYMPH: normal cervical, supraclavicular, axillary and groin LN's  Breast: s/p left lumpectomy        Labs:     Lab Results   Component Value Date    WBC 10.85 10/15/2020    HGB 13.8 10/15/2020    HCT 44.0 10/15/2020    MCV 85 10/15/2020     10/15/2020       Lab Results   Component Value Date    IRON 89 10/15/2020    TIBC 340 10/15/2020    FERRITIN 308 (H) 10/15/2020     CMP  Sodium   Date Value Ref Range Status   10/15/2020 139 136 - 145 mmol/L Final     Potassium   Date Value Ref Range Status   10/15/2020 4.1 3.5 - 5.1 mmol/L Final     Chloride   Date Value Ref Range Status   10/15/2020 99 95 - 110 mmol/L Final     CO2   Date Value Ref Range Status   10/15/2020 27 23 - 29 mmol/L Final     Glucose   Date Value Ref Range Status   10/15/2020 116 (H) 70 - 110 mg/dL Final     BUN, Bld   Date Value Ref Range Status   10/15/2020 18 6 - 20 mg/dL Final     Creatinine   Date Value Ref Range Status   10/15/2020 0.8 0.5 - 1.4 mg/dL Final     Calcium   Date Value Ref Range Status   10/15/2020 9.3 8.7 - 10.5 mg/dL Final     Total Protein   Date Value Ref Range Status   10/15/2020 7.0 6.0 - 8.4 g/dL Final     Albumin   Date Value Ref Range Status   10/15/2020 3.8 3.5 - 5.2 g/dL Final     Total Bilirubin   Date Value Ref Range Status   10/15/2020 0.6 0.1 - 1.0 mg/dL Final     Comment:     For infants and newborns, interpretation of results should be based  on gestational age, weight and in agreement with clinical  observations.  Premature Infant recommended reference  ranges:  Up to 24 hours.............<8.0 mg/dL  Up to 48 hours............<12.0 mg/dL  3-5 days..................<15.0 mg/dL  6-29 days.................<15.0 mg/dL       Alkaline Phosphatase   Date Value Ref Range Status   10/15/2020 76 55 - 135 U/L Final     AST   Date Value Ref Range Status   10/15/2020 13 10 - 40 U/L Final     ALT   Date Value Ref Range Status   10/15/2020 17 10 - 44 U/L Final     Anion Gap   Date Value Ref Range Status   10/15/2020 13 8 - 16 mmol/L Final     eGFR if    Date Value Ref Range Status   10/15/2020 >60.0 >60 mL/min/1.73 m^2 Final     eGFR if non    Date Value Ref Range Status   10/15/2020 >60.0 >60 mL/min/1.73 m^2 Final     Comment:     Calculation used to obtain the estimated glomerular filtration  rate (eGFR) is the CKD-EPI equation.              Radiology/Diagnostic Studies:    No results found.    I have reviewed all available lab results and radiology reports.    Assessment/Plan:   (1) 50 y.o. female with diagnosis of anemia referred by Dr Wade  - microcytic indices  - hgb at 10.2 and MCV at 66  - bilirubin is wnl, so I do not suspect any hemolysis at this time  - low iron and ferritin with elevated TIBC most consistent with an iron deficiency condition  - hx/of endometriosis and heavy menstrual cycles - followed by Dr Dagoberto Morris with GYN at [facility] (prior laparoscopy x4)    10/20/2020:  - s/p two IV irons  - Hgb now up and normal at 13.8  - iron now normal at 89 and ferritin is up to 308     (2) Hx/of left breast cancer diagnosed May 2018 s/p lumpectomy and radiation, oral antihormone with tamoxifen - s/p lumpectomy and XRT at [facility]  - Stage I  - ER positive  - she saw a female oncologist at [facility] in past   - she gets regular mammograms and had one this past Jan 2020  - she does not follow-up with oncology     (3) HTN and hypercholesterolemia     (4) Type II DM     (5) GERD     (6) TATYANA/Depression and RLS     (7) OA of right knee    (8) Subcutaneous  nodule left scapular medial side and left posterior axilla - ? Lipomas - will schedule PEt scan in light of the breast cancer history      VISIT DIAGNOSES:      Iron deficiency anemia, unspecified iron deficiency anemia type    History of left breast cancer    ER+ (estrogen receptor positive status)    Microcytic anemia    S/P lumpectomy, left breast          PLAN:  1. Schedule PET scan to evaluate the subcutaneous nodules - consider General Surg evaluation if need be  2.  Check labs monthly and resume IV iron as needed  4. Continue tamoxifen for now and mammogram yearly recommended (due jan 2021)  5. F/u with GYN yearly  6.  F/u with PCP, etc  RTC in 4 weeks   Fax note to Andrew Roger MD; Laya Morris    Discussion:     COVID-19 Discussion:    I had long discussion with patient and any applicable family about the COVID-19 coronavirus epidemic and the recommended precautions with regard to cancer and/or hematology patients. I have re-iterated the CDC recommendations for adequate hand washing, use of hand -like products, and coughing into elbow, etc. In addition, especially for our patients who are on chemotherapy and/or our otherwise immunocompromised patients, I have recommended avoidance of crowds, including movie theaters, restaurants, churches, etc. I have recommended avoidance of any sick or symptomatic family members and/or friends. I have also recommended avoidance of any raw and unwashed food products, and general avoidance of food items that have not been prepared by themselves. The patient has been asked to call us immediately with any symptom developments, issues, questions or other general concerns.     Pathology Discussion:     I reviewed and discussed the pathology report(s) and radiograph reports (if available) in as simple to understand and/or laymen's terms to the best of my ability. I had an indepth conversation with the patient and went over the patient's individual diagnosis  "based on the information that was currently available. I discussed the TNM staging process with regard to the patient's particular cancer type, and the calculated stage based on the currently available TNM data and literature. I discussed the available prognostic data with regard to the current staging information and how it relates to the prognosis of their particular neoplastic process.          NCCN Guidelines:     I discussed the available treatment option(s) in accordance with the latest literature from the NCCN Clinical Practice Guidelines for the patient's particular type of cancer disorder. The NCCN Guidelines provide a "document evidence-based (and) consensus-driven management" of the care of oncology patients. The treatment recommendations were made not only in accordance to the NCCN guidelines, but also factored in to account the patient's overall age, condition, performance status and their medical co-morbidities. I went over the risks and benefits of the the treatment options (if any could be made) with regard to their particular cancer type, their cancer stage, their age, and their co-morbidities.          Antihormone Therapy Discussion:    I discussed the advantages of antihormone therapy with the patient with regard to their particular neoplastic or carcinoma in situ condition. I went over the side-effect profile of the medication including risk for potential development of endometrial cancer and/or hyperplasia in women who still have a uterus and the need for yearly GYN evaluation and follow-up. I discussed the risks for thromboembolic events such as DVT's, pulmonary emboli, CVA's, retinal vascular clots, phlebitis, and TIA's. I discussed the potential risks for development of ocular disturbances, retinopathy, cataracts, corneal changes, flushing, hot flashes, amenorrhea, altered menses, fluid retention, weight changes, elevations in LFT's, liver damage, and mood disturbances. I discussed the " potential risk of arthropathy and joint pains/aches which could be chronic and debilitating. I discussed potential adverse effects on bone mineralization and the risk of osteopenia and/or osteoporosis which could led to increase risk of fractures.   A consent form was obtained and a copy was provided to the patient.    Iron Infusion Therapy Discussion:     I provided literature/learning materials on the particular IV iron regimen and discussed the potential side-effect profiles of the drug(s). I discussed the importance of compliance with obtaining and monitoring requested lab work, and went over the potential risk for the development of anaphylactic shock, bronchospasm, dysrhythmia, liver and/or kidney damage, and respiratory/cardiovascular arrest and/or failure. I discussed the potential risks for development of alopecia, fevers, itching, chills and/or rigors, cold sensory issues, ringing in ears, vertigo and neuropathy, all of which are usually acute but sometimes could end up being chronic and life-long. I discussed the risks of hand-foot syndrome and rashes, and development of other autoimmune mediated processes such as pneumonitis and colitis which could be life threatening.     The patient's consent has been obtained to proceed with the IV iron therapy.The patient will be referred to [employer] for training and education on IV iron therapy, use of antiemetics and/or anti-diarrheals, use of NSAID's, potential IV iron therapy side-effects, and any specific recommendations and precautions with the particular IV iron agents.      I answered all of the patient's (and family's, if applicable) questions to the best of my ability and to their complete satisfaction. The patient acknowledged full understanding of the risks, recommendations and plan(s).         I spent over 25 mins of time with the patient. Reviewed results of the recently ordered labs, tests and studies; made directives  with regards to the results. Over half of this time was spent couseling and coordinating care.    I have explained all of the above in detail and the patient understands all of the current recommendation(s). I have answered all of their questions to the best of my ability and to their complete satisfaction.   The patient is to continue with the current management plan.            Electronically signed by Hossein Carranza MD          Answers for HPI/ROS submitted by the patient on 10/17/2020   appetite change : No  unexpected weight change: No  mouth sores: No  visual disturbance: No  cough: No  shortness of breath: No  chest pain: No  abdominal pain: No  diarrhea: No  frequency: No  back pain: Yes  rash: No  headaches: No  adenopathy: No  nervous/ anxious: No

## 2020-10-20 ENCOUNTER — OFFICE VISIT (OUTPATIENT)
Dept: HEMATOLOGY/ONCOLOGY | Facility: CLINIC | Age: 50
End: 2020-10-20
Payer: COMMERCIAL

## 2020-10-20 VITALS
BODY MASS INDEX: 35.14 KG/M2 | SYSTOLIC BLOOD PRESSURE: 137 MMHG | WEIGHT: 217.69 LBS | RESPIRATION RATE: 18 BRPM | DIASTOLIC BLOOD PRESSURE: 89 MMHG | TEMPERATURE: 98 F | HEART RATE: 97 BPM

## 2020-10-20 DIAGNOSIS — Z17.0 ER+ (ESTROGEN RECEPTOR POSITIVE STATUS): ICD-10-CM

## 2020-10-20 DIAGNOSIS — Z85.3 HISTORY OF LEFT BREAST CANCER: ICD-10-CM

## 2020-10-20 DIAGNOSIS — R22.2 SUBCUTANEOUS NODULE OF BACK: ICD-10-CM

## 2020-10-20 DIAGNOSIS — D50.9 MICROCYTIC ANEMIA: ICD-10-CM

## 2020-10-20 DIAGNOSIS — D50.9 IRON DEFICIENCY ANEMIA, UNSPECIFIED IRON DEFICIENCY ANEMIA TYPE: Primary | ICD-10-CM

## 2020-10-20 DIAGNOSIS — R22.32 MASS OF LEFT AXILLA: ICD-10-CM

## 2020-10-20 DIAGNOSIS — C50.312 MALIGNANT NEOPLASM OF LOWER-INNER QUADRANT OF LEFT BREAST IN FEMALE, ESTROGEN RECEPTOR POSITIVE: ICD-10-CM

## 2020-10-20 DIAGNOSIS — Z17.0 MALIGNANT NEOPLASM OF LOWER-INNER QUADRANT OF LEFT BREAST IN FEMALE, ESTROGEN RECEPTOR POSITIVE: ICD-10-CM

## 2020-10-20 DIAGNOSIS — Z98.890 S/P LUMPECTOMY, LEFT BREAST: ICD-10-CM

## 2020-10-20 PROCEDURE — 99215 OFFICE O/P EST HI 40 MIN: CPT | Mod: S$GLB,,, | Performed by: INTERNAL MEDICINE

## 2020-10-20 PROCEDURE — 3079F PR MOST RECENT DIASTOLIC BLOOD PRESSURE 80-89 MM HG: ICD-10-PCS | Mod: S$GLB,,, | Performed by: INTERNAL MEDICINE

## 2020-10-20 PROCEDURE — 3008F BODY MASS INDEX DOCD: CPT | Mod: S$GLB,,, | Performed by: INTERNAL MEDICINE

## 2020-10-20 PROCEDURE — 3079F DIAST BP 80-89 MM HG: CPT | Mod: S$GLB,,, | Performed by: INTERNAL MEDICINE

## 2020-10-20 PROCEDURE — 3075F PR MOST RECENT SYSTOLIC BLOOD PRESS GE 130-139MM HG: ICD-10-PCS | Mod: S$GLB,,, | Performed by: INTERNAL MEDICINE

## 2020-10-20 PROCEDURE — 3008F PR BODY MASS INDEX (BMI) DOCUMENTED: ICD-10-PCS | Mod: S$GLB,,, | Performed by: INTERNAL MEDICINE

## 2020-10-20 PROCEDURE — 3075F SYST BP GE 130 - 139MM HG: CPT | Mod: S$GLB,,, | Performed by: INTERNAL MEDICINE

## 2020-10-20 PROCEDURE — 99215 PR OFFICE/OUTPT VISIT, EST, LEVL V, 40-54 MIN: ICD-10-PCS | Mod: S$GLB,,, | Performed by: INTERNAL MEDICINE

## 2020-10-27 ENCOUNTER — PATIENT MESSAGE (OUTPATIENT)
Dept: HEMATOLOGY/ONCOLOGY | Facility: CLINIC | Age: 50
End: 2020-10-27

## 2020-11-02 NOTE — PROGRESS NOTES
Digital Medicine: Health  Introduction    Introduced Dennis Aldana to Digital Medicine. Discussed health  role and recommended lifestyle modifications.    The history is provided by the patient.               DIABETES  Explained the goal of the diabetes digital medicine program is to decrease A1c within patient-specific target levels. Reviewed benefits of A1c reduction including reducing risk for kidney, eye, and nerve disease.      Explained that we expect patient to submit blood sugar readings as prescribed. Instructed patient not to allow anyone else to use their glucometer and phone as data submitted is directly entered into their medical record. Reviewed and confirmed appropriate blood sugar testing technique.       Reviewed general Self-Monitoring of Blood Glucose (SMBG) goals:  · FP-130 mg/dL  · 2h PPG: < 180 mg/dL  · Bedtime: < 150 mg/dL    Explained to the patient that the Digital Medicine team is not available for emergencies. Advised patient call [facility] ([phone] or [phone]) or [phone] if needed.     Patient reported SMBG schedule: Patient unsure  testing daily currently, just needs to download apps on her new phone so that readings transfer over to our system    Reviewed signs and symptoms of hypoglycemia (weakness, dizziness, hunger, shakiness, nausea, headache, heart palpitations, sweating, fatigue, anxiety, etc.).  Reviewed treatment of hypoglycemia (1515 rule).      Patient's A1C goal is less than or equal to 7.  Patient's most recent A1C result is at goal.  @RESUFAST(LABA1C,HGBA).                Diet-Not assessed          Physical Activity-Not assessed    Medication Adherence-Medication Adherence not addressed.      Substance, Sleep, Stress-Not assessed      Additional monitoring needed.  Instructed to charge device.  Provided patient education.  Reviewed Device Techniques.     Addressed patient questions and patient has my contact information if needed prior to next  outreach. Patient verbalizes understanding.      Explained the importance of self-monitoring and medication adherence. Encouraged the patient to communicate with their health  for lifestyle modifications to help improve or maintain a healthy lifestyle.        Sent link to [facility] [employer] [facility] webpages and my contact information via [employer] for future questions.        Explained to the patient that the Digital Medicine team is not available for emergencies. Advised patient call [facility] ([phone] or [phone]) or [phone] if needed.                Topic    Eye Exam            Last 6 Patient Entered Readings                                          Most Recent A1c:      Recent Readings 8/6/2020 8/5/2020 8/3/2020 8/2/2020    Blood Glucose (mg/dL) 115 131 140 104

## 2020-11-04 DIAGNOSIS — M17.11 PRIMARY OSTEOARTHRITIS OF RIGHT KNEE: ICD-10-CM

## 2020-11-04 RX ORDER — MELOXICAM 15 MG/1
TABLET ORAL
Qty: 30 TABLET | Refills: 5 | Status: SHIPPED | OUTPATIENT
Start: 2020-11-04 | End: 2021-06-02

## 2020-11-04 RX ORDER — FERROUS SULFATE 324(65)MG
325 TABLET, DELAYED RELEASE (ENTERIC COATED) ORAL DAILY
Qty: 30 TABLET | Refills: 6 | OUTPATIENT
Start: 2020-11-04

## 2020-11-04 NOTE — TELEPHONE ENCOUNTER
Call patient tell according recent lab iron level was normal should stop on iron repeat iron level in 6 months be sure states states

## 2020-11-10 ENCOUNTER — LAB VISIT (OUTPATIENT)
Dept: LAB | Facility: HOSPITAL | Age: 50
End: 2020-11-10
Attending: INTERNAL MEDICINE
Payer: COMMERCIAL

## 2020-11-10 DIAGNOSIS — D50.9 IRON DEFICIENCY ANEMIA, UNSPECIFIED IRON DEFICIENCY ANEMIA TYPE: ICD-10-CM

## 2020-11-10 DIAGNOSIS — Z98.890 S/P LUMPECTOMY, LEFT BREAST: ICD-10-CM

## 2020-11-10 DIAGNOSIS — D50.9 MICROCYTIC ANEMIA: ICD-10-CM

## 2020-11-10 LAB
ALBUMIN SERPL BCP-MCNC: 3.7 G/DL (ref 3.5–5.2)
ALP SERPL-CCNC: 71 U/L (ref 55–135)
ALT SERPL W/O P-5'-P-CCNC: 19 U/L (ref 10–44)
ANION GAP SERPL CALC-SCNC: 11 MMOL/L (ref 8–16)
AST SERPL-CCNC: 14 U/L (ref 10–40)
BASOPHILS # BLD AUTO: 0.04 K/UL (ref 0–0.2)
BASOPHILS NFR BLD: 0.4 % (ref 0–1.9)
BILIRUB SERPL-MCNC: 0.6 MG/DL (ref 0.1–1)
BUN SERPL-MCNC: 17 MG/DL (ref 6–20)
CALCIUM SERPL-MCNC: 8.8 MG/DL (ref 8.7–10.5)
CHLORIDE SERPL-SCNC: 104 MMOL/L (ref 95–110)
CO2 SERPL-SCNC: 25 MMOL/L (ref 23–29)
CREAT SERPL-MCNC: 1 MG/DL (ref 0.5–1.4)
DIFFERENTIAL METHOD: ABNORMAL
EOSINOPHIL # BLD AUTO: 0.2 K/UL (ref 0–0.5)
EOSINOPHIL NFR BLD: 1.6 % (ref 0–8)
ERYTHROCYTE [DISTWIDTH] IN BLOOD BY AUTOMATED COUNT: 17.4 % (ref 11.5–14.5)
EST. GFR  (AFRICAN AMERICAN): >60 ML/MIN/1.73 M^2
EST. GFR  (NON AFRICAN AMERICAN): >60 ML/MIN/1.73 M^2
FERRITIN SERPL-MCNC: 253 NG/ML (ref 20–300)
GLUCOSE SERPL-MCNC: 121 MG/DL (ref 70–110)
HCT VFR BLD AUTO: 40.9 % (ref 37–48.5)
HGB BLD-MCNC: 13.2 G/DL (ref 12–16)
IMM GRANULOCYTES # BLD AUTO: 0.07 K/UL (ref 0–0.04)
IMM GRANULOCYTES NFR BLD AUTO: 0.7 % (ref 0–0.5)
IRON SERPL-MCNC: 80 UG/DL (ref 30–160)
LYMPHOCYTES # BLD AUTO: 1.9 K/UL (ref 1–4.8)
LYMPHOCYTES NFR BLD: 19.6 % (ref 18–48)
MCH RBC QN AUTO: 27.4 PG (ref 27–31)
MCHC RBC AUTO-ENTMCNC: 32.3 G/DL (ref 32–36)
MCV RBC AUTO: 85 FL (ref 82–98)
MONOCYTES # BLD AUTO: 0.7 K/UL (ref 0.3–1)
MONOCYTES NFR BLD: 7.7 % (ref 4–15)
NEUTROPHILS # BLD AUTO: 6.7 K/UL (ref 1.8–7.7)
NEUTROPHILS NFR BLD: 70 % (ref 38–73)
NRBC BLD-RTO: 0 /100 WBC
PLATELET # BLD AUTO: 233 K/UL (ref 150–350)
PMV BLD AUTO: 10.1 FL (ref 9.2–12.9)
POTASSIUM SERPL-SCNC: 3.6 MMOL/L (ref 3.5–5.1)
PROT SERPL-MCNC: 6.7 G/DL (ref 6–8.4)
RBC # BLD AUTO: 4.82 M/UL (ref 4–5.4)
SATURATED IRON: 26 % (ref 20–50)
SODIUM SERPL-SCNC: 140 MMOL/L (ref 136–145)
TOTAL IRON BINDING CAPACITY: 311 UG/DL (ref 250–450)
TRANSFERRIN SERPL-MCNC: 222 MG/DL (ref 200–375)
WBC # BLD AUTO: 9.5 K/UL (ref 3.9–12.7)

## 2020-11-10 PROCEDURE — 85025 COMPLETE CBC W/AUTO DIFF WBC: CPT

## 2020-11-10 PROCEDURE — 83540 ASSAY OF IRON: CPT

## 2020-11-10 PROCEDURE — 36415 COLL VENOUS BLD VENIPUNCTURE: CPT

## 2020-11-10 PROCEDURE — 80053 COMPREHEN METABOLIC PANEL: CPT

## 2020-11-10 PROCEDURE — 82728 ASSAY OF FERRITIN: CPT

## 2020-11-13 ENCOUNTER — PATIENT OUTREACH (OUTPATIENT)
Dept: OTHER | Facility: OTHER | Age: 50
End: 2020-11-13

## 2020-11-13 DIAGNOSIS — E11.69 TYPE 2 DIABETES MELLITUS WITH HYPERLIPIDEMIA: Primary | ICD-10-CM

## 2020-11-13 DIAGNOSIS — E78.5 TYPE 2 DIABETES MELLITUS WITH HYPERLIPIDEMIA: Primary | ICD-10-CM

## 2020-11-13 NOTE — PROGRESS NOTES
Digital Medicine: Clinician Introduction    Dennis Aldana is a 50 y.o. female who is newly enrolled in the Digital Medicine Clinic.    No readings submitted since . No issues with glucometer currently. She states she got a new phone and was unsure if she needed to use her old phone or her new phones to submit readings. Requested tech support phone # be sent to her [employer] message    [place]. Would like to discuss with HC about diet further.    Wonders how often she should be checking her SMBG.     Has upcoming OV with PCP 20 and she states she was told to get A1C checked prior to OV    Family has hx of diabetes     Patient lives in [place] but sees [place] PCP.    The history is provided by the patient.      Review of patient's allergies indicates:  No Known Allergies  Completed Medication Reconciliation    Patient is not on ACEI/ARB because Recent Microalbumin/Scr lab was normal.  Patient is not on statin because ADA recommends patients ages 40-76 yo with diabetes be started on statin to decrease ASCVD risks. Will notify PCP.      DIABETES  Explained the goal of the diabetes digital medicine program is to decrease A1c within patient-specific target levels. Reviewed benefits of A1c reduction including reducing risk for kidney, eye, and nerve disease.      Explained that we expect patient to submit blood sugar readings as prescribed. Instructed patient not to allow anyone else to use their glucometer and phone as data submitted is directly entered into their medical record. Reviewed and confirmed appropriate blood sugar testing technique.      Patient reported SMBG schedule: Patient unsure.   Reviewed general Self-Monitoring of Blood Glucose (SMBG) goals:  · FP-130 mg/dL  · 2h PPG: <180 mg/dL  · Bedtime: <150 mg/dL    Reviewed signs or symptoms of hyperglycemia (headache, increased thirst, increased urination, fatigue,  blurred vision, etc.).      Reviewed signs and symptoms of hypoglycemia (weakness, dizziness, hunger, shakiness, nausea, headache, heart palpitations, sweating, fatigue, anxiety, etc.).  Reviewed treatment of hypoglycemia (15/15 rule).      Patient does not have history of hypoglycemia.    Denies any recent or current hypoglycemic or hyperglycemic s/s.    Patient's A1C goal is less than or equal to 7 per 2020 ADA guidelines. Patient's most recent A1C result is at goal. Lab Results     Component                Value               Date                     HGBA1C                   6.4 (H)             05/28/2020          .           Last 6 Patient Entered Readings                                          Most Recent A1c: 6.4% on 5/28/2020  (Goal: 7%)     Recent Readings 8/6/2020 8/5/2020 8/3/2020 8/2/2020    Blood Glucose (mg/dL) 115 131 140 104              Depression Screening  Dennis Aldana screened negative on the depression screening.   States depression and anxiety managed appropriately on current medications    Sleep Apnea Screening  Patient not previously diagnosed with MINDI    has told her that she has loud snoring and has stopped breathing during sleep. Is interested in sleep medicine referral, will notify PCP per sleep apnea protocol.     Medication Affordability Screening  Patient did not answer the medication affordability questionnaires. Patient is currently not having problems affording medications    Medication Adherence-Medication adherence was assessed.  Patient continue taking medication as prescribed.          Denies any missed doses - not using a formal reminder system       ASSESSMENT(S)  Patient's A1C goal is less than or equal to 7. Patient's most recent A1C result is at goal. Lab Results    Component                Value               Date                     HGBA1C                   6.4 (H)             05/28/2020          .       Unable to assess SMBG. Last A1C within goal - due for  another A1C. She is currently managed on first line oral monotherapy.       Diabetes Plan  Prescribed SMBG testing frequency. Patient is on oral monotherapy - SMBG checks daily appropriate. Recommended checking SMBG daily and alternating between fasting and post prandial.   Reviewed treatment of hypoglycemia (rule of 15/15).  Additional monitoring needed. Encouraged she started checking SMBG  Continue current therapy.  Continue current diet/physical activity routine.  Instructed to charge device.  Placed task for health  follow up. She would like to discuss diet further with HC  Await md intervention. I discussed with her the guideline recommendations for statin is to decrease her risk for ASCVD. She is aware that I will discuss with PCP about the addition of a statin.  Plan to follow up with in 1 month to allow her time to assess SMBG. Advised to get A1C completed - states she plans to do this same time she gets monthly CBC for anemia.      Addressed patient questions and patient has my contact information if needed prior to next outreach. Patient verbalizes understanding.      Explained the importance of self-monitoring and medication adherence. Encouraged the patient to communicate with their health  for lifestyle modifications to help improve or maintain a healthy lifestyle.        Sent link to [facility] webpages and my contact information via [employer] for future questions.        Explained to the patient that the Digital Medicine team is not available for emergencies. Advised patient call [facility] ([phone] or [phone]) or [phone] if needed.                Topic    Eye Exam           Current Medication Regimen:    Diabetes Medications             metFORMIN (GLUCOPHAGE) 500 MG tablet Take 1 tablet (500 mg total) by mouth daily with breakfast.

## 2020-11-16 NOTE — PROGRESS NOTES
[facility] Hematology/Oncology  PROGRESS NOTE -   Follow-up Visit      Subjective:       Patient ID:   NAME: Dennis Aldana : 1970     50 y.o. female    Referring Doc: Kana  Other Physicians: Laya Morris (Gyn)    Chief Complaint:  anemia/hx/of Lft breast cancer f/u    History of Present Illness:     Patient returns today for a regularly scheduled follow-up visit.  The patient is here today to go over the results of the recently ordered labs, tests and studies. She had some IV iron back in 2020.     Insurance denied the PEt scan, so we will order CT scans to evaluate the area that is suspected lipoma.     She is otherwise doing ok. Energy levels are better overall.  No CP, SOB, HA's or n/V.     Discussed covid19 precautions             ROS:   GEN: normal without any fever, night sweats or weight loss  HEENT: normal with no HA's, sore throat, stiff neck, changes in vision  CV: normal with no CP, SOB, PND, GARCIA or orthopnea  PULM: normal with no SOB, cough, hemoptysis, sputum or pleuritic pain  GI: normal with no abdominal pain, nausea, vomiting, constipation, diarrhea, melanotic stools, BRBPR, or hematemesis  : normal with no hematuria, dysuria  BREAST: normal with no mass, discharge, pain; s/p left lumpectomy  SKIN: normal with no rash, erythema, bruising, or swelling; nodule in skin    Pain Scale:  0    Allergies:  Review of patient's allergies indicates:  No Known Allergies    Medications:    Current Outpatient Medications:     citalopram (CELEXA) 40 MG tablet, TAKE 1 TABLET BY MOUTH EVERY DAY, Disp: 90 tablet, Rfl: 1    ergocalciferol (ERGOCALCIFEROL) 50,000 unit Cap, Take 1 capsule (50,000 Units total) by mouth every 7 days., Disp: 12 capsule, Rfl: 1    famotidine (PEPCID) 40 MG tablet, TAKE 1 TABLET BY MOUTH EVERY DAY IN THE EVENING, Disp: 90 tablet, Rfl: 1    ferrous sulfate 324 mg (65 mg iron) TbEC, Take 1 tablet (324 mg total) by mouth once daily., Disp: 100 tablet, Rfl: 1     LORazepam (ATIVAN) 1 MG tablet, TAKE 1 TABLET (1 MG TOTAL) BY MOUTH ONCE DAILY. AS NEEDED FOR ANXIETY, Disp: 30 tablet, Rfl: 1    meloxicam (MOBIC) 15 MG tablet, TAKE 1 TABLET BY MOUTH EVERY DAY AS NEEDED FOR PAIN, Disp: 30 tablet, Rfl: 5    metFORMIN (GLUCOPHAGE) 500 MG tablet, Take 1 tablet (500 mg total) by mouth daily with breakfast., Disp: 60 tablet, Rfl: 5    metoprolol succinate (TOPROL-XL) 200 MG 24 hr tablet, TAKE 1 TABLET BY MOUTH EVERY DAY, Disp: 90 tablet, Rfl: 1    pramipexole (MIRAPEX) 1 MG tablet, TAKE 2 TABLETS (2 MG TOTAL) BY MOUTH EVERY EVENING., Disp: 180 tablet, Rfl: 1    promethazine-codeine 6.25-10 mg/5 ml (PHENERGAN WITH CODEINE) 6.25-10 mg/5 mL syrup, Take 5 mLs by mouth every 6 (six) hours as needed for Cough., Disp: 180 mL, Rfl: 0    ranitidine (ZANTAC) 300 MG tablet, Take 300 mg by mouth nightly., Disp: , Rfl:     rosuvastatin (CRESTOR) 10 MG tablet, Take 1 tablet (10 mg total) by mouth once daily., Disp: 90 tablet, Rfl: 3    tamoxifen (NOLVADEX) 20 MG Tab, TAKE 1 TABLET BY MOUTH EVERY DAY, Disp: 30 tablet, Rfl: 11    zolpidem (AMBIEN) 5 MG Tab, TAKE 1 TABLET (5 MG TOTAL) BY MOUTH NIGHTLY AS NEEDED., Disp: 30 tablet, Rfl: 1    PMHx/PSHx Updates:  See patient's last visit with me on 10/20/2020.  See H&P on 8/17/2020        Pathology:  Cancer Staging  No matching staging information was found for the patient.          Objective:     Vitals:  Blood pressure 128/87, pulse 94, temperature 97.6 °F (36.4 °C), resp. rate 19, weight 98.6 kg (217 lb 6.4 oz).    Physical Examination:   GEN: no apparent distress, comfortable; AAOx3; overweight  HEAD: atraumatic and normocephalic  EYES: no pallor, no icterus, PERRLA  ENT: OMM, no pharyngeal erythema, external ears WNL; no nasal discharge; no thrush  NECK: no masses, thyroid normal, trachea midline, no LAD/LN's, supple  CV: RRR with no murmur; normal pulse; normal S1 and S2; no pedal edema  CHEST: Normal respiratory effort; CTAB; normal breath  sounds; no wheeze or crackles  ABDOM: nontender and nondistended; soft; normal bowel sounds; no rebound/guarding  MUSC/Skeletal: ROM normal; no crepitus; joints normal; no deformities or arthropathy  EXTREM: no clubbing, cyanosis, inflammation or swelling  SKIN: no rashes, lesions, ulcers, petechiae or subcutaneous nodules; tattoos; subcutaneous nodule along the left lower medial scapula and also in the left posterior axilla region  : no quinonez  NEURO: grossly intact; motor/sensory WNL; AAOx3; no tremors  PSYCH: normal mood, affect and behavior  LYMPH: normal cervical, supraclavicular, axillary and groin LN's  Breast: s/p left lumpectomy        Labs:     Lab Results   Component Value Date    WBC 9.50 11/10/2020    HGB 13.2 11/10/2020    HCT 40.9 11/10/2020    MCV 85 11/10/2020     11/10/2020       Lab Results   Component Value Date    IRON 80 11/10/2020    TIBC 311 11/10/2020    FERRITIN 253 11/10/2020     CMP  Sodium   Date Value Ref Range Status   11/10/2020 140 136 - 145 mmol/L Final     Potassium   Date Value Ref Range Status   11/10/2020 3.6 3.5 - 5.1 mmol/L Final     Chloride   Date Value Ref Range Status   11/10/2020 104 95 - 110 mmol/L Final     CO2   Date Value Ref Range Status   11/10/2020 25 23 - 29 mmol/L Final     Glucose   Date Value Ref Range Status   11/10/2020 121 (H) 70 - 110 mg/dL Final     BUN   Date Value Ref Range Status   11/10/2020 17 6 - 20 mg/dL Final     Creatinine   Date Value Ref Range Status   11/10/2020 1.0 0.5 - 1.4 mg/dL Final     Calcium   Date Value Ref Range Status   11/10/2020 8.8 8.7 - 10.5 mg/dL Final     Total Protein   Date Value Ref Range Status   11/10/2020 6.7 6.0 - 8.4 g/dL Final     Albumin   Date Value Ref Range Status   11/10/2020 3.7 3.5 - 5.2 g/dL Final     Total Bilirubin   Date Value Ref Range Status   11/10/2020 0.6 0.1 - 1.0 mg/dL Final     Comment:     For infants and newborns, interpretation of results should be based  on gestational age, weight and in  agreement with clinical  observations.  Premature Infant recommended reference ranges:  Up to 24 hours.............<8.0 mg/dL  Up to 48 hours............<12.0 mg/dL  3-5 days..................<15.0 mg/dL  6-29 days.................<15.0 mg/dL       Alkaline Phosphatase   Date Value Ref Range Status   11/10/2020 71 55 - 135 U/L Final     AST   Date Value Ref Range Status   11/10/2020 14 10 - 40 U/L Final     ALT   Date Value Ref Range Status   11/10/2020 19 10 - 44 U/L Final     Anion Gap   Date Value Ref Range Status   11/10/2020 11 8 - 16 mmol/L Final     eGFR if    Date Value Ref Range Status   11/10/2020 >60.0 >60 mL/min/1.73 m^2 Final     eGFR if non    Date Value Ref Range Status   11/10/2020 >60.0 >60 mL/min/1.73 m^2 Final     Comment:     Calculation used to obtain the estimated glomerular filtration  rate (eGFR) is the CKD-EPI equation.        Lab Results   Component Value Date    IRON 80 11/10/2020    TIBC 311 11/10/2020    FERRITIN 253 11/10/2020           Radiology/Diagnostic Studies:    No results found.    I have reviewed all available lab results and radiology reports.    Assessment/Plan:   (1) 50 y.o. female with diagnosis of anemia referred by Dr Wade  - microcytic indices  - hgb at 10.2 and MCV at 66  - bilirubin is wnl, so I do not suspect any hemolysis at this time  - low iron and ferritin with elevated TIBC most consistent with an iron deficiency condition  - hx/of endometriosis and heavy menstrual cycles - followed by Dr Dagoberto Morris with GYN at [facility] (prior laparoscopy x4)    10/20/2020:  - s/p two IV irons  - Hgb now up and normal at 13.8  - iron now normal at 89 and ferritin is up to 308    11/17/2020:  - hgb good at 13.2 and now WNL  - iron panel currently normal     (2) Hx/of left breast cancer diagnosed May 2018 s/p lumpectomy and radiation, oral antihormone with tamoxifen - s/p lumpectomy and XRT at [facility]  - Stage I  - ER positive  - she saw a female  oncologist at [facility] in past   - she gets regular mammograms and had one this past Jan 2020  - she does not follow-up with oncology     (3) HTN and hypercholesterolemia     (4) Type II DM     (5) GERD     (6) TATYANA/Depression and RLS     (7) OA of right knee    (8) Subcutaneous nodule left scapular medial side and left posterior axilla - ? Lipomas - will schedule PEt scan in light of the breast cancer history  - 11/17/2020:  - insurance decline approval for PET   - will schedule CT scans instead and refer to Dr Rendon with Gen Surg      VISIT DIAGNOSES:      Microcytic anemia    Iron deficiency anemia, unspecified iron deficiency anemia type    History of left breast cancer    ER+ (estrogen receptor positive status)    S/P lumpectomy, left breast    Use of tamoxifen (Nolvadex)          PLAN:  1. Schedule Ct scans since PET was denied; refer to Dr Rendon with Gen Surg for evaluation of the subcutaneous nodule  2.  Check labs monthly and resume IV iron as needed  4. Continue tamoxifen for now and mammogram yearly recommended (due jan 2021)  5. F/u with GYN yearly  6.  F/u with PCP, etc  RTC in 3-4 weeks   Fax note to Andrew Roger MD; Carmen; Star Rendon    Discussion:     COVID-19 Discussion:    I had long discussion with patient and any applicable family about the COVID-19 coronavirus epidemic and the recommended precautions with regard to cancer and/or hematology patients. I have re-iterated the CDC recommendations for adequate hand washing, use of hand -like products, and coughing into elbow, etc. In addition, especially for our patients who are on chemotherapy and/or our otherwise immunocompromised patients, I have recommended avoidance of crowds, including movie theaters, restaurants, churches, etc. I have recommended avoidance of any sick or symptomatic family members and/or friends. I have also recommended avoidance of any raw and unwashed food products, and general avoidance of food items  "that have not been prepared by themselves. The patient has been asked to call us immediately with any symptom developments, issues, questions or other general concerns.     Pathology Discussion:     I reviewed and discussed the pathology report(s) and radiograph reports (if available) in as simple to understand and/or laymen's terms to the best of my ability. I had an indepth conversation with the patient and went over the patient's individual diagnosis based on the information that was currently available. I discussed the TNM staging process with regard to the patient's particular cancer type, and the calculated stage based on the currently available TNM data and literature. I discussed the available prognostic data with regard to the current staging information and how it relates to the prognosis of their particular neoplastic process.          NCCN Guidelines:     I discussed the available treatment option(s) in accordance with the latest literature from the NCCN Clinical Practice Guidelines for the patient's particular type of cancer disorder. The NCCN Guidelines provide a "document evidence-based (and) consensus-driven management" of the care of oncology patients. The treatment recommendations were made not only in accordance to the NCCN guidelines, but also factored in to account the patient's overall age, condition, performance status and their medical co-morbidities. I went over the risks and benefits of the the treatment options (if any could be made) with regard to their particular cancer type, their cancer stage, their age, and their co-morbidities.          Antihormone Therapy Discussion:    I discussed the advantages of antihormone therapy with the patient with regard to their particular neoplastic or carcinoma in situ condition. I went over the side-effect profile of the medication including risk for potential development of endometrial cancer and/or hyperplasia in women who still have a uterus and the " need for yearly GYN evaluation and follow-up. I discussed the risks for thromboembolic events such as DVT's, pulmonary emboli, CVA's, retinal vascular clots, phlebitis, and TIA's. I discussed the potential risks for development of ocular disturbances, retinopathy, cataracts, corneal changes, flushing, hot flashes, amenorrhea, altered menses, fluid retention, weight changes, elevations in LFT's, liver damage, and mood disturbances. I discussed the potential risk of arthropathy and joint pains/aches which could be chronic and debilitating. I discussed potential adverse effects on bone mineralization and the risk of osteopenia and/or osteoporosis which could led to increase risk of fractures.   A consent form was obtained and a copy was provided to the patient.    Iron Infusion Therapy Discussion:     I provided literature/learning materials on the particular IV iron regimen and discussed the potential side-effect profiles of the drug(s). I discussed the importance of compliance with obtaining and monitoring requested lab work, and went over the potential risk for the development of anaphylactic shock, bronchospasm, dysrhythmia, liver and/or kidney damage, and respiratory/cardiovascular arrest and/or failure. I discussed the potential risks for development of alopecia, fevers, itching, chills and/or rigors, cold sensory issues, ringing in ears, vertigo and neuropathy, all of which are usually acute but sometimes could end up being chronic and life-long. I discussed the risks of hand-foot syndrome and rashes, and development of other autoimmune mediated processes such as pneumonitis and colitis which could be life threatening.     The patient's consent has been obtained to proceed with the IV iron therapy.The patient will be referred to [facility] for training and education on IV iron therapy, use of antiemetics and/or anti-diarrheals, use of NSAID's, potential IV iron therapy side-effects,  and any specific recommendations and precautions with the particular IV iron agents.      I answered all of the patient's (and family's, if applicable) questions to the best of my ability and to their complete satisfaction. The patient acknowledged full understanding of the risks, recommendations and plan(s).         I spent over 25 mins of time with the patient. Reviewed results of the recently ordered labs, tests and studies; made directives with regards to the results. Over half of this time was spent couseling and coordinating care.    I have explained all of the above in detail and the patient understands all of the current recommendation(s). I have answered all of their questions to the best of my ability and to their complete satisfaction.   The patient is to continue with the current management plan.            Electronically signed by Hossein Carranza MD                 Answers for HPI/ROS submitted by the patient on 11/14/2020   appetite change : No  unexpected weight change: No  mouth sores: No  visual disturbance: No  cough: No  shortness of breath: No  chest pain: No  abdominal pain: No  diarrhea: No  frequency: No  back pain: Yes  rash: No  headaches: No  adenopathy: No  nervous/ anxious: No

## 2020-11-17 ENCOUNTER — PATIENT OUTREACH (OUTPATIENT)
Dept: OTHER | Facility: OTHER | Age: 50
End: 2020-11-17

## 2020-11-17 ENCOUNTER — OFFICE VISIT (OUTPATIENT)
Dept: HEMATOLOGY/ONCOLOGY | Facility: CLINIC | Age: 50
End: 2020-11-17
Payer: COMMERCIAL

## 2020-11-17 VITALS
HEART RATE: 94 BPM | SYSTOLIC BLOOD PRESSURE: 128 MMHG | DIASTOLIC BLOOD PRESSURE: 87 MMHG | TEMPERATURE: 98 F | RESPIRATION RATE: 19 BRPM | WEIGHT: 217.38 LBS | BODY MASS INDEX: 35.09 KG/M2

## 2020-11-17 DIAGNOSIS — Z85.3 HISTORY OF LEFT BREAST CANCER: ICD-10-CM

## 2020-11-17 DIAGNOSIS — Z17.0 ER+ (ESTROGEN RECEPTOR POSITIVE STATUS): ICD-10-CM

## 2020-11-17 DIAGNOSIS — Z17.0 MALIGNANT NEOPLASM OF LOWER-INNER QUADRANT OF LEFT BREAST IN FEMALE, ESTROGEN RECEPTOR POSITIVE: ICD-10-CM

## 2020-11-17 DIAGNOSIS — Z79.810 USE OF TAMOXIFEN (NOLVADEX): ICD-10-CM

## 2020-11-17 DIAGNOSIS — D50.9 IRON DEFICIENCY ANEMIA, UNSPECIFIED IRON DEFICIENCY ANEMIA TYPE: ICD-10-CM

## 2020-11-17 DIAGNOSIS — D50.9 MICROCYTIC ANEMIA: Primary | ICD-10-CM

## 2020-11-17 DIAGNOSIS — C50.312 MALIGNANT NEOPLASM OF LOWER-INNER QUADRANT OF LEFT BREAST IN FEMALE, ESTROGEN RECEPTOR POSITIVE: ICD-10-CM

## 2020-11-17 DIAGNOSIS — E78.5 TYPE 2 DIABETES MELLITUS WITH HYPERLIPIDEMIA: Primary | ICD-10-CM

## 2020-11-17 DIAGNOSIS — Z98.890 S/P LUMPECTOMY, LEFT BREAST: ICD-10-CM

## 2020-11-17 DIAGNOSIS — E11.69 TYPE 2 DIABETES MELLITUS WITH HYPERLIPIDEMIA: Primary | ICD-10-CM

## 2020-11-17 PROCEDURE — 3008F BODY MASS INDEX DOCD: CPT | Mod: S$GLB,,, | Performed by: INTERNAL MEDICINE

## 2020-11-17 PROCEDURE — 99214 PR OFFICE/OUTPT VISIT, EST, LEVL IV, 30-39 MIN: ICD-10-PCS | Mod: S$GLB,,, | Performed by: INTERNAL MEDICINE

## 2020-11-17 PROCEDURE — 1125F PR PAIN SEVERITY QUANTIFIED, PAIN PRESENT: ICD-10-PCS | Mod: S$GLB,,, | Performed by: INTERNAL MEDICINE

## 2020-11-17 PROCEDURE — 3074F PR MOST RECENT SYSTOLIC BLOOD PRESSURE < 130 MM HG: ICD-10-PCS | Mod: S$GLB,,, | Performed by: INTERNAL MEDICINE

## 2020-11-17 PROCEDURE — 3074F SYST BP LT 130 MM HG: CPT | Mod: S$GLB,,, | Performed by: INTERNAL MEDICINE

## 2020-11-17 PROCEDURE — 3008F PR BODY MASS INDEX (BMI) DOCUMENTED: ICD-10-PCS | Mod: S$GLB,,, | Performed by: INTERNAL MEDICINE

## 2020-11-17 PROCEDURE — 99214 OFFICE O/P EST MOD 30 MIN: CPT | Mod: S$GLB,,, | Performed by: INTERNAL MEDICINE

## 2020-11-17 PROCEDURE — 3079F PR MOST RECENT DIASTOLIC BLOOD PRESSURE 80-89 MM HG: ICD-10-PCS | Mod: S$GLB,,, | Performed by: INTERNAL MEDICINE

## 2020-11-17 PROCEDURE — 3079F DIAST BP 80-89 MM HG: CPT | Mod: S$GLB,,, | Performed by: INTERNAL MEDICINE

## 2020-11-17 PROCEDURE — 1125F AMNT PAIN NOTED PAIN PRSNT: CPT | Mod: S$GLB,,, | Performed by: INTERNAL MEDICINE

## 2020-11-17 RX ORDER — ROSUVASTATIN CALCIUM 10 MG/1
10 TABLET, COATED ORAL DAILY
Qty: 90 TABLET | Refills: 3 | Status: SHIPPED | OUTPATIENT
Start: 2020-11-17 | End: 2021-11-10

## 2020-11-17 NOTE — PROGRESS NOTES
Spoke to patient about adding rosuvastatin to help decrease ASCVD risk after discussing with PCP. Confirmed pharmacy and she requested 90 day supply. Discussed that common side effects can include muscle pain, headaches or dizziness but this really depends on the patient. Will f/u in 2 weeks to assess tolerance

## 2020-11-17 NOTE — PROGRESS NOTES
MD Elise Meadows, PharmD   Caller: Unspecified (4 days ago,  4:07 PM)             The statin is fine, thanks. I will discuss the possibility of MINDI at her upcoming appointment. Thanks for the heads up.    Previous Messages    ----- Message -----   From: Elise Rubin PharmD   Sent: 11/13/2020   4:07 PM CST   To: Andrew Roger MD   Subject: Statin Indication                                 Good afternoon Dr. Roger,       Your patient, Dennis Aldana, is enrolled in the Diabetes Digital Medicine Program. She is not on a STATIN. Per ADA guidelines, patients with diabetes ages 40-75 years old regardless of 10 year ASCVD risk warrants moderate statin therapy. If you agree, please reply and I will initiate therapy with rosuvastatin 10 mg daily.     Patient's MINDI questionnaire deemed her high risk for MINDI. She states  has told her she has loud snoring and has stopped breathing in her sleep. Please discuss with her further if sleep medicine referral is appropriate. She has an upcoming OV with you 11/30/20.     Thank you for your continued support of Digital Medicine!     Sincerely,   Elise Rubin, PharmD   Digital Medicine Clinical Pharmacist   [phone]

## 2020-11-23 ENCOUNTER — PATIENT OUTREACH (OUTPATIENT)
Dept: ADMINISTRATIVE | Facility: OTHER | Age: 50
End: 2020-11-23

## 2020-11-23 NOTE — PROGRESS NOTES
Chart was reviewed for overdue Proactive [facility] Encounters (LEIDA)  topics  Updates were requested from care everywhere  Health Maintenance has been updated  LINKS immunization registry triggered

## 2020-11-24 ENCOUNTER — OFFICE VISIT (OUTPATIENT)
Dept: OPHTHALMOLOGY | Facility: CLINIC | Age: 50
End: 2020-11-24
Payer: COMMERCIAL

## 2020-11-24 DIAGNOSIS — E11.9 DIABETES MELLITUS TYPE 2 WITHOUT RETINOPATHY: Primary | ICD-10-CM

## 2020-11-24 DIAGNOSIS — H35.342 LAMELLAR MACULAR HOLE, LEFT EYE: ICD-10-CM

## 2020-11-24 DIAGNOSIS — H04.123 DRY EYE SYNDROME, BILATERAL: ICD-10-CM

## 2020-11-24 PROCEDURE — 1126F PR PAIN SEVERITY QUANTIFIED, NO PAIN PRESENT: ICD-10-PCS | Mod: S$GLB,,, | Performed by: OPHTHALMOLOGY

## 2020-11-24 PROCEDURE — 92004 COMPRE OPH EXAM NEW PT 1/>: CPT | Mod: S$GLB,,, | Performed by: OPHTHALMOLOGY

## 2020-11-24 PROCEDURE — 99999 PR PBB SHADOW E&M-EST. PATIENT-LVL III: ICD-10-PCS | Mod: PBBFAC,,, | Performed by: OPHTHALMOLOGY

## 2020-11-24 PROCEDURE — 99999 PR PBB SHADOW E&M-EST. PATIENT-LVL III: CPT | Mod: PBBFAC,,, | Performed by: OPHTHALMOLOGY

## 2020-11-24 PROCEDURE — 2023F PR DILATED RETINAL EXAM W/O EVID OF RETINOPATHY: ICD-10-PCS | Mod: S$GLB,,, | Performed by: OPHTHALMOLOGY

## 2020-11-24 PROCEDURE — 2023F DILAT RTA XM W/O RTNOPTHY: CPT | Mod: S$GLB,,, | Performed by: OPHTHALMOLOGY

## 2020-11-24 PROCEDURE — 1126F AMNT PAIN NOTED NONE PRSNT: CPT | Mod: S$GLB,,, | Performed by: OPHTHALMOLOGY

## 2020-11-24 PROCEDURE — 92134 POSTERIOR SEGMENT OCT RETINA (OCULAR COHERENCE TOMOGRAPHY)-BOTH EYES: ICD-10-PCS | Mod: S$GLB,,, | Performed by: OPHTHALMOLOGY

## 2020-11-24 PROCEDURE — 92004 PR EYE EXAM, NEW PATIENT,COMPREHESV: ICD-10-PCS | Mod: S$GLB,,, | Performed by: OPHTHALMOLOGY

## 2020-11-24 PROCEDURE — 92134 CPTRZ OPH DX IMG PST SGM RTA: CPT | Mod: S$GLB,,, | Performed by: OPHTHALMOLOGY

## 2020-11-24 NOTE — PATIENT INSTRUCTIONS
What is diabetic retinopathy?     -- Diabetic retinopathy is an eye problem that can lead to vision loss and even blindness. It affects people with diabetes. It is most common in people who do not control their blood sugar well.    What are the symptoms of diabetic retinopathy?     -- Most people with diabetic retinopathy have no symptoms until the disease is very advanced. By then, it is usually too late to do anything about the vision loss. That's why it is important to get screened for the condition early. That way, doctors can take steps to protect your eyes before your vision is damaged.  When symptoms start, they can include:    ?Blurry vision  ?Dark or floating spots  ?Trouble seeing things that are at the center of your focus when reading or driving  ?Trouble telling colors apart    Is there a test for diabetic retinopathy?     -- Yes. To check for diabetic retinopathy, you can get 1 of 2 main tests:    ?Dilated eye exam - During this exam, the eye doctor gives you eye drops to make your pupils open up. (The drops make it easier for the doctor to see the different parts of the inside of your eye.) After the drops have done their job, the doctor looks at the back of your eye, called the retina. That's the part of the eye that is damaged by diabetic retinopathy.  ?Digital retinal imaging - For this test, a technician takes pictures of the eye with a special camera. Then he or she sends the pictures to an eye doctor, who checks for disease. It is OK to use this test if your past eye tests have all been normal. Otherwise, you should have a dilated eye exam.    If either the dilated eye exam or the digital retinal imaging test shows a problem, the eye doctor might suggest other tests, too.    People with diabetes should have their eyes checked regularly. If you have retinopathy, you will need to get checked at least once a year, maybe more. If not, you might only need to get checked every few years. Your doctor  "will work with you to decide on a schedule.     ?For people with type 1 diabetes, eye exams should start 3 to 5 years after diagnosis.  ?For people with type 2 diabetes, eye exams should start right after diagnosis.    Should I see a doctor or nurse?   -- If you notice any vision loss (or dark spots in your vision), see an eye doctor as soon as possible.    How is diabetic retinopathy treated?   -- When mild, diabetic retinopathy is not always treated. But people with the condition do need to keep their blood sugar and blood pressure levels as close to normal as possible. This helps keep the condition from getting worse.    Treatments for diabetic retinopathy can include:    ?Photocoagulation - This is laser surgery to seal or destroy leaking or growing blood vessels in the retina.  ?Medicines - Medicines that are injected into the vitreous humor are sometimes used alone or along with other treatments. Your eye doctor will let you know if medicines might help you.   ?Vitrectomy - This is surgery to remove blood from the part of the eye called the "vitreous humor". Doctors do this surgery if the blood vessels in the retina leak into the vitreous humor.     Can diabetic retinopathy be prevented?   -- Yes. If you have diabetes, you can reduce your chances of getting diabetic retinopathy by keeping your blood sugar and blood pressure levels as close to normal as possible. It might also be important to keep cholesterol levels in the normal range.      "

## 2020-11-24 NOTE — PROGRESS NOTES
HPI     New pt here for comprehensive eye exam for DM. States history of retinal   tear OS. States FOL OS when sneezing or coughing a lot,  has a ripple in   vision OS.  Floaters OU. None new. DM is pretty good per pt. States   managed well with medication. Denies pain. Not using any gtts. No specs.   Using readers for near work. Works with [employer].   Hemoglobin A1C       Date                     Value               Ref Range             Status                05/28/2020               6.4 (H)             4.0 - 5.6 %           Final                  02/13/2020               7.2 (H)             4.0 - 5.6 %           Final                  Last edited by Rajni Wayne on 11/24/2020  8:17 AM. (History)            Assessment /Plan     For exam results, see Encounter Report.    Diabetes mellitus type 2 without retinopathy    Lamellar macular hole, left eye  -     Posterior Segment OCT Retina-Both eyes    Dry eye syndrome, bilateral      1. Diabetes mellitus type 2 without retinopathy  Diabetes without retinopathy, discussed with patient importance of glucose control and follow up.  Patient voices understanding.    F/u 1 year with OCT mac    2. Lamellar macular hole, left eye  Observe  - Posterior Segment OCT Retina-Both eyes    3. Dry eye syndrome, bilateral  ATs, WCs

## 2020-11-30 ENCOUNTER — PATIENT OUTREACH (OUTPATIENT)
Dept: OTHER | Facility: OTHER | Age: 50
End: 2020-11-30

## 2020-11-30 ENCOUNTER — OFFICE VISIT (OUTPATIENT)
Dept: PRIMARY CARE CLINIC | Facility: CLINIC | Age: 50
End: 2020-11-30
Payer: COMMERCIAL

## 2020-11-30 VITALS
SYSTOLIC BLOOD PRESSURE: 132 MMHG | TEMPERATURE: 97 F | HEART RATE: 98 BPM | RESPIRATION RATE: 18 BRPM | OXYGEN SATURATION: 100 % | BODY MASS INDEX: 35.26 KG/M2 | HEIGHT: 66 IN | DIASTOLIC BLOOD PRESSURE: 80 MMHG | WEIGHT: 219.38 LBS

## 2020-11-30 DIAGNOSIS — Z23 NEED FOR VACCINATION: ICD-10-CM

## 2020-11-30 DIAGNOSIS — D50.9 IRON DEFICIENCY ANEMIA, UNSPECIFIED IRON DEFICIENCY ANEMIA TYPE: ICD-10-CM

## 2020-11-30 DIAGNOSIS — E11.69 TYPE 2 DIABETES MELLITUS WITH HYPERLIPIDEMIA: Primary | ICD-10-CM

## 2020-11-30 DIAGNOSIS — I10 ESSENTIAL HYPERTENSION, BENIGN: ICD-10-CM

## 2020-11-30 DIAGNOSIS — Z11.59 NEED FOR HEPATITIS C SCREENING TEST: ICD-10-CM

## 2020-11-30 DIAGNOSIS — E78.5 HYPERLIPIDEMIA, UNSPECIFIED HYPERLIPIDEMIA TYPE: ICD-10-CM

## 2020-11-30 DIAGNOSIS — Z85.3 HISTORY OF LEFT BREAST CANCER: ICD-10-CM

## 2020-11-30 DIAGNOSIS — E78.5 TYPE 2 DIABETES MELLITUS WITH HYPERLIPIDEMIA: Primary | ICD-10-CM

## 2020-11-30 PROCEDURE — 3079F PR MOST RECENT DIASTOLIC BLOOD PRESSURE 80-89 MM HG: ICD-10-PCS | Mod: CPTII,S$GLB,, | Performed by: FAMILY MEDICINE

## 2020-11-30 PROCEDURE — 3008F BODY MASS INDEX DOCD: CPT | Mod: CPTII,S$GLB,, | Performed by: FAMILY MEDICINE

## 2020-11-30 PROCEDURE — 1125F PR PAIN SEVERITY QUANTIFIED, PAIN PRESENT: ICD-10-PCS | Mod: S$GLB,,, | Performed by: FAMILY MEDICINE

## 2020-11-30 PROCEDURE — 90471 IMMUNIZATION ADMIN: CPT | Mod: S$GLB,,, | Performed by: FAMILY MEDICINE

## 2020-11-30 PROCEDURE — 99214 OFFICE O/P EST MOD 30 MIN: CPT | Mod: 25,S$GLB,, | Performed by: FAMILY MEDICINE

## 2020-11-30 PROCEDURE — 3079F DIAST BP 80-89 MM HG: CPT | Mod: CPTII,S$GLB,, | Performed by: FAMILY MEDICINE

## 2020-11-30 PROCEDURE — 90471 FLU VACCINE (QUAD) GREATER THAN OR EQUAL TO 3YO PRESERVATIVE FREE IM: ICD-10-PCS | Mod: S$GLB,,, | Performed by: FAMILY MEDICINE

## 2020-11-30 PROCEDURE — 1125F AMNT PAIN NOTED PAIN PRSNT: CPT | Mod: S$GLB,,, | Performed by: FAMILY MEDICINE

## 2020-11-30 PROCEDURE — 90686 FLU VACCINE (QUAD) GREATER THAN OR EQUAL TO 3YO PRESERVATIVE FREE IM: ICD-10-PCS | Mod: S$GLB,,, | Performed by: FAMILY MEDICINE

## 2020-11-30 PROCEDURE — 99999 PR PBB SHADOW E&M-EST. PATIENT-LVL IV: ICD-10-PCS | Mod: PBBFAC,,, | Performed by: FAMILY MEDICINE

## 2020-11-30 PROCEDURE — 3075F SYST BP GE 130 - 139MM HG: CPT | Mod: CPTII,S$GLB,, | Performed by: FAMILY MEDICINE

## 2020-11-30 PROCEDURE — 3044F HG A1C LEVEL LT 7.0%: CPT | Mod: CPTII,S$GLB,, | Performed by: FAMILY MEDICINE

## 2020-11-30 PROCEDURE — 99999 PR PBB SHADOW E&M-EST. PATIENT-LVL IV: CPT | Mod: PBBFAC,,, | Performed by: FAMILY MEDICINE

## 2020-11-30 PROCEDURE — 3075F PR MOST RECENT SYSTOLIC BLOOD PRESS GE 130-139MM HG: ICD-10-PCS | Mod: CPTII,S$GLB,, | Performed by: FAMILY MEDICINE

## 2020-11-30 PROCEDURE — 90686 IIV4 VACC NO PRSV 0.5 ML IM: CPT | Mod: S$GLB,,, | Performed by: FAMILY MEDICINE

## 2020-11-30 PROCEDURE — 99214 PR OFFICE/OUTPT VISIT, EST, LEVL IV, 30-39 MIN: ICD-10-PCS | Mod: 25,S$GLB,, | Performed by: FAMILY MEDICINE

## 2020-11-30 PROCEDURE — 3008F PR BODY MASS INDEX (BMI) DOCUMENTED: ICD-10-PCS | Mod: CPTII,S$GLB,, | Performed by: FAMILY MEDICINE

## 2020-11-30 PROCEDURE — 3044F PR MOST RECENT HEMOGLOBIN A1C LEVEL <7.0%: ICD-10-PCS | Mod: CPTII,S$GLB,, | Performed by: FAMILY MEDICINE

## 2020-11-30 NOTE — PROGRESS NOTES
"Subjective:       Patient ID: Dennis Aldana is a 50 y.o. female.    Chief Complaint: Follow-up    Generally feeling well.  No specific complaints at this time.  Blood count improved substantially following iron infusions.  Fasting sugars in the 120-130 range, no recent hypoglycemia.  Compliant with all medications.  Followed regularly by Heme-Onc period being referred to surgeon for possible excision of lump on her back.    Review of Systems   Constitutional: Negative for activity change and unexpected weight change.   HENT: Negative for hearing loss, rhinorrhea and trouble swallowing.    Eyes: Negative for discharge and visual disturbance.   Respiratory: Negative for chest tightness and wheezing.    Cardiovascular: Negative for chest pain and palpitations.   Gastrointestinal: Negative for blood in stool, constipation, diarrhea and vomiting.   Endocrine: Negative for polydipsia and polyuria.   Genitourinary: Negative for difficulty urinating, dysuria, hematuria and menstrual problem.   Musculoskeletal: Positive for arthralgias. Negative for joint swelling and neck pain.   Neurological: Negative for weakness and headaches.   Psychiatric/Behavioral: Negative for confusion and dysphoric mood.       Objective:      Vitals:    11/30/20 0754   BP: 132/80   BP Location: Right arm   Patient Position: Sitting   BP Method: Large (Manual)   Pulse: 98   Resp: 18   Temp: 97 °F (36.1 °C)   TempSrc: Temporal   SpO2: 100%   Weight: 99.5 kg (219 lb 5.7 oz)   Height: 5' 6" (1.676 m)     Physical Exam  Vitals signs and nursing note reviewed.   Constitutional:       Appearance: She is well-developed.   HENT:      Head: Normocephalic and atraumatic.   Cardiovascular:      Rate and Rhythm: Regular rhythm. Tachycardia present.      Heart sounds: Normal heart sounds.   Pulmonary:      Effort: Pulmonary effort is normal.      Breath sounds: Normal breath sounds.   Skin:     General: Skin is warm and dry.   Neurological:      Mental Status: " She is alert and oriented to person, place, and time.   Psychiatric:         Mood and Affect: Mood normal.         Behavior: Behavior normal.         Lab Results   Component Value Date    WBC 9.50 11/10/2020    HGB 13.2 11/10/2020    HCT 40.9 11/10/2020     11/10/2020    CHOL 188 05/28/2020    TRIG 137 05/28/2020    HDL 46 05/28/2020    ALT 19 11/10/2020    AST 14 11/10/2020     11/10/2020    K 3.6 11/10/2020     11/10/2020    CREATININE 1.0 11/10/2020    BUN 17 11/10/2020    CO2 25 11/10/2020    TSH 2.35 01/30/2020    GLUF 122 (H) 02/13/2020    HGBA1C 6.4 (H) 05/28/2020      Assessment:       1. Type 2 diabetes mellitus with hyperlipidemia    2. Essential hypertension, benign    3. Hyperlipidemia, unspecified hyperlipidemia type    4. Iron deficiency anemia, unspecified iron deficiency anemia type    5. History of left breast cancer    6. Need for hepatitis C screening test    7. Need for vaccination        Plan:       Type 2 diabetes mellitus with hyperlipidemia  -     Comprehensive Metabolic Panel; Future; Expected date: 05/30/2021  -     Hemoglobin A1C; Future; Expected date: 05/30/2021  -     Microalbumin/Creatinine Ratio, Urine; Future; Expected date: 05/30/2021  Labs today, adjust meds if needed  Essential hypertension, benign  -     Comprehensive Metabolic Panel; Future; Expected date: 05/30/2021  Well controlled  Hyperlipidemia, unspecified hyperlipidemia type  -     Comprehensive Metabolic Panel; Future; Expected date: 05/30/2021  -     Lipid Panel; Future; Expected date: 05/30/2021  Continue statin  Iron deficiency anemia, unspecified iron deficiency anemia type  Followed regularly by Dr. Carranza  History of left breast cancer  Followed regularly by Dr. Carranza  Need for hepatitis C screening test  -     Hepatitis C Antibody; Future; Expected date: 05/30/2021    Need for vaccination  -     Influenza - Quadrivalent *Preferred* (6 months+) (PF)      Medication List with  Changes/Refills   Current Medications    CITALOPRAM (CELEXA) 40 MG TABLET    TAKE 1 TABLET BY MOUTH EVERY DAY    ERGOCALCIFEROL (ERGOCALCIFEROL) 50,000 UNIT CAP    Take 1 capsule (50,000 Units total) by mouth every 7 days.    FERROUS SULFATE 324 MG (65 MG IRON) TBEC    Take 1 tablet (324 mg total) by mouth once daily.    LORAZEPAM (ATIVAN) 1 MG TABLET    TAKE 1 TABLET (1 MG TOTAL) BY MOUTH ONCE DAILY. AS NEEDED FOR ANXIETY    MELOXICAM (MOBIC) 15 MG TABLET    TAKE 1 TABLET BY MOUTH EVERY DAY AS NEEDED FOR PAIN    METFORMIN (GLUCOPHAGE) 500 MG TABLET    Take 1 tablet (500 mg total) by mouth daily with breakfast.    METOPROLOL SUCCINATE (TOPROL-XL) 200 MG 24 HR TABLET    TAKE 1 TABLET BY MOUTH EVERY DAY    PRAMIPEXOLE (MIRAPEX) 1 MG TABLET    TAKE 2 TABLETS (2 MG TOTAL) BY MOUTH EVERY EVENING.    RANITIDINE (ZANTAC) 300 MG TABLET    Take 300 mg by mouth nightly.    ROSUVASTATIN (CRESTOR) 10 MG TABLET    Take 1 tablet (10 mg total) by mouth once daily.    TAMOXIFEN (NOLVADEX) 20 MG TAB    TAKE 1 TABLET BY MOUTH EVERY DAY    ZOLPIDEM (AMBIEN) 5 MG TAB    TAKE 1 TABLET (5 MG TOTAL) BY MOUTH NIGHTLY AS NEEDED.   Discontinued Medications    FAMOTIDINE (PEPCID) 40 MG TABLET    TAKE 1 TABLET BY MOUTH EVERY DAY IN THE EVENING    PROMETHAZINE-CODEINE 6.25-10 MG/5 ML (PHENERGAN WITH CODEINE) 6.25-10 MG/5 ML SYRUP    Take 5 mLs by mouth every 6 (six) hours as needed for Cough.

## 2020-11-30 NOTE — PROGRESS NOTES
Verified pt ID using name and . NKDA. Administered flu vaccine in left deltoid per physician order using aseptic technique. Aspirated and no blood return noted. Pt tolerated well with no adverse reactions noted.

## 2020-12-01 ENCOUNTER — PATIENT MESSAGE (OUTPATIENT)
Dept: HEMATOLOGY/ONCOLOGY | Facility: CLINIC | Age: 50
End: 2020-12-01

## 2020-12-01 ENCOUNTER — PATIENT OUTREACH (OUTPATIENT)
Dept: OTHER | Facility: OTHER | Age: 50
End: 2020-12-01

## 2020-12-01 NOTE — PROGRESS NOTES
Digital Medicine: Clinician Follow-Up    Called to notify patient of A1C results and assess tolerance to rosuvastatin.    States she gets occasional muscle aches but tolerable and nothing she is concerned about at the moment.     States she has been checking SMBGs recently but hasn't opened the mimi on her phone for the readings to submit. States SMBG was 139 after breakfast this morning. She plans on opening the apps for the readings to send and has tech support number already if needed    Reminded her that [facility] has been trying to reach her and she returned call but LVM.    The history is provided by the patient.   Follow-up reason(s): medication change follow-up.   Patient is not experiencing signs/symptoms of hypoglycemia.  Patient is not experiencing signs/symptoms of hyperglycemia.    Patient did make medication change.    Is patient tolerating med change? yes              Last 6 Patient Entered Readings                                          Most Recent A1c: 6.4% on 11/30/2020  (Goal: 7%)     Recent Readings 8/6/2020 8/5/2020 8/3/2020 8/2/2020    Blood Glucose (mg/dL) 115 131 140 104               Depression Screening  Did not address depression screening.    Sleep Apnea Screening    Did not address sleep apnea screening.     Medication Affordability Screening  Did not address medication affordability screening.     Medication Adherence-Medication adherence was assessed.          ASSESSMENT(S)  Patient's A1C goal is less than or equal to 7. Patient's most recent A1C result is at goal. Lab Results    Component                Value               Date                     HGBA1C                   6.4 (H)             11/30/2020          .       A1C remained at 6.4% and she is well controlled. Unable to assess SMBG - no readings uploaded but she reported SMBG 139 after breakfast - within goal.  She is currently managed on first line oral monotherapy.       Diabetes Plan  Continue current therapy.  Continue current  tiara diet/physical activity routine.  Plan to follow up in 2-3 months or sooner if needed.      Addressed patient questions and patient has my contact information if needed prior to next outreach. Patient verbalizes understanding.      Explained the importance of self-monitoring and medication adherence. Encouraged the patient to communicate with their health  for lifestyle modifications to help improve or maintain a healthy lifestyle.               There are no preventive care reminders to display for this patient.  There are no preventive care reminders to display for this patient.        Diabetes Medications             metFORMIN (GLUCOPHAGE) 500 MG tablet Take 1 tablet (500 mg total) by mouth daily with breakfast.

## 2020-12-09 ENCOUNTER — HOSPITAL ENCOUNTER (OUTPATIENT)
Dept: RADIOLOGY | Facility: HOSPITAL | Age: 50
Discharge: HOME | End: 2020-12-09
Attending: INTERNAL MEDICINE
Payer: COMMERCIAL

## 2020-12-09 DIAGNOSIS — Z98.890 S/P LUMPECTOMY, LEFT BREAST: ICD-10-CM

## 2020-12-09 DIAGNOSIS — Z17.0 MALIGNANT NEOPLASM OF LOWER-INNER QUADRANT OF LEFT BREAST IN FEMALE, ESTROGEN RECEPTOR POSITIVE: ICD-10-CM

## 2020-12-09 DIAGNOSIS — C50.312 MALIGNANT NEOPLASM OF LOWER-INNER QUADRANT OF LEFT BREAST IN FEMALE, ESTROGEN RECEPTOR POSITIVE: ICD-10-CM

## 2020-12-09 DIAGNOSIS — Z17.0 ER+ (ESTROGEN RECEPTOR POSITIVE STATUS): ICD-10-CM

## 2020-12-09 PROCEDURE — 25500020 PHARM REV CODE 255: Mod: PO | Performed by: INTERNAL MEDICINE

## 2020-12-09 PROCEDURE — 74177 CT ABD & PELVIS W/CONTRAST: CPT | Mod: TC,PO

## 2020-12-09 RX ADMIN — IOHEXOL 100 ML: 350 INJECTION, SOLUTION INTRAVENOUS at 12:12

## 2020-12-14 ENCOUNTER — TELEPHONE (OUTPATIENT)
Dept: HEMATOLOGY/ONCOLOGY | Facility: CLINIC | Age: 50
End: 2020-12-14

## 2020-12-14 DIAGNOSIS — R16.0 MASS OF LEFT LOBE OF LIVER: ICD-10-CM

## 2020-12-14 DIAGNOSIS — Z85.3 HISTORY OF LEFT BREAST CANCER: Primary | ICD-10-CM

## 2020-12-14 DIAGNOSIS — Z17.0 ER+ (ESTROGEN RECEPTOR POSITIVE STATUS): ICD-10-CM

## 2020-12-14 NOTE — TELEPHONE ENCOUNTER
----- Message from Hossein Carranza MD sent at 12/13/2020 12:37 PM CST -----  Order a multiphasic MRI of her liver please to better evaluate the area of her liver seen on CT

## 2020-12-14 NOTE — TELEPHONE ENCOUNTER
Called the patient and instructed her that the CT of chest, abd and pelvis showed a suspicious area in the liver.  I instructed her that the Radiologist is recommending a MRI of the Liver.  I instructed her that I ordered the MRI and someone from the Imaging Center will call her with a date and time.  She instructed me that she already has an appt with Dr. Grubbs in the morning.

## 2020-12-15 ENCOUNTER — OFFICE VISIT (OUTPATIENT)
Dept: SURGERY | Facility: CLINIC | Age: 50
End: 2020-12-15
Payer: COMMERCIAL

## 2020-12-15 ENCOUNTER — LAB VISIT (OUTPATIENT)
Dept: LAB | Facility: HOSPITAL | Age: 50
End: 2020-12-15
Attending: INTERNAL MEDICINE
Payer: COMMERCIAL

## 2020-12-15 VITALS
RESPIRATION RATE: 20 BRPM | HEART RATE: 93 BPM | BODY MASS INDEX: 35.2 KG/M2 | DIASTOLIC BLOOD PRESSURE: 85 MMHG | TEMPERATURE: 98 F | WEIGHT: 219 LBS | HEIGHT: 66 IN | SYSTOLIC BLOOD PRESSURE: 129 MMHG

## 2020-12-15 DIAGNOSIS — R22.2 MASS ON BACK: Primary | ICD-10-CM

## 2020-12-15 DIAGNOSIS — D50.9 MICROCYTIC ANEMIA: ICD-10-CM

## 2020-12-15 DIAGNOSIS — Z85.3 HISTORY OF LEFT BREAST CANCER: ICD-10-CM

## 2020-12-15 DIAGNOSIS — D50.9 IRON DEFICIENCY ANEMIA, UNSPECIFIED IRON DEFICIENCY ANEMIA TYPE: ICD-10-CM

## 2020-12-15 DIAGNOSIS — Z98.890 S/P LUMPECTOMY, LEFT BREAST: ICD-10-CM

## 2020-12-15 DIAGNOSIS — R16.0 LIVER MASS, LEFT LOBE: ICD-10-CM

## 2020-12-15 DIAGNOSIS — M89.9 LESION OF BONE OF THORACIC SPINE: ICD-10-CM

## 2020-12-15 LAB
ALBUMIN SERPL BCP-MCNC: 3.9 G/DL (ref 3.5–5.2)
ALP SERPL-CCNC: 73 U/L (ref 55–135)
ALT SERPL W/O P-5'-P-CCNC: 19 U/L (ref 10–44)
ANION GAP SERPL CALC-SCNC: 9 MMOL/L (ref 8–16)
AST SERPL-CCNC: 15 U/L (ref 10–40)
BASOPHILS # BLD AUTO: 0.06 K/UL (ref 0–0.2)
BASOPHILS NFR BLD: 0.6 % (ref 0–1.9)
BILIRUB SERPL-MCNC: 0.9 MG/DL (ref 0.1–1)
BUN SERPL-MCNC: 12 MG/DL (ref 6–20)
CALCIUM SERPL-MCNC: 9.1 MG/DL (ref 8.7–10.5)
CHLORIDE SERPL-SCNC: 103 MMOL/L (ref 95–110)
CO2 SERPL-SCNC: 27 MMOL/L (ref 23–29)
CREAT SERPL-MCNC: 1 MG/DL (ref 0.5–1.4)
DIFFERENTIAL METHOD: ABNORMAL
EOSINOPHIL # BLD AUTO: 0.2 K/UL (ref 0–0.5)
EOSINOPHIL NFR BLD: 2 % (ref 0–8)
ERYTHROCYTE [DISTWIDTH] IN BLOOD BY AUTOMATED COUNT: 14.7 % (ref 11.5–14.5)
EST. GFR  (AFRICAN AMERICAN): >60 ML/MIN/1.73 M^2
EST. GFR  (NON AFRICAN AMERICAN): >60 ML/MIN/1.73 M^2
FERRITIN SERPL-MCNC: 219 NG/ML (ref 20–300)
GLUCOSE SERPL-MCNC: 122 MG/DL (ref 70–110)
HCT VFR BLD AUTO: 42 % (ref 37–48.5)
HGB BLD-MCNC: 13.6 G/DL (ref 12–16)
IMM GRANULOCYTES # BLD AUTO: 0.08 K/UL (ref 0–0.04)
IMM GRANULOCYTES NFR BLD AUTO: 0.8 % (ref 0–0.5)
IRON SERPL-MCNC: 73 UG/DL (ref 30–160)
LYMPHOCYTES # BLD AUTO: 2.1 K/UL (ref 1–4.8)
LYMPHOCYTES NFR BLD: 21.1 % (ref 18–48)
MCH RBC QN AUTO: 29.1 PG (ref 27–31)
MCHC RBC AUTO-ENTMCNC: 32.4 G/DL (ref 32–36)
MCV RBC AUTO: 90 FL (ref 82–98)
MONOCYTES # BLD AUTO: 0.7 K/UL (ref 0.3–1)
MONOCYTES NFR BLD: 7.2 % (ref 4–15)
NEUTROPHILS # BLD AUTO: 6.9 K/UL (ref 1.8–7.7)
NEUTROPHILS NFR BLD: 68.3 % (ref 38–73)
NRBC BLD-RTO: 0 /100 WBC
PLATELET # BLD AUTO: 280 K/UL (ref 150–350)
PMV BLD AUTO: 10.2 FL (ref 9.2–12.9)
POTASSIUM SERPL-SCNC: 4.1 MMOL/L (ref 3.5–5.1)
PROT SERPL-MCNC: 7.1 G/DL (ref 6–8.4)
RBC # BLD AUTO: 4.67 M/UL (ref 4–5.4)
SATURATED IRON: 23 % (ref 20–50)
SODIUM SERPL-SCNC: 139 MMOL/L (ref 136–145)
TOTAL IRON BINDING CAPACITY: 321 UG/DL (ref 250–450)
TRANSFERRIN SERPL-MCNC: 229 MG/DL (ref 200–375)
WBC # BLD AUTO: 10.14 K/UL (ref 3.9–12.7)

## 2020-12-15 PROCEDURE — 1125F AMNT PAIN NOTED PAIN PRSNT: CPT | Mod: S$GLB,,, | Performed by: SURGERY

## 2020-12-15 PROCEDURE — 83540 ASSAY OF IRON: CPT

## 2020-12-15 PROCEDURE — 36415 COLL VENOUS BLD VENIPUNCTURE: CPT

## 2020-12-15 PROCEDURE — 99243 OFF/OP CNSLTJ NEW/EST LOW 30: CPT | Mod: S$GLB,,, | Performed by: SURGERY

## 2020-12-15 PROCEDURE — 3008F PR BODY MASS INDEX (BMI) DOCUMENTED: ICD-10-PCS | Mod: CPTII,S$GLB,, | Performed by: SURGERY

## 2020-12-15 PROCEDURE — 3008F BODY MASS INDEX DOCD: CPT | Mod: CPTII,S$GLB,, | Performed by: SURGERY

## 2020-12-15 PROCEDURE — 99243 PR OFFICE CONSULTATION,LEVEL III: ICD-10-PCS | Mod: S$GLB,,, | Performed by: SURGERY

## 2020-12-15 PROCEDURE — 1125F PR PAIN SEVERITY QUANTIFIED, PAIN PRESENT: ICD-10-PCS | Mod: S$GLB,,, | Performed by: SURGERY

## 2020-12-15 PROCEDURE — 82728 ASSAY OF FERRITIN: CPT

## 2020-12-15 PROCEDURE — 80053 COMPREHEN METABOLIC PANEL: CPT

## 2020-12-15 PROCEDURE — 85025 COMPLETE CBC W/AUTO DIFF WBC: CPT

## 2020-12-15 NOTE — LETTER
December 29, 2020      Hossein Carranza MD  [address] [facility]  [address]  [place] [address]           [facility]  [address]  [place] [address]  Phone: [phone]  Fax: [phone]          Patient: Dennis Aldana   MR Number: [id number]   YOB: 1970   Date of Visit: 12/15/2020       Dear Dr. Hossein Carranza:    Thank you for referring Dennis Aldana to me for evaluation. Attached you will find relevant portions of my assessment and plan of care.    If you have questions, please do not hesitate to call me. I look forward to following Dennis Aldana along with you.    Sincerely,    Alex Rendon III, MD    Enclosure  CC:  No Recipients    If you would like to receive this communication electronically, please contact [email or web address] or [phone] to request more information on [employer] access.    For providers and/or their staff who would like to refer a patient to [facility], please contact us through our one-stop-shop provider referral line, [facility], at [phone].    If you feel you have received this communication in error or would no longer like to receive these types of communications, please e-mail [email or web address]

## 2020-12-15 NOTE — PROGRESS NOTES
Subjective:       Patient ID: Dennis Aldana is a 50 y.o. female.    Chief Complaint: Other (Eval Poss Lipoma on back & Axilla)      HPI:  Patient has history of left breast cancer treated with lumpectomy, SLN and radiation in 2018 at an outside facility. She has been doing well. She has noticed an enlarging firm mass in the upper left back over the past few months.  A CT was ordered to evaluate the back and multiple incidental findings were noted. Findings listed below. The mass was not mentioned in the report but on review it appears that there is a spherical mass within a left perispinal muscle belly. She has no consitutional symptoms.     CT findings:  No evidence of metastatic disease within the thorax. Specifically,  there is no evidence of axillary adenopathy.     Indeterminate mildly hyperdense mass within the lateral segment of the  left hepatic lobe, incompletely characterized. Correlation with  multiphasic MRI or CT is recommended.     Indeterminate splenic masses measuring 10 and 8 mm in size. If prior  outside contrast enhanced studies exist, these would be helpful for  comparison. Alternatively, these lesions could be also assessed at the  time of follow-up multiphasic MRI or CT.     Sclerotic area within the rightward aspect of T7. The possibility of  sclerotic metastasis is not excludable.     13 mm calculus within the right renal pelvis with surrounding  inflammatory changes. The right kidney is small in overall size and  demonstrates multiple focal areas of cortical scarring.     Cystic density left ovarian masses and mild prominence of the  endometrial stripe. Correlation with pelvic ultrasound recommended.     Multiple solid uterine masses likely representing fibroids.     Probable Bartholin's cyst near the vaginal       Past Medical History:   Diagnosis Date    Anxiety     Breast cancer 2018    left w/ radiation    Depression     Diabetes mellitus     ER+ (estrogen receptor positive status)  8/16/2020    Essential hypertension, benign 1/3/2018    Excessive menstruation 8/16/2020    Hypertension     Microcytic anemia 6/21/2020    RLS (restless legs syndrome)     S/P lumpectomy, left breast 6/21/2020    Use of tamoxifen (Nolvadex) 6/21/2020     Past Surgical History:   Procedure Laterality Date    BREAST LUMPECTOMY Left 2018    KNEE SURGERY      SHOULDER SURGERY      TONSILLECTOMY       Review of patient's allergies indicates:  No Known Allergies  Medication List with Changes/Refills   Current Medications    CITALOPRAM (CELEXA) 40 MG TABLET    TAKE 1 TABLET BY MOUTH EVERY DAY    ERGOCALCIFEROL (ERGOCALCIFEROL) 50,000 UNIT CAP    Take 1 capsule (50,000 Units total) by mouth every 7 days.    FERROUS SULFATE 324 MG (65 MG IRON) TBEC    Take 1 tablet (324 mg total) by mouth once daily.    LORAZEPAM (ATIVAN) 1 MG TABLET    TAKE 1 TABLET (1 MG TOTAL) BY MOUTH ONCE DAILY. AS NEEDED FOR ANXIETY    MELOXICAM (MOBIC) 15 MG TABLET    TAKE 1 TABLET BY MOUTH EVERY DAY AS NEEDED FOR PAIN    METFORMIN (GLUCOPHAGE) 500 MG TABLET    Take 1 tablet (500 mg total) by mouth daily with breakfast.    METOPROLOL SUCCINATE (TOPROL-XL) 200 MG 24 HR TABLET    TAKE 1 TABLET BY MOUTH EVERY DAY    PRAMIPEXOLE (MIRAPEX) 1 MG TABLET    TAKE 2 TABLETS (2 MG TOTAL) BY MOUTH EVERY EVENING.    RANITIDINE (ZANTAC) 300 MG TABLET    Take 300 mg by mouth nightly.    ROSUVASTATIN (CRESTOR) 10 MG TABLET    Take 1 tablet (10 mg total) by mouth once daily.    TAMOXIFEN (NOLVADEX) 20 MG TAB    TAKE 1 TABLET BY MOUTH EVERY DAY    ZOLPIDEM (AMBIEN) 5 MG TAB    TAKE 1 TABLET (5 MG TOTAL) BY MOUTH NIGHTLY AS NEEDED.     Family History   Problem Relation Age of Onset    Hypertension Mother     Diabetes Mother     Diabetes Father     Breast cancer Maternal Grandmother     Colon cancer Neg Hx     Ovarian cancer Neg Hx     Glaucoma Neg Hx     Macular degeneration Neg Hx      Social History     Socioeconomic History    Marital status:       Spouse name: Not on file    Number of children: Not on file    Years of education: Not on file    Highest education level: Not on file   Occupational History    Not on file   Social Needs    Financial resource strain: Somewhat hard    Food insecurity     Worry: Often true     Inability: Sometimes true    Transportation needs     Medical: No     Non-medical: No   Tobacco Use    Smoking status: Never Smoker    Smokeless tobacco: Never Used   Substance and Sexual Activity    Alcohol use: Yes     Frequency: Monthly or less     Drinks per session: 1 or 2     Binge frequency: Never     Comment: occ.     Drug use: No    Sexual activity: Yes     Partners: Male     Birth control/protection: None   Lifestyle    Physical activity     Days per week: Not on file     Minutes per session: Not on file    Stress: Not on file   Relationships    Social connections     Talks on phone: Twice a week     Gets together: Twice a week     Attends Protestant service: Never     Active member of club or organization: No     Attends meetings of clubs or organizations: Never     Relationship status:    Other Topics Concern    Not on file   Social History Narrative    Not on file         Review of Systems   Constitutional: Negative for appetite change, chills, fever and unexpected weight change.   HENT: Negative for hearing loss, rhinorrhea, sore throat and voice change.    Eyes: Negative for photophobia and visual disturbance.   Respiratory: Negative for cough, choking and shortness of breath.    Cardiovascular: Negative for chest pain, palpitations and leg swelling.   Gastrointestinal: Negative for abdominal pain, blood in stool, constipation, diarrhea, nausea and vomiting.   Endocrine: Negative for cold intolerance, heat intolerance and polyphagia.   Genitourinary: Negative for dysuria.   Musculoskeletal: Negative for arthralgias and back pain.   Skin: Negative for color change.   Neurological: Negative for  dizziness, seizures, syncope and headaches.   Hematological: Negative for adenopathy. Does not bruise/bleed easily.       Objective:      Physical Exam  Constitutional:       General: She is not in acute distress.     Appearance: Normal appearance. She is well-developed. She is not toxic-appearing.   HENT:      Head: Normocephalic and atraumatic. No abrasion or laceration.      Right Ear: External ear normal.      Left Ear: External ear normal.      Nose: Nose normal.   Eyes:      Pupils: Pupils are equal, round, and reactive to light.   Neck:      Musculoskeletal: Neck supple. Normal range of motion.      Trachea: Trachea and phonation normal. No tracheal deviation.   Cardiovascular:      Rate and Rhythm: Normal rate and regular rhythm.   Pulmonary:      Effort: Pulmonary effort is normal. No tachypnea, accessory muscle usage or respiratory distress.          Comments: Mobile mass left of midline. Approximately 3 cm. No overlying skin changes. Mild tenderness.   Abdominal:      General: There is no distension.      Palpations: Abdomen is soft.      Tenderness: There is no abdominal tenderness. There is no guarding or rebound.      Hernia: No hernia is present.   Lymphadenopathy:      Cervical: No cervical adenopathy.   Skin:     General: Skin is warm.   Neurological:      Mental Status: She is alert and oriented to person, place, and time.      Coordination: Coordination normal.      Gait: Gait normal.   Psychiatric:         Speech: Speech normal.         Behavior: Behavior normal.         Assessment/Plan:   Dennis was seen today for other.    Diagnoses and all orders for this visit:    Mass on back    Liver mass, left lobe    Lesion of bone of thoracic spine    History of left breast cancer      Patient currently being worked up for multiple lesions found on CT including the liver and thoracic spine.  The mass in the back is mobile and non tender. On CT it did appear to be within the muscle belly.  Will discuss  with Radiology after her schedule studies are performed. Possibilities include lipoma, mets, soft tissue lesion like sarcoma. Will discuss with Radiology also about possible percutaneous biopsy of the liver and possibly the spine as well.  She will return to the office after studies and will discuss best next step.

## 2020-12-20 NOTE — PROGRESS NOTES
"[facility] Hematology/Oncology  PROGRESS NOTE -   Follow-up Visit      Subjective:       Patient ID:   NAME: Dennis Aldana : 1970     50 y.o. female    Referring Doc: Kana  Other Physicians: Laya Morris (Gyn)    Chief Complaint:  anemia/hx/of Lft breast cancer f/u    History of Present Illness:     Patient returns today for a regularly scheduled follow-up visit.  The patient is here today to go over the results of the recently ordered labs, tests and studies. She had some IV iron back in 2020. She is here with her . She reports that she has been feeling "good"    Insurance denied the PEt scan, so we will order CT scans to evaluate the area that is suspected lipoma.     She is otherwise doing ok. Energy levels are better overall.  No CP, SOB, HA's or n/V.     Discussed covid19 precautions             ROS:   GEN: normal without any fever, night sweats or weight loss  HEENT: normal with no HA's, sore throat, stiff neck, changes in vision  CV: normal with no CP, SOB, PND, GARCIA or orthopnea  PULM: normal with no SOB, cough, hemoptysis, sputum or pleuritic pain  GI: normal with no abdominal pain, nausea, vomiting, constipation, diarrhea, melanotic stools, BRBPR, or hematemesis  : normal with no hematuria, dysuria  BREAST: normal with no mass, discharge, pain; s/p left lumpectomy  SKIN: normal with no rash, erythema, bruising, or swelling; nodule in skin    Pain Scale:  0    Allergies:  Review of patient's allergies indicates:  No Known Allergies    Medications:    Current Outpatient Medications:     citalopram (CELEXA) 40 MG tablet, TAKE 1 TABLET BY MOUTH EVERY DAY, Disp: 90 tablet, Rfl: 1    ergocalciferol (ERGOCALCIFEROL) 50,000 unit Cap, TAKE 1 CAPSULE BY MOUTH ONE TIME PER WEEK, Disp: 12 capsule, Rfl: 1    ferrous sulfate 324 mg (65 mg iron) TbEC, Take 1 tablet (324 mg total) by mouth once daily., Disp: 100 tablet, Rfl: 1    LORazepam (ATIVAN) 1 MG tablet, TAKE 1 TABLET (1 MG TOTAL) BY " MOUTH ONCE DAILY. AS NEEDED FOR ANXIETY, Disp: 30 tablet, Rfl: 1    meloxicam (MOBIC) 15 MG tablet, TAKE 1 TABLET BY MOUTH EVERY DAY AS NEEDED FOR PAIN, Disp: 30 tablet, Rfl: 5    metFORMIN (GLUCOPHAGE) 500 MG tablet, Take 1 tablet (500 mg total) by mouth daily with breakfast., Disp: 60 tablet, Rfl: 5    metoprolol succinate (TOPROL-XL) 200 MG 24 hr tablet, TAKE 1 TABLET BY MOUTH EVERY DAY, Disp: 90 tablet, Rfl: 1    pramipexole (MIRAPEX) 1 MG tablet, TAKE 2 TABLETS (2 MG TOTAL) BY MOUTH EVERY EVENING., Disp: 180 tablet, Rfl: 1    ranitidine (ZANTAC) 300 MG tablet, Take 300 mg by mouth nightly., Disp: , Rfl:     rosuvastatin (CRESTOR) 10 MG tablet, Take 1 tablet (10 mg total) by mouth once daily., Disp: 90 tablet, Rfl: 3    tamoxifen (NOLVADEX) 20 MG Tab, TAKE 1 TABLET BY MOUTH EVERY DAY, Disp: 30 tablet, Rfl: 11    zolpidem (AMBIEN) 5 MG Tab, TAKE 1 TABLET (5 MG TOTAL) BY MOUTH NIGHTLY AS NEEDED., Disp: 30 tablet, Rfl: 1    PMHx/PSHx Updates:  See patient's last visit with me on 11/17/2020.  See H&P on 8/17/2020        Pathology:  Cancer Staging  No matching staging information was found for the patient.          Objective:     Vitals:  Blood pressure 135/85, pulse 101, temperature 98.2 °F (36.8 °C), resp. rate 18, weight 98.7 kg (217 lb 9.6 oz).    Physical Examination:   GEN: no apparent distress, comfortable; AAOx3; overweight  HEAD: atraumatic and normocephalic  EYES: no pallor, no icterus, PERRLA  ENT: OMM, no pharyngeal erythema, external ears WNL; no nasal discharge; no thrush  NECK: no masses, thyroid normal, trachea midline, no LAD/LN's, supple  CV: RRR with no murmur; normal pulse; normal S1 and S2; no pedal edema  CHEST: Normal respiratory effort; CTAB; normal breath sounds; no wheeze or crackles  ABDOM: nontender and nondistended; soft; normal bowel sounds; no rebound/guarding  MUSC/Skeletal: ROM normal; no crepitus; joints normal; no deformities or arthropathy  EXTREM: no clubbing, cyanosis,  inflammation or swelling  SKIN: no rashes, lesions, ulcers, petechiae or subcutaneous nodules; tattoos; subcutaneous nodule along the left lower medial scapula and also in the left posterior axilla region  : no quinonez  NEURO: grossly intact; motor/sensory WNL; AAOx3; no tremors  PSYCH: normal mood, affect and behavior  LYMPH: normal cervical, supraclavicular, axillary and groin LN's  Breast: s/p left lumpectomy        Labs:     Lab Results   Component Value Date    WBC 10.14 12/15/2020    HGB 13.6 12/15/2020    HCT 42.0 12/15/2020    MCV 90 12/15/2020     12/15/2020       Lab Results   Component Value Date    IRON 73 12/15/2020    TIBC 321 12/15/2020    FERRITIN 219 12/15/2020     CMP  Sodium   Date Value Ref Range Status   12/15/2020 139 136 - 145 mmol/L Final     Potassium   Date Value Ref Range Status   12/15/2020 4.1 3.5 - 5.1 mmol/L Final     Chloride   Date Value Ref Range Status   12/15/2020 103 95 - 110 mmol/L Final     CO2   Date Value Ref Range Status   12/15/2020 27 23 - 29 mmol/L Final     Glucose   Date Value Ref Range Status   12/15/2020 122 (H) 70 - 110 mg/dL Final     BUN   Date Value Ref Range Status   12/15/2020 12 6 - 20 mg/dL Final     Creatinine   Date Value Ref Range Status   12/15/2020 1.0 0.5 - 1.4 mg/dL Final     Calcium   Date Value Ref Range Status   12/15/2020 9.1 8.7 - 10.5 mg/dL Final     Total Protein   Date Value Ref Range Status   12/15/2020 7.1 6.0 - 8.4 g/dL Final     Albumin   Date Value Ref Range Status   12/15/2020 3.9 3.5 - 5.2 g/dL Final     Total Bilirubin   Date Value Ref Range Status   12/15/2020 0.9 0.1 - 1.0 mg/dL Final     Comment:     For infants and newborns, interpretation of results should be based  on gestational age, weight and in agreement with clinical  observations.  Premature Infant recommended reference ranges:  Up to 24 hours.............<8.0 mg/dL  Up to 48 hours............<12.0 mg/dL  3-5 days..................<15.0 mg/dL  6-29  days.................<15.0 mg/dL       Alkaline Phosphatase   Date Value Ref Range Status   12/15/2020 73 55 - 135 U/L Final     AST   Date Value Ref Range Status   12/15/2020 15 10 - 40 U/L Final     ALT   Date Value Ref Range Status   12/15/2020 19 10 - 44 U/L Final     Anion Gap   Date Value Ref Range Status   12/15/2020 9 8 - 16 mmol/L Final     eGFR if    Date Value Ref Range Status   12/15/2020 >60.0 >60 mL/min/1.73 m^2 Final     eGFR if non    Date Value Ref Range Status   12/15/2020 >60.0 >60 mL/min/1.73 m^2 Final     Comment:     Calculation used to obtain the estimated glomerular filtration  rate (eGFR) is the CKD-EPI equation.        Lab Results   Component Value Date    IRON 73 12/15/2020    TIBC 321 12/15/2020    FERRITIN 219 12/15/2020           Radiology/Diagnostic Studies:    CT chest/abdom/pelvis  12/9/2020:    IMPRESSION:     Degradation by motion.     No evidence of metastatic disease within the thorax. Specifically,  there is no evidence of axillary adenopathy.     Indeterminate mildly hyperdense mass within the lateral segment of the  left hepatic lobe, incompletely characterized. Correlation with  multiphasic MRI or CT is recommended.     Indeterminate splenic masses measuring 10 and 8 mm in size. If prior  outside contrast enhanced studies exist, these would be helpful for  comparison. Alternatively, these lesions could be also assessed at the  time of follow-up multiphasic MRI or CT.     Sclerotic area within the rightward aspect of T7. The possibility of  sclerotic metastasis is not excludable.     13 mm calculus within the right renal pelvis with surrounding  inflammatory changes. The right kidney is small in overall size and  demonstrates multiple focal areas of cortical scarring.     Cystic density left ovarian masses and mild prominence of the  endometrial stripe. Correlation with pelvic ultrasound recommended.     Multiple solid uterine masses likely  "representing fibroids.     Probable Bartholin's cyst near the vaginal introitus.             I have reviewed all available lab results and radiology reports.    Assessment/Plan:   (1) 50 y.o. female with diagnosis of anemia referred by Dr Wade  - microcytic indices  - hgb at 10.2 and MCV at 66  - bilirubin is wnl, so I do not suspect any hemolysis at this time  - low iron and ferritin with elevated TIBC most consistent with an iron deficiency condition  - hx/of endometriosis and heavy menstrual cycles - followed by Dr Dagoberto Morris with GYN at [facility] [place] (prior laparoscopy x4)    10/20/2020:  - s/p two IV irons  - Hgb now up and normal at 13.8  - iron now normal at 89 and ferritin is up to 308    11/17/2020:  - hgb good at 13.2 and now WNL  - iron panel currently normal    12/21/2020:  - s/p IV iron in Sept 2020  - hgb and iron panel are currently WNL     (2) Hx/of left breast cancer diagnosed May 2018 s/p lumpectomy and radiation, oral antihormone with tamoxifen - s/p lumpectomy and XRT at [facility]  - Stage I  - ER positive  - she saw a female oncologist at [facility] in past   - she gets regular mammograms and had one this past Jan 2020  - she does not follow-up with oncology     (3) HTN and hypercholesterolemia     (4) Type II DM     (5) GERD     (6) TATYANA/Depression and RLS     (7) OA of right knee    (8) Subcutaneous nodule left scapular medial side and left posterior axilla - ? Lipomas - will schedule PEt scan in light of the breast cancer history  - 11/17/2020:  - insurance decline approval for PET   - scheduled CT scans instead and refer to Dr Rendon with Gen Surg    12/21/2020:  - she saw Dr Rendon since last visit and the suspected lipoma is actually a nodularity in the muscle layer and he is concerned about the possibility for "sarcoma"    (9) Left ovarian cystic mass with uterine fibroids, Indeterminate mildly hyperdense mass within the lateral segment of the left hepatic lobe and two indeterminate nodules in her " spleen   - will set up MRI of liver and also include the left paraspinal thoracic area near scapula to evaluate the submuscular nodule that ispalpable      VISIT DIAGNOSES:      Menorrhagia with regular cycle    ER+ (estrogen receptor positive status)    History of left breast cancer  -     MRI Thoracic Spine W WO Contrast; Future; Expected date: 12/21/2020  -     NM PET CT Routine Skull to Mid Thigh; Future; Expected date: 12/21/2020  -     Ambulatory referral/consult to Gynecologic Oncology; Future; Expected date: 12/28/2020  -     Ambulatory referral/consult to Interventional Radiology; Future; Expected date: 12/28/2020  -     ; Future; Expected date: 12/21/2020  -     CEA; Standing  -     Cancer Antigen 15-3; Future; Expected date: 12/21/2020  -     CA 27.29; Future; Expected date: 12/21/2020    Iron deficiency anemia, unspecified iron deficiency anemia type    S/P lumpectomy, left breast    Microcytic anemia    Use of tamoxifen (Nolvadex)    Mass of left ovary  -     MRI Thoracic Spine W WO Contrast; Future; Expected date: 12/21/2020  -     NM PET CT Routine Skull to Mid Thigh; Future; Expected date: 12/21/2020  -     Ambulatory referral/consult to Gynecologic Oncology; Future; Expected date: 12/28/2020  -     Ambulatory referral/consult to Interventional Radiology; Future; Expected date: 12/28/2020  -     ; Future; Expected date: 12/21/2020  -     CEA; Standing  -     Cancer Antigen 15-3; Future; Expected date: 12/21/2020  -     CA 27.29; Future; Expected date: 12/21/2020    Liver mass, left lobe  -     MRI Thoracic Spine W WO Contrast; Future; Expected date: 12/21/2020  -     NM PET CT Routine Skull to Mid Thigh; Future; Expected date: 12/21/2020  -     Ambulatory referral/consult to Gynecologic Oncology; Future; Expected date: 12/28/2020  -     Ambulatory referral/consult to Interventional Radiology; Future; Expected date: 12/28/2020  -     ; Future; Expected date: 12/21/2020  -     CEA;  Standing  -     Cancer Antigen 15-3; Future; Expected date: 12/21/2020  -     CA 27.29; Future; Expected date: 12/21/2020    Mass of subcutaneous tissue of back  -     MRI Thoracic Spine W WO Contrast; Future; Expected date: 12/21/2020  -     NM PET CT Routine Skull to Mid Thigh; Future; Expected date: 12/21/2020    Abnormal CT of the abdomen    Abnormal CT of liver          PLAN:  1. request PEt scan again in light of the abnormalities seen on the CT's  2.  Check labs monthly and resume IV iron as needed  3. Set up MRI of liver and MRI of back as detailed above  4. Refer to Dr Contreras with Gyn-Onc  5. Check CEA, , CA 15-3 and CA27.29  6. Continue tamoxifen for now and mammogram yearly recommended (due jan 2021)  7. F/u with GYN on Jan 25th  8.  F/u with PCP, etc    RTC in 3-4 weeks   Fax note to Andrew Roger MD; Carmen; Alexandra; Justice; Ben    Discussion:     COVID-19 Discussion:    I had long discussion with patient and any applicable family about the COVID-19 coronavirus epidemic and the recommended precautions with regard to cancer and/or hematology patients. I have re-iterated the CDC recommendations for adequate hand washing, use of hand -like products, and coughing into elbow, etc. In addition, especially for our patients who are on chemotherapy and/or our otherwise immunocompromised patients, I have recommended avoidance of crowds, including movie theaters, restaurants, churches, etc. I have recommended avoidance of any sick or symptomatic family members and/or friends. I have also recommended avoidance of any raw and unwashed food products, and general avoidance of food items that have not been prepared by themselves. The patient has been asked to call us immediately with any symptom developments, issues, questions or other general concerns.     Pathology Discussion:     I reviewed and discussed the pathology report(s) and radiograph reports (if available) in as simple to understand  "and/or laymen's terms to the best of my ability. I had an indepth conversation with the patient and went over the patient's individual diagnosis based on the information that was currently available. I discussed the TNM staging process with regard to the patient's particular cancer type, and the calculated stage based on the currently available TNM data and literature. I discussed the available prognostic data with regard to the current staging information and how it relates to the prognosis of their particular neoplastic process.          NCCN Guidelines:     I discussed the available treatment option(s) in accordance with the latest literature from the NCCN Clinical Practice Guidelines for the patient's particular type of cancer disorder. The NCCN Guidelines provide a "document evidence-based (and) consensus-driven management" of the care of oncology patients. The treatment recommendations were made not only in accordance to the NCCN guidelines, but also factored in to account the patient's overall age, condition, performance status and their medical co-morbidities. I went over the risks and benefits of the the treatment options (if any could be made) with regard to their particular cancer type, their cancer stage, their age, and their co-morbidities.          Antihormone Therapy Discussion:    I discussed the advantages of antihormone therapy with the patient with regard to their particular neoplastic or carcinoma in situ condition. I went over the side-effect profile of the medication including risk for potential development of endometrial cancer and/or hyperplasia in women who still have a uterus and the need for yearly GYN evaluation and follow-up. I discussed the risks for thromboembolic events such as DVT's, pulmonary emboli, CVA's, retinal vascular clots, phlebitis, and TIA's. I discussed the potential risks for development of ocular disturbances, retinopathy, cataracts, corneal changes, flushing, hot " flashes, amenorrhea, altered menses, fluid retention, weight changes, elevations in LFT's, liver damage, and mood disturbances. I discussed the potential risk of arthropathy and joint pains/aches which could be chronic and debilitating. I discussed potential adverse effects on bone mineralization and the risk of osteopenia and/or osteoporosis which could led to increase risk of fractures.   A consent form was obtained and a copy was provided to the patient.    Iron Infusion Therapy Discussion:     I provided literature/learning materials on the particular IV iron regimen and discussed the potential side-effect profiles of the drug(s). I discussed the importance of compliance with obtaining and monitoring requested lab work, and went over the potential risk for the development of anaphylactic shock, bronchospasm, dysrhythmia, liver and/or kidney damage, and respiratory/cardiovascular arrest and/or failure. I discussed the potential risks for development of alopecia, fevers, itching, chills and/or rigors, cold sensory issues, ringing in ears, vertigo and neuropathy, all of which are usually acute but sometimes could end up being chronic and life-long. I discussed the risks of hand-foot syndrome and rashes, and development of other autoimmune mediated processes such as pneumonitis and colitis which could be life threatening.     The patient's consent has been obtained to proceed with the IV iron therapy.The patient will be referred to [facility] for training and education on IV iron therapy, use of antiemetics and/or anti-diarrheals, use of NSAID's, potential IV iron therapy side-effects, and any specific recommendations and precautions with the particular IV iron agents.      I answered all of the patient's (and family's, if applicable) questions to the best of my ability and to their complete satisfaction. The patient acknowledged full understanding of the risks, recommendations and  plan(s).         I spent over 25 mins of time with the patient. Reviewed results of the recently ordered labs, tests and studies; made directives with regards to the results. Over half of this time was spent couseling and coordinating care.    I have explained all of the above in detail and the patient understands all of the current recommendation(s). I have answered all of their questions to the best of my ability and to their complete satisfaction.   The patient is to continue with the current management plan.            Electronically signed by Hossein Carranza MD          Answers for HPI/ROS submitted by the patient on 12/17/2020   appetite change : No  unexpected weight change: No  mouth sores: No  visual disturbance: No  cough: No  shortness of breath: No  chest pain: No  abdominal pain: No  diarrhea: No  frequency: No  back pain: Yes  rash: No  headaches: No  adenopathy: No  nervous/ anxious: No

## 2020-12-21 ENCOUNTER — OFFICE VISIT (OUTPATIENT)
Dept: HEMATOLOGY/ONCOLOGY | Facility: CLINIC | Age: 50
End: 2020-12-21
Payer: COMMERCIAL

## 2020-12-21 VITALS
BODY MASS INDEX: 35.12 KG/M2 | HEART RATE: 101 BPM | WEIGHT: 217.63 LBS | DIASTOLIC BLOOD PRESSURE: 85 MMHG | SYSTOLIC BLOOD PRESSURE: 135 MMHG | TEMPERATURE: 98 F | RESPIRATION RATE: 18 BRPM

## 2020-12-21 DIAGNOSIS — R93.5 ABNORMAL CT OF THE ABDOMEN: ICD-10-CM

## 2020-12-21 DIAGNOSIS — N92.0 MENORRHAGIA WITH REGULAR CYCLE: Primary | ICD-10-CM

## 2020-12-21 DIAGNOSIS — Z85.3 HISTORY OF LEFT BREAST CANCER: ICD-10-CM

## 2020-12-21 DIAGNOSIS — R93.2 ABNORMAL CT OF LIVER: ICD-10-CM

## 2020-12-21 DIAGNOSIS — D50.9 IRON DEFICIENCY ANEMIA, UNSPECIFIED IRON DEFICIENCY ANEMIA TYPE: ICD-10-CM

## 2020-12-21 DIAGNOSIS — Z17.0 ER+ (ESTROGEN RECEPTOR POSITIVE STATUS): ICD-10-CM

## 2020-12-21 DIAGNOSIS — R16.0 LIVER MASS, LEFT LOBE: ICD-10-CM

## 2020-12-21 DIAGNOSIS — R22.2 MASS OF SUBCUTANEOUS TISSUE OF BACK: ICD-10-CM

## 2020-12-21 DIAGNOSIS — R93.5 ABNORMAL CT OF THE ABDOMEN: Primary | ICD-10-CM

## 2020-12-21 DIAGNOSIS — Z79.810 USE OF TAMOXIFEN (NOLVADEX): ICD-10-CM

## 2020-12-21 DIAGNOSIS — N83.8 MASS OF LEFT OVARY: ICD-10-CM

## 2020-12-21 DIAGNOSIS — Z98.890 S/P LUMPECTOMY, LEFT BREAST: ICD-10-CM

## 2020-12-21 DIAGNOSIS — D50.9 MICROCYTIC ANEMIA: ICD-10-CM

## 2020-12-21 PROCEDURE — 3079F PR MOST RECENT DIASTOLIC BLOOD PRESSURE 80-89 MM HG: ICD-10-PCS | Mod: S$GLB,,, | Performed by: INTERNAL MEDICINE

## 2020-12-21 PROCEDURE — 3075F SYST BP GE 130 - 139MM HG: CPT | Mod: S$GLB,,, | Performed by: INTERNAL MEDICINE

## 2020-12-21 PROCEDURE — 1125F AMNT PAIN NOTED PAIN PRSNT: CPT | Mod: S$GLB,,, | Performed by: INTERNAL MEDICINE

## 2020-12-21 PROCEDURE — 1125F PR PAIN SEVERITY QUANTIFIED, PAIN PRESENT: ICD-10-PCS | Mod: S$GLB,,, | Performed by: INTERNAL MEDICINE

## 2020-12-21 PROCEDURE — 3079F DIAST BP 80-89 MM HG: CPT | Mod: S$GLB,,, | Performed by: INTERNAL MEDICINE

## 2020-12-21 PROCEDURE — 3008F BODY MASS INDEX DOCD: CPT | Mod: S$GLB,,, | Performed by: INTERNAL MEDICINE

## 2020-12-21 PROCEDURE — 99215 OFFICE O/P EST HI 40 MIN: CPT | Mod: S$GLB,,, | Performed by: INTERNAL MEDICINE

## 2020-12-21 PROCEDURE — 99215 PR OFFICE/OUTPT VISIT, EST, LEVL V, 40-54 MIN: ICD-10-PCS | Mod: S$GLB,,, | Performed by: INTERNAL MEDICINE

## 2020-12-21 PROCEDURE — 3075F PR MOST RECENT SYSTOLIC BLOOD PRESS GE 130-139MM HG: ICD-10-PCS | Mod: S$GLB,,, | Performed by: INTERNAL MEDICINE

## 2020-12-21 PROCEDURE — 3008F PR BODY MASS INDEX (BMI) DOCUMENTED: ICD-10-PCS | Mod: S$GLB,,, | Performed by: INTERNAL MEDICINE

## 2020-12-22 ENCOUNTER — TELEPHONE (OUTPATIENT)
Dept: RADIOLOGY | Facility: HOSPITAL | Age: 50
End: 2020-12-22

## 2020-12-22 DIAGNOSIS — Z01.812 PRE-PROCEDURE LAB EXAM: ICD-10-CM

## 2020-12-22 NOTE — PROGRESS NOTES
Muscle bx scheduled @ [facility] on 12/29/20 @ 9am with arrival @ 7am. Pre-procedure instructions given and understanding verbalized.

## 2020-12-26 ENCOUNTER — LAB VISIT (OUTPATIENT)
Dept: PRIMARY CARE CLINIC | Facility: CLINIC | Age: 50
End: 2020-12-26
Payer: COMMERCIAL

## 2020-12-26 DIAGNOSIS — Z01.812 PRE-PROCEDURE LAB EXAM: ICD-10-CM

## 2020-12-26 PROCEDURE — U0003 INFECTIOUS AGENT DETECTION BY NUCLEIC ACID (DNA OR RNA); SEVERE ACUTE RESPIRATORY SYNDROME CORONAVIRUS 2 (SARS-COV-2) (CORONAVIRUS DISEASE [COVID-19]), AMPLIFIED PROBE TECHNIQUE, MAKING USE OF HIGH THROUGHPUT TECHNOLOGIES AS DESCRIBED BY CMS-2020-01-R: HCPCS

## 2020-12-27 LAB — SARS-COV-2 RNA RESP QL NAA+PROBE: NOT DETECTED

## 2020-12-28 ENCOUNTER — HOSPITAL ENCOUNTER (OUTPATIENT)
Dept: RADIOLOGY | Facility: HOSPITAL | Age: 50
Discharge: HOME | End: 2020-12-28
Attending: INTERNAL MEDICINE
Payer: COMMERCIAL

## 2020-12-28 ENCOUNTER — TELEPHONE (OUTPATIENT)
Dept: HEMATOLOGY/ONCOLOGY | Facility: CLINIC | Age: 50
End: 2020-12-28

## 2020-12-28 DIAGNOSIS — Z17.0 ER+ (ESTROGEN RECEPTOR POSITIVE STATUS): ICD-10-CM

## 2020-12-28 DIAGNOSIS — R16.0 MASS OF LEFT LOBE OF LIVER: ICD-10-CM

## 2020-12-28 DIAGNOSIS — R16.0 LIVER MASS, LEFT LOBE: ICD-10-CM

## 2020-12-28 DIAGNOSIS — N83.8 MASS OF LEFT OVARY: ICD-10-CM

## 2020-12-28 DIAGNOSIS — Z85.3 HISTORY OF LEFT BREAST CANCER: ICD-10-CM

## 2020-12-28 DIAGNOSIS — R22.2 MASS OF SUBCUTANEOUS TISSUE OF BACK: ICD-10-CM

## 2020-12-28 PROCEDURE — 74183 MRI ABD W/O CNTR FLWD CNTR: CPT | Mod: TC

## 2020-12-28 PROCEDURE — 72157 MRI CHEST SPINE W/O & W/DYE: CPT | Mod: TC

## 2020-12-28 PROCEDURE — 25500020 PHARM REV CODE 255: Performed by: INTERNAL MEDICINE

## 2020-12-28 PROCEDURE — A9585 GADOBUTROL INJECTION: HCPCS | Performed by: INTERNAL MEDICINE

## 2020-12-28 RX ORDER — GADOBUTROL 604.72 MG/ML
10 INJECTION INTRAVENOUS
Status: COMPLETED | OUTPATIENT
Start: 2020-12-28 | End: 2020-12-28

## 2020-12-28 RX ADMIN — GADOBUTROL 10 ML: 604.72 INJECTION INTRAVENOUS at 09:12

## 2020-12-28 NOTE — TELEPHONE ENCOUNTER
----- Message from Hossein Carranza MD sent at 12/28/2020 10:27 AM CST -----  Call her with this report - I still would like GI to see her out of abundance of precaution

## 2020-12-28 NOTE — TELEPHONE ENCOUNTER
----- Message from Hossein Carranza MD sent at 12/28/2020  2:24 PM CST -----  Set up biopsy of this muscle nodule with IR

## 2020-12-28 NOTE — TELEPHONE ENCOUNTER
Called the patient and instructed her that Dr. Carranza wants her to have a biopsy.  She instructed me that someone called today and she is scheduled for tomorrow.

## 2020-12-28 NOTE — TELEPHONE ENCOUNTER
Called the patient and gave her the results of her MRI and instructed her that Dr. Carranza wants her to see GI for precautionary.

## 2020-12-29 ENCOUNTER — HOSPITAL ENCOUNTER (OUTPATIENT)
Facility: HOSPITAL | Age: 50
Discharge: HOME | End: 2020-12-29
Attending: INTERNAL MEDICINE | Admitting: INTERNAL MEDICINE
Payer: COMMERCIAL

## 2020-12-29 VITALS
WEIGHT: 215 LBS | RESPIRATION RATE: 16 BRPM | TEMPERATURE: 99 F | SYSTOLIC BLOOD PRESSURE: 149 MMHG | DIASTOLIC BLOOD PRESSURE: 93 MMHG | BODY MASS INDEX: 35.82 KG/M2 | HEIGHT: 65 IN | OXYGEN SATURATION: 98 % | HEART RATE: 84 BPM

## 2020-12-29 DIAGNOSIS — R93.5 ABNORMAL CT OF THE ABDOMEN: ICD-10-CM

## 2020-12-29 LAB
APTT PPP: 29.1 SEC (ref 23.6–33.3)
B-HCG UR QL: NEGATIVE
BASOPHILS # BLD AUTO: 0.06 K/UL (ref 0–0.2)
BASOPHILS NFR BLD: 0.5 % (ref 0–1.9)
CTP QC/QA: YES
DIFFERENTIAL METHOD: ABNORMAL
EOSINOPHIL # BLD AUTO: 0.2 K/UL (ref 0–0.5)
EOSINOPHIL NFR BLD: 2.1 % (ref 0–8)
ERYTHROCYTE [DISTWIDTH] IN BLOOD BY AUTOMATED COUNT: 13.8 % (ref 11.5–14.5)
HCT VFR BLD AUTO: 40.4 % (ref 37–48.5)
HGB BLD-MCNC: 13.4 G/DL (ref 12–16)
IMM GRANULOCYTES # BLD AUTO: 0.07 K/UL (ref 0–0.04)
IMM GRANULOCYTES NFR BLD AUTO: 0.6 % (ref 0–0.5)
INR PPP: 1.1
LYMPHOCYTES # BLD AUTO: 2.9 K/UL (ref 1–4.8)
LYMPHOCYTES NFR BLD: 24.9 % (ref 18–48)
MCH RBC QN AUTO: 29.6 PG (ref 27–31)
MCHC RBC AUTO-ENTMCNC: 33.2 G/DL (ref 32–36)
MCV RBC AUTO: 89 FL (ref 82–98)
MONOCYTES # BLD AUTO: 0.9 K/UL (ref 0.3–1)
MONOCYTES NFR BLD: 7.5 % (ref 4–15)
NEUTROPHILS # BLD AUTO: 7.4 K/UL (ref 1.8–7.7)
NEUTROPHILS NFR BLD: 64.4 % (ref 38–73)
NRBC BLD-RTO: 0 /100 WBC
PLATELET # BLD AUTO: 242 K/UL (ref 150–350)
PMV BLD AUTO: 10.1 FL (ref 9.2–12.9)
PROTHROMBIN TIME: 13.8 SEC (ref 10.6–14.8)
RBC # BLD AUTO: 4.53 M/UL (ref 4–5.4)
WBC # BLD AUTO: 11.44 K/UL (ref 3.9–12.7)

## 2020-12-29 PROCEDURE — 81025 URINE PREGNANCY TEST: CPT | Performed by: RADIOLOGY

## 2020-12-29 PROCEDURE — 71000016 HC POSTOP RECOV ADDL HR

## 2020-12-29 PROCEDURE — 85730 THROMBOPLASTIN TIME PARTIAL: CPT

## 2020-12-29 PROCEDURE — 63600175 PHARM REV CODE 636 W HCPCS: Performed by: RADIOLOGY

## 2020-12-29 PROCEDURE — 85025 COMPLETE CBC W/AUTO DIFF WBC: CPT

## 2020-12-29 PROCEDURE — 85610 PROTHROMBIN TIME: CPT

## 2020-12-29 PROCEDURE — 25000003 PHARM REV CODE 250: Performed by: RADIOLOGY

## 2020-12-29 PROCEDURE — 71000015 HC POSTOP RECOV 1ST HR

## 2020-12-29 RX ORDER — FENTANYL CITRATE 50 UG/ML
INJECTION, SOLUTION INTRAMUSCULAR; INTRAVENOUS CODE/TRAUMA/SEDATION MEDICATION
Status: COMPLETED | OUTPATIENT
Start: 2020-12-29 | End: 2020-12-29

## 2020-12-29 RX ORDER — MIDAZOLAM HYDROCHLORIDE 1 MG/ML
INJECTION INTRAMUSCULAR; INTRAVENOUS CODE/TRAUMA/SEDATION MEDICATION
Status: COMPLETED | OUTPATIENT
Start: 2020-12-29 | End: 2020-12-29

## 2020-12-29 RX ORDER — SODIUM CHLORIDE 9 MG/ML
INJECTION, SOLUTION INTRAVENOUS
Status: COMPLETED | OUTPATIENT
Start: 2020-12-29 | End: 2020-12-29

## 2020-12-29 RX ADMIN — FENTANYL CITRATE 50 MCG: 50 INJECTION INTRAMUSCULAR; INTRAVENOUS at 09:12

## 2020-12-29 RX ADMIN — MIDAZOLAM HYDROCHLORIDE 1 MG: 1 INJECTION, SOLUTION INTRAMUSCULAR; INTRAVENOUS at 09:12

## 2020-12-29 RX ADMIN — SODIUM CHLORIDE 500 ML/HR: 900 INJECTION, SOLUTION INTRAVENOUS at 09:12

## 2021-01-07 ENCOUNTER — PATIENT MESSAGE (OUTPATIENT)
Dept: HEMATOLOGY/ONCOLOGY | Facility: CLINIC | Age: 51
End: 2021-01-07

## 2021-01-11 ENCOUNTER — HOSPITAL ENCOUNTER (OUTPATIENT)
Dept: RADIOLOGY | Facility: HOSPITAL | Age: 51
Discharge: HOME | End: 2021-01-11
Attending: INTERNAL MEDICINE
Payer: COMMERCIAL

## 2021-01-11 VITALS — HEIGHT: 65 IN | BODY MASS INDEX: 35.99 KG/M2 | WEIGHT: 216 LBS

## 2021-01-11 DIAGNOSIS — N83.8 MASS OF LEFT OVARY: ICD-10-CM

## 2021-01-11 DIAGNOSIS — R93.5 ABNORMAL CT OF THE ABDOMEN: ICD-10-CM

## 2021-01-11 DIAGNOSIS — R16.0 LIVER MASS, LEFT LOBE: ICD-10-CM

## 2021-01-11 DIAGNOSIS — Z85.3 HISTORY OF LEFT BREAST CANCER: ICD-10-CM

## 2021-01-11 DIAGNOSIS — R22.2 MASS OF SUBCUTANEOUS TISSUE OF BACK: ICD-10-CM

## 2021-01-11 LAB — GLUCOSE SERPL-MCNC: 92 MG/DL (ref 70–110)

## 2021-01-11 PROCEDURE — 82962 GLUCOSE BLOOD TEST: CPT | Mod: PO

## 2021-01-11 PROCEDURE — 78815 PET IMAGE W/CT SKULL-THIGH: CPT | Mod: TC,PO

## 2021-01-13 ENCOUNTER — PATIENT MESSAGE (OUTPATIENT)
Dept: HEMATOLOGY/ONCOLOGY | Facility: CLINIC | Age: 51
End: 2021-01-13

## 2021-01-18 ENCOUNTER — TELEPHONE (OUTPATIENT)
Dept: HEMATOLOGY/ONCOLOGY | Facility: CLINIC | Age: 51
End: 2021-01-18

## 2021-01-19 PROBLEM — D23.9: Status: ACTIVE | Noted: 2021-01-19

## 2021-01-20 ENCOUNTER — OFFICE VISIT (OUTPATIENT)
Dept: HEMATOLOGY/ONCOLOGY | Facility: CLINIC | Age: 51
End: 2021-01-20
Payer: COMMERCIAL

## 2021-01-20 VITALS
SYSTOLIC BLOOD PRESSURE: 147 MMHG | RESPIRATION RATE: 17 BRPM | HEART RATE: 97 BPM | BODY MASS INDEX: 36.43 KG/M2 | TEMPERATURE: 98 F | DIASTOLIC BLOOD PRESSURE: 86 MMHG | WEIGHT: 218.88 LBS

## 2021-01-20 DIAGNOSIS — N92.0 MENORRHAGIA WITH REGULAR CYCLE: ICD-10-CM

## 2021-01-20 DIAGNOSIS — D50.9 MICROCYTIC ANEMIA: ICD-10-CM

## 2021-01-20 DIAGNOSIS — Z17.0 ER+ (ESTROGEN RECEPTOR POSITIVE STATUS): ICD-10-CM

## 2021-01-20 DIAGNOSIS — R93.5 ABNORMAL CT OF THE ABDOMEN: ICD-10-CM

## 2021-01-20 DIAGNOSIS — D50.9 IRON DEFICIENCY ANEMIA, UNSPECIFIED IRON DEFICIENCY ANEMIA TYPE: ICD-10-CM

## 2021-01-20 DIAGNOSIS — Z98.890 S/P LUMPECTOMY, LEFT BREAST: ICD-10-CM

## 2021-01-20 DIAGNOSIS — Z79.810 USE OF TAMOXIFEN (NOLVADEX): ICD-10-CM

## 2021-01-20 DIAGNOSIS — Z85.3 HISTORY OF LEFT BREAST CANCER: Primary | ICD-10-CM

## 2021-01-20 DIAGNOSIS — R93.2 ABNORMAL CT OF LIVER: ICD-10-CM

## 2021-01-20 DIAGNOSIS — D23.9: ICD-10-CM

## 2021-01-20 PROCEDURE — 99214 OFFICE O/P EST MOD 30 MIN: CPT | Mod: S$GLB,,, | Performed by: INTERNAL MEDICINE

## 2021-01-20 PROCEDURE — 99214 PR OFFICE/OUTPT VISIT, EST, LEVL IV, 30-39 MIN: ICD-10-PCS | Mod: S$GLB,,, | Performed by: INTERNAL MEDICINE

## 2021-01-20 PROCEDURE — 3077F PR MOST RECENT SYSTOLIC BLOOD PRESSURE >= 140 MM HG: ICD-10-PCS | Mod: S$GLB,,, | Performed by: INTERNAL MEDICINE

## 2021-01-20 PROCEDURE — 3072F LOW RISK FOR RETINOPATHY: CPT | Mod: S$GLB,,, | Performed by: INTERNAL MEDICINE

## 2021-01-20 PROCEDURE — 3008F PR BODY MASS INDEX (BMI) DOCUMENTED: ICD-10-PCS | Mod: S$GLB,,, | Performed by: INTERNAL MEDICINE

## 2021-01-20 PROCEDURE — 1125F AMNT PAIN NOTED PAIN PRSNT: CPT | Mod: S$GLB,,, | Performed by: INTERNAL MEDICINE

## 2021-01-20 PROCEDURE — 3077F SYST BP >= 140 MM HG: CPT | Mod: S$GLB,,, | Performed by: INTERNAL MEDICINE

## 2021-01-20 PROCEDURE — 3079F DIAST BP 80-89 MM HG: CPT | Mod: S$GLB,,, | Performed by: INTERNAL MEDICINE

## 2021-01-20 PROCEDURE — 3079F PR MOST RECENT DIASTOLIC BLOOD PRESSURE 80-89 MM HG: ICD-10-PCS | Mod: S$GLB,,, | Performed by: INTERNAL MEDICINE

## 2021-01-20 PROCEDURE — 3072F PR LOW RISK FOR RETINOPATHY: ICD-10-PCS | Mod: S$GLB,,, | Performed by: INTERNAL MEDICINE

## 2021-01-20 PROCEDURE — 1125F PR PAIN SEVERITY QUANTIFIED, PAIN PRESENT: ICD-10-PCS | Mod: S$GLB,,, | Performed by: INTERNAL MEDICINE

## 2021-01-20 PROCEDURE — 3008F BODY MASS INDEX DOCD: CPT | Mod: S$GLB,,, | Performed by: INTERNAL MEDICINE

## 2021-01-26 ENCOUNTER — OFFICE VISIT (OUTPATIENT)
Dept: SURGERY | Facility: CLINIC | Age: 51
End: 2021-01-26
Payer: COMMERCIAL

## 2021-01-26 VITALS — BODY MASS INDEX: 36.28 KG/M2 | WEIGHT: 218 LBS | TEMPERATURE: 97 F

## 2021-01-26 DIAGNOSIS — C49.9: Primary | ICD-10-CM

## 2021-01-26 PROCEDURE — 1126F PR PAIN SEVERITY QUANTIFIED, NO PAIN PRESENT: ICD-10-PCS | Mod: S$GLB,,, | Performed by: SURGERY

## 2021-01-26 PROCEDURE — 3008F BODY MASS INDEX DOCD: CPT | Mod: CPTII,S$GLB,, | Performed by: SURGERY

## 2021-01-26 PROCEDURE — 99213 OFFICE O/P EST LOW 20 MIN: CPT | Mod: S$GLB,,, | Performed by: SURGERY

## 2021-01-26 PROCEDURE — 99213 PR OFFICE/OUTPT VISIT, EST, LEVL III, 20-29 MIN: ICD-10-PCS | Mod: S$GLB,,, | Performed by: SURGERY

## 2021-01-26 PROCEDURE — 3072F PR LOW RISK FOR RETINOPATHY: ICD-10-PCS | Mod: S$GLB,,, | Performed by: SURGERY

## 2021-01-26 PROCEDURE — 3072F LOW RISK FOR RETINOPATHY: CPT | Mod: S$GLB,,, | Performed by: SURGERY

## 2021-01-26 PROCEDURE — 3008F PR BODY MASS INDEX (BMI) DOCUMENTED: ICD-10-PCS | Mod: CPTII,S$GLB,, | Performed by: SURGERY

## 2021-01-26 PROCEDURE — 1126F AMNT PAIN NOTED NONE PRSNT: CPT | Mod: S$GLB,,, | Performed by: SURGERY

## 2021-01-28 ENCOUNTER — TELEPHONE (OUTPATIENT)
Dept: HEMATOLOGY/ONCOLOGY | Facility: CLINIC | Age: 51
End: 2021-01-28

## 2021-02-01 ENCOUNTER — TELEPHONE (OUTPATIENT)
Dept: SURGERY | Facility: CLINIC | Age: 51
End: 2021-02-01

## 2021-02-01 ENCOUNTER — HOSPITAL ENCOUNTER (OUTPATIENT)
Dept: RADIOLOGY | Facility: HOSPITAL | Age: 51
Discharge: HOME | End: 2021-02-01
Attending: FAMILY MEDICINE
Payer: COMMERCIAL

## 2021-02-01 DIAGNOSIS — Z12.39 BREAST CANCER SCREENING: ICD-10-CM

## 2021-02-01 PROCEDURE — 77067 MAMMO DIGITAL SCREENING BILAT WITH TOMO: ICD-10-PCS | Mod: 26,,, | Performed by: RADIOLOGY

## 2021-02-01 PROCEDURE — 77067 SCR MAMMO BI INCL CAD: CPT | Mod: TC

## 2021-02-01 PROCEDURE — 77063 MAMMO DIGITAL SCREENING BILAT WITH TOMO: ICD-10-PCS | Mod: 26,,, | Performed by: RADIOLOGY

## 2021-02-01 PROCEDURE — 77063 BREAST TOMOSYNTHESIS BI: CPT | Mod: 26,,, | Performed by: RADIOLOGY

## 2021-02-01 PROCEDURE — 77067 SCR MAMMO BI INCL CAD: CPT | Mod: 26,,, | Performed by: RADIOLOGY

## 2021-02-04 ENCOUNTER — OFFICE VISIT (OUTPATIENT)
Dept: SURGERY | Facility: CLINIC | Age: 51
End: 2021-02-04
Payer: COMMERCIAL

## 2021-02-04 VITALS
BODY MASS INDEX: 36.64 KG/M2 | WEIGHT: 219.88 LBS | SYSTOLIC BLOOD PRESSURE: 159 MMHG | HEIGHT: 65 IN | DIASTOLIC BLOOD PRESSURE: 108 MMHG | HEART RATE: 99 BPM

## 2021-02-04 DIAGNOSIS — C49.9: ICD-10-CM

## 2021-02-04 DIAGNOSIS — C49.9: Primary | ICD-10-CM

## 2021-02-04 DIAGNOSIS — Z01.818 PRE-OP TESTING: ICD-10-CM

## 2021-02-04 DIAGNOSIS — M79.89 PALPABLE MASS OF SOFT TISSUE OF SHOULDER: ICD-10-CM

## 2021-02-04 PROCEDURE — 99205 PR OFFICE/OUTPT VISIT, NEW, LEVL V, 60-74 MIN: ICD-10-PCS | Mod: S$GLB,,, | Performed by: SURGERY

## 2021-02-04 PROCEDURE — 99999 PR PBB SHADOW E&M-EST. PATIENT-LVL III: ICD-10-PCS | Mod: PBBFAC,,, | Performed by: SURGERY

## 2021-02-04 PROCEDURE — 99999 PR PBB SHADOW E&M-EST. PATIENT-LVL III: CPT | Mod: PBBFAC,,, | Performed by: SURGERY

## 2021-02-04 PROCEDURE — 3080F PR MOST RECENT DIASTOLIC BLOOD PRESSURE >= 90 MM HG: ICD-10-PCS | Mod: CPTII,S$GLB,, | Performed by: SURGERY

## 2021-02-04 PROCEDURE — 3077F PR MOST RECENT SYSTOLIC BLOOD PRESSURE >= 140 MM HG: ICD-10-PCS | Mod: CPTII,S$GLB,, | Performed by: SURGERY

## 2021-02-04 PROCEDURE — 3080F DIAST BP >= 90 MM HG: CPT | Mod: CPTII,S$GLB,, | Performed by: SURGERY

## 2021-02-04 PROCEDURE — 99205 OFFICE O/P NEW HI 60 MIN: CPT | Mod: S$GLB,,, | Performed by: SURGERY

## 2021-02-04 PROCEDURE — 3077F SYST BP >= 140 MM HG: CPT | Mod: CPTII,S$GLB,, | Performed by: SURGERY

## 2021-02-08 ENCOUNTER — PATIENT MESSAGE (OUTPATIENT)
Dept: INFECTIOUS DISEASES | Facility: CLINIC | Age: 51
End: 2021-02-08

## 2021-02-09 ENCOUNTER — TELEPHONE (OUTPATIENT)
Dept: SURGERY | Facility: CLINIC | Age: 51
End: 2021-02-09

## 2021-02-09 ENCOUNTER — RESEARCH ENCOUNTER (OUTPATIENT)
Dept: RESEARCH | Facility: HOSPITAL | Age: 51
End: 2021-02-09

## 2021-02-12 DIAGNOSIS — C49.9: Primary | ICD-10-CM

## 2021-02-12 RX ORDER — CEFAZOLIN SODIUM 2 G/50ML
2 SOLUTION INTRAVENOUS
Status: CANCELLED | OUTPATIENT
Start: 2021-02-22

## 2021-02-12 RX ORDER — SODIUM CHLORIDE 9 MG/ML
INJECTION, SOLUTION INTRAVENOUS CONTINUOUS
Status: CANCELLED | OUTPATIENT
Start: 2021-02-12

## 2021-02-12 RX ORDER — LIDOCAINE HYDROCHLORIDE 10 MG/ML
1 INJECTION, SOLUTION EPIDURAL; INFILTRATION; INTRACAUDAL; PERINEURAL ONCE
Status: CANCELLED | OUTPATIENT
Start: 2021-02-12 | End: 2021-02-12

## 2021-02-19 ENCOUNTER — LAB VISIT (OUTPATIENT)
Dept: PRIMARY CARE CLINIC | Facility: CLINIC | Age: 51
End: 2021-02-19
Payer: COMMERCIAL

## 2021-02-19 ENCOUNTER — TELEPHONE (OUTPATIENT)
Dept: SURGERY | Facility: CLINIC | Age: 51
End: 2021-02-19

## 2021-02-19 DIAGNOSIS — Z01.818 PRE-OP TESTING: ICD-10-CM

## 2021-02-19 PROCEDURE — U0003 INFECTIOUS AGENT DETECTION BY NUCLEIC ACID (DNA OR RNA); SEVERE ACUTE RESPIRATORY SYNDROME CORONAVIRUS 2 (SARS-COV-2) (CORONAVIRUS DISEASE [COVID-19]), AMPLIFIED PROBE TECHNIQUE, MAKING USE OF HIGH THROUGHPUT TECHNOLOGIES AS DESCRIBED BY CMS-2020-01-R: HCPCS

## 2021-02-19 PROCEDURE — U0005 INFEC AGEN DETEC AMPLI PROBE: HCPCS

## 2021-02-20 LAB — SARS-COV-2 RNA RESP QL NAA+PROBE: NOT DETECTED

## 2021-02-22 ENCOUNTER — HOSPITAL ENCOUNTER (OUTPATIENT)
Facility: HOSPITAL | Age: 51
Discharge: HOME | End: 2021-02-22
Attending: SURGERY | Admitting: SURGERY
Payer: COMMERCIAL

## 2021-02-22 ENCOUNTER — ANESTHESIA EVENT (OUTPATIENT)
Dept: SURGERY | Facility: HOSPITAL | Age: 51
End: 2021-02-22
Payer: COMMERCIAL

## 2021-02-22 ENCOUNTER — ANESTHESIA (OUTPATIENT)
Dept: SURGERY | Facility: HOSPITAL | Age: 51
End: 2021-02-22
Payer: COMMERCIAL

## 2021-02-22 VITALS
DIASTOLIC BLOOD PRESSURE: 59 MMHG | TEMPERATURE: 98 F | HEIGHT: 65 IN | HEART RATE: 80 BPM | WEIGHT: 219 LBS | BODY MASS INDEX: 36.49 KG/M2 | RESPIRATION RATE: 23 BRPM | OXYGEN SATURATION: 97 % | SYSTOLIC BLOOD PRESSURE: 122 MMHG

## 2021-02-22 DIAGNOSIS — D23.9: ICD-10-CM

## 2021-02-22 DIAGNOSIS — C49.9: Primary | ICD-10-CM

## 2021-02-22 LAB
B-HCG UR QL: NEGATIVE
CTP QC/QA: YES
POCT GLUCOSE: 132 MG/DL (ref 70–110)
POCT GLUCOSE: 142 MG/DL (ref 70–110)

## 2021-02-22 PROCEDURE — D9220A PRA ANESTHESIA: Mod: CRNA,,, | Performed by: NURSE ANESTHETIST, CERTIFIED REGISTERED

## 2021-02-22 PROCEDURE — 88307 PR  SURG PATH,LEVEL V: ICD-10-PCS | Mod: 26,,, | Performed by: PATHOLOGY

## 2021-02-22 PROCEDURE — 63600175 PHARM REV CODE 636 W HCPCS: Performed by: SURGERY

## 2021-02-22 PROCEDURE — D9220A PRA ANESTHESIA: ICD-10-PCS | Mod: ANES,,, | Performed by: ANESTHESIOLOGY

## 2021-02-22 PROCEDURE — 63600175 PHARM REV CODE 636 W HCPCS: Performed by: NURSE ANESTHETIST, CERTIFIED REGISTERED

## 2021-02-22 PROCEDURE — 25000003 PHARM REV CODE 250: Performed by: NURSE ANESTHETIST, CERTIFIED REGISTERED

## 2021-02-22 PROCEDURE — 82962 GLUCOSE BLOOD TEST: CPT | Performed by: SURGERY

## 2021-02-22 PROCEDURE — 63600175 PHARM REV CODE 636 W HCPCS: Performed by: ANESTHESIOLOGY

## 2021-02-22 PROCEDURE — 21936 RESECT BACK TUM 5 CM/>: CPT | Mod: ,,, | Performed by: SURGERY

## 2021-02-22 PROCEDURE — D9220A PRA ANESTHESIA: Mod: ANES,,, | Performed by: ANESTHESIOLOGY

## 2021-02-22 PROCEDURE — 25000003 PHARM REV CODE 250: Performed by: SURGERY

## 2021-02-22 PROCEDURE — 37000008 HC ANESTHESIA 1ST 15 MINUTES: Performed by: SURGERY

## 2021-02-22 PROCEDURE — 71000044 HC DOSC ROUTINE RECOVERY FIRST HOUR: Performed by: SURGERY

## 2021-02-22 PROCEDURE — 36000707: Performed by: SURGERY

## 2021-02-22 PROCEDURE — 81025 URINE PREGNANCY TEST: CPT | Performed by: SURGERY

## 2021-02-22 PROCEDURE — 88307 TISSUE EXAM BY PATHOLOGIST: CPT | Performed by: PATHOLOGY

## 2021-02-22 PROCEDURE — 36000706: Performed by: SURGERY

## 2021-02-22 PROCEDURE — D9220A PRA ANESTHESIA: ICD-10-PCS | Mod: CRNA,,, | Performed by: NURSE ANESTHETIST, CERTIFIED REGISTERED

## 2021-02-22 PROCEDURE — 71000015 HC POSTOP RECOV 1ST HR: Performed by: SURGERY

## 2021-02-22 PROCEDURE — 88307 TISSUE EXAM BY PATHOLOGIST: CPT | Mod: 26,,, | Performed by: PATHOLOGY

## 2021-02-22 PROCEDURE — 21936 PR RAD RESECTION TUMOR SOFT TISSUE BACK/FLANK 5+CM: ICD-10-PCS | Mod: ,,, | Performed by: SURGERY

## 2021-02-22 PROCEDURE — 37000009 HC ANESTHESIA EA ADD 15 MINS: Performed by: SURGERY

## 2021-02-22 RX ORDER — KETAMINE HCL IN 0.9 % NACL 50 MG/5 ML
SYRINGE (ML) INTRAVENOUS
Status: DISCONTINUED | OUTPATIENT
Start: 2021-02-22 | End: 2021-02-22

## 2021-02-22 RX ORDER — CEFAZOLIN SODIUM 1 G/3ML
2 INJECTION, POWDER, FOR SOLUTION INTRAMUSCULAR; INTRAVENOUS
Status: COMPLETED | OUTPATIENT
Start: 2021-02-22 | End: 2021-02-22

## 2021-02-22 RX ORDER — HYDROCODONE BITARTRATE AND ACETAMINOPHEN 5; 325 MG/1; MG/1
1 TABLET ORAL EVERY 6 HOURS PRN
Qty: 16 TABLET | Refills: 0 | Status: SHIPPED | OUTPATIENT
Start: 2021-02-22 | End: 2021-03-19 | Stop reason: CLARIF

## 2021-02-22 RX ORDER — HYDROCODONE BITARTRATE AND ACETAMINOPHEN 5; 325 MG/1; MG/1
1 TABLET ORAL EVERY 6 HOURS PRN
Qty: 16 TABLET | Refills: 0 | Status: SHIPPED | OUTPATIENT
Start: 2021-02-22 | End: 2021-02-22 | Stop reason: SDUPTHER

## 2021-02-22 RX ORDER — ONDANSETRON 2 MG/ML
4 INJECTION INTRAMUSCULAR; INTRAVENOUS DAILY PRN
Status: DISCONTINUED | OUTPATIENT
Start: 2021-02-22 | End: 2021-02-22 | Stop reason: HOSPADM

## 2021-02-22 RX ORDER — MIDAZOLAM HYDROCHLORIDE 1 MG/ML
INJECTION, SOLUTION INTRAMUSCULAR; INTRAVENOUS
Status: DISCONTINUED | OUTPATIENT
Start: 2021-02-22 | End: 2021-02-22

## 2021-02-22 RX ORDER — LIDOCAINE HYDROCHLORIDE 20 MG/ML
INJECTION INTRAVENOUS
Status: DISCONTINUED | OUTPATIENT
Start: 2021-02-22 | End: 2021-02-22

## 2021-02-22 RX ORDER — ACETAMINOPHEN 10 MG/ML
INJECTION, SOLUTION INTRAVENOUS
Status: DISCONTINUED | OUTPATIENT
Start: 2021-02-22 | End: 2021-02-22

## 2021-02-22 RX ORDER — ONDANSETRON 2 MG/ML
INJECTION INTRAMUSCULAR; INTRAVENOUS
Status: DISCONTINUED | OUTPATIENT
Start: 2021-02-22 | End: 2021-02-22

## 2021-02-22 RX ORDER — PROPOFOL 10 MG/ML
VIAL (ML) INTRAVENOUS
Status: DISCONTINUED | OUTPATIENT
Start: 2021-02-22 | End: 2021-02-22

## 2021-02-22 RX ORDER — SODIUM CHLORIDE 9 MG/ML
INJECTION, SOLUTION INTRAVENOUS CONTINUOUS
Status: DISCONTINUED | OUTPATIENT
Start: 2021-02-22 | End: 2021-02-22 | Stop reason: HOSPADM

## 2021-02-22 RX ORDER — ROCURONIUM BROMIDE 10 MG/ML
INJECTION, SOLUTION INTRAVENOUS
Status: DISCONTINUED | OUTPATIENT
Start: 2021-02-22 | End: 2021-02-22

## 2021-02-22 RX ORDER — PHENYLEPHRINE HYDROCHLORIDE 10 MG/ML
INJECTION INTRAVENOUS
Status: DISCONTINUED | OUTPATIENT
Start: 2021-02-22 | End: 2021-02-22

## 2021-02-22 RX ORDER — FENTANYL CITRATE 50 UG/ML
25 INJECTION, SOLUTION INTRAMUSCULAR; INTRAVENOUS EVERY 5 MIN PRN
Status: DISCONTINUED | OUTPATIENT
Start: 2021-02-22 | End: 2021-02-22 | Stop reason: HOSPADM

## 2021-02-22 RX ORDER — NEOSTIGMINE METHYLSULFATE 0.5 MG/ML
INJECTION, SOLUTION INTRAVENOUS
Status: DISCONTINUED | OUTPATIENT
Start: 2021-02-22 | End: 2021-02-22

## 2021-02-22 RX ORDER — DEXAMETHASONE SODIUM PHOSPHATE 4 MG/ML
INJECTION, SOLUTION INTRA-ARTICULAR; INTRALESIONAL; INTRAMUSCULAR; INTRAVENOUS; SOFT TISSUE
Status: DISCONTINUED | OUTPATIENT
Start: 2021-02-22 | End: 2021-02-22

## 2021-02-22 RX ORDER — BUPIVACAINE HYDROCHLORIDE 5 MG/ML
INJECTION, SOLUTION EPIDURAL; INTRACAUDAL
Status: DISCONTINUED | OUTPATIENT
Start: 2021-02-22 | End: 2021-02-22 | Stop reason: HOSPADM

## 2021-02-22 RX ORDER — LIDOCAINE HYDROCHLORIDE 10 MG/ML
1 INJECTION, SOLUTION EPIDURAL; INFILTRATION; INTRACAUDAL; PERINEURAL ONCE
Status: DISCONTINUED | OUTPATIENT
Start: 2021-02-22 | End: 2021-02-22 | Stop reason: HOSPADM

## 2021-02-22 RX ORDER — FENTANYL CITRATE 50 UG/ML
INJECTION, SOLUTION INTRAMUSCULAR; INTRAVENOUS
Status: DISCONTINUED | OUTPATIENT
Start: 2021-02-22 | End: 2021-02-22

## 2021-02-22 RX ADMIN — FENTANYL CITRATE 50 MCG: 50 INJECTION, SOLUTION INTRAMUSCULAR; INTRAVENOUS at 07:02

## 2021-02-22 RX ADMIN — GLYCOPYRROLATE 0.6 MG: 0.2 INJECTION, SOLUTION INTRAMUSCULAR; INTRAVITREAL at 08:02

## 2021-02-22 RX ADMIN — LIDOCAINE HYDROCHLORIDE 100 MG: 20 INJECTION, SOLUTION INTRAVENOUS at 07:02

## 2021-02-22 RX ADMIN — GLYCOPYRROLATE 0.2 MG: 0.2 INJECTION, SOLUTION INTRAMUSCULAR; INTRAVITREAL at 08:02

## 2021-02-22 RX ADMIN — PHENYLEPHRINE HYDROCHLORIDE 100 MCG: 10 INJECTION INTRAVENOUS at 07:02

## 2021-02-22 RX ADMIN — ONDANSETRON 4 MG: 2 INJECTION, SOLUTION INTRAMUSCULAR; INTRAVENOUS at 07:02

## 2021-02-22 RX ADMIN — NEOSTIGMINE METHYLSULFATE 5 MG: 0.5 INJECTION INTRAVENOUS at 08:02

## 2021-02-22 RX ADMIN — PHENYLEPHRINE HYDROCHLORIDE 100 MCG: 10 INJECTION INTRAVENOUS at 08:02

## 2021-02-22 RX ADMIN — ACETAMINOPHEN 1000 MG: 10 INJECTION, SOLUTION INTRAVENOUS at 07:02

## 2021-02-22 RX ADMIN — ONDANSETRON 4 MG: 2 INJECTION INTRAMUSCULAR; INTRAVENOUS at 09:02

## 2021-02-22 RX ADMIN — MIDAZOLAM HYDROCHLORIDE 2 MG: 1 INJECTION, SOLUTION INTRAMUSCULAR; INTRAVENOUS at 06:02

## 2021-02-22 RX ADMIN — ROCURONIUM BROMIDE 40 MG: 10 INJECTION, SOLUTION INTRAVENOUS at 07:02

## 2021-02-22 RX ADMIN — PROPOFOL 150 MG: 10 INJECTION, EMULSION INTRAVENOUS at 07:02

## 2021-02-22 RX ADMIN — Medication 20 MG: at 07:02

## 2021-02-22 RX ADMIN — DEXAMETHASONE SODIUM PHOSPHATE 4 MG: 4 INJECTION, SOLUTION INTRAMUSCULAR; INTRAVENOUS at 07:02

## 2021-02-22 RX ADMIN — CEFAZOLIN 2 G: 330 INJECTION, POWDER, FOR SOLUTION INTRAMUSCULAR; INTRAVENOUS at 07:02

## 2021-02-22 RX ADMIN — SODIUM CHLORIDE: 0.9 INJECTION, SOLUTION INTRAVENOUS at 06:02

## 2021-02-24 ENCOUNTER — PATIENT MESSAGE (OUTPATIENT)
Dept: HEMATOLOGY/ONCOLOGY | Facility: CLINIC | Age: 51
End: 2021-02-24

## 2021-02-24 ENCOUNTER — TELEPHONE (OUTPATIENT)
Dept: HEMATOLOGY/ONCOLOGY | Facility: CLINIC | Age: 51
End: 2021-02-24

## 2021-02-26 ENCOUNTER — TELEPHONE (OUTPATIENT)
Dept: SURGERY | Facility: CLINIC | Age: 51
End: 2021-02-26

## 2021-03-01 ENCOUNTER — OFFICE VISIT (OUTPATIENT)
Dept: HEMATOLOGY/ONCOLOGY | Facility: CLINIC | Age: 51
End: 2021-03-01
Payer: COMMERCIAL

## 2021-03-01 VITALS
RESPIRATION RATE: 18 BRPM | SYSTOLIC BLOOD PRESSURE: 156 MMHG | DIASTOLIC BLOOD PRESSURE: 97 MMHG | TEMPERATURE: 99 F | HEART RATE: 87 BPM | WEIGHT: 227.5 LBS | BODY MASS INDEX: 37.9 KG/M2 | HEIGHT: 65 IN

## 2021-03-01 DIAGNOSIS — Z17.0 ER+ (ESTROGEN RECEPTOR POSITIVE STATUS): ICD-10-CM

## 2021-03-01 DIAGNOSIS — Z85.3 HISTORY OF LEFT BREAST CANCER: ICD-10-CM

## 2021-03-01 DIAGNOSIS — R93.2 ABNORMAL CT OF LIVER: ICD-10-CM

## 2021-03-01 DIAGNOSIS — D50.9 IRON DEFICIENCY ANEMIA, UNSPECIFIED IRON DEFICIENCY ANEMIA TYPE: Primary | ICD-10-CM

## 2021-03-01 DIAGNOSIS — Z79.810 USE OF TAMOXIFEN (NOLVADEX): ICD-10-CM

## 2021-03-01 DIAGNOSIS — D50.9 MICROCYTIC ANEMIA: ICD-10-CM

## 2021-03-01 DIAGNOSIS — Z98.890 S/P LUMPECTOMY, LEFT BREAST: ICD-10-CM

## 2021-03-01 DIAGNOSIS — R93.5 ABNORMAL CT OF THE ABDOMEN: ICD-10-CM

## 2021-03-01 DIAGNOSIS — C49.9: ICD-10-CM

## 2021-03-01 PROCEDURE — 3072F LOW RISK FOR RETINOPATHY: CPT | Mod: S$GLB,,, | Performed by: INTERNAL MEDICINE

## 2021-03-01 PROCEDURE — 3080F DIAST BP >= 90 MM HG: CPT | Mod: S$GLB,,, | Performed by: INTERNAL MEDICINE

## 2021-03-01 PROCEDURE — 3008F BODY MASS INDEX DOCD: CPT | Mod: S$GLB,,, | Performed by: INTERNAL MEDICINE

## 2021-03-01 PROCEDURE — 3077F PR MOST RECENT SYSTOLIC BLOOD PRESSURE >= 140 MM HG: ICD-10-PCS | Mod: S$GLB,,, | Performed by: INTERNAL MEDICINE

## 2021-03-01 PROCEDURE — 3072F PR LOW RISK FOR RETINOPATHY: ICD-10-PCS | Mod: S$GLB,,, | Performed by: INTERNAL MEDICINE

## 2021-03-01 PROCEDURE — 3008F PR BODY MASS INDEX (BMI) DOCUMENTED: ICD-10-PCS | Mod: S$GLB,,, | Performed by: INTERNAL MEDICINE

## 2021-03-01 PROCEDURE — 3080F PR MOST RECENT DIASTOLIC BLOOD PRESSURE >= 90 MM HG: ICD-10-PCS | Mod: S$GLB,,, | Performed by: INTERNAL MEDICINE

## 2021-03-01 PROCEDURE — 99214 PR OFFICE/OUTPT VISIT, EST, LEVL IV, 30-39 MIN: ICD-10-PCS | Mod: S$GLB,,, | Performed by: INTERNAL MEDICINE

## 2021-03-01 PROCEDURE — 3077F SYST BP >= 140 MM HG: CPT | Mod: S$GLB,,, | Performed by: INTERNAL MEDICINE

## 2021-03-01 PROCEDURE — 1125F PR PAIN SEVERITY QUANTIFIED, PAIN PRESENT: ICD-10-PCS | Mod: S$GLB,,, | Performed by: INTERNAL MEDICINE

## 2021-03-01 PROCEDURE — 1125F AMNT PAIN NOTED PAIN PRSNT: CPT | Mod: S$GLB,,, | Performed by: INTERNAL MEDICINE

## 2021-03-01 PROCEDURE — 99214 OFFICE O/P EST MOD 30 MIN: CPT | Mod: S$GLB,,, | Performed by: INTERNAL MEDICINE

## 2021-03-03 LAB
FINAL PATHOLOGIC DIAGNOSIS: NORMAL
GROSS: NORMAL
Lab: NORMAL
SUPPLEMENTAL DIAGNOSIS: NORMAL

## 2021-03-04 ENCOUNTER — OFFICE VISIT (OUTPATIENT)
Dept: SURGERY | Facility: CLINIC | Age: 51
End: 2021-03-04
Payer: COMMERCIAL

## 2021-03-04 VITALS
SYSTOLIC BLOOD PRESSURE: 150 MMHG | OXYGEN SATURATION: 98 % | WEIGHT: 219.69 LBS | BODY MASS INDEX: 36.6 KG/M2 | HEIGHT: 65 IN | HEART RATE: 97 BPM | DIASTOLIC BLOOD PRESSURE: 82 MMHG | TEMPERATURE: 98 F

## 2021-03-04 DIAGNOSIS — Z86.018 HISTORY OF MYXOMA: Primary | ICD-10-CM

## 2021-03-04 DIAGNOSIS — D23.9: ICD-10-CM

## 2021-03-04 PROCEDURE — 3008F PR BODY MASS INDEX (BMI) DOCUMENTED: ICD-10-PCS | Mod: CPTII,S$GLB,, | Performed by: NURSE PRACTITIONER

## 2021-03-04 PROCEDURE — 99999 PR PBB SHADOW E&M-EST. PATIENT-LVL IV: ICD-10-PCS | Mod: PBBFAC,,, | Performed by: NURSE PRACTITIONER

## 2021-03-04 PROCEDURE — 1125F AMNT PAIN NOTED PAIN PRSNT: CPT | Mod: S$GLB,,, | Performed by: NURSE PRACTITIONER

## 2021-03-04 PROCEDURE — 1125F PR PAIN SEVERITY QUANTIFIED, PAIN PRESENT: ICD-10-PCS | Mod: S$GLB,,, | Performed by: NURSE PRACTITIONER

## 2021-03-04 PROCEDURE — 99999 PR PBB SHADOW E&M-EST. PATIENT-LVL IV: CPT | Mod: PBBFAC,,, | Performed by: NURSE PRACTITIONER

## 2021-03-04 PROCEDURE — 99024 PR POST-OP FOLLOW-UP VISIT: ICD-10-PCS | Mod: S$GLB,,, | Performed by: NURSE PRACTITIONER

## 2021-03-04 PROCEDURE — 3008F BODY MASS INDEX DOCD: CPT | Mod: CPTII,S$GLB,, | Performed by: NURSE PRACTITIONER

## 2021-03-04 PROCEDURE — 3072F PR LOW RISK FOR RETINOPATHY: ICD-10-PCS | Mod: S$GLB,,, | Performed by: NURSE PRACTITIONER

## 2021-03-04 PROCEDURE — 99024 POSTOP FOLLOW-UP VISIT: CPT | Mod: S$GLB,,, | Performed by: NURSE PRACTITIONER

## 2021-03-04 PROCEDURE — 3072F LOW RISK FOR RETINOPATHY: CPT | Mod: S$GLB,,, | Performed by: NURSE PRACTITIONER

## 2021-03-19 NOTE — PROGRESS NOTES
A1C increased from 6.4% to 6.9%. No SMBG readings submitted since 08/2020.  continues to try to reach out and encourage SMBG readings and participation.    Will defer PharmD outreach and will continue to monitor for readings

## 2021-04-14 ENCOUNTER — OFFICE VISIT (OUTPATIENT)
Dept: PRIMARY CARE CLINIC | Facility: CLINIC | Age: 51
End: 2021-04-14
Payer: COMMERCIAL

## 2021-04-14 VITALS
SYSTOLIC BLOOD PRESSURE: 130 MMHG | WEIGHT: 207.44 LBS | BODY MASS INDEX: 34.56 KG/M2 | DIASTOLIC BLOOD PRESSURE: 86 MMHG | RESPIRATION RATE: 18 BRPM | HEART RATE: 90 BPM | OXYGEN SATURATION: 99 % | HEIGHT: 65 IN

## 2021-04-14 DIAGNOSIS — I10 ESSENTIAL HYPERTENSION, BENIGN: ICD-10-CM

## 2021-04-14 DIAGNOSIS — E11.69 TYPE 2 DIABETES MELLITUS WITH HYPERLIPIDEMIA: Primary | ICD-10-CM

## 2021-04-14 DIAGNOSIS — E78.5 TYPE 2 DIABETES MELLITUS WITH HYPERLIPIDEMIA: Primary | ICD-10-CM

## 2021-04-14 PROBLEM — N92.0 EXCESSIVE MENSTRUATION: Status: RESOLVED | Noted: 2020-08-16 | Resolved: 2021-04-14

## 2021-04-14 PROCEDURE — 3044F PR MOST RECENT HEMOGLOBIN A1C LEVEL <7.0%: ICD-10-PCS | Mod: CPTII,S$GLB,, | Performed by: FAMILY MEDICINE

## 2021-04-14 PROCEDURE — 99213 OFFICE O/P EST LOW 20 MIN: CPT | Mod: S$GLB,,, | Performed by: FAMILY MEDICINE

## 2021-04-14 PROCEDURE — 3008F BODY MASS INDEX DOCD: CPT | Mod: CPTII,S$GLB,, | Performed by: FAMILY MEDICINE

## 2021-04-14 PROCEDURE — 99999 PR PBB SHADOW E&M-EST. PATIENT-LVL IV: CPT | Mod: PBBFAC,,, | Performed by: FAMILY MEDICINE

## 2021-04-14 PROCEDURE — 1126F PR PAIN SEVERITY QUANTIFIED, NO PAIN PRESENT: ICD-10-PCS | Mod: S$GLB,,, | Performed by: FAMILY MEDICINE

## 2021-04-14 PROCEDURE — 99999 PR PBB SHADOW E&M-EST. PATIENT-LVL IV: ICD-10-PCS | Mod: PBBFAC,,, | Performed by: FAMILY MEDICINE

## 2021-04-14 PROCEDURE — 3008F PR BODY MASS INDEX (BMI) DOCUMENTED: ICD-10-PCS | Mod: CPTII,S$GLB,, | Performed by: FAMILY MEDICINE

## 2021-04-14 PROCEDURE — 3072F PR LOW RISK FOR RETINOPATHY: ICD-10-PCS | Mod: S$GLB,,, | Performed by: FAMILY MEDICINE

## 2021-04-14 PROCEDURE — 1126F AMNT PAIN NOTED NONE PRSNT: CPT | Mod: S$GLB,,, | Performed by: FAMILY MEDICINE

## 2021-04-14 PROCEDURE — 99213 PR OFFICE/OUTPT VISIT, EST, LEVL III, 20-29 MIN: ICD-10-PCS | Mod: S$GLB,,, | Performed by: FAMILY MEDICINE

## 2021-04-14 PROCEDURE — 3044F HG A1C LEVEL LT 7.0%: CPT | Mod: CPTII,S$GLB,, | Performed by: FAMILY MEDICINE

## 2021-04-14 PROCEDURE — 3072F LOW RISK FOR RETINOPATHY: CPT | Mod: S$GLB,,, | Performed by: FAMILY MEDICINE

## 2021-04-16 ENCOUNTER — LAB VISIT (OUTPATIENT)
Dept: LAB | Facility: HOSPITAL | Age: 51
End: 2021-04-16
Attending: INTERNAL MEDICINE
Payer: COMMERCIAL

## 2021-04-16 DIAGNOSIS — N83.8 MASS OF LEFT OVARY: ICD-10-CM

## 2021-04-16 DIAGNOSIS — D50.9 IRON DEFICIENCY ANEMIA, UNSPECIFIED IRON DEFICIENCY ANEMIA TYPE: ICD-10-CM

## 2021-04-16 DIAGNOSIS — Z85.3 HISTORY OF LEFT BREAST CANCER: ICD-10-CM

## 2021-04-16 DIAGNOSIS — E11.69 TYPE 2 DIABETES MELLITUS WITH HYPERLIPIDEMIA: ICD-10-CM

## 2021-04-16 DIAGNOSIS — D50.9 MICROCYTIC ANEMIA: ICD-10-CM

## 2021-04-16 DIAGNOSIS — Z98.890 S/P LUMPECTOMY, LEFT BREAST: ICD-10-CM

## 2021-04-16 DIAGNOSIS — E78.5 TYPE 2 DIABETES MELLITUS WITH HYPERLIPIDEMIA: ICD-10-CM

## 2021-04-16 DIAGNOSIS — R16.0 LIVER MASS, LEFT LOBE: ICD-10-CM

## 2021-04-16 LAB
ALBUMIN SERPL BCP-MCNC: 3.7 G/DL (ref 3.5–5.2)
ALP SERPL-CCNC: 74 U/L (ref 55–135)
ALT SERPL W/O P-5'-P-CCNC: 33 U/L (ref 10–44)
ANION GAP SERPL CALC-SCNC: 10 MMOL/L (ref 8–16)
AST SERPL-CCNC: 26 U/L (ref 10–40)
BASOPHILS # BLD AUTO: 0.03 K/UL (ref 0–0.2)
BASOPHILS NFR BLD: 0.3 % (ref 0–1.9)
BILIRUB SERPL-MCNC: 0.9 MG/DL (ref 0.1–1)
BUN SERPL-MCNC: 16 MG/DL (ref 6–20)
CALCIUM SERPL-MCNC: 8.9 MG/DL (ref 8.7–10.5)
CEA SERPL-MCNC: 1.1 NG/ML (ref 0–5)
CHLORIDE SERPL-SCNC: 102 MMOL/L (ref 95–110)
CO2 SERPL-SCNC: 26 MMOL/L (ref 23–29)
CREAT SERPL-MCNC: 0.8 MG/DL (ref 0.5–1.4)
DIFFERENTIAL METHOD: ABNORMAL
EOSINOPHIL # BLD AUTO: 0.3 K/UL (ref 0–0.5)
EOSINOPHIL NFR BLD: 2.6 % (ref 0–8)
ERYTHROCYTE [DISTWIDTH] IN BLOOD BY AUTOMATED COUNT: 13.5 % (ref 11.5–14.5)
EST. GFR  (AFRICAN AMERICAN): >60 ML/MIN/1.73 M^2
EST. GFR  (NON AFRICAN AMERICAN): >60 ML/MIN/1.73 M^2
ESTIMATED AVG GLUCOSE: 154 MG/DL (ref 68–131)
FERRITIN SERPL-MCNC: 121 NG/ML (ref 20–300)
GLUCOSE SERPL-MCNC: 140 MG/DL (ref 70–110)
HBA1C MFR BLD: 7 % (ref 4.5–6.2)
HCT VFR BLD AUTO: 41.8 % (ref 37–48.5)
HGB BLD-MCNC: 13.6 G/DL (ref 12–16)
IMM GRANULOCYTES # BLD AUTO: 0.08 K/UL (ref 0–0.04)
IMM GRANULOCYTES NFR BLD AUTO: 0.8 % (ref 0–0.5)
IRON SERPL-MCNC: 53 UG/DL (ref 30–160)
LYMPHOCYTES # BLD AUTO: 2.4 K/UL (ref 1–4.8)
LYMPHOCYTES NFR BLD: 24.8 % (ref 18–48)
MCH RBC QN AUTO: 29 PG (ref 27–31)
MCHC RBC AUTO-ENTMCNC: 32.5 G/DL (ref 32–36)
MCV RBC AUTO: 89 FL (ref 82–98)
MONOCYTES # BLD AUTO: 0.8 K/UL (ref 0.3–1)
MONOCYTES NFR BLD: 8 % (ref 4–15)
NEUTROPHILS # BLD AUTO: 6 K/UL (ref 1.8–7.7)
NEUTROPHILS NFR BLD: 63.5 % (ref 38–73)
NRBC BLD-RTO: 0 /100 WBC
PLATELET # BLD AUTO: 244 K/UL (ref 150–450)
PMV BLD AUTO: 10.3 FL (ref 9.2–12.9)
POTASSIUM SERPL-SCNC: 4.3 MMOL/L (ref 3.5–5.1)
PROT SERPL-MCNC: 6.7 G/DL (ref 6–8.4)
RBC # BLD AUTO: 4.69 M/UL (ref 4–5.4)
SATURATED IRON: 15 % (ref 20–50)
SODIUM SERPL-SCNC: 138 MMOL/L (ref 136–145)
TOTAL IRON BINDING CAPACITY: 358 UG/DL (ref 250–450)
TRANSFERRIN SERPL-MCNC: 256 MG/DL (ref 200–375)
WBC # BLD AUTO: 9.52 K/UL (ref 3.9–12.7)

## 2021-04-16 PROCEDURE — 82378 CARCINOEMBRYONIC ANTIGEN: CPT | Performed by: INTERNAL MEDICINE

## 2021-04-16 PROCEDURE — 36415 COLL VENOUS BLD VENIPUNCTURE: CPT | Performed by: INTERNAL MEDICINE

## 2021-04-16 PROCEDURE — 80053 COMPREHEN METABOLIC PANEL: CPT | Performed by: INTERNAL MEDICINE

## 2021-04-16 PROCEDURE — 85025 COMPLETE CBC W/AUTO DIFF WBC: CPT | Performed by: INTERNAL MEDICINE

## 2021-04-16 PROCEDURE — 82728 ASSAY OF FERRITIN: CPT | Performed by: INTERNAL MEDICINE

## 2021-04-16 PROCEDURE — 83036 HEMOGLOBIN GLYCOSYLATED A1C: CPT

## 2021-04-16 PROCEDURE — 83540 ASSAY OF IRON: CPT | Performed by: INTERNAL MEDICINE

## 2021-04-20 ENCOUNTER — OFFICE VISIT (OUTPATIENT)
Dept: HEMATOLOGY/ONCOLOGY | Facility: CLINIC | Age: 51
End: 2021-04-20
Payer: COMMERCIAL

## 2021-04-20 VITALS
DIASTOLIC BLOOD PRESSURE: 81 MMHG | TEMPERATURE: 98 F | BODY MASS INDEX: 36.89 KG/M2 | HEART RATE: 97 BPM | SYSTOLIC BLOOD PRESSURE: 143 MMHG | WEIGHT: 221.69 LBS | RESPIRATION RATE: 18 BRPM

## 2021-04-20 DIAGNOSIS — Z17.0 ER+ (ESTROGEN RECEPTOR POSITIVE STATUS): ICD-10-CM

## 2021-04-20 DIAGNOSIS — D50.9 MICROCYTIC ANEMIA: ICD-10-CM

## 2021-04-20 DIAGNOSIS — Z85.3 HISTORY OF LEFT BREAST CANCER: ICD-10-CM

## 2021-04-20 DIAGNOSIS — Z98.890 S/P LUMPECTOMY, LEFT BREAST: ICD-10-CM

## 2021-04-20 DIAGNOSIS — R93.2 ABNORMAL CT OF LIVER: ICD-10-CM

## 2021-04-20 DIAGNOSIS — Z79.810 USE OF TAMOXIFEN (NOLVADEX): Primary | ICD-10-CM

## 2021-04-20 DIAGNOSIS — D50.9 IRON DEFICIENCY ANEMIA, UNSPECIFIED IRON DEFICIENCY ANEMIA TYPE: ICD-10-CM

## 2021-04-20 PROCEDURE — 3051F HG A1C>EQUAL 7.0%<8.0%: CPT | Mod: S$GLB,,, | Performed by: INTERNAL MEDICINE

## 2021-04-20 PROCEDURE — 3072F PR LOW RISK FOR RETINOPATHY: ICD-10-PCS | Mod: S$GLB,,, | Performed by: INTERNAL MEDICINE

## 2021-04-20 PROCEDURE — 1126F AMNT PAIN NOTED NONE PRSNT: CPT | Mod: S$GLB,,, | Performed by: INTERNAL MEDICINE

## 2021-04-20 PROCEDURE — 3008F BODY MASS INDEX DOCD: CPT | Mod: S$GLB,,, | Performed by: INTERNAL MEDICINE

## 2021-04-20 PROCEDURE — 3008F PR BODY MASS INDEX (BMI) DOCUMENTED: ICD-10-PCS | Mod: S$GLB,,, | Performed by: INTERNAL MEDICINE

## 2021-04-20 PROCEDURE — 3072F LOW RISK FOR RETINOPATHY: CPT | Mod: S$GLB,,, | Performed by: INTERNAL MEDICINE

## 2021-04-20 PROCEDURE — 3051F PR MOST RECENT HEMOGLOBIN A1C LEVEL 7.0 - < 8.0%: ICD-10-PCS | Mod: S$GLB,,, | Performed by: INTERNAL MEDICINE

## 2021-04-20 PROCEDURE — 99214 OFFICE O/P EST MOD 30 MIN: CPT | Mod: S$GLB,,, | Performed by: INTERNAL MEDICINE

## 2021-04-20 PROCEDURE — 99214 PR OFFICE/OUTPT VISIT, EST, LEVL IV, 30-39 MIN: ICD-10-PCS | Mod: S$GLB,,, | Performed by: INTERNAL MEDICINE

## 2021-04-20 PROCEDURE — 1126F PR PAIN SEVERITY QUANTIFIED, NO PAIN PRESENT: ICD-10-PCS | Mod: S$GLB,,, | Performed by: INTERNAL MEDICINE

## 2021-04-20 RX ORDER — ANASTROZOLE 1 MG/1
1 TABLET ORAL DAILY
Qty: 90 TABLET | Refills: 1 | Status: SHIPPED | OUTPATIENT
Start: 2021-04-20 | End: 2021-09-06

## 2021-06-04 ENCOUNTER — TELEPHONE (OUTPATIENT)
Dept: HEMATOLOGY/ONCOLOGY | Facility: CLINIC | Age: 51
End: 2021-06-04

## 2021-06-04 DIAGNOSIS — Z17.0 ER+ (ESTROGEN RECEPTOR POSITIVE STATUS): ICD-10-CM

## 2021-06-04 DIAGNOSIS — Z85.3 HISTORY OF LEFT BREAST CANCER: ICD-10-CM

## 2021-06-04 DIAGNOSIS — D50.9 IRON DEFICIENCY ANEMIA, UNSPECIFIED IRON DEFICIENCY ANEMIA TYPE: Primary | ICD-10-CM

## 2021-07-13 ENCOUNTER — LAB VISIT (OUTPATIENT)
Dept: LAB | Facility: HOSPITAL | Age: 51
End: 2021-07-13
Attending: INTERNAL MEDICINE
Payer: COMMERCIAL

## 2021-07-13 DIAGNOSIS — D50.9 IRON DEFICIENCY ANEMIA, UNSPECIFIED IRON DEFICIENCY ANEMIA TYPE: ICD-10-CM

## 2021-07-13 DIAGNOSIS — Z85.3 HISTORY OF LEFT BREAST CANCER: ICD-10-CM

## 2021-07-13 LAB
ALBUMIN SERPL BCP-MCNC: 3.9 G/DL (ref 3.5–5.2)
ALP SERPL-CCNC: 91 U/L (ref 55–135)
ALT SERPL W/O P-5'-P-CCNC: 49 U/L (ref 10–44)
ANION GAP SERPL CALC-SCNC: 16 MMOL/L (ref 8–16)
AST SERPL-CCNC: 38 U/L (ref 10–40)
BASOPHILS # BLD AUTO: 0.07 K/UL (ref 0–0.2)
BASOPHILS NFR BLD: 0.6 % (ref 0–1.9)
BILIRUB SERPL-MCNC: 1.5 MG/DL (ref 0.1–1)
BUN SERPL-MCNC: 15 MG/DL (ref 6–20)
CALCIUM SERPL-MCNC: 9.6 MG/DL (ref 8.7–10.5)
CEA SERPL-MCNC: 1.3 NG/ML (ref 0–5)
CHLORIDE SERPL-SCNC: 96 MMOL/L (ref 95–110)
CO2 SERPL-SCNC: 26 MMOL/L (ref 23–29)
CREAT SERPL-MCNC: 0.9 MG/DL (ref 0.5–1.4)
DIFFERENTIAL METHOD: ABNORMAL
EOSINOPHIL # BLD AUTO: 0.2 K/UL (ref 0–0.5)
EOSINOPHIL NFR BLD: 1.9 % (ref 0–8)
ERYTHROCYTE [DISTWIDTH] IN BLOOD BY AUTOMATED COUNT: 13.4 % (ref 11.5–14.5)
EST. GFR  (AFRICAN AMERICAN): >60 ML/MIN/1.73 M^2
EST. GFR  (NON AFRICAN AMERICAN): >60 ML/MIN/1.73 M^2
FERRITIN SERPL-MCNC: 111 NG/ML (ref 20–300)
GLUCOSE SERPL-MCNC: 168 MG/DL (ref 70–110)
HCT VFR BLD AUTO: 42.8 % (ref 37–48.5)
HGB BLD-MCNC: 14.4 G/DL (ref 12–16)
IMM GRANULOCYTES # BLD AUTO: 0.09 K/UL (ref 0–0.04)
IMM GRANULOCYTES NFR BLD AUTO: 0.8 % (ref 0–0.5)
IRON SERPL-MCNC: 64 UG/DL (ref 30–160)
LYMPHOCYTES # BLD AUTO: 2.9 K/UL (ref 1–4.8)
LYMPHOCYTES NFR BLD: 24.8 % (ref 18–48)
MCH RBC QN AUTO: 29.4 PG (ref 27–31)
MCHC RBC AUTO-ENTMCNC: 33.6 G/DL (ref 32–36)
MCV RBC AUTO: 87 FL (ref 82–98)
MONOCYTES # BLD AUTO: 0.9 K/UL (ref 0.3–1)
MONOCYTES NFR BLD: 7.5 % (ref 4–15)
NEUTROPHILS # BLD AUTO: 7.4 K/UL (ref 1.8–7.7)
NEUTROPHILS NFR BLD: 64.4 % (ref 38–73)
NRBC BLD-RTO: 0 /100 WBC
PLATELET # BLD AUTO: 276 K/UL (ref 150–450)
PMV BLD AUTO: 10.3 FL (ref 9.2–12.9)
POTASSIUM SERPL-SCNC: 3.9 MMOL/L (ref 3.5–5.1)
PROT SERPL-MCNC: 7.3 G/DL (ref 6–8.4)
RBC # BLD AUTO: 4.9 M/UL (ref 4–5.4)
SATURATED IRON: 16 % (ref 20–50)
SODIUM SERPL-SCNC: 138 MMOL/L (ref 136–145)
TOTAL IRON BINDING CAPACITY: 406 UG/DL (ref 250–450)
TRANSFERRIN SERPL-MCNC: 290 MG/DL (ref 200–375)
WBC # BLD AUTO: 11.53 K/UL (ref 3.9–12.7)

## 2021-07-13 PROCEDURE — 80053 COMPREHEN METABOLIC PANEL: CPT | Performed by: NURSE PRACTITIONER

## 2021-07-13 PROCEDURE — 83540 ASSAY OF IRON: CPT | Performed by: NURSE PRACTITIONER

## 2021-07-13 PROCEDURE — 82378 CARCINOEMBRYONIC ANTIGEN: CPT | Performed by: NURSE PRACTITIONER

## 2021-07-13 PROCEDURE — 36415 COLL VENOUS BLD VENIPUNCTURE: CPT | Performed by: NURSE PRACTITIONER

## 2021-07-13 PROCEDURE — 82728 ASSAY OF FERRITIN: CPT | Performed by: NURSE PRACTITIONER

## 2021-07-13 PROCEDURE — 85025 COMPLETE CBC W/AUTO DIFF WBC: CPT | Performed by: NURSE PRACTITIONER

## 2021-07-21 ENCOUNTER — OFFICE VISIT (OUTPATIENT)
Dept: HEMATOLOGY/ONCOLOGY | Facility: CLINIC | Age: 51
End: 2021-07-21
Payer: COMMERCIAL

## 2021-07-21 ENCOUNTER — LAB VISIT (OUTPATIENT)
Dept: LAB | Facility: HOSPITAL | Age: 51
End: 2021-07-21
Attending: FAMILY MEDICINE
Payer: COMMERCIAL

## 2021-07-21 VITALS
TEMPERATURE: 98 F | SYSTOLIC BLOOD PRESSURE: 136 MMHG | BODY MASS INDEX: 36.63 KG/M2 | RESPIRATION RATE: 18 BRPM | HEART RATE: 83 BPM | WEIGHT: 220.13 LBS | DIASTOLIC BLOOD PRESSURE: 88 MMHG

## 2021-07-21 DIAGNOSIS — E11.69 TYPE 2 DIABETES MELLITUS WITH HYPERLIPIDEMIA: ICD-10-CM

## 2021-07-21 DIAGNOSIS — E78.5 TYPE 2 DIABETES MELLITUS WITH HYPERLIPIDEMIA: ICD-10-CM

## 2021-07-21 DIAGNOSIS — Z98.890 S/P LUMPECTOMY, LEFT BREAST: Primary | ICD-10-CM

## 2021-07-21 DIAGNOSIS — Z86.018 HISTORY OF MYXOMA: ICD-10-CM

## 2021-07-21 DIAGNOSIS — D50.9 IRON DEFICIENCY ANEMIA, UNSPECIFIED IRON DEFICIENCY ANEMIA TYPE: ICD-10-CM

## 2021-07-21 DIAGNOSIS — Z17.0 ER+ (ESTROGEN RECEPTOR POSITIVE STATUS): ICD-10-CM

## 2021-07-21 DIAGNOSIS — D50.9 MICROCYTIC ANEMIA: ICD-10-CM

## 2021-07-21 DIAGNOSIS — R93.2 ABNORMAL CT OF LIVER: ICD-10-CM

## 2021-07-21 DIAGNOSIS — Z85.3 HISTORY OF LEFT BREAST CANCER: ICD-10-CM

## 2021-07-21 DIAGNOSIS — Z79.810 USE OF TAMOXIFEN (NOLVADEX): ICD-10-CM

## 2021-07-21 LAB
ALBUMIN/CREAT UR: 109.1 UG/MG (ref 0–30)
CREAT UR-MCNC: 95 MG/DL (ref 15–325)
ESTIMATED AVG GLUCOSE: 183 MG/DL (ref 68–131)
HBA1C MFR BLD: 8 % (ref 4.5–6.2)
MICROALBUMIN UR DL<=1MG/L-MCNC: 103.6 UG/ML

## 2021-07-21 PROCEDURE — 3051F PR MOST RECENT HEMOGLOBIN A1C LEVEL 7.0 - < 8.0%: ICD-10-PCS | Mod: S$GLB,,, | Performed by: INTERNAL MEDICINE

## 2021-07-21 PROCEDURE — 82043 UR ALBUMIN QUANTITATIVE: CPT | Performed by: FAMILY MEDICINE

## 2021-07-21 PROCEDURE — 3051F HG A1C>EQUAL 7.0%<8.0%: CPT | Mod: S$GLB,,, | Performed by: INTERNAL MEDICINE

## 2021-07-21 PROCEDURE — 3072F PR LOW RISK FOR RETINOPATHY: ICD-10-PCS | Mod: S$GLB,,, | Performed by: INTERNAL MEDICINE

## 2021-07-21 PROCEDURE — 3008F BODY MASS INDEX DOCD: CPT | Mod: S$GLB,,, | Performed by: INTERNAL MEDICINE

## 2021-07-21 PROCEDURE — 83036 HEMOGLOBIN GLYCOSYLATED A1C: CPT | Performed by: FAMILY MEDICINE

## 2021-07-21 PROCEDURE — 82570 ASSAY OF URINE CREATININE: CPT | Performed by: FAMILY MEDICINE

## 2021-07-21 PROCEDURE — 1125F PR PAIN SEVERITY QUANTIFIED, PAIN PRESENT: ICD-10-PCS | Mod: S$GLB,,, | Performed by: INTERNAL MEDICINE

## 2021-07-21 PROCEDURE — 36415 COLL VENOUS BLD VENIPUNCTURE: CPT | Performed by: FAMILY MEDICINE

## 2021-07-21 PROCEDURE — 99214 OFFICE O/P EST MOD 30 MIN: CPT | Mod: S$GLB,,, | Performed by: INTERNAL MEDICINE

## 2021-07-21 PROCEDURE — 3008F PR BODY MASS INDEX (BMI) DOCUMENTED: ICD-10-PCS | Mod: S$GLB,,, | Performed by: INTERNAL MEDICINE

## 2021-07-21 PROCEDURE — 3072F LOW RISK FOR RETINOPATHY: CPT | Mod: S$GLB,,, | Performed by: INTERNAL MEDICINE

## 2021-07-21 PROCEDURE — 1125F AMNT PAIN NOTED PAIN PRSNT: CPT | Mod: S$GLB,,, | Performed by: INTERNAL MEDICINE

## 2021-07-21 PROCEDURE — 99214 PR OFFICE/OUTPT VISIT, EST, LEVL IV, 30-39 MIN: ICD-10-PCS | Mod: S$GLB,,, | Performed by: INTERNAL MEDICINE

## 2021-08-02 ENCOUNTER — HOSPITAL ENCOUNTER (OUTPATIENT)
Dept: RADIOLOGY | Facility: HOSPITAL | Age: 51
Discharge: HOME | End: 2021-08-02
Attending: INTERNAL MEDICINE
Payer: COMMERCIAL

## 2021-08-02 DIAGNOSIS — Z85.3 HISTORY OF LEFT BREAST CANCER: ICD-10-CM

## 2021-08-02 DIAGNOSIS — R93.2 ABNORMAL CT OF LIVER: ICD-10-CM

## 2021-08-02 DIAGNOSIS — Z86.018 HISTORY OF MYXOMA: ICD-10-CM

## 2021-08-02 PROCEDURE — 71260 CT THORAX DX C+: CPT | Mod: TC,PO

## 2021-08-02 PROCEDURE — 25500020 PHARM REV CODE 255: Mod: PO | Performed by: INTERNAL MEDICINE

## 2021-08-02 PROCEDURE — 74177 CT ABD & PELVIS W/CONTRAST: CPT | Mod: TC,PO

## 2021-08-02 RX ADMIN — IOHEXOL 100 ML: 350 INJECTION, SOLUTION INTRAVENOUS at 10:08

## 2021-08-03 ENCOUNTER — TELEPHONE (OUTPATIENT)
Dept: HEMATOLOGY/ONCOLOGY | Facility: CLINIC | Age: 51
End: 2021-08-03

## 2021-08-23 ENCOUNTER — PATIENT MESSAGE (OUTPATIENT)
Dept: PRIMARY CARE CLINIC | Facility: CLINIC | Age: 51
End: 2021-08-23

## 2021-09-06 ENCOUNTER — HOSPITAL ENCOUNTER (INPATIENT)
Facility: HOSPITAL | Age: 51
LOS: 1 days | Discharge: HOME | DRG: 177 | End: 2021-09-07
Attending: EMERGENCY MEDICINE | Admitting: INTERNAL MEDICINE
Payer: COMMERCIAL

## 2021-09-06 DIAGNOSIS — R09.02 HYPOXIA: ICD-10-CM

## 2021-09-06 DIAGNOSIS — U07.1 COVID-19 VIRUS INFECTION: ICD-10-CM

## 2021-09-06 DIAGNOSIS — R07.9 CHEST PAIN: ICD-10-CM

## 2021-09-06 DIAGNOSIS — J12.82 PNEUMONIA DUE TO COVID-19 VIRUS: ICD-10-CM

## 2021-09-06 DIAGNOSIS — J96.01 ACUTE HYPOXEMIC RESPIRATORY FAILURE: Primary | ICD-10-CM

## 2021-09-06 DIAGNOSIS — U07.1 PNEUMONIA DUE TO COVID-19 VIRUS: ICD-10-CM

## 2021-09-06 DIAGNOSIS — Z20.822 SUSPECTED COVID-19 VIRUS INFECTION: ICD-10-CM

## 2021-09-06 LAB
ALBUMIN SERPL BCP-MCNC: 3.5 G/DL (ref 3.5–5.2)
ALP SERPL-CCNC: 70 U/L (ref 55–135)
ALT SERPL W/O P-5'-P-CCNC: 25 U/L (ref 10–44)
ANION GAP SERPL CALC-SCNC: 10 MMOL/L (ref 8–16)
AST SERPL-CCNC: 22 U/L (ref 10–40)
BASOPHILS # BLD AUTO: 0.02 K/UL (ref 0–0.2)
BASOPHILS NFR BLD: 0.3 % (ref 0–1.9)
BILIRUB SERPL-MCNC: 1.5 MG/DL (ref 0.1–1)
BNP SERPL-MCNC: 19 PG/ML (ref 0–99)
BUN SERPL-MCNC: 21 MG/DL (ref 6–20)
CALCIUM SERPL-MCNC: 8.7 MG/DL (ref 8.7–10.5)
CHLORIDE SERPL-SCNC: 101 MMOL/L (ref 95–110)
CK SERPL-CCNC: 51 U/L (ref 20–180)
CO2 SERPL-SCNC: 27 MMOL/L (ref 23–29)
CREAT SERPL-MCNC: 1.6 MG/DL (ref 0.5–1.4)
CRP SERPL-MCNC: 13.04 MG/DL
DIFFERENTIAL METHOD: ABNORMAL
EOSINOPHIL # BLD AUTO: 0 K/UL (ref 0–0.5)
EOSINOPHIL NFR BLD: 0.5 % (ref 0–8)
ERYTHROCYTE [DISTWIDTH] IN BLOOD BY AUTOMATED COUNT: 13.9 % (ref 11.5–14.5)
EST. GFR  (AFRICAN AMERICAN): 43 ML/MIN/1.73 M^2
EST. GFR  (NON AFRICAN AMERICAN): 37.3 ML/MIN/1.73 M^2
FERRITIN SERPL-MCNC: 311 NG/ML (ref 20–300)
GLUCOSE SERPL-MCNC: 166 MG/DL (ref 70–110)
HCT VFR BLD AUTO: 40 % (ref 37–48.5)
HGB BLD-MCNC: 13.3 G/DL (ref 12–16)
IMM GRANULOCYTES # BLD AUTO: 0.07 K/UL (ref 0–0.04)
IMM GRANULOCYTES NFR BLD AUTO: 0.9 % (ref 0–0.5)
LACTATE SERPL-SCNC: 1.4 MMOL/L (ref 0.5–1.9)
LDH SERPL L TO P-CCNC: 324 U/L (ref 110–260)
LYMPHOCYTES # BLD AUTO: 1.5 K/UL (ref 1–4.8)
LYMPHOCYTES NFR BLD: 18.4 % (ref 18–48)
MCH RBC QN AUTO: 28.3 PG (ref 27–31)
MCHC RBC AUTO-ENTMCNC: 33.3 G/DL (ref 32–36)
MCV RBC AUTO: 85 FL (ref 82–98)
MONOCYTES # BLD AUTO: 0.9 K/UL (ref 0.3–1)
MONOCYTES NFR BLD: 10.8 % (ref 4–15)
NEUTROPHILS # BLD AUTO: 5.5 K/UL (ref 1.8–7.7)
NEUTROPHILS NFR BLD: 69.1 % (ref 38–73)
NRBC BLD-RTO: 0 /100 WBC
PLATELET # BLD AUTO: 245 K/UL (ref 150–450)
PMV BLD AUTO: 10.2 FL (ref 9.2–12.9)
POTASSIUM SERPL-SCNC: 4.2 MMOL/L (ref 3.5–5.1)
PROCALCITONIN SERPL IA-MCNC: 0.06 NG/ML (ref 0–0.5)
PROT SERPL-MCNC: 7.1 G/DL (ref 6–8.4)
RBC # BLD AUTO: 4.7 M/UL (ref 4–5.4)
SARS-COV-2 RDRP RESP QL NAA+PROBE: POSITIVE
SODIUM SERPL-SCNC: 138 MMOL/L (ref 136–145)
TROPONIN I SERPL DL<=0.01 NG/ML-MCNC: <0.03 NG/ML
WBC # BLD AUTO: 7.99 K/UL (ref 3.9–12.7)

## 2021-09-06 PROCEDURE — 84145 PROCALCITONIN (PCT): CPT | Performed by: EMERGENCY MEDICINE

## 2021-09-06 PROCEDURE — 20000000 HC ICU ROOM

## 2021-09-06 PROCEDURE — 80053 COMPREHEN METABOLIC PANEL: CPT | Performed by: EMERGENCY MEDICINE

## 2021-09-06 PROCEDURE — 83605 ASSAY OF LACTIC ACID: CPT | Performed by: EMERGENCY MEDICINE

## 2021-09-06 PROCEDURE — 84484 ASSAY OF TROPONIN QUANT: CPT | Performed by: EMERGENCY MEDICINE

## 2021-09-06 PROCEDURE — 63600175 PHARM REV CODE 636 W HCPCS: Performed by: EMERGENCY MEDICINE

## 2021-09-06 PROCEDURE — 93010 EKG 12-LEAD: ICD-10-PCS | Mod: ,,, | Performed by: INTERNAL MEDICINE

## 2021-09-06 PROCEDURE — 85025 COMPLETE CBC W/AUTO DIFF WBC: CPT | Performed by: EMERGENCY MEDICINE

## 2021-09-06 PROCEDURE — 82728 ASSAY OF FERRITIN: CPT | Performed by: EMERGENCY MEDICINE

## 2021-09-06 PROCEDURE — 82550 ASSAY OF CK (CPK): CPT | Performed by: EMERGENCY MEDICINE

## 2021-09-06 PROCEDURE — 83880 ASSAY OF NATRIURETIC PEPTIDE: CPT | Performed by: EMERGENCY MEDICINE

## 2021-09-06 PROCEDURE — 21400001 HC TELEMETRY ROOM

## 2021-09-06 PROCEDURE — 36415 COLL VENOUS BLD VENIPUNCTURE: CPT | Performed by: EMERGENCY MEDICINE

## 2021-09-06 PROCEDURE — 93010 ELECTROCARDIOGRAM REPORT: CPT | Mod: ,,, | Performed by: INTERNAL MEDICINE

## 2021-09-06 PROCEDURE — U0002 COVID-19 LAB TEST NON-CDC: HCPCS | Performed by: EMERGENCY MEDICINE

## 2021-09-06 PROCEDURE — 83615 LACTATE (LD) (LDH) ENZYME: CPT | Performed by: EMERGENCY MEDICINE

## 2021-09-06 PROCEDURE — 96374 THER/PROPH/DIAG INJ IV PUSH: CPT

## 2021-09-06 PROCEDURE — 99285 EMERGENCY DEPT VISIT HI MDM: CPT | Mod: 25

## 2021-09-06 PROCEDURE — 93005 ELECTROCARDIOGRAM TRACING: CPT | Performed by: INTERNAL MEDICINE

## 2021-09-06 PROCEDURE — 87040 BLOOD CULTURE FOR BACTERIA: CPT | Mod: 59 | Performed by: EMERGENCY MEDICINE

## 2021-09-06 PROCEDURE — 86140 C-REACTIVE PROTEIN: CPT | Performed by: EMERGENCY MEDICINE

## 2021-09-06 RX ORDER — CHOLECALCIFEROL (VITAMIN D3) 50 MCG
2000 TABLET ORAL DAILY
Status: DISCONTINUED | OUTPATIENT
Start: 2021-09-07 | End: 2021-09-07 | Stop reason: HOSPADM

## 2021-09-06 RX ORDER — ALBUTEROL SULFATE 90 UG/1
2 AEROSOL, METERED RESPIRATORY (INHALATION) EVERY 8 HOURS
Status: DISCONTINUED | OUTPATIENT
Start: 2021-09-07 | End: 2021-09-07

## 2021-09-06 RX ORDER — DEXAMETHASONE SODIUM PHOSPHATE 4 MG/ML
6 INJECTION, SOLUTION INTRA-ARTICULAR; INTRALESIONAL; INTRAMUSCULAR; INTRAVENOUS; SOFT TISSUE
Status: COMPLETED | OUTPATIENT
Start: 2021-09-06 | End: 2021-09-06

## 2021-09-06 RX ORDER — BENZONATATE 100 MG/1
200 CAPSULE ORAL 3 TIMES DAILY PRN
Status: DISCONTINUED | OUTPATIENT
Start: 2021-09-06 | End: 2021-09-07 | Stop reason: HOSPADM

## 2021-09-06 RX ORDER — GUAIFENESIN/DEXTROMETHORPHAN 100-10MG/5
10 SYRUP ORAL EVERY 4 HOURS PRN
Status: DISCONTINUED | OUTPATIENT
Start: 2021-09-06 | End: 2021-09-07 | Stop reason: HOSPADM

## 2021-09-06 RX ADMIN — DEXAMETHASONE SODIUM PHOSPHATE 6 MG: 4 INJECTION, SOLUTION INTRA-ARTICULAR; INTRALESIONAL; INTRAMUSCULAR; INTRAVENOUS; SOFT TISSUE at 09:09

## 2021-09-07 ENCOUNTER — PATIENT MESSAGE (OUTPATIENT)
Dept: HEMATOLOGY/ONCOLOGY | Facility: CLINIC | Age: 51
End: 2021-09-07

## 2021-09-07 ENCOUNTER — TELEPHONE (OUTPATIENT)
Dept: HEMATOLOGY/ONCOLOGY | Facility: CLINIC | Age: 51
End: 2021-09-07

## 2021-09-07 VITALS
DIASTOLIC BLOOD PRESSURE: 67 MMHG | OXYGEN SATURATION: 93 % | HEIGHT: 65 IN | WEIGHT: 211 LBS | RESPIRATION RATE: 20 BRPM | TEMPERATURE: 98 F | HEART RATE: 98 BPM | BODY MASS INDEX: 35.16 KG/M2 | SYSTOLIC BLOOD PRESSURE: 113 MMHG

## 2021-09-07 LAB
ALBUMIN SERPL BCP-MCNC: 3.4 G/DL (ref 3.5–5.2)
ALBUMIN SERPL BCP-MCNC: 3.4 G/DL (ref 3.5–5.2)
ALP SERPL-CCNC: 68 U/L (ref 55–135)
ALP SERPL-CCNC: 68 U/L (ref 55–135)
ALT SERPL W/O P-5'-P-CCNC: 23 U/L (ref 10–44)
ALT SERPL W/O P-5'-P-CCNC: 23 U/L (ref 10–44)
ANION GAP SERPL CALC-SCNC: 15 MMOL/L (ref 8–16)
ANION GAP SERPL CALC-SCNC: 15 MMOL/L (ref 8–16)
AST SERPL-CCNC: 19 U/L (ref 10–40)
AST SERPL-CCNC: 19 U/L (ref 10–40)
BASOPHILS # BLD AUTO: 0.01 K/UL (ref 0–0.2)
BASOPHILS NFR BLD: 0.2 % (ref 0–1.9)
BILIRUB SERPL-MCNC: 1.7 MG/DL (ref 0.1–1)
BILIRUB SERPL-MCNC: 1.7 MG/DL (ref 0.1–1)
BUN SERPL-MCNC: 23 MG/DL (ref 6–20)
BUN SERPL-MCNC: 23 MG/DL (ref 6–20)
CALCIUM SERPL-MCNC: 8.9 MG/DL (ref 8.7–10.5)
CALCIUM SERPL-MCNC: 8.9 MG/DL (ref 8.7–10.5)
CHLORIDE SERPL-SCNC: 101 MMOL/L (ref 95–110)
CHLORIDE SERPL-SCNC: 101 MMOL/L (ref 95–110)
CO2 SERPL-SCNC: 23 MMOL/L (ref 23–29)
CO2 SERPL-SCNC: 23 MMOL/L (ref 23–29)
CREAT SERPL-MCNC: 1.5 MG/DL (ref 0.5–1.4)
CREAT SERPL-MCNC: 1.5 MG/DL (ref 0.5–1.4)
CRP SERPL-MCNC: 15.93 MG/DL
D DIMER PPP IA.FEU-MCNC: 0.86 UG/ML FEU
DIFFERENTIAL METHOD: ABNORMAL
EOSINOPHIL # BLD AUTO: 0 K/UL (ref 0–0.5)
EOSINOPHIL NFR BLD: 0 % (ref 0–8)
ERYTHROCYTE [DISTWIDTH] IN BLOOD BY AUTOMATED COUNT: 14.1 % (ref 11.5–14.5)
EST. GFR  (AFRICAN AMERICAN): 46.5 ML/MIN/1.73 M^2
EST. GFR  (AFRICAN AMERICAN): 46.5 ML/MIN/1.73 M^2
EST. GFR  (NON AFRICAN AMERICAN): 40.3 ML/MIN/1.73 M^2
EST. GFR  (NON AFRICAN AMERICAN): 40.3 ML/MIN/1.73 M^2
FERRITIN SERPL-MCNC: 339 NG/ML (ref 20–300)
GLUCOSE SERPL-MCNC: 275 MG/DL (ref 70–110)
GLUCOSE SERPL-MCNC: 275 MG/DL (ref 70–110)
GLUCOSE SERPL-MCNC: 319 MG/DL (ref 70–110)
HCT VFR BLD AUTO: 39.4 % (ref 37–48.5)
HGB BLD-MCNC: 12.8 G/DL (ref 12–16)
IMM GRANULOCYTES # BLD AUTO: 0.06 K/UL (ref 0–0.04)
IMM GRANULOCYTES NFR BLD AUTO: 1 % (ref 0–0.5)
LYMPHOCYTES # BLD AUTO: 1 K/UL (ref 1–4.8)
LYMPHOCYTES NFR BLD: 16.8 % (ref 18–48)
MCH RBC QN AUTO: 28.2 PG (ref 27–31)
MCHC RBC AUTO-ENTMCNC: 32.5 G/DL (ref 32–36)
MCV RBC AUTO: 87 FL (ref 82–98)
MONOCYTES # BLD AUTO: 0.4 K/UL (ref 0.3–1)
MONOCYTES NFR BLD: 5.8 % (ref 4–15)
NEUTROPHILS # BLD AUTO: 4.6 K/UL (ref 1.8–7.7)
NEUTROPHILS NFR BLD: 76.2 % (ref 38–73)
NRBC BLD-RTO: 0 /100 WBC
PLATELET # BLD AUTO: 231 K/UL (ref 150–450)
PMV BLD AUTO: 10.9 FL (ref 9.2–12.9)
POTASSIUM SERPL-SCNC: 4.4 MMOL/L (ref 3.5–5.1)
POTASSIUM SERPL-SCNC: 4.4 MMOL/L (ref 3.5–5.1)
PROT SERPL-MCNC: 7.2 G/DL (ref 6–8.4)
PROT SERPL-MCNC: 7.2 G/DL (ref 6–8.4)
RBC # BLD AUTO: 4.54 M/UL (ref 4–5.4)
SODIUM SERPL-SCNC: 139 MMOL/L (ref 136–145)
SODIUM SERPL-SCNC: 139 MMOL/L (ref 136–145)
TROPONIN I SERPL DL<=0.01 NG/ML-MCNC: <0.03 NG/ML
TROPONIN I SERPL DL<=0.01 NG/ML-MCNC: <0.03 NG/ML
WBC # BLD AUTO: 6.02 K/UL (ref 3.9–12.7)

## 2021-09-07 PROCEDURE — 82728 ASSAY OF FERRITIN: CPT | Performed by: INTERNAL MEDICINE

## 2021-09-07 PROCEDURE — 86140 C-REACTIVE PROTEIN: CPT | Performed by: INTERNAL MEDICINE

## 2021-09-07 PROCEDURE — 85379 FIBRIN DEGRADATION QUANT: CPT | Performed by: INTERNAL MEDICINE

## 2021-09-07 PROCEDURE — 63600175 PHARM REV CODE 636 W HCPCS: Performed by: INTERNAL MEDICINE

## 2021-09-07 PROCEDURE — 94761 N-INVAS EAR/PLS OXIMETRY MLT: CPT

## 2021-09-07 PROCEDURE — 99900035 HC TECH TIME PER 15 MIN (STAT)

## 2021-09-07 PROCEDURE — 85025 COMPLETE CBC W/AUTO DIFF WBC: CPT | Performed by: INTERNAL MEDICINE

## 2021-09-07 PROCEDURE — 99900031 HC PATIENT EDUCATION (STAT)

## 2021-09-07 PROCEDURE — 36415 COLL VENOUS BLD VENIPUNCTURE: CPT | Performed by: INTERNAL MEDICINE

## 2021-09-07 PROCEDURE — 84484 ASSAY OF TROPONIN QUANT: CPT | Performed by: INTERNAL MEDICINE

## 2021-09-07 PROCEDURE — 25000242 PHARM REV CODE 250 ALT 637 W/ HCPCS: Performed by: INTERNAL MEDICINE

## 2021-09-07 PROCEDURE — 25000003 PHARM REV CODE 250: Performed by: INTERNAL MEDICINE

## 2021-09-07 PROCEDURE — 80053 COMPREHEN METABOLIC PANEL: CPT | Performed by: INTERNAL MEDICINE

## 2021-09-07 PROCEDURE — 94640 AIRWAY INHALATION TREATMENT: CPT

## 2021-09-07 PROCEDURE — C9399 UNCLASSIFIED DRUGS OR BIOLOG: HCPCS | Performed by: INTERNAL MEDICINE

## 2021-09-07 PROCEDURE — 27000221 HC OXYGEN, UP TO 24 HOURS

## 2021-09-07 RX ORDER — ALBUTEROL SULFATE 90 UG/1
2 AEROSOL, METERED RESPIRATORY (INHALATION)
Qty: 18 G | Refills: 0 | Status: SHIPPED | OUTPATIENT
Start: 2021-09-07 | End: 2021-10-14

## 2021-09-07 RX ORDER — ATORVASTATIN CALCIUM 10 MG/1
10 TABLET, FILM COATED ORAL DAILY
Status: DISCONTINUED | OUTPATIENT
Start: 2021-09-07 | End: 2021-09-07 | Stop reason: HOSPADM

## 2021-09-07 RX ORDER — CITALOPRAM 20 MG/1
40 TABLET, FILM COATED ORAL DAILY
Status: DISCONTINUED | OUTPATIENT
Start: 2021-09-07 | End: 2021-09-07 | Stop reason: HOSPADM

## 2021-09-07 RX ORDER — ACETAMINOPHEN 325 MG/1
650 TABLET ORAL EVERY 4 HOURS PRN
Status: DISCONTINUED | OUTPATIENT
Start: 2021-09-07 | End: 2021-09-07 | Stop reason: HOSPADM

## 2021-09-07 RX ORDER — CHOLECALCIFEROL (VITAMIN D3) 50 MCG
2000 TABLET ORAL DAILY
Qty: 30 TABLET | Refills: 0 | Status: SHIPPED | OUTPATIENT
Start: 2021-09-08 | End: 2021-10-08

## 2021-09-07 RX ORDER — DEXAMETHASONE SODIUM PHOSPHATE 100 MG/10ML
10 INJECTION INTRAMUSCULAR; INTRAVENOUS DAILY
Status: DISCONTINUED | OUTPATIENT
Start: 2021-09-07 | End: 2021-09-07 | Stop reason: HOSPADM

## 2021-09-07 RX ORDER — ALBUTEROL SULFATE 90 UG/1
2 AEROSOL, METERED RESPIRATORY (INHALATION)
Status: DISCONTINUED | OUTPATIENT
Start: 2021-09-07 | End: 2021-09-07 | Stop reason: HOSPADM

## 2021-09-07 RX ORDER — GLUCAGON 1 MG
1 KIT INJECTION
Status: DISCONTINUED | OUTPATIENT
Start: 2021-09-07 | End: 2021-09-07 | Stop reason: HOSPADM

## 2021-09-07 RX ORDER — ENOXAPARIN SODIUM 100 MG/ML
40 INJECTION SUBCUTANEOUS EVERY 24 HOURS
Status: DISCONTINUED | OUTPATIENT
Start: 2021-09-07 | End: 2021-09-07 | Stop reason: HOSPADM

## 2021-09-07 RX ORDER — ONDANSETRON 4 MG/1
8 TABLET, ORALLY DISINTEGRATING ORAL EVERY 8 HOURS PRN
Status: DISCONTINUED | OUTPATIENT
Start: 2021-09-07 | End: 2021-09-07 | Stop reason: HOSPADM

## 2021-09-07 RX ORDER — DEXAMETHASONE 6 MG/1
6 TABLET ORAL
Qty: 9 TABLET | Refills: 0 | Status: SHIPPED | OUTPATIENT
Start: 2021-09-07 | End: 2021-09-16

## 2021-09-07 RX ORDER — IBUPROFEN 200 MG
16 TABLET ORAL
Status: DISCONTINUED | OUTPATIENT
Start: 2021-09-07 | End: 2021-09-07 | Stop reason: HOSPADM

## 2021-09-07 RX ORDER — GUAIFENESIN/DEXTROMETHORPHAN 100-10MG/5
10 SYRUP ORAL EVERY 4 HOURS PRN
Refills: 0
Start: 2021-09-07 | End: 2021-09-17

## 2021-09-07 RX ORDER — VALSARTAN 80 MG/1
80 TABLET ORAL DAILY
Status: DISCONTINUED | OUTPATIENT
Start: 2021-09-07 | End: 2021-09-07 | Stop reason: HOSPADM

## 2021-09-07 RX ORDER — CHLORHEXIDINE GLUCONATE ORAL RINSE 1.2 MG/ML
15 SOLUTION DENTAL 2 TIMES DAILY
Status: DISCONTINUED | OUTPATIENT
Start: 2021-09-07 | End: 2021-09-07 | Stop reason: HOSPADM

## 2021-09-07 RX ORDER — IBUPROFEN 200 MG
24 TABLET ORAL
Status: DISCONTINUED | OUTPATIENT
Start: 2021-09-07 | End: 2021-09-07 | Stop reason: HOSPADM

## 2021-09-07 RX ORDER — PROCHLORPERAZINE EDISYLATE 5 MG/ML
5 INJECTION INTRAMUSCULAR; INTRAVENOUS EVERY 6 HOURS PRN
Status: DISCONTINUED | OUTPATIENT
Start: 2021-09-07 | End: 2021-09-07 | Stop reason: HOSPADM

## 2021-09-07 RX ORDER — SODIUM CHLORIDE 0.9 % (FLUSH) 0.9 %
10 SYRINGE (ML) INJECTION
Status: DISCONTINUED | OUTPATIENT
Start: 2021-09-07 | End: 2021-09-07 | Stop reason: HOSPADM

## 2021-09-07 RX ORDER — ASPIRIN 325 MG
325 TABLET ORAL DAILY
Refills: 0
Start: 2021-09-07 | End: 2021-10-14

## 2021-09-07 RX ORDER — BENZONATATE 200 MG/1
200 CAPSULE ORAL 3 TIMES DAILY PRN
Qty: 30 CAPSULE | Refills: 0 | Status: SHIPPED | OUTPATIENT
Start: 2021-09-07 | End: 2021-09-17

## 2021-09-07 RX ORDER — MUPIROCIN 20 MG/G
OINTMENT TOPICAL 2 TIMES DAILY
Status: DISCONTINUED | OUTPATIENT
Start: 2021-09-07 | End: 2021-09-07 | Stop reason: HOSPADM

## 2021-09-07 RX ORDER — ACETAMINOPHEN 325 MG/1
650 TABLET ORAL EVERY 8 HOURS PRN
Status: DISCONTINUED | OUTPATIENT
Start: 2021-09-07 | End: 2021-09-07 | Stop reason: HOSPADM

## 2021-09-07 RX ADMIN — ATORVASTATIN CALCIUM 10 MG: 10 TABLET, FILM COATED ORAL at 08:09

## 2021-09-07 RX ADMIN — Medication 2000 UNITS: at 08:09

## 2021-09-07 RX ADMIN — INSULIN DETEMIR 10 UNITS: 100 INJECTION, SOLUTION SUBCUTANEOUS at 08:09

## 2021-09-07 RX ADMIN — REMDESIVIR 200 MG: 100 INJECTION, POWDER, LYOPHILIZED, FOR SOLUTION INTRAVENOUS at 08:09

## 2021-09-07 RX ADMIN — VALSARTAN 80 MG: 80 TABLET, FILM COATED ORAL at 08:09

## 2021-09-07 RX ADMIN — HUMAN INSULIN 4 UNITS: 100 INJECTION, SOLUTION SUBCUTANEOUS at 11:09

## 2021-09-07 RX ADMIN — DEXAMETHASONE SODIUM PHOSPHATE 10 MG: 10 INJECTION INTRAMUSCULAR; INTRAVENOUS at 08:09

## 2021-09-07 RX ADMIN — ALBUTEROL SULFATE 2 PUFF: 90 AEROSOL, METERED RESPIRATORY (INHALATION) at 01:09

## 2021-09-07 RX ADMIN — CITALOPRAM HYDROBROMIDE 40 MG: 20 TABLET ORAL at 08:09

## 2021-09-07 RX ADMIN — ALBUTEROL SULFATE 2 PUFF: 90 AEROSOL, METERED RESPIRATORY (INHALATION) at 08:09

## 2021-09-12 LAB
BACTERIA BLD CULT: NORMAL
BACTERIA BLD CULT: NORMAL

## 2021-10-14 ENCOUNTER — OFFICE VISIT (OUTPATIENT)
Dept: PRIMARY CARE CLINIC | Facility: CLINIC | Age: 51
End: 2021-10-14
Payer: COMMERCIAL

## 2021-10-14 VITALS
RESPIRATION RATE: 18 BRPM | SYSTOLIC BLOOD PRESSURE: 128 MMHG | HEART RATE: 94 BPM | OXYGEN SATURATION: 96 % | WEIGHT: 200.75 LBS | DIASTOLIC BLOOD PRESSURE: 82 MMHG | HEIGHT: 65 IN | BODY MASS INDEX: 33.45 KG/M2

## 2021-10-14 DIAGNOSIS — N17.9 AKI (ACUTE KIDNEY INJURY): ICD-10-CM

## 2021-10-14 DIAGNOSIS — E11.29 MICROALBUMINURIA DUE TO TYPE 2 DIABETES MELLITUS: ICD-10-CM

## 2021-10-14 DIAGNOSIS — Z23 NEED FOR VACCINATION: ICD-10-CM

## 2021-10-14 DIAGNOSIS — Z11.59 NEED FOR HEPATITIS C SCREENING TEST: ICD-10-CM

## 2021-10-14 DIAGNOSIS — E78.5 HYPERLIPIDEMIA, UNSPECIFIED HYPERLIPIDEMIA TYPE: ICD-10-CM

## 2021-10-14 DIAGNOSIS — E11.65 TYPE 2 DIABETES MELLITUS WITH HYPERGLYCEMIA, WITHOUT LONG-TERM CURRENT USE OF INSULIN: Primary | ICD-10-CM

## 2021-10-14 DIAGNOSIS — R80.9 MICROALBUMINURIA DUE TO TYPE 2 DIABETES MELLITUS: ICD-10-CM

## 2021-10-14 DIAGNOSIS — I10 ESSENTIAL HYPERTENSION, BENIGN: ICD-10-CM

## 2021-10-14 DIAGNOSIS — Z12.11 COLON CANCER SCREENING: ICD-10-CM

## 2021-10-14 PROBLEM — U07.1 COVID-19 VIRUS INFECTION: Status: RESOLVED | Noted: 2021-09-06 | Resolved: 2021-10-14

## 2021-10-14 PROBLEM — U07.1 PNEUMONIA DUE TO COVID-19 VIRUS: Status: RESOLVED | Noted: 2021-09-06 | Resolved: 2021-10-14

## 2021-10-14 PROBLEM — J12.82 PNEUMONIA DUE TO COVID-19 VIRUS: Status: RESOLVED | Noted: 2021-09-06 | Resolved: 2021-10-14

## 2021-10-14 PROBLEM — J96.01 ACUTE HYPOXEMIC RESPIRATORY FAILURE: Status: RESOLVED | Noted: 2021-09-06 | Resolved: 2021-10-14

## 2021-10-14 LAB — GLUCOSE SERPL-MCNC: 216 MG/DL (ref 70–110)

## 2021-10-14 PROCEDURE — 90472 PNEUMOCOCCAL CONJUGATE VACCINE 13-VALENT LESS THAN 5YO & GREATER THAN: ICD-10-PCS | Mod: S$GLB,,, | Performed by: FAMILY MEDICINE

## 2021-10-14 PROCEDURE — 99999 PR PBB SHADOW E&M-EST. PATIENT-LVL IV: ICD-10-PCS | Mod: PBBFAC,,, | Performed by: FAMILY MEDICINE

## 2021-10-14 PROCEDURE — 99999 PR PBB SHADOW E&M-EST. PATIENT-LVL IV: CPT | Mod: PBBFAC,,, | Performed by: FAMILY MEDICINE

## 2021-10-14 PROCEDURE — 4010F ACE/ARB THERAPY RXD/TAKEN: CPT | Mod: CPTII,S$GLB,, | Performed by: FAMILY MEDICINE

## 2021-10-14 PROCEDURE — 3052F HG A1C>EQUAL 8.0%<EQUAL 9.0%: CPT | Mod: CPTII,S$GLB,, | Performed by: FAMILY MEDICINE

## 2021-10-14 PROCEDURE — 3079F DIAST BP 80-89 MM HG: CPT | Mod: CPTII,S$GLB,, | Performed by: FAMILY MEDICINE

## 2021-10-14 PROCEDURE — 3060F PR POS MICROALBUMINURIA RESULT DOCUMENTED/REVIEW: ICD-10-PCS | Mod: CPTII,S$GLB,, | Performed by: FAMILY MEDICINE

## 2021-10-14 PROCEDURE — 90471 FLU VACCINE (QUAD) GREATER THAN OR EQUAL TO 3YO PRESERVATIVE FREE IM: ICD-10-PCS | Mod: S$GLB,,, | Performed by: FAMILY MEDICINE

## 2021-10-14 PROCEDURE — 3072F PR LOW RISK FOR RETINOPATHY: ICD-10-PCS | Mod: CPTII,S$GLB,, | Performed by: FAMILY MEDICINE

## 2021-10-14 PROCEDURE — 82962 POCT GLUCOSE, HAND-HELD DEVICE: ICD-10-PCS | Mod: S$GLB,,, | Performed by: FAMILY MEDICINE

## 2021-10-14 PROCEDURE — 90686 FLU VACCINE (QUAD) GREATER THAN OR EQUAL TO 3YO PRESERVATIVE FREE IM: ICD-10-PCS | Mod: S$GLB,,, | Performed by: FAMILY MEDICINE

## 2021-10-14 PROCEDURE — 3079F PR MOST RECENT DIASTOLIC BLOOD PRESSURE 80-89 MM HG: ICD-10-PCS | Mod: CPTII,S$GLB,, | Performed by: FAMILY MEDICINE

## 2021-10-14 PROCEDURE — 82962 GLUCOSE BLOOD TEST: CPT | Mod: S$GLB,,, | Performed by: FAMILY MEDICINE

## 2021-10-14 PROCEDURE — 3072F LOW RISK FOR RETINOPATHY: CPT | Mod: CPTII,S$GLB,, | Performed by: FAMILY MEDICINE

## 2021-10-14 PROCEDURE — 90686 IIV4 VACC NO PRSV 0.5 ML IM: CPT | Mod: S$GLB,,, | Performed by: FAMILY MEDICINE

## 2021-10-14 PROCEDURE — 99214 PR OFFICE/OUTPT VISIT, EST, LEVL IV, 30-39 MIN: ICD-10-PCS | Mod: 25,S$GLB,, | Performed by: FAMILY MEDICINE

## 2021-10-14 PROCEDURE — 3066F NEPHROPATHY DOC TX: CPT | Mod: CPTII,S$GLB,, | Performed by: FAMILY MEDICINE

## 2021-10-14 PROCEDURE — 90670 PCV13 VACCINE IM: CPT | Mod: S$GLB,,, | Performed by: FAMILY MEDICINE

## 2021-10-14 PROCEDURE — 3060F POS MICROALBUMINURIA REV: CPT | Mod: CPTII,S$GLB,, | Performed by: FAMILY MEDICINE

## 2021-10-14 PROCEDURE — 1159F MED LIST DOCD IN RCRD: CPT | Mod: CPTII,S$GLB,, | Performed by: FAMILY MEDICINE

## 2021-10-14 PROCEDURE — 3074F SYST BP LT 130 MM HG: CPT | Mod: CPTII,S$GLB,, | Performed by: FAMILY MEDICINE

## 2021-10-14 PROCEDURE — 3074F PR MOST RECENT SYSTOLIC BLOOD PRESSURE < 130 MM HG: ICD-10-PCS | Mod: CPTII,S$GLB,, | Performed by: FAMILY MEDICINE

## 2021-10-14 PROCEDURE — 90472 IMMUNIZATION ADMIN EACH ADD: CPT | Mod: S$GLB,,, | Performed by: FAMILY MEDICINE

## 2021-10-14 PROCEDURE — 90670 PNEUMOCOCCAL CONJUGATE VACCINE 13-VALENT LESS THAN 5YO & GREATER THAN: ICD-10-PCS | Mod: S$GLB,,, | Performed by: FAMILY MEDICINE

## 2021-10-14 PROCEDURE — 3008F PR BODY MASS INDEX (BMI) DOCUMENTED: ICD-10-PCS | Mod: CPTII,S$GLB,, | Performed by: FAMILY MEDICINE

## 2021-10-14 PROCEDURE — 3008F BODY MASS INDEX DOCD: CPT | Mod: CPTII,S$GLB,, | Performed by: FAMILY MEDICINE

## 2021-10-14 PROCEDURE — 4010F PR ACE/ARB THEARPY RXD/TAKEN: ICD-10-PCS | Mod: CPTII,S$GLB,, | Performed by: FAMILY MEDICINE

## 2021-10-14 PROCEDURE — 3052F PR MOST RECENT HEMOGLOBIN A1C LEVEL 8.0 - < 9.0%: ICD-10-PCS | Mod: CPTII,S$GLB,, | Performed by: FAMILY MEDICINE

## 2021-10-14 PROCEDURE — 3066F PR DOCUMENTATION OF TREATMENT FOR NEPHROPATHY: ICD-10-PCS | Mod: CPTII,S$GLB,, | Performed by: FAMILY MEDICINE

## 2021-10-14 PROCEDURE — 1159F PR MEDICATION LIST DOCUMENTED IN MEDICAL RECORD: ICD-10-PCS | Mod: CPTII,S$GLB,, | Performed by: FAMILY MEDICINE

## 2021-10-14 PROCEDURE — 99214 OFFICE O/P EST MOD 30 MIN: CPT | Mod: 25,S$GLB,, | Performed by: FAMILY MEDICINE

## 2021-10-14 PROCEDURE — 90471 IMMUNIZATION ADMIN: CPT | Mod: S$GLB,,, | Performed by: FAMILY MEDICINE

## 2021-10-14 RX ORDER — METFORMIN HYDROCHLORIDE 500 MG/1
1000 TABLET, EXTENDED RELEASE ORAL 2 TIMES DAILY
COMMUNITY
End: 2021-10-27

## 2021-10-14 RX ORDER — VALSARTAN 80 MG/1
80 TABLET ORAL DAILY
Qty: 90 TABLET | Refills: 1
Start: 2021-10-14 | End: 2022-02-14 | Stop reason: SDUPTHER

## 2021-10-27 DIAGNOSIS — E11.65 TYPE 2 DIABETES MELLITUS WITH HYPERGLYCEMIA, WITHOUT LONG-TERM CURRENT USE OF INSULIN: ICD-10-CM

## 2021-10-27 DIAGNOSIS — N18.32 STAGE 3B CHRONIC KIDNEY DISEASE: Primary | ICD-10-CM

## 2021-10-27 RX ORDER — GLIPIZIDE 5 MG/1
5 TABLET, FILM COATED, EXTENDED RELEASE ORAL
Qty: 90 TABLET | Refills: 1 | Status: SHIPPED | OUTPATIENT
Start: 2021-10-27 | End: 2022-04-11

## 2021-11-03 ENCOUNTER — PATIENT MESSAGE (OUTPATIENT)
Dept: GASTROENTEROLOGY | Facility: CLINIC | Age: 51
End: 2021-11-03
Payer: COMMERCIAL

## 2021-11-08 ENCOUNTER — PATIENT MESSAGE (OUTPATIENT)
Dept: GASTROENTEROLOGY | Facility: CLINIC | Age: 51
End: 2021-11-08
Payer: COMMERCIAL

## 2021-11-08 ENCOUNTER — TELEPHONE (OUTPATIENT)
Dept: GASTROENTEROLOGY | Facility: CLINIC | Age: 51
End: 2021-11-08
Payer: COMMERCIAL

## 2021-11-08 DIAGNOSIS — Z01.818 PREOP TESTING: ICD-10-CM

## 2021-11-11 PROBLEM — Z17.0 MALIGNANT NEOPLASM OF LEFT BREAST IN FEMALE, ESTROGEN RECEPTOR POSITIVE: Status: ACTIVE | Noted: 2018-04-11

## 2021-11-12 ENCOUNTER — LAB VISIT (OUTPATIENT)
Dept: LAB | Facility: HOSPITAL | Age: 51
End: 2021-11-12
Attending: NURSE PRACTITIONER
Payer: COMMERCIAL

## 2021-11-12 DIAGNOSIS — Z85.3 HISTORY OF LEFT BREAST CANCER: ICD-10-CM

## 2021-11-12 DIAGNOSIS — D50.9 IRON DEFICIENCY ANEMIA, UNSPECIFIED IRON DEFICIENCY ANEMIA TYPE: ICD-10-CM

## 2021-11-12 LAB
ALBUMIN SERPL BCP-MCNC: 4 G/DL (ref 3.5–5.2)
ALP SERPL-CCNC: 96 U/L (ref 55–135)
ALT SERPL W/O P-5'-P-CCNC: 30 U/L (ref 10–44)
ANION GAP SERPL CALC-SCNC: 10 MMOL/L (ref 8–16)
AST SERPL-CCNC: 22 U/L (ref 10–40)
BASOPHILS # BLD AUTO: 0.05 K/UL (ref 0–0.2)
BASOPHILS NFR BLD: 0.5 % (ref 0–1.9)
BILIRUB SERPL-MCNC: 1.6 MG/DL (ref 0.1–1)
BUN SERPL-MCNC: 14 MG/DL (ref 6–20)
CALCIUM SERPL-MCNC: 9.3 MG/DL (ref 8.7–10.5)
CEA SERPL-MCNC: 1.3 NG/ML (ref 0–5)
CHLORIDE SERPL-SCNC: 101 MMOL/L (ref 95–110)
CO2 SERPL-SCNC: 28 MMOL/L (ref 23–29)
CREAT SERPL-MCNC: 0.8 MG/DL (ref 0.5–1.4)
DIFFERENTIAL METHOD: ABNORMAL
EOSINOPHIL # BLD AUTO: 0.3 K/UL (ref 0–0.5)
EOSINOPHIL NFR BLD: 2.7 % (ref 0–8)
ERYTHROCYTE [DISTWIDTH] IN BLOOD BY AUTOMATED COUNT: 14.7 % (ref 11.5–14.5)
EST. GFR  (AFRICAN AMERICAN): >60 ML/MIN/1.73 M^2
EST. GFR  (NON AFRICAN AMERICAN): >60 ML/MIN/1.73 M^2
FERRITIN SERPL-MCNC: 86 NG/ML (ref 20–300)
GLUCOSE SERPL-MCNC: 169 MG/DL (ref 70–110)
HCT VFR BLD AUTO: 41.7 % (ref 37–48.5)
HGB BLD-MCNC: 13.6 G/DL (ref 12–16)
IMM GRANULOCYTES # BLD AUTO: 0.06 K/UL (ref 0–0.04)
IMM GRANULOCYTES NFR BLD AUTO: 0.6 % (ref 0–0.5)
IRON SERPL-MCNC: 56 UG/DL (ref 30–160)
LYMPHOCYTES # BLD AUTO: 2 K/UL (ref 1–4.8)
LYMPHOCYTES NFR BLD: 20.1 % (ref 18–48)
MCH RBC QN AUTO: 28.6 PG (ref 27–31)
MCHC RBC AUTO-ENTMCNC: 32.6 G/DL (ref 32–36)
MCV RBC AUTO: 88 FL (ref 82–98)
MONOCYTES # BLD AUTO: 0.8 K/UL (ref 0.3–1)
MONOCYTES NFR BLD: 8.1 % (ref 4–15)
NEUTROPHILS # BLD AUTO: 6.9 K/UL (ref 1.8–7.7)
NEUTROPHILS NFR BLD: 68 % (ref 38–73)
NRBC BLD-RTO: 0 /100 WBC
PLATELET # BLD AUTO: 279 K/UL (ref 150–450)
PMV BLD AUTO: 9.7 FL (ref 9.2–12.9)
POTASSIUM SERPL-SCNC: 4 MMOL/L (ref 3.5–5.1)
PROT SERPL-MCNC: 7.3 G/DL (ref 6–8.4)
RBC # BLD AUTO: 4.76 M/UL (ref 4–5.4)
SATURATED IRON: 14 % (ref 20–50)
SODIUM SERPL-SCNC: 139 MMOL/L (ref 136–145)
TOTAL IRON BINDING CAPACITY: 388 UG/DL (ref 250–450)
TRANSFERRIN SERPL-MCNC: 277 MG/DL (ref 200–375)
WBC # BLD AUTO: 10.16 K/UL (ref 3.9–12.7)

## 2021-11-12 PROCEDURE — 80053 COMPREHEN METABOLIC PANEL: CPT | Performed by: NURSE PRACTITIONER

## 2021-11-12 PROCEDURE — 82378 CARCINOEMBRYONIC ANTIGEN: CPT | Performed by: NURSE PRACTITIONER

## 2021-11-12 PROCEDURE — 82728 ASSAY OF FERRITIN: CPT | Performed by: NURSE PRACTITIONER

## 2021-11-12 PROCEDURE — 85025 COMPLETE CBC W/AUTO DIFF WBC: CPT | Performed by: NURSE PRACTITIONER

## 2021-11-12 PROCEDURE — 36415 COLL VENOUS BLD VENIPUNCTURE: CPT | Performed by: NURSE PRACTITIONER

## 2021-11-12 PROCEDURE — 84466 ASSAY OF TRANSFERRIN: CPT | Performed by: NURSE PRACTITIONER

## 2021-11-15 ENCOUNTER — OFFICE VISIT (OUTPATIENT)
Dept: HEMATOLOGY/ONCOLOGY | Facility: CLINIC | Age: 51
End: 2021-11-15
Payer: COMMERCIAL

## 2021-11-15 VITALS
BODY MASS INDEX: 33.52 KG/M2 | SYSTOLIC BLOOD PRESSURE: 128 MMHG | HEIGHT: 65 IN | HEART RATE: 88 BPM | DIASTOLIC BLOOD PRESSURE: 87 MMHG | WEIGHT: 201.19 LBS | RESPIRATION RATE: 18 BRPM

## 2021-11-15 DIAGNOSIS — R93.5 ABNORMAL CT OF THE ABDOMEN: ICD-10-CM

## 2021-11-15 DIAGNOSIS — D50.9 MICROCYTIC ANEMIA: ICD-10-CM

## 2021-11-15 DIAGNOSIS — C50.312 MALIGNANT NEOPLASM OF LOWER-INNER QUADRANT OF LEFT BREAST IN FEMALE, ESTROGEN RECEPTOR POSITIVE: ICD-10-CM

## 2021-11-15 DIAGNOSIS — Z17.0 MALIGNANT NEOPLASM OF LOWER-INNER QUADRANT OF LEFT BREAST IN FEMALE, ESTROGEN RECEPTOR POSITIVE: ICD-10-CM

## 2021-11-15 DIAGNOSIS — N20.0 KIDNEY STONE: ICD-10-CM

## 2021-11-15 DIAGNOSIS — Z17.0 MALIGNANT NEOPLASM OF LEFT BREAST IN FEMALE, ESTROGEN RECEPTOR POSITIVE, UNSPECIFIED SITE OF BREAST: ICD-10-CM

## 2021-11-15 DIAGNOSIS — D50.9 IRON DEFICIENCY ANEMIA, UNSPECIFIED IRON DEFICIENCY ANEMIA TYPE: ICD-10-CM

## 2021-11-15 DIAGNOSIS — Z85.3 HISTORY OF LEFT BREAST CANCER: ICD-10-CM

## 2021-11-15 DIAGNOSIS — Z98.890 S/P LUMPECTOMY, LEFT BREAST: ICD-10-CM

## 2021-11-15 DIAGNOSIS — Z79.810 USE OF TAMOXIFEN (NOLVADEX): ICD-10-CM

## 2021-11-15 DIAGNOSIS — C50.912 MALIGNANT NEOPLASM OF LEFT BREAST IN FEMALE, ESTROGEN RECEPTOR POSITIVE, UNSPECIFIED SITE OF BREAST: ICD-10-CM

## 2021-11-15 DIAGNOSIS — Z17.0 ER+ (ESTROGEN RECEPTOR POSITIVE STATUS): Primary | ICD-10-CM

## 2021-11-15 PROCEDURE — 3079F PR MOST RECENT DIASTOLIC BLOOD PRESSURE 80-89 MM HG: ICD-10-PCS | Mod: S$GLB,,, | Performed by: INTERNAL MEDICINE

## 2021-11-15 PROCEDURE — 4010F ACE/ARB THERAPY RXD/TAKEN: CPT | Mod: S$GLB,,, | Performed by: INTERNAL MEDICINE

## 2021-11-15 PROCEDURE — 3074F SYST BP LT 130 MM HG: CPT | Mod: S$GLB,,, | Performed by: INTERNAL MEDICINE

## 2021-11-15 PROCEDURE — 99214 PR OFFICE/OUTPT VISIT, EST, LEVL IV, 30-39 MIN: ICD-10-PCS | Mod: S$GLB,,, | Performed by: INTERNAL MEDICINE

## 2021-11-15 PROCEDURE — 3060F POS MICROALBUMINURIA REV: CPT | Mod: S$GLB,,, | Performed by: INTERNAL MEDICINE

## 2021-11-15 PROCEDURE — 3072F PR LOW RISK FOR RETINOPATHY: ICD-10-PCS | Mod: S$GLB,,, | Performed by: INTERNAL MEDICINE

## 2021-11-15 PROCEDURE — 3066F PR DOCUMENTATION OF TREATMENT FOR NEPHROPATHY: ICD-10-PCS | Mod: S$GLB,,, | Performed by: INTERNAL MEDICINE

## 2021-11-15 PROCEDURE — 4010F PR ACE/ARB THEARPY RXD/TAKEN: ICD-10-PCS | Mod: S$GLB,,, | Performed by: INTERNAL MEDICINE

## 2021-11-15 PROCEDURE — 3046F PR MOST RECENT HEMOGLOBIN A1C LEVEL > 9.0%: ICD-10-PCS | Mod: S$GLB,,, | Performed by: INTERNAL MEDICINE

## 2021-11-15 PROCEDURE — 3079F DIAST BP 80-89 MM HG: CPT | Mod: S$GLB,,, | Performed by: INTERNAL MEDICINE

## 2021-11-15 PROCEDURE — 99214 OFFICE O/P EST MOD 30 MIN: CPT | Mod: S$GLB,,, | Performed by: INTERNAL MEDICINE

## 2021-11-15 PROCEDURE — 3046F HEMOGLOBIN A1C LEVEL >9.0%: CPT | Mod: S$GLB,,, | Performed by: INTERNAL MEDICINE

## 2021-11-15 PROCEDURE — 1160F RVW MEDS BY RX/DR IN RCRD: CPT | Mod: S$GLB,,, | Performed by: INTERNAL MEDICINE

## 2021-11-15 PROCEDURE — 3072F LOW RISK FOR RETINOPATHY: CPT | Mod: S$GLB,,, | Performed by: INTERNAL MEDICINE

## 2021-11-15 PROCEDURE — 3008F PR BODY MASS INDEX (BMI) DOCUMENTED: ICD-10-PCS | Mod: S$GLB,,, | Performed by: INTERNAL MEDICINE

## 2021-11-15 PROCEDURE — 3066F NEPHROPATHY DOC TX: CPT | Mod: S$GLB,,, | Performed by: INTERNAL MEDICINE

## 2021-11-15 PROCEDURE — 3074F PR MOST RECENT SYSTOLIC BLOOD PRESSURE < 130 MM HG: ICD-10-PCS | Mod: S$GLB,,, | Performed by: INTERNAL MEDICINE

## 2021-11-15 PROCEDURE — 3060F PR POS MICROALBUMINURIA RESULT DOCUMENTED/REVIEW: ICD-10-PCS | Mod: S$GLB,,, | Performed by: INTERNAL MEDICINE

## 2021-11-15 PROCEDURE — 3008F BODY MASS INDEX DOCD: CPT | Mod: S$GLB,,, | Performed by: INTERNAL MEDICINE

## 2021-11-15 PROCEDURE — 1160F PR REVIEW ALL MEDS BY PRESCRIBER/CLIN PHARMACIST DOCUMENTED: ICD-10-PCS | Mod: S$GLB,,, | Performed by: INTERNAL MEDICINE

## 2021-11-30 ENCOUNTER — LAB VISIT (OUTPATIENT)
Dept: PRIMARY CARE CLINIC | Facility: CLINIC | Age: 51
End: 2021-11-30
Payer: COMMERCIAL

## 2021-11-30 DIAGNOSIS — Z01.818 PREOP TESTING: ICD-10-CM

## 2021-11-30 PROCEDURE — U0005 INFEC AGEN DETEC AMPLI PROBE: HCPCS | Performed by: INTERNAL MEDICINE

## 2021-11-30 PROCEDURE — U0003 INFECTIOUS AGENT DETECTION BY NUCLEIC ACID (DNA OR RNA); SEVERE ACUTE RESPIRATORY SYNDROME CORONAVIRUS 2 (SARS-COV-2) (CORONAVIRUS DISEASE [COVID-19]), AMPLIFIED PROBE TECHNIQUE, MAKING USE OF HIGH THROUGHPUT TECHNOLOGIES AS DESCRIBED BY CMS-2020-01-R: HCPCS | Performed by: INTERNAL MEDICINE

## 2021-12-01 ENCOUNTER — OFFICE VISIT (OUTPATIENT)
Dept: PRIMARY CARE CLINIC | Facility: CLINIC | Age: 51
End: 2021-12-01
Payer: COMMERCIAL

## 2021-12-01 VITALS
BODY MASS INDEX: 34.5 KG/M2 | OXYGEN SATURATION: 99 % | DIASTOLIC BLOOD PRESSURE: 74 MMHG | TEMPERATURE: 98 F | SYSTOLIC BLOOD PRESSURE: 136 MMHG | WEIGHT: 207.06 LBS | HEART RATE: 85 BPM | RESPIRATION RATE: 20 BRPM | HEIGHT: 65 IN

## 2021-12-01 DIAGNOSIS — Z17.0 MALIGNANT NEOPLASM OF LEFT BREAST IN FEMALE, ESTROGEN RECEPTOR POSITIVE, UNSPECIFIED SITE OF BREAST: ICD-10-CM

## 2021-12-01 DIAGNOSIS — C50.912 MALIGNANT NEOPLASM OF LEFT BREAST IN FEMALE, ESTROGEN RECEPTOR POSITIVE, UNSPECIFIED SITE OF BREAST: ICD-10-CM

## 2021-12-01 DIAGNOSIS — N18.32 STAGE 3B CHRONIC KIDNEY DISEASE: ICD-10-CM

## 2021-12-01 DIAGNOSIS — E11.65 TYPE 2 DIABETES MELLITUS WITH HYPERGLYCEMIA, WITHOUT LONG-TERM CURRENT USE OF INSULIN: Primary | ICD-10-CM

## 2021-12-01 DIAGNOSIS — N20.1 URETEROPELVIC JUNCTION CALCULUS: ICD-10-CM

## 2021-12-01 DIAGNOSIS — Z17.0 ER+ (ESTROGEN RECEPTOR POSITIVE STATUS): ICD-10-CM

## 2021-12-01 LAB
SARS-COV-2 RNA RESP QL NAA+PROBE: NOT DETECTED
SARS-COV-2- CYCLE NUMBER: NORMAL

## 2021-12-01 PROCEDURE — 99214 PR OFFICE/OUTPT VISIT, EST, LEVL IV, 30-39 MIN: ICD-10-PCS | Mod: S$GLB,,, | Performed by: NURSE PRACTITIONER

## 2021-12-01 PROCEDURE — 3072F LOW RISK FOR RETINOPATHY: CPT | Mod: CPTII,S$GLB,, | Performed by: NURSE PRACTITIONER

## 2021-12-01 PROCEDURE — 3060F PR POS MICROALBUMINURIA RESULT DOCUMENTED/REVIEW: ICD-10-PCS | Mod: CPTII,S$GLB,, | Performed by: NURSE PRACTITIONER

## 2021-12-01 PROCEDURE — 99999 PR PBB SHADOW E&M-EST. PATIENT-LVL IV: ICD-10-PCS | Mod: PBBFAC,,, | Performed by: NURSE PRACTITIONER

## 2021-12-01 PROCEDURE — 4010F ACE/ARB THERAPY RXD/TAKEN: CPT | Mod: CPTII,S$GLB,, | Performed by: NURSE PRACTITIONER

## 2021-12-01 PROCEDURE — 3066F NEPHROPATHY DOC TX: CPT | Mod: CPTII,S$GLB,, | Performed by: NURSE PRACTITIONER

## 2021-12-01 PROCEDURE — 4010F PR ACE/ARB THEARPY RXD/TAKEN: ICD-10-PCS | Mod: CPTII,S$GLB,, | Performed by: NURSE PRACTITIONER

## 2021-12-01 PROCEDURE — 3072F PR LOW RISK FOR RETINOPATHY: ICD-10-PCS | Mod: CPTII,S$GLB,, | Performed by: NURSE PRACTITIONER

## 2021-12-01 PROCEDURE — 3066F PR DOCUMENTATION OF TREATMENT FOR NEPHROPATHY: ICD-10-PCS | Mod: CPTII,S$GLB,, | Performed by: NURSE PRACTITIONER

## 2021-12-01 PROCEDURE — 99999 PR PBB SHADOW E&M-EST. PATIENT-LVL IV: CPT | Mod: PBBFAC,,, | Performed by: NURSE PRACTITIONER

## 2021-12-01 PROCEDURE — 3060F POS MICROALBUMINURIA REV: CPT | Mod: CPTII,S$GLB,, | Performed by: NURSE PRACTITIONER

## 2021-12-01 PROCEDURE — 99214 OFFICE O/P EST MOD 30 MIN: CPT | Mod: S$GLB,,, | Performed by: NURSE PRACTITIONER

## 2021-12-03 ENCOUNTER — ANESTHESIA (OUTPATIENT)
Dept: ENDOSCOPY | Facility: HOSPITAL | Age: 51
End: 2021-12-03
Payer: COMMERCIAL

## 2021-12-03 ENCOUNTER — HOSPITAL ENCOUNTER (OUTPATIENT)
Facility: HOSPITAL | Age: 51
Discharge: HOME | End: 2021-12-03
Attending: INTERNAL MEDICINE | Admitting: INTERNAL MEDICINE
Payer: COMMERCIAL

## 2021-12-03 ENCOUNTER — ANESTHESIA EVENT (OUTPATIENT)
Dept: ENDOSCOPY | Facility: HOSPITAL | Age: 51
End: 2021-12-03
Payer: COMMERCIAL

## 2021-12-03 DIAGNOSIS — Z12.11 SCREENING FOR COLON CANCER: ICD-10-CM

## 2021-12-03 DIAGNOSIS — K63.5 POLYP OF COLON, UNSPECIFIED PART OF COLON, UNSPECIFIED TYPE: Primary | ICD-10-CM

## 2021-12-03 DIAGNOSIS — K57.90 DIVERTICULOSIS: ICD-10-CM

## 2021-12-03 DIAGNOSIS — K64.8 INTERNAL HEMORRHOIDS: ICD-10-CM

## 2021-12-03 PROCEDURE — 63600175 PHARM REV CODE 636 W HCPCS: Performed by: NURSE ANESTHETIST, CERTIFIED REGISTERED

## 2021-12-03 PROCEDURE — D9220A PRA ANESTHESIA: Mod: 33,ANES,, | Performed by: ANESTHESIOLOGY

## 2021-12-03 PROCEDURE — 45385 COLONOSCOPY W/LESION REMOVAL: CPT | Mod: PT | Performed by: INTERNAL MEDICINE

## 2021-12-03 PROCEDURE — D9220A PRA ANESTHESIA: ICD-10-PCS | Mod: 33,CRNA,, | Performed by: NURSE ANESTHETIST, CERTIFIED REGISTERED

## 2021-12-03 PROCEDURE — 88305 TISSUE EXAM BY PATHOLOGIST: CPT | Mod: 26,,, | Performed by: PATHOLOGY

## 2021-12-03 PROCEDURE — D9220A PRA ANESTHESIA: Mod: 33,CRNA,, | Performed by: NURSE ANESTHETIST, CERTIFIED REGISTERED

## 2021-12-03 PROCEDURE — 45385 PR COLONOSCOPY,REMV LESN,SNARE: ICD-10-PCS | Mod: 33,,, | Performed by: INTERNAL MEDICINE

## 2021-12-03 PROCEDURE — 25000003 PHARM REV CODE 250: Performed by: NURSE ANESTHETIST, CERTIFIED REGISTERED

## 2021-12-03 PROCEDURE — 88305 TISSUE EXAM BY PATHOLOGIST: CPT | Performed by: PATHOLOGY

## 2021-12-03 PROCEDURE — 37000009 HC ANESTHESIA EA ADD 15 MINS: Performed by: INTERNAL MEDICINE

## 2021-12-03 PROCEDURE — D9220A PRA ANESTHESIA: ICD-10-PCS | Mod: 33,ANES,, | Performed by: ANESTHESIOLOGY

## 2021-12-03 PROCEDURE — 37000008 HC ANESTHESIA 1ST 15 MINUTES: Performed by: INTERNAL MEDICINE

## 2021-12-03 PROCEDURE — 27201089 HC SNARE, DISP (ANY): Performed by: INTERNAL MEDICINE

## 2021-12-03 PROCEDURE — 45385 COLONOSCOPY W/LESION REMOVAL: CPT | Mod: 33,,, | Performed by: INTERNAL MEDICINE

## 2021-12-03 PROCEDURE — 88305 TISSUE EXAM BY PATHOLOGIST: ICD-10-PCS | Mod: 26,,, | Performed by: PATHOLOGY

## 2021-12-03 RX ORDER — SODIUM CHLORIDE 9 MG/ML
INJECTION, SOLUTION INTRAVENOUS CONTINUOUS
Status: DISCONTINUED | OUTPATIENT
Start: 2021-12-03 | End: 2021-12-03 | Stop reason: HOSPADM

## 2021-12-03 RX ORDER — FENTANYL CITRATE 50 UG/ML
INJECTION, SOLUTION INTRAMUSCULAR; INTRAVENOUS
Status: DISCONTINUED | OUTPATIENT
Start: 2021-12-03 | End: 2021-12-03

## 2021-12-03 RX ORDER — PROPOFOL 10 MG/ML
VIAL (ML) INTRAVENOUS
Status: DISCONTINUED | OUTPATIENT
Start: 2021-12-03 | End: 2021-12-03

## 2021-12-03 RX ORDER — ONDANSETRON 2 MG/ML
INJECTION INTRAMUSCULAR; INTRAVENOUS
Status: DISCONTINUED | OUTPATIENT
Start: 2021-12-03 | End: 2021-12-03

## 2021-12-03 RX ORDER — LIDOCAINE HCL/PF 100 MG/5ML
SYRINGE (ML) INTRAVENOUS
Status: DISCONTINUED | OUTPATIENT
Start: 2021-12-03 | End: 2021-12-03

## 2021-12-03 RX ADMIN — ONDANSETRON 4 MG: 2 INJECTION, SOLUTION INTRAMUSCULAR; INTRAVENOUS at 09:12

## 2021-12-03 RX ADMIN — PROPOFOL 50 MG: 10 INJECTION, EMULSION INTRAVENOUS at 10:12

## 2021-12-03 RX ADMIN — PROPOFOL 50 MG: 10 INJECTION, EMULSION INTRAVENOUS at 09:12

## 2021-12-03 RX ADMIN — PROPOFOL 100 MG: 10 INJECTION, EMULSION INTRAVENOUS at 09:12

## 2021-12-03 RX ADMIN — GLYCOPYRROLATE 0.1 MG: 0.2 INJECTION, SOLUTION INTRAMUSCULAR; INTRAVITREAL at 09:12

## 2021-12-03 RX ADMIN — FENTANYL CITRATE 25 MCG: 50 INJECTION, SOLUTION INTRAMUSCULAR; INTRAVENOUS at 09:12

## 2021-12-03 RX ADMIN — FENTANYL CITRATE 25 MCG: 50 INJECTION, SOLUTION INTRAMUSCULAR; INTRAVENOUS at 10:12

## 2021-12-03 RX ADMIN — LIDOCAINE HYDROCHLORIDE 50 MG: 20 INJECTION INTRAVENOUS at 09:12

## 2021-12-06 VITALS
OXYGEN SATURATION: 97 % | RESPIRATION RATE: 18 BRPM | SYSTOLIC BLOOD PRESSURE: 133 MMHG | HEART RATE: 104 BPM | TEMPERATURE: 99 F | DIASTOLIC BLOOD PRESSURE: 79 MMHG

## 2021-12-10 LAB
FINAL PATHOLOGIC DIAGNOSIS: NORMAL
GROSS: NORMAL
Lab: NORMAL

## 2021-12-12 ENCOUNTER — PATIENT OUTREACH (OUTPATIENT)
Dept: ADMINISTRATIVE | Facility: OTHER | Age: 51
End: 2021-12-12
Payer: COMMERCIAL

## 2021-12-12 NOTE — H&P (VIEW-ONLY)
[facility] Note    PCP: Andrew Roger MD    Chief Complaint: kidney stones    SUBJECTIVE:       History of Present Illness:  Dennis Aldana is a 51 y.o. female who presents to clinic for kidney stones. She is New  to our clinic.     Patient has a hx of breast cancer for which she underwent a CT scan in August showing a 13 mm right renal pelvis stone with no hydro. The right kidney enhances however is severely atrophic. There is a small focus of air within the collecting system.     Has had multiple stones in the past, but passed them all. Has had a 24 hour urine.     She had a urethral diverticulectomy with Dr. Cohen in March. However she states this has returned. On CT they stated she had a bartholin duct cyst however she says she feels as though this is a recurrence of her diverticula.    She has hematuria. No pain however intercourse is difficult. No issues with recurrent UTIs.     UA today: +leuk and blood, nitrite negative   PVR today: 10 cc (catheterized)    Lab Results   Component Value Date    CREATININE 0.8 11/12/2021     Family  hx: no malignancy     Past medical, family, and social history reviewed as documented in chart with pertinent positive medical, family, and social history detailed in HPI.    Review of patient's allergies indicates:  No Known Allergies    Past Medical History:   Diagnosis Date    Abnormal CT of liver 12/21/2020    Abnormal CT of the abdomen 12/21/2020    Anemia     Anxiety     Arthritis     Breast cancer 2018    left w/ radiation    COVID-19 virus infection 9/6/2021    Depression     Diabetes mellitus     ER+ (estrogen receptor positive status) 8/16/2020    Essential hypertension, benign 1/3/2018    Excessive menstruation 8/16/2020    Hypertension     Kidney stone     Liver mass     Malignant neoplasm of left breast in female, estrogen receptor positive 4/11/2018    S/p lumpectomy and radiation, discharge May 2018    Microcytic  anemia 6/21/2020    Myxoma of skin determined by biopsy (low grade) 1/19/2021    Pneumonia due to COVID-19 virus 9/6/2021    PONV (postoperative nausea and vomiting)     RLS (restless legs syndrome)     S/P lumpectomy, left breast 6/21/2020    Use of tamoxifen (Nolvadex) 6/21/2020     Past Surgical History:   Procedure Laterality Date    BIOPSY N/A 12/29/2020    Procedure: us guided muscle biopsy (latissimus Dorsi);  Surgeon: [facility] Diagnostic Provider;  Location: [facility] OR;  Service: Interventional Radiology;  Laterality: N/A;    BREAST LUMPECTOMY Left 2018    COLONOSCOPY N/A 12/3/2021    Procedure: COLONOSCOPY;  Surgeon: Silvio Montesinos MD;  Location: [facility] ENDO;  Service: Endoscopy;  Laterality: N/A;    CYSTOSCOPY      CYSTOSCOPY N/A 3/22/2021    Procedure: CYSTOSCOPY;  Surgeon: Jannie Contreras MD;  Location: [facility] OR;  Service: OB/GYN;  Laterality: N/A;    EXCISION OF MASS OF BACK N/A 2/22/2021    Procedure: EXCISION, MASS, BACK  LEFT;  Surgeon: Chico Baird MD;  Location: [address];  Service: General;  Laterality: N/A;  Specimen sent to pathology via  for research     EXCISION OF MASS OF BACK  02/22/2021    KNEE SURGERY      ROBOT-ASSISTED LAPAROSCOPIC HYSTERECTOMY N/A 3/22/2021    Procedure: ROBOTIC HYSTERECTOMY;  Surgeon: Jannie Contreras MD;  Location: [facility] OR;  Service: OB/GYN;  Laterality: N/A;    ROBOT-ASSISTED LAPAROSCOPIC SALPINGO-OOPHORECTOMY Bilateral 3/22/2021    Procedure: ROBOTIC SALPINGO-OOPHORECTOMY;  Surgeon: Jannie Contreras MD;  Location: [facility] OR;  Service: OB/GYN;  Laterality: Bilateral;    SHOULDER SURGERY      TONSILLECTOMY       Family History   Problem Relation Age of Onset    Hypertension Mother     Diabetes Mother     Diabetes Father     Breast cancer Maternal Grandmother     Cancer Paternal Grandfather     Colon cancer Neg Hx     Ovarian cancer Neg Hx     Glaucoma Neg Hx     Macular degeneration Neg Hx      Social History     Tobacco Use  "   Smoking status: Never Smoker    Smokeless tobacco: Never Used   Substance Use Topics    Alcohol use: Yes     Comment: occ.     Drug use: No        Review of Systems   Genitourinary: Positive for dyspareunia, flank pain, hematuria and vaginal pain. Negative for difficulty urinating, dysuria, frequency and urgency.       OBJECTIVE:     Anticoagulation: no    Estimated body mass index is 33.61 kg/m² as calculated from the following:    Height as of this encounter: 5' 5" (1.651 m).    Weight as of this encounter: 91.6 kg (202 lb).    Vital Signs (Most Recent)  Pulse: 82 (12/13/21 1004)  Resp: 18 (12/13/21 1004)  BP: 133/87 (12/13/21 1004)    Physical Exam  Exam conducted with a chaperone present.   Constitutional:       General: She is not in acute distress.     Appearance: She is not ill-appearing, toxic-appearing or diaphoretic.   HENT:      Head: Normocephalic and atraumatic.   Cardiovascular:      Rate and Rhythm: Normal rate and regular rhythm.   Genitourinary:     Exam position: Lithotomy position.      Comments: There is swelling and tenderness along the length of the urethra. Suspect recurrence of her diverticulum. No definite vaginal cysts seen. Pain on placement of catheter.   Neurological:      General: No focal deficit present.      Mental Status: She is oriented to person, place, and time.         BMP  Lab Results   Component Value Date     11/12/2021    K 4.0 11/12/2021     11/12/2021    CO2 28 11/12/2021    BUN 14 11/12/2021    CREATININE 0.8 11/12/2021    CALCIUM 9.3 11/12/2021    ANIONGAP 10 11/12/2021    ESTGFRAFRICA >60.0 11/12/2021    EGFRNONAA >60.0 11/12/2021       Lab Results   Component Value Date    WBC 10.16 11/12/2021    HGB 13.6 11/12/2021    HCT 41.7 11/12/2021    MCV 88 11/12/2021     11/12/2021       Imaging:  Per HPI    ASSESSMENT     1. Kidney stone    2. Urethral diverticulum    3. Pre-op testing    4. Hyperlipidemia, unspecified hyperlipidemia type    5. " Malignant neoplasm of left breast in female, estrogen receptor positive, unspecified site of breast    6. Type 2 diabetes mellitus with hyperglycemia, without long-term current use of insulin        PLAN:     - Suspect that her diverticula has recurred, but will obtain pelvic MRI to better detail anatomy  - If this is confirmed - we discussed referral to Dr. Greer for repeat surgery  - For her stones we discussed that her right kidney is atrophic however I do not suspect that it is non-functional   - Recommended URS to remove the stone and relieve obstruction. She is in agreement with this.   - I have explained the indication, risks, benefits, and alternatives of the procedure in detail.  Risks including but not limited to bleeding, infection, injury to the urethra, bladder, ureter, need for additional treatments, inability or incomplete removal of kidney stones, pain, and discomfort related to the stent were discussed in depth with the patient.  The patient voices understanding and all questions have been answered.  The patient agrees to proceed as planned with right ureteroscopy, laser lithotripsy, and ureteral stent placement.  - Urine sent for culture. She had air on CT from August. If has a UTI will need stent placement first. However otherwise, will plan for URS on 1/7/22.        Esthela Lin MD

## 2021-12-13 ENCOUNTER — OFFICE VISIT (OUTPATIENT)
Dept: UROLOGY | Facility: CLINIC | Age: 51
End: 2021-12-13
Payer: COMMERCIAL

## 2021-12-13 VITALS
RESPIRATION RATE: 18 BRPM | BODY MASS INDEX: 33.66 KG/M2 | SYSTOLIC BLOOD PRESSURE: 133 MMHG | HEIGHT: 65 IN | WEIGHT: 202 LBS | HEART RATE: 82 BPM | DIASTOLIC BLOOD PRESSURE: 87 MMHG

## 2021-12-13 DIAGNOSIS — Z01.818 PRE-OP TESTING: ICD-10-CM

## 2021-12-13 DIAGNOSIS — N20.0 KIDNEY STONE: Primary | ICD-10-CM

## 2021-12-13 DIAGNOSIS — E11.65 TYPE 2 DIABETES MELLITUS WITH HYPERGLYCEMIA, WITHOUT LONG-TERM CURRENT USE OF INSULIN: ICD-10-CM

## 2021-12-13 DIAGNOSIS — C50.912 MALIGNANT NEOPLASM OF LEFT BREAST IN FEMALE, ESTROGEN RECEPTOR POSITIVE, UNSPECIFIED SITE OF BREAST: ICD-10-CM

## 2021-12-13 DIAGNOSIS — E78.5 HYPERLIPIDEMIA, UNSPECIFIED HYPERLIPIDEMIA TYPE: ICD-10-CM

## 2021-12-13 DIAGNOSIS — N36.1 URETHRAL DIVERTICULUM: ICD-10-CM

## 2021-12-13 DIAGNOSIS — Z17.0 MALIGNANT NEOPLASM OF LEFT BREAST IN FEMALE, ESTROGEN RECEPTOR POSITIVE, UNSPECIFIED SITE OF BREAST: ICD-10-CM

## 2021-12-13 LAB
BILIRUB SERPL-MCNC: ABNORMAL MG/DL
BLOOD URINE, POC: ABNORMAL
CLARITY, POC UA: CLEAR
COLOR, POC UA: YELLOW
GLUCOSE UR QL STRIP: ABNORMAL
KETONES UR QL STRIP: ABNORMAL
LEUKOCYTE ESTERASE URINE, POC: ABNORMAL
NITRITE, POC UA: ABNORMAL
PH, POC UA: 7
PROTEIN, POC: 30
SPECIFIC GRAVITY, POC UA: 1.02
UROBILINOGEN, POC UA: 0.2

## 2021-12-13 PROCEDURE — 99204 PR OFFICE/OUTPT VISIT, NEW, LEVL IV, 45-59 MIN: ICD-10-PCS | Mod: 25,S$GLB,, | Performed by: STUDENT IN AN ORGANIZED HEALTH CARE EDUCATION/TRAINING PROGRAM

## 2021-12-13 PROCEDURE — 3060F PR POS MICROALBUMINURIA RESULT DOCUMENTED/REVIEW: ICD-10-PCS | Mod: CPTII,S$GLB,, | Performed by: STUDENT IN AN ORGANIZED HEALTH CARE EDUCATION/TRAINING PROGRAM

## 2021-12-13 PROCEDURE — 81002 URINALYSIS NONAUTO W/O SCOPE: CPT | Mod: S$GLB,,, | Performed by: STUDENT IN AN ORGANIZED HEALTH CARE EDUCATION/TRAINING PROGRAM

## 2021-12-13 PROCEDURE — 3072F LOW RISK FOR RETINOPATHY: CPT | Mod: CPTII,S$GLB,, | Performed by: STUDENT IN AN ORGANIZED HEALTH CARE EDUCATION/TRAINING PROGRAM

## 2021-12-13 PROCEDURE — 87088 URINE BACTERIA CULTURE: CPT | Performed by: STUDENT IN AN ORGANIZED HEALTH CARE EDUCATION/TRAINING PROGRAM

## 2021-12-13 PROCEDURE — 51701 INSERT BLADDER CATHETER: CPT | Mod: S$GLB,,, | Performed by: STUDENT IN AN ORGANIZED HEALTH CARE EDUCATION/TRAINING PROGRAM

## 2021-12-13 PROCEDURE — 87077 CULTURE AEROBIC IDENTIFY: CPT | Performed by: STUDENT IN AN ORGANIZED HEALTH CARE EDUCATION/TRAINING PROGRAM

## 2021-12-13 PROCEDURE — 4010F ACE/ARB THERAPY RXD/TAKEN: CPT | Mod: CPTII,S$GLB,, | Performed by: STUDENT IN AN ORGANIZED HEALTH CARE EDUCATION/TRAINING PROGRAM

## 2021-12-13 PROCEDURE — 81002 POCT URINE DIPSTICK WITHOUT MICROSCOPE: ICD-10-PCS | Mod: S$GLB,,, | Performed by: STUDENT IN AN ORGANIZED HEALTH CARE EDUCATION/TRAINING PROGRAM

## 2021-12-13 PROCEDURE — 99999 PR PBB SHADOW E&M-EST. PATIENT-LVL V: ICD-10-PCS | Mod: PBBFAC,,, | Performed by: STUDENT IN AN ORGANIZED HEALTH CARE EDUCATION/TRAINING PROGRAM

## 2021-12-13 PROCEDURE — 99999 PR PBB SHADOW E&M-EST. PATIENT-LVL V: CPT | Mod: PBBFAC,,, | Performed by: STUDENT IN AN ORGANIZED HEALTH CARE EDUCATION/TRAINING PROGRAM

## 2021-12-13 PROCEDURE — 3066F NEPHROPATHY DOC TX: CPT | Mod: CPTII,S$GLB,, | Performed by: STUDENT IN AN ORGANIZED HEALTH CARE EDUCATION/TRAINING PROGRAM

## 2021-12-13 PROCEDURE — 99204 OFFICE O/P NEW MOD 45 MIN: CPT | Mod: 25,S$GLB,, | Performed by: STUDENT IN AN ORGANIZED HEALTH CARE EDUCATION/TRAINING PROGRAM

## 2021-12-13 PROCEDURE — 87086 URINE CULTURE/COLONY COUNT: CPT | Performed by: STUDENT IN AN ORGANIZED HEALTH CARE EDUCATION/TRAINING PROGRAM

## 2021-12-13 PROCEDURE — 4010F PR ACE/ARB THEARPY RXD/TAKEN: ICD-10-PCS | Mod: CPTII,S$GLB,, | Performed by: STUDENT IN AN ORGANIZED HEALTH CARE EDUCATION/TRAINING PROGRAM

## 2021-12-13 PROCEDURE — 3066F PR DOCUMENTATION OF TREATMENT FOR NEPHROPATHY: ICD-10-PCS | Mod: CPTII,S$GLB,, | Performed by: STUDENT IN AN ORGANIZED HEALTH CARE EDUCATION/TRAINING PROGRAM

## 2021-12-13 PROCEDURE — 3072F PR LOW RISK FOR RETINOPATHY: ICD-10-PCS | Mod: CPTII,S$GLB,, | Performed by: STUDENT IN AN ORGANIZED HEALTH CARE EDUCATION/TRAINING PROGRAM

## 2021-12-13 PROCEDURE — 51701 PR INSERTION OF NON-INDWELLING BLADDER CATHETERIZATION FOR RESIDUAL UR: ICD-10-PCS | Mod: S$GLB,,, | Performed by: STUDENT IN AN ORGANIZED HEALTH CARE EDUCATION/TRAINING PROGRAM

## 2021-12-13 PROCEDURE — 3060F POS MICROALBUMINURIA REV: CPT | Mod: CPTII,S$GLB,, | Performed by: STUDENT IN AN ORGANIZED HEALTH CARE EDUCATION/TRAINING PROGRAM

## 2021-12-13 PROCEDURE — 87186 SC STD MICRODIL/AGAR DIL: CPT | Performed by: STUDENT IN AN ORGANIZED HEALTH CARE EDUCATION/TRAINING PROGRAM

## 2021-12-15 DIAGNOSIS — N20.0 KIDNEY STONE: Primary | ICD-10-CM

## 2021-12-15 LAB — BACTERIA UR CULT: ABNORMAL

## 2021-12-15 RX ORDER — SULFAMETHOXAZOLE AND TRIMETHOPRIM 800; 160 MG/1; MG/1
1 TABLET ORAL 2 TIMES DAILY
Qty: 14 TABLET | Refills: 0 | Status: SHIPPED | OUTPATIENT
Start: 2021-12-15 | End: 2021-12-22

## 2021-12-16 ENCOUNTER — ANESTHESIA EVENT (OUTPATIENT)
Dept: SURGERY | Facility: HOSPITAL | Age: 51
End: 2021-12-16
Payer: COMMERCIAL

## 2021-12-17 ENCOUNTER — HOSPITAL ENCOUNTER (OUTPATIENT)
Facility: HOSPITAL | Age: 51
Discharge: HOME | End: 2021-12-17
Attending: STUDENT IN AN ORGANIZED HEALTH CARE EDUCATION/TRAINING PROGRAM | Admitting: STUDENT IN AN ORGANIZED HEALTH CARE EDUCATION/TRAINING PROGRAM
Payer: COMMERCIAL

## 2021-12-17 ENCOUNTER — TELEPHONE (OUTPATIENT)
Dept: UROLOGY | Facility: CLINIC | Age: 51
End: 2021-12-17
Payer: COMMERCIAL

## 2021-12-17 ENCOUNTER — ANESTHESIA (OUTPATIENT)
Dept: SURGERY | Facility: HOSPITAL | Age: 51
End: 2021-12-17
Payer: COMMERCIAL

## 2021-12-17 DIAGNOSIS — N20.0 RIGHT KIDNEY STONE: Primary | ICD-10-CM

## 2021-12-17 LAB — SARS-COV-2 RDRP RESP QL NAA+PROBE: NEGATIVE

## 2021-12-17 PROCEDURE — 63600175 PHARM REV CODE 636 W HCPCS: Performed by: STUDENT IN AN ORGANIZED HEALTH CARE EDUCATION/TRAINING PROGRAM

## 2021-12-17 PROCEDURE — 36000706: Performed by: STUDENT IN AN ORGANIZED HEALTH CARE EDUCATION/TRAINING PROGRAM

## 2021-12-17 PROCEDURE — C1769 GUIDE WIRE: HCPCS | Performed by: STUDENT IN AN ORGANIZED HEALTH CARE EDUCATION/TRAINING PROGRAM

## 2021-12-17 PROCEDURE — 99900104 DSU ONLY-NO CHARGE-EA ADD'L HR (STAT): Performed by: STUDENT IN AN ORGANIZED HEALTH CARE EDUCATION/TRAINING PROGRAM

## 2021-12-17 PROCEDURE — 25000003 PHARM REV CODE 250: Performed by: NURSE ANESTHETIST, CERTIFIED REGISTERED

## 2021-12-17 PROCEDURE — 37000009 HC ANESTHESIA EA ADD 15 MINS: Performed by: STUDENT IN AN ORGANIZED HEALTH CARE EDUCATION/TRAINING PROGRAM

## 2021-12-17 PROCEDURE — 71000039 HC RECOVERY, EACH ADD'L HOUR: Performed by: STUDENT IN AN ORGANIZED HEALTH CARE EDUCATION/TRAINING PROGRAM

## 2021-12-17 PROCEDURE — 63600175 PHARM REV CODE 636 W HCPCS: Performed by: NURSE ANESTHETIST, CERTIFIED REGISTERED

## 2021-12-17 PROCEDURE — 25000003 PHARM REV CODE 250: Performed by: ANESTHESIOLOGY

## 2021-12-17 PROCEDURE — 99900103 DSU ONLY-NO CHARGE-INITIAL HR (STAT): Performed by: STUDENT IN AN ORGANIZED HEALTH CARE EDUCATION/TRAINING PROGRAM

## 2021-12-17 PROCEDURE — D9220A PRA ANESTHESIA: ICD-10-PCS | Mod: ANES,,, | Performed by: ANESTHESIOLOGY

## 2021-12-17 PROCEDURE — 71000033 HC RECOVERY, INTIAL HOUR: Performed by: STUDENT IN AN ORGANIZED HEALTH CARE EDUCATION/TRAINING PROGRAM

## 2021-12-17 PROCEDURE — D9220A PRA ANESTHESIA: Mod: ANES,,, | Performed by: ANESTHESIOLOGY

## 2021-12-17 PROCEDURE — D9220A PRA ANESTHESIA: Mod: CRNA,,, | Performed by: NURSE ANESTHETIST, CERTIFIED REGISTERED

## 2021-12-17 PROCEDURE — 71000015 HC POSTOP RECOV 1ST HR: Performed by: STUDENT IN AN ORGANIZED HEALTH CARE EDUCATION/TRAINING PROGRAM

## 2021-12-17 PROCEDURE — 37000008 HC ANESTHESIA 1ST 15 MINUTES: Performed by: STUDENT IN AN ORGANIZED HEALTH CARE EDUCATION/TRAINING PROGRAM

## 2021-12-17 PROCEDURE — 52332 CYSTOSCOPY AND TREATMENT: CPT | Mod: RT,,, | Performed by: STUDENT IN AN ORGANIZED HEALTH CARE EDUCATION/TRAINING PROGRAM

## 2021-12-17 PROCEDURE — 52332 PR CYSTOSCOPY,INSERT URETERAL STENT: ICD-10-PCS | Mod: RT,,, | Performed by: STUDENT IN AN ORGANIZED HEALTH CARE EDUCATION/TRAINING PROGRAM

## 2021-12-17 PROCEDURE — D9220A PRA ANESTHESIA: ICD-10-PCS | Mod: CRNA,,, | Performed by: NURSE ANESTHETIST, CERTIFIED REGISTERED

## 2021-12-17 PROCEDURE — 36000707: Performed by: STUDENT IN AN ORGANIZED HEALTH CARE EDUCATION/TRAINING PROGRAM

## 2021-12-17 PROCEDURE — U0002 COVID-19 LAB TEST NON-CDC: HCPCS | Performed by: STUDENT IN AN ORGANIZED HEALTH CARE EDUCATION/TRAINING PROGRAM

## 2021-12-17 PROCEDURE — C2617 STENT, NON-COR, TEM W/O DEL: HCPCS | Performed by: STUDENT IN AN ORGANIZED HEALTH CARE EDUCATION/TRAINING PROGRAM

## 2021-12-17 PROCEDURE — 27200651 HC AIRWAY, LMA: Performed by: ANESTHESIOLOGY

## 2021-12-17 DEVICE — STENT URETERAL UNIV 6FR 26CM: Type: IMPLANTABLE DEVICE | Site: URETER | Status: FUNCTIONAL

## 2021-12-17 RX ORDER — LIDOCAINE HYDROCHLORIDE 10 MG/ML
1 INJECTION, SOLUTION EPIDURAL; INFILTRATION; INTRACAUDAL; PERINEURAL ONCE
Status: DISCONTINUED | OUTPATIENT
Start: 2021-12-17 | End: 2023-08-11

## 2021-12-17 RX ORDER — HYDROCODONE BITARTRATE AND ACETAMINOPHEN 5; 325 MG/1; MG/1
1 TABLET ORAL EVERY 4 HOURS PRN
Status: DISCONTINUED | OUTPATIENT
Start: 2021-12-17 | End: 2021-12-17 | Stop reason: HOSPADM

## 2021-12-17 RX ORDER — LIDOCAINE HYDROCHLORIDE 10 MG/ML
1 INJECTION, SOLUTION EPIDURAL; INFILTRATION; INTRACAUDAL; PERINEURAL ONCE AS NEEDED
Status: DISCONTINUED | OUTPATIENT
Start: 2021-12-17 | End: 2023-08-11

## 2021-12-17 RX ORDER — FENTANYL CITRATE 50 UG/ML
25 INJECTION, SOLUTION INTRAMUSCULAR; INTRAVENOUS EVERY 5 MIN PRN
Status: DISCONTINUED | OUTPATIENT
Start: 2021-12-17 | End: 2022-01-07 | Stop reason: SDUPTHER

## 2021-12-17 RX ORDER — CEFAZOLIN SODIUM 2 G/50ML
2 SOLUTION INTRAVENOUS
Status: COMPLETED | OUTPATIENT
Start: 2021-12-17 | End: 2021-12-17

## 2021-12-17 RX ORDER — TAMSULOSIN HYDROCHLORIDE 0.4 MG/1
0.4 CAPSULE ORAL DAILY
Qty: 30 CAPSULE | Refills: 3 | Status: SHIPPED | OUTPATIENT
Start: 2021-12-17 | End: 2022-02-14

## 2021-12-17 RX ORDER — OXYCODONE HYDROCHLORIDE 5 MG/1
5 TABLET ORAL
Status: DISCONTINUED | OUTPATIENT
Start: 2021-12-17 | End: 2022-01-07 | Stop reason: SDUPTHER

## 2021-12-17 RX ORDER — MIDAZOLAM HYDROCHLORIDE 1 MG/ML
INJECTION INTRAMUSCULAR; INTRAVENOUS
Status: DISCONTINUED | OUTPATIENT
Start: 2021-12-17 | End: 2021-12-17

## 2021-12-17 RX ORDER — FENTANYL CITRATE 50 UG/ML
INJECTION, SOLUTION INTRAMUSCULAR; INTRAVENOUS
Status: DISCONTINUED | OUTPATIENT
Start: 2021-12-17 | End: 2021-12-17

## 2021-12-17 RX ORDER — HYDROMORPHONE HYDROCHLORIDE 2 MG/ML
0.2 INJECTION, SOLUTION INTRAMUSCULAR; INTRAVENOUS; SUBCUTANEOUS EVERY 5 MIN PRN
Status: DISCONTINUED | OUTPATIENT
Start: 2021-12-17 | End: 2022-01-07 | Stop reason: SDUPTHER

## 2021-12-17 RX ORDER — SCOLOPAMINE TRANSDERMAL SYSTEM 1 MG/1
1 PATCH, EXTENDED RELEASE TRANSDERMAL
Status: DISCONTINUED | OUTPATIENT
Start: 2021-12-17 | End: 2023-08-11

## 2021-12-17 RX ORDER — PHENAZOPYRIDINE HYDROCHLORIDE 100 MG/1
100 TABLET, FILM COATED ORAL 3 TIMES DAILY PRN
Qty: 30 TABLET | Refills: 3 | Status: SHIPPED | OUTPATIENT
Start: 2021-12-17 | End: 2021-12-27

## 2021-12-17 RX ORDER — ONDANSETRON 4 MG/1
8 TABLET, ORALLY DISINTEGRATING ORAL EVERY 8 HOURS PRN
Status: DISCONTINUED | OUTPATIENT
Start: 2021-12-17 | End: 2021-12-17 | Stop reason: HOSPADM

## 2021-12-17 RX ORDER — PROPOFOL 10 MG/ML
VIAL (ML) INTRAVENOUS
Status: DISCONTINUED | OUTPATIENT
Start: 2021-12-17 | End: 2021-12-17

## 2021-12-17 RX ORDER — ONDANSETRON HYDROCHLORIDE 2 MG/ML
INJECTION, SOLUTION INTRAMUSCULAR; INTRAVENOUS
Status: DISCONTINUED | OUTPATIENT
Start: 2021-12-17 | End: 2021-12-17

## 2021-12-17 RX ORDER — DEXAMETHASONE SODIUM PHOSPHATE 4 MG/ML
INJECTION, SOLUTION INTRA-ARTICULAR; INTRALESIONAL; INTRAMUSCULAR; INTRAVENOUS; SOFT TISSUE
Status: DISCONTINUED | OUTPATIENT
Start: 2021-12-17 | End: 2021-12-17

## 2021-12-17 RX ORDER — LIDOCAINE HCL/PF 100 MG/5ML
SYRINGE (ML) INTRAVENOUS
Status: DISCONTINUED | OUTPATIENT
Start: 2021-12-17 | End: 2021-12-17

## 2021-12-17 RX ADMIN — MIDAZOLAM HYDROCHLORIDE 2 MG: 1 INJECTION, SOLUTION INTRAMUSCULAR; INTRAVENOUS at 12:12

## 2021-12-17 RX ADMIN — LIDOCAINE HYDROCHLORIDE 100 MG: 20 INJECTION INTRAVENOUS at 01:12

## 2021-12-17 RX ADMIN — OXYCODONE 5 MG: 5 TABLET ORAL at 01:12

## 2021-12-17 RX ADMIN — FENTANYL CITRATE 50 MCG: 50 INJECTION, SOLUTION INTRAMUSCULAR; INTRAVENOUS at 01:12

## 2021-12-17 RX ADMIN — CEFAZOLIN SODIUM 2 G: 2 SOLUTION INTRAVENOUS at 01:12

## 2021-12-17 RX ADMIN — PROPOFOL 200 MG: 10 INJECTION, EMULSION INTRAVENOUS at 01:12

## 2021-12-17 RX ADMIN — SCOPALAMINE 1 PATCH: 1 PATCH, EXTENDED RELEASE TRANSDERMAL at 10:12

## 2021-12-17 RX ADMIN — DEXAMETHASONE SODIUM PHOSPHATE 4 MG: 4 INJECTION, SOLUTION INTRA-ARTICULAR; INTRALESIONAL; INTRAMUSCULAR; INTRAVENOUS; SOFT TISSUE at 01:12

## 2021-12-17 RX ADMIN — SODIUM CHLORIDE, SODIUM GLUCONATE, SODIUM ACETATE, POTASSIUM CHLORIDE, MAGNESIUM CHLORIDE, SODIUM PHOSPHATE, DIBASIC, AND POTASSIUM PHOSPHATE: .53; .5; .37; .037; .03; .012; .00082 INJECTION, SOLUTION INTRAVENOUS at 10:12

## 2021-12-17 RX ADMIN — ONDANSETRON 4 MG: 2 INJECTION, SOLUTION INTRAMUSCULAR; INTRAVENOUS at 01:12

## 2021-12-20 ENCOUNTER — TELEPHONE (OUTPATIENT)
Dept: UROLOGY | Facility: CLINIC | Age: 51
End: 2021-12-20
Payer: COMMERCIAL

## 2021-12-20 VITALS
OXYGEN SATURATION: 96 % | RESPIRATION RATE: 16 BRPM | HEART RATE: 90 BPM | SYSTOLIC BLOOD PRESSURE: 125 MMHG | TEMPERATURE: 98 F | DIASTOLIC BLOOD PRESSURE: 57 MMHG

## 2021-12-28 ENCOUNTER — HOSPITAL ENCOUNTER (OUTPATIENT)
Dept: RADIOLOGY | Facility: HOSPITAL | Age: 51
Discharge: HOME | End: 2021-12-28
Attending: STUDENT IN AN ORGANIZED HEALTH CARE EDUCATION/TRAINING PROGRAM
Payer: COMMERCIAL

## 2021-12-28 DIAGNOSIS — N20.0 KIDNEY STONE: ICD-10-CM

## 2021-12-28 PROCEDURE — 72197 MRI PELVIS W/O & W/DYE: CPT | Mod: 26,,, | Performed by: RADIOLOGY

## 2021-12-28 PROCEDURE — 72197 MRI PELVIS W/O & W/DYE: CPT | Mod: TC

## 2021-12-28 PROCEDURE — 25500020 PHARM REV CODE 255: Performed by: STUDENT IN AN ORGANIZED HEALTH CARE EDUCATION/TRAINING PROGRAM

## 2021-12-28 PROCEDURE — 72197 MRI FEMALE PELVIS W W/O CONTRAST: ICD-10-PCS | Mod: 26,,, | Performed by: RADIOLOGY

## 2021-12-28 PROCEDURE — A9585 GADOBUTROL INJECTION: HCPCS | Performed by: STUDENT IN AN ORGANIZED HEALTH CARE EDUCATION/TRAINING PROGRAM

## 2021-12-28 RX ORDER — GADOBUTROL 604.72 MG/ML
9 INJECTION INTRAVENOUS
Status: COMPLETED | OUTPATIENT
Start: 2021-12-28 | End: 2021-12-28

## 2021-12-28 RX ADMIN — GADOBUTROL 9 ML: 604.72 INJECTION INTRAVENOUS at 06:12

## 2021-12-29 DIAGNOSIS — N36.1 URETHRAL DIVERTICULUM: Primary | ICD-10-CM

## 2021-12-30 ENCOUNTER — LAB VISIT (OUTPATIENT)
Dept: LAB | Facility: HOSPITAL | Age: 51
End: 2021-12-30
Attending: STUDENT IN AN ORGANIZED HEALTH CARE EDUCATION/TRAINING PROGRAM
Payer: COMMERCIAL

## 2021-12-30 DIAGNOSIS — N20.0 RIGHT KIDNEY STONE: ICD-10-CM

## 2021-12-30 PROCEDURE — 87077 CULTURE AEROBIC IDENTIFY: CPT | Performed by: STUDENT IN AN ORGANIZED HEALTH CARE EDUCATION/TRAINING PROGRAM

## 2021-12-30 PROCEDURE — 87086 URINE CULTURE/COLONY COUNT: CPT | Performed by: STUDENT IN AN ORGANIZED HEALTH CARE EDUCATION/TRAINING PROGRAM

## 2021-12-30 PROCEDURE — 87088 URINE BACTERIA CULTURE: CPT | Performed by: STUDENT IN AN ORGANIZED HEALTH CARE EDUCATION/TRAINING PROGRAM

## 2021-12-30 PROCEDURE — 87186 SC STD MICRODIL/AGAR DIL: CPT | Performed by: STUDENT IN AN ORGANIZED HEALTH CARE EDUCATION/TRAINING PROGRAM

## 2022-01-01 ENCOUNTER — PATIENT MESSAGE (OUTPATIENT)
Dept: SURGERY | Facility: HOSPITAL | Age: 52
End: 2022-01-01
Payer: COMMERCIAL

## 2022-01-01 RX ORDER — SULFAMETHOXAZOLE AND TRIMETHOPRIM 800; 160 MG/1; MG/1
1 TABLET ORAL 2 TIMES DAILY
Qty: 14 TABLET | Refills: 0 | Status: SHIPPED | OUTPATIENT
Start: 2022-01-01 | End: 2022-01-08

## 2022-01-03 PROBLEM — N20.0 RENAL STONE: Status: ACTIVE | Noted: 2022-01-03

## 2022-01-03 LAB — BACTERIA UR CULT: ABNORMAL

## 2022-01-04 ENCOUNTER — TELEPHONE (OUTPATIENT)
Dept: UROLOGY | Facility: CLINIC | Age: 52
End: 2022-01-04
Payer: COMMERCIAL

## 2022-01-04 ENCOUNTER — LAB VISIT (OUTPATIENT)
Dept: PRIMARY CARE CLINIC | Facility: CLINIC | Age: 52
End: 2022-01-04
Payer: COMMERCIAL

## 2022-01-04 DIAGNOSIS — Z01.818 PRE-OP TESTING: ICD-10-CM

## 2022-01-04 PROCEDURE — U0005 INFEC AGEN DETEC AMPLI PROBE: HCPCS | Performed by: STUDENT IN AN ORGANIZED HEALTH CARE EDUCATION/TRAINING PROGRAM

## 2022-01-04 PROCEDURE — U0003 INFECTIOUS AGENT DETECTION BY NUCLEIC ACID (DNA OR RNA); SEVERE ACUTE RESPIRATORY SYNDROME CORONAVIRUS 2 (SARS-COV-2) (CORONAVIRUS DISEASE [COVID-19]), AMPLIFIED PROBE TECHNIQUE, MAKING USE OF HIGH THROUGHPUT TECHNOLOGIES AS DESCRIBED BY CMS-2020-01-R: HCPCS | Performed by: STUDENT IN AN ORGANIZED HEALTH CARE EDUCATION/TRAINING PROGRAM

## 2022-01-04 NOTE — PROGRESS NOTES
08-May-2017 Pt presented for Pre-procedure drive thru testing. Patient identified using two patient identifiers prior to specimen collection. Questions answered prior to departure and education given.

## 2022-01-04 NOTE — TELEPHONE ENCOUNTER
----- Message from Anali Greer MD sent at 1/3/2022  5:54 PM CST -----  Please reach out to the patient for an appointment.  Thanks. Please have her get the op notes and bring them with her (not have them faxed.)  thanks.   ----- Message -----  From: Esthela Lin MD  Sent: 12/29/2021  11:56 AM CST  To: Anali Greer MD, [facility] Staff    Hey!    Sending this patient to you - had a urethral diverticulectomy with Noel and now has a recurrence.     Esthela

## 2022-01-05 LAB — SARS-COV-2 RNA RESP QL NAA+PROBE: NOT DETECTED

## 2022-01-06 ENCOUNTER — ANESTHESIA EVENT (OUTPATIENT)
Dept: SURGERY | Facility: HOSPITAL | Age: 52
End: 2022-01-06
Payer: COMMERCIAL

## 2022-01-07 ENCOUNTER — ANESTHESIA (OUTPATIENT)
Dept: SURGERY | Facility: HOSPITAL | Age: 52
End: 2022-01-07
Payer: COMMERCIAL

## 2022-01-07 ENCOUNTER — HOSPITAL ENCOUNTER (OUTPATIENT)
Facility: HOSPITAL | Age: 52
Discharge: HOME | End: 2022-01-07
Attending: STUDENT IN AN ORGANIZED HEALTH CARE EDUCATION/TRAINING PROGRAM | Admitting: STUDENT IN AN ORGANIZED HEALTH CARE EDUCATION/TRAINING PROGRAM
Payer: COMMERCIAL

## 2022-01-07 DIAGNOSIS — N20.0 RENAL STONE: Primary | ICD-10-CM

## 2022-01-07 PROCEDURE — 99900103 DSU ONLY-NO CHARGE-INITIAL HR (STAT): Performed by: STUDENT IN AN ORGANIZED HEALTH CARE EDUCATION/TRAINING PROGRAM

## 2022-01-07 PROCEDURE — D9220A PRA ANESTHESIA: ICD-10-PCS | Mod: CRNA,,, | Performed by: NURSE ANESTHETIST, CERTIFIED REGISTERED

## 2022-01-07 PROCEDURE — 71000039 HC RECOVERY, EACH ADD'L HOUR: Performed by: STUDENT IN AN ORGANIZED HEALTH CARE EDUCATION/TRAINING PROGRAM

## 2022-01-07 PROCEDURE — 36000707: Performed by: STUDENT IN AN ORGANIZED HEALTH CARE EDUCATION/TRAINING PROGRAM

## 2022-01-07 PROCEDURE — 52356 CYSTO/URETERO W/LITHOTRIPSY: CPT | Mod: RT,,, | Performed by: STUDENT IN AN ORGANIZED HEALTH CARE EDUCATION/TRAINING PROGRAM

## 2022-01-07 PROCEDURE — 27200651 HC AIRWAY, LMA: Performed by: ANESTHESIOLOGY

## 2022-01-07 PROCEDURE — 82365 CALCULUS SPECTROSCOPY: CPT | Performed by: STUDENT IN AN ORGANIZED HEALTH CARE EDUCATION/TRAINING PROGRAM

## 2022-01-07 PROCEDURE — 63600175 PHARM REV CODE 636 W HCPCS: Performed by: ANESTHESIOLOGY

## 2022-01-07 PROCEDURE — C1769 GUIDE WIRE: HCPCS | Performed by: STUDENT IN AN ORGANIZED HEALTH CARE EDUCATION/TRAINING PROGRAM

## 2022-01-07 PROCEDURE — 25000003 PHARM REV CODE 250: Performed by: ANESTHESIOLOGY

## 2022-01-07 PROCEDURE — 71000033 HC RECOVERY, INTIAL HOUR: Performed by: STUDENT IN AN ORGANIZED HEALTH CARE EDUCATION/TRAINING PROGRAM

## 2022-01-07 PROCEDURE — D9220A PRA ANESTHESIA: ICD-10-PCS | Mod: ANES,,, | Performed by: ANESTHESIOLOGY

## 2022-01-07 PROCEDURE — 63600175 PHARM REV CODE 636 W HCPCS: Performed by: STUDENT IN AN ORGANIZED HEALTH CARE EDUCATION/TRAINING PROGRAM

## 2022-01-07 PROCEDURE — 52356 PR CYSTO/URETERO W/LITHOTRIPSY: ICD-10-PCS | Mod: RT,,, | Performed by: STUDENT IN AN ORGANIZED HEALTH CARE EDUCATION/TRAINING PROGRAM

## 2022-01-07 PROCEDURE — 63600175 PHARM REV CODE 636 W HCPCS: Performed by: NURSE ANESTHETIST, CERTIFIED REGISTERED

## 2022-01-07 PROCEDURE — 71000015 HC POSTOP RECOV 1ST HR: Performed by: STUDENT IN AN ORGANIZED HEALTH CARE EDUCATION/TRAINING PROGRAM

## 2022-01-07 PROCEDURE — 99900104 DSU ONLY-NO CHARGE-EA ADD'L HR (STAT): Performed by: STUDENT IN AN ORGANIZED HEALTH CARE EDUCATION/TRAINING PROGRAM

## 2022-01-07 PROCEDURE — D9220A PRA ANESTHESIA: Mod: CRNA,,, | Performed by: NURSE ANESTHETIST, CERTIFIED REGISTERED

## 2022-01-07 PROCEDURE — C1894 INTRO/SHEATH, NON-LASER: HCPCS | Performed by: STUDENT IN AN ORGANIZED HEALTH CARE EDUCATION/TRAINING PROGRAM

## 2022-01-07 PROCEDURE — 25500020 PHARM REV CODE 255: Performed by: STUDENT IN AN ORGANIZED HEALTH CARE EDUCATION/TRAINING PROGRAM

## 2022-01-07 PROCEDURE — 37000009 HC ANESTHESIA EA ADD 15 MINS: Performed by: STUDENT IN AN ORGANIZED HEALTH CARE EDUCATION/TRAINING PROGRAM

## 2022-01-07 PROCEDURE — 36000706: Performed by: STUDENT IN AN ORGANIZED HEALTH CARE EDUCATION/TRAINING PROGRAM

## 2022-01-07 PROCEDURE — D9220A PRA ANESTHESIA: Mod: ANES,,, | Performed by: ANESTHESIOLOGY

## 2022-01-07 PROCEDURE — 25000003 PHARM REV CODE 250: Performed by: NURSE ANESTHETIST, CERTIFIED REGISTERED

## 2022-01-07 PROCEDURE — C2617 STENT, NON-COR, TEM W/O DEL: HCPCS | Performed by: STUDENT IN AN ORGANIZED HEALTH CARE EDUCATION/TRAINING PROGRAM

## 2022-01-07 PROCEDURE — 27201423 OPTIME MED/SURG SUP & DEVICES STERILE SUPPLY: Performed by: STUDENT IN AN ORGANIZED HEALTH CARE EDUCATION/TRAINING PROGRAM

## 2022-01-07 PROCEDURE — 37000008 HC ANESTHESIA 1ST 15 MINUTES: Performed by: STUDENT IN AN ORGANIZED HEALTH CARE EDUCATION/TRAINING PROGRAM

## 2022-01-07 DEVICE — STENT URETERAL UNIV 6FR 26CM: Type: IMPLANTABLE DEVICE | Site: URETER | Status: FUNCTIONAL

## 2022-01-07 RX ORDER — LIDOCAINE HCL/PF 100 MG/5ML
SYRINGE (ML) INTRAVENOUS
Status: DISCONTINUED | OUTPATIENT
Start: 2022-01-07 | End: 2022-01-07

## 2022-01-07 RX ORDER — GENTAMICIN SULFATE 100 MG/100ML
INJECTION, SOLUTION INTRAVENOUS
Status: DISCONTINUED | OUTPATIENT
Start: 2022-01-07 | End: 2022-01-07

## 2022-01-07 RX ORDER — ONDANSETRON HYDROCHLORIDE 2 MG/ML
INJECTION, SOLUTION INTRAMUSCULAR; INTRAVENOUS
Status: DISCONTINUED | OUTPATIENT
Start: 2022-01-07 | End: 2022-01-07

## 2022-01-07 RX ORDER — PROPOFOL 10 MG/ML
VIAL (ML) INTRAVENOUS
Status: DISCONTINUED | OUTPATIENT
Start: 2022-01-07 | End: 2022-01-07

## 2022-01-07 RX ORDER — ONDANSETRON 4 MG/1
8 TABLET, ORALLY DISINTEGRATING ORAL EVERY 8 HOURS PRN
Status: DISCONTINUED | OUTPATIENT
Start: 2022-01-07 | End: 2022-01-07 | Stop reason: HOSPADM

## 2022-01-07 RX ORDER — MIDAZOLAM HYDROCHLORIDE 1 MG/ML
INJECTION INTRAMUSCULAR; INTRAVENOUS
Status: DISCONTINUED | OUTPATIENT
Start: 2022-01-07 | End: 2022-01-07

## 2022-01-07 RX ORDER — HYDROCODONE BITARTRATE AND ACETAMINOPHEN 5; 325 MG/1; MG/1
1 TABLET ORAL EVERY 4 HOURS PRN
Status: DISCONTINUED | OUTPATIENT
Start: 2022-01-07 | End: 2022-01-07 | Stop reason: HOSPADM

## 2022-01-07 RX ORDER — FENTANYL CITRATE 50 UG/ML
25 INJECTION, SOLUTION INTRAMUSCULAR; INTRAVENOUS EVERY 5 MIN PRN
Status: COMPLETED | OUTPATIENT
Start: 2022-01-07 | End: 2022-01-07

## 2022-01-07 RX ORDER — SCOLOPAMINE TRANSDERMAL SYSTEM 1 MG/1
1 PATCH, EXTENDED RELEASE TRANSDERMAL ONCE
Status: DISCONTINUED | OUTPATIENT
Start: 2022-01-07 | End: 2022-01-07 | Stop reason: HOSPADM

## 2022-01-07 RX ORDER — OXYCODONE HYDROCHLORIDE 5 MG/1
5 TABLET ORAL
Status: DISCONTINUED | OUTPATIENT
Start: 2022-01-07 | End: 2022-01-07 | Stop reason: HOSPADM

## 2022-01-07 RX ORDER — CEFAZOLIN SODIUM 2 G/50ML
2 SOLUTION INTRAVENOUS
Status: COMPLETED | OUTPATIENT
Start: 2022-01-07 | End: 2022-01-07

## 2022-01-07 RX ORDER — LIDOCAINE HYDROCHLORIDE 20 MG/ML
JELLY TOPICAL
Status: DISCONTINUED | OUTPATIENT
Start: 2022-01-07 | End: 2022-01-07

## 2022-01-07 RX ORDER — DEXAMETHASONE SODIUM PHOSPHATE 4 MG/ML
INJECTION, SOLUTION INTRA-ARTICULAR; INTRALESIONAL; INTRAMUSCULAR; INTRAVENOUS; SOFT TISSUE
Status: DISCONTINUED | OUTPATIENT
Start: 2022-01-07 | End: 2022-01-07

## 2022-01-07 RX ORDER — PHENAZOPYRIDINE HYDROCHLORIDE 100 MG/1
100 TABLET, FILM COATED ORAL 3 TIMES DAILY PRN
Qty: 30 TABLET | Refills: 3 | Status: SHIPPED | OUTPATIENT
Start: 2022-01-07 | End: 2022-01-17

## 2022-01-07 RX ORDER — FENTANYL CITRATE 50 UG/ML
INJECTION, SOLUTION INTRAMUSCULAR; INTRAVENOUS
Status: DISCONTINUED | OUTPATIENT
Start: 2022-01-07 | End: 2022-01-07

## 2022-01-07 RX ORDER — HYDROMORPHONE HYDROCHLORIDE 2 MG/ML
0.2 INJECTION, SOLUTION INTRAMUSCULAR; INTRAVENOUS; SUBCUTANEOUS EVERY 5 MIN PRN
Status: DISCONTINUED | OUTPATIENT
Start: 2022-01-07 | End: 2022-01-07 | Stop reason: HOSPADM

## 2022-01-07 RX ORDER — KETOROLAC TROMETHAMINE 30 MG/ML
INJECTION, SOLUTION INTRAMUSCULAR; INTRAVENOUS
Status: DISCONTINUED | OUTPATIENT
Start: 2022-01-07 | End: 2022-01-07

## 2022-01-07 RX ORDER — TRAMADOL HYDROCHLORIDE 50 MG/1
50 TABLET ORAL EVERY 4 HOURS PRN
Qty: 5 TABLET | Refills: 0 | Status: SHIPPED | OUTPATIENT
Start: 2022-01-07 | End: 2022-02-16 | Stop reason: CLARIF

## 2022-01-07 RX ORDER — LIDOCAINE HYDROCHLORIDE 10 MG/ML
1 INJECTION, SOLUTION EPIDURAL; INFILTRATION; INTRACAUDAL; PERINEURAL ONCE AS NEEDED
Status: DISCONTINUED | OUTPATIENT
Start: 2022-01-07 | End: 2022-01-07 | Stop reason: HOSPADM

## 2022-01-07 RX ORDER — PHENYLEPHRINE HYDROCHLORIDE 10 MG/ML
INJECTION INTRAVENOUS
Status: DISCONTINUED | OUTPATIENT
Start: 2022-01-07 | End: 2022-01-07

## 2022-01-07 RX ORDER — LIDOCAINE HYDROCHLORIDE 10 MG/ML
1 INJECTION, SOLUTION EPIDURAL; INFILTRATION; INTRACAUDAL; PERINEURAL ONCE
Status: DISCONTINUED | OUTPATIENT
Start: 2022-01-07 | End: 2022-01-07 | Stop reason: HOSPADM

## 2022-01-07 RX ORDER — GENTAMICIN SULFATE 80 MG/100ML
160 INJECTION, SOLUTION INTRAVENOUS ONCE
Status: DISCONTINUED | OUTPATIENT
Start: 2022-01-07 | End: 2022-01-07 | Stop reason: HOSPADM

## 2022-01-07 RX ADMIN — FENTANYL CITRATE 25 MCG: 50 INJECTION INTRAMUSCULAR; INTRAVENOUS at 10:01

## 2022-01-07 RX ADMIN — MIDAZOLAM HYDROCHLORIDE 2 MG: 1 INJECTION, SOLUTION INTRAMUSCULAR; INTRAVENOUS at 07:01

## 2022-01-07 RX ADMIN — PROPOFOL 180 MG: 10 INJECTION, EMULSION INTRAVENOUS at 07:01

## 2022-01-07 RX ADMIN — SODIUM CHLORIDE, SODIUM GLUCONATE, SODIUM ACETATE, POTASSIUM CHLORIDE, MAGNESIUM CHLORIDE, SODIUM PHOSPHATE, DIBASIC, AND POTASSIUM PHOSPHATE: .53; .5; .37; .037; .03; .012; .00082 INJECTION, SOLUTION INTRAVENOUS at 08:01

## 2022-01-07 RX ADMIN — LIDOCAINE HYDROCHLORIDE 2 ML: 20 JELLY TOPICAL at 07:01

## 2022-01-07 RX ADMIN — OXYCODONE HYDROCHLORIDE 5 MG: 5 TABLET ORAL at 09:01

## 2022-01-07 RX ADMIN — FENTANYL CITRATE 100 MCG: 50 INJECTION, SOLUTION INTRAMUSCULAR; INTRAVENOUS at 08:01

## 2022-01-07 RX ADMIN — FENTANYL CITRATE 100 MCG: 50 INJECTION, SOLUTION INTRAMUSCULAR; INTRAVENOUS at 07:01

## 2022-01-07 RX ADMIN — DEXAMETHASONE SODIUM PHOSPHATE 4 MG: 4 INJECTION, SOLUTION INTRA-ARTICULAR; INTRALESIONAL; INTRAMUSCULAR; INTRAVENOUS; SOFT TISSUE at 08:01

## 2022-01-07 RX ADMIN — SCOPALAMINE 1 PATCH: 1 PATCH, EXTENDED RELEASE TRANSDERMAL at 07:01

## 2022-01-07 RX ADMIN — ONDANSETRON 4 MG: 2 INJECTION, SOLUTION INTRAMUSCULAR; INTRAVENOUS at 07:01

## 2022-01-07 RX ADMIN — PHENYLEPHRINE HYDROCHLORIDE 100 MCG: 10 INJECTION INTRAVENOUS at 08:01

## 2022-01-07 RX ADMIN — CEFAZOLIN SODIUM 2 G: 2 SOLUTION INTRAVENOUS at 07:01

## 2022-01-07 RX ADMIN — PHENYLEPHRINE HYDROCHLORIDE 200 MCG: 10 INJECTION INTRAVENOUS at 08:01

## 2022-01-07 RX ADMIN — FENTANYL CITRATE 25 MCG: 50 INJECTION INTRAMUSCULAR; INTRAVENOUS at 09:01

## 2022-01-07 RX ADMIN — SODIUM CHLORIDE, SODIUM GLUCONATE, SODIUM ACETATE, POTASSIUM CHLORIDE, MAGNESIUM CHLORIDE, SODIUM PHOSPHATE, DIBASIC, AND POTASSIUM PHOSPHATE: .53; .5; .37; .037; .03; .012; .00082 INJECTION, SOLUTION INTRAVENOUS at 07:01

## 2022-01-07 RX ADMIN — KETOROLAC TROMETHAMINE 30 MG: 30 INJECTION, SOLUTION INTRAMUSCULAR; INTRAVENOUS at 08:01

## 2022-01-07 RX ADMIN — LIDOCAINE HYDROCHLORIDE 100 MG: 20 INJECTION INTRAVENOUS at 07:01

## 2022-01-07 RX ADMIN — GENTAMICIN SULFATE 160 MG: 100 INJECTION, SOLUTION INTRAVENOUS at 08:01

## 2022-01-07 NOTE — ANESTHESIA POSTPROCEDURE EVALUATION
Anesthesia Post Evaluation    Patient: Dennis Aldana    Procedure(s) Performed: Procedure(s) (LRB):  REMOVAL, CALCULUS, URETER, URETEROSCOPIC (Right)    Final Anesthesia Type: general      Patient location during evaluation: PACU  Patient participation: Yes- Able to Participate  Level of consciousness: awake and alert and oriented  Post-procedure vital signs: reviewed and stable  Pain management: adequate  Airway patency: patent    PONV status at discharge: No PONV  Anesthetic complications: no      Cardiovascular status: blood pressure returned to baseline and stable  Respiratory status: unassisted and spontaneous ventilation  Hydration status: euvolemic  Follow-up not needed.          Vitals Value Taken Time   /63 01/07/22 0930   Temp 36.5 °C (97.7 °F) 01/07/22 0915   Pulse 94 01/07/22 0933   Resp 50 01/07/22 0933   SpO2 99 % 01/07/22 0933   Vitals shown include unvalidated device data.      No case tracking events are documented in the log.      Pain/Cheyenne Score: Cheyenne Score: 4 (1/7/2022  9:20 AM)

## 2022-01-07 NOTE — PLAN OF CARE
Discharge instructions completed an dgave paper instructions to the pt and her     All questions were answered and concerns were addressed    Pt denies nausea, scopolamine patch on behind the left ear    Pt pain level 5/10 and was able to urinate without difficulty  Noted that the pain was better since urinating.

## 2022-01-07 NOTE — INTERVAL H&P NOTE
The patient has been examined and the H&P has been reviewed:    I concur with the findings and no changes have occurred since H&P was written.    Surgery risks, benefits and alternative options discussed and understood by patient/family.      Active Hospital Problems    Diagnosis  POA    Renal stone [N20.0]  Yes      Resolved Hospital Problems   No resolved problems to display.

## 2022-01-07 NOTE — TRANSFER OF CARE
"Anesthesia Transfer of Care Note    Patient: Dennis Aldana    Procedure(s) Performed: Procedure(s) (LRB):  REMOVAL, CALCULUS, URETER, URETEROSCOPIC (Right)    Patient location: PACU    Anesthesia Type: general    Transport from OR: Transported from OR on 2-3 L/min O2 by NC with adequate spontaneous ventilation    Post pain: adequate analgesia    Post assessment: no apparent anesthetic complications and tolerated procedure well    Post vital signs: stable    Level of consciousness: awake, alert and oriented    Nausea/Vomiting: no nausea/vomiting    Complications: none    Transfer of care protocol was followed      Last vitals:   Visit Vitals  /76   Pulse 97   Temp 36.3 °C (97.3 °F) (Skin)   Resp 16   Ht 5' 5" (1.651 m)   Wt 92.1 kg (203 lb)   LMP 02/24/2021   SpO2 97%   Breastfeeding No   BMI 33.78 kg/m²     "

## 2022-01-07 NOTE — PATIENT INSTRUCTIONS
Ok to remove stent on Monday, 1/10.  Continue Flomax as needed for discomfort.   Finish antibiotics.

## 2022-01-07 NOTE — ANESTHESIA PREPROCEDURE EVALUATION
01/07/2022  Dennis Aldana is a 51 y.o., female.    Pre-op Assessment    I have reviewed the Patient Summary Reports.     I have reviewed the Nursing Notes.    I have reviewed the Medications.     Review of Systems  Anesthesia Hx:  Hx of Anesthetic complications (PONV)    Social:  Non-Smoker    Hematology/Oncology:         -- Anemia: --  Cancer in past history:  Breast left   Cardiovascular:   Hypertension, well controlled    Pulmonary:   Pneumonia    Renal/:   Chronic Renal Disease    Hepatic/GI:   GERD Liver Disease,    Musculoskeletal:   Arthritis     Neurological:   Neuromuscular Disease,    Endocrine:   Diabetes, well controlled, type 2    Psych:   Psychiatric History anxiety depression          Physical Exam  General:  Well nourished, Obesity    Airway/Jaw/Neck:  Airway Findings: Mouth Opening: Normal Tongue: Normal  General Airway Assessment: Adult  Oropharynx Findings:  Mallampati: II  Jaw/Neck Findings:  Neck ROM: Normal ROM     Eyes/Ears/Nose:  Eyes/Ears/Nose Findings:    Dental:  Dental Findings:   Chest/Lungs:  Chest/Lungs Findings: Normal Respiratory Rate     Heart/Vascular:  Heart Findings: Rate: Normal  Rhythm: Regular Rhythm        Mental Status:  Mental Status Findings:  Cooperative, Alert and Oriented         Anesthesia Plan  Type of Anesthesia, risks & benefits discussed:  Anesthesia Type:  general    Patient's Preference:   Plan Factors:          Intra-op Monitoring Plan: standard ASA monitors  Intra-op Monitoring Plan Comments:   Post Op Pain Control Plan: multimodal analgesia  Post Op Pain Control Plan Comments:     Induction:   IV  Beta Blocker:  Patient is on a Beta-Blocker and has received one dose within the past 24 hours (No further documentation required).       Informed Consent: Patient understands risks and agrees with Anesthesia plan.  Questions answered. Anesthesia  consent signed with patient.  ASA Score: 3     Day of Surgery Review of History & Physical:            Ready For Surgery From Anesthesia Perspective.

## 2022-01-07 NOTE — OP NOTE
[facility] [place]  Operative Note    Date: 01/07/2022    Pre-Op Diagnosis:   - 1.3 cm right renal pelvis stone      Patient Active Problem List    Diagnosis Date Noted    Renal stone 01/03/2022    Urethral diverticulum 12/13/2021    Stage 3b chronic kidney disease 10/27/2021    Microalbuminuria due to type 2 diabetes mellitus 10/14/2021    History of myxoma 02/22/2021    Myxoma of skin determined by biopsy (low grade) 01/19/2021    Abnormal CT of liver 12/21/2020    ER+ (estrogen receptor positive status) 08/16/2020    Microcytic anemia 06/21/2020    S/P lumpectomy, left breast 06/21/2020    Use of tamoxifen (Nolvadex) 06/21/2020    Vitamin D deficiency 06/02/2020    Primary osteoarthritis of right knee 05/28/2020    Type 2 diabetes mellitus with hyperglycemia, without long-term current use of insulin 02/14/2020    Hyperlipidemia 02/14/2020    Iron deficiency anemia 02/14/2020    Primary insomnia 03/11/2019    Sinus tachycardia 04/11/2018    Malignant neoplasm of left breast in female, estrogen receptor positive 04/11/2018    Gastroesophageal reflux disease 04/11/2018    RLS (restless legs syndrome) 01/03/2018    Essential hypertension, benign 01/03/2018    TATYANA (generalized anxiety disorder) 01/03/2018    Depression 01/03/2018       Post-Op Diagnosis: same    Procedure(s) Performed:   1.  Right ureteroscopy  2.  Cystoscopy  3.  Stone basket extraction  4.  Laser lithotripsy  5.  Right ureteral stent placement   6.  Fluoro < 1 hr    Specimen(s): stone for analysis     Staff Surgeon: Esthela Lin MD    Anesthesia: General LMA anesthesia    Indications: Dennis Aldana is a 51 y.o. female with a right 1.3 cm renal pelvis stone, presenting for definitive stone management.  She currently does have a JJ ureteral stent in place.    Findings:   - all significant stone fragments removed   - no trauma to ureter during access sheath placement   - no definite diverticular os seen  in urethra     Estimated Blood Loss: min    Drains: 6 Fr x 26 cm JJ ureteral stent with strings    Procedure in detail:  After informed consent was obtained, the patient was brought the the cystoscopy suite and placed in the supine position.  SCDs were applied and working.  Anesthesia was administered.  The patient was then placed in the dorsal lithotomy position and prepped and draped in the usual sterile fashion.      A rigid cystoscope in a 22 Fr sheath was introduced into the patient's urethra.  This passed easily.  The entire urethra was visualized which showed no strictures or masses.      A motion wire was passed up the right ureteral orifice and up into the kidney through her previously placed stent.  This passed easily and placement was confirmed using fluoro.  A second super stiff wire was then passed up the right ureteral orifice again confirmed using fluoro.  The cystoscope was removed keeping the wires in place.    A 12/14 ureteral access sheath was then passed over the free wire under fluoroscopic guidance.  Subsequently, the flexible ureteroscope was passed into the patient's ureter through the access sheath under direct vision. Flexible pyeloscopy was performed systematically.  A stone was encountered at the level of renal pelvis.      A 273 micron laser fiber was passed through the ureteroscope.  The stone was fragmented using the laser.  The laser fiber was removed and a basket was introduced through the ureteroscope.  Stone fragments were removed and collected for specimen analysis.  The ureteroscope was removed keeping the motion wire in place.  The entire course of the ureter was visualized as the ureteroscope and access sheath were simultaneously removed.  There were no ureteral fragments left behind.     A 6 Fr x 26 cm JJ ureteral stent with strings was passed over the wire and up into the renal pelvis using fluoro.  When the coil appeared to be in good position in the kidney, the wire was  removed under continuous fluoro.  Good coils were seen in the kidney and the bladder using fluoro.      The patient tolerated the procedure well and was transferred to the recovery room in stable condition.      Disposition:  The patient will follow up in 3 months with a renal US. She has follow up on 1/10 with Dr. Greer to discuss surgical management of her urethral diverticula. She may remove her stent on Monday, 1/10.      Esthela Lin MD

## 2022-01-07 NOTE — PLAN OF CARE
Pre op assessment in progress  Pt gives us permission to speak to her  in regard to her surgery, discharge and her medical history

## 2022-01-07 NOTE — DISCHARGE INSTRUCTIONS
"Discharge Instructions: After Your Surgery/Procedure  Youve just had surgery. During surgery you were given medicine called anesthesia to keep you relaxed and free of pain. After surgery you may have some pain or nausea. This is common. Here are some tips for feeling better and getting well after surgery.     Stay on schedule with your medication.   Going home  Your doctor or nurse will show you how to take care of yourself when you go home. He or she will also answer your questions. Have an adult family member or friend drive you home.      For your safety we recommend these precaution for the first 24 hours after your procedure:  · Do not drive or use heavy equipment.  · Do not make important decisions or sign legal papers.  · Do not drink alcohol.  · Have someone stay with you, if needed. He or she can watch for problems and help keep you safe.  · Your concentration, balance, coordination, and judgement may be impaired for many hours after anesthesia.  Use caution when ambulating or standing up.     · You may feel weak and "washed out" after anesthesia and surgery.      Subtle residual effects of general anesthesia or sedation with regional / local anesthesia can last more than 24 hours.  Rest for the remainder of the day or longer if your Doctor/Surgeon has advised you to do so.  Although you may feel normal within the first 24 hours, your reflexes and mental ability may be impaired without you realizing it.  You may feel dizzy, lightheaded or sleepy for 24 hours or longer.      Be sure to go to all follow-up visits with your doctor. And rest after your surgery for as long as your doctor tells you to.  Coping with pain  If you have pain after surgery, pain medicine will help you feel better. Take it as told, before pain becomes severe. Also, ask your doctor or pharmacist about other ways to control pain. This might be with heat, ice, or relaxation. And follow any other instructions your surgeon or nurse gives " you.  Tips for taking pain medicine  To get the best relief possible, remember these points:  · Pain medicines can upset your stomach. Taking them with a little food may help.  · Most pain relievers taken by mouth need at least 20 to 30 minutes to start to work.  · Taking medicine on a schedule can help you remember to take it. Try to time your medicine so that you can take it before starting an activity. This might be before you get dressed, go for a walk, or sit down for dinner.  · Constipation is a common side effect of pain medicines. Call your doctor before taking any medicines such as laxatives or stool softeners to help ease constipation. Also ask if you should skip any foods. Drinking lots of fluids and eating foods such as fruits and vegetables that are high in fiber can also help. Remember, do not take laxatives unless your surgeon has prescribed them.  · Drinking alcohol and taking pain medicine can cause dizziness and slow your breathing. It can even be deadly. Do not drink alcohol while taking pain medicine.  · Pain medicine can make you react more slowly to things. Do not drive or run machinery while taking pain medicine.  Your health care provider may tell you to take acetaminophen to help ease your pain. Ask him or her how much you are supposed to take each day. Acetaminophen or other pain relievers may interact with your prescription medicines or other over-the-counter (OTC) drugs. Some prescription medicines have acetaminophen and other ingredients. Using both prescription and OTC acetaminophen for pain can cause you to overdose. Read the labels on your OTC medicines with care. This will help you to clearly know the list of ingredients, how much to take, and any warnings. It may also help you not take too much acetaminophen. If you have questions or do not understand the information, ask your pharmacist or health care provider to explain it to you before you take the OTC medicine.  Managing  nausea  Some people have an upset stomach after surgery. This is often because of anesthesia, pain, or pain medicine, or the stress of surgery. These tips will help you handle nausea and eat healthy foods as you get better. If you were on a special food plan before surgery, ask your doctor if you should follow it while you get better. These tips may help:  · Do not push yourself to eat. Your body will tell you when to eat and how much.  · Start off with clear liquids and soup. They are easier to digest.  · Next try semi-solid foods, such as mashed potatoes, applesauce, and gelatin, as you feel ready.  · Slowly move to solid foods. Dont eat fatty, rich, or spicy foods at first.  · Do not force yourself to have 3 large meals a day. Instead eat smaller amounts more often.  · Take pain medicines with a small amount of solid food, such as crackers or toast, to avoid nausea.     Call your surgeon if  · You still have pain an hour after taking medicine. The medicine may not be strong enough.  · You feel too sleepy, dizzy, or groggy. The medicine may be too strong.  · You have side effects like nausea, vomiting, or skin changes, such as rash, itching, or hives.       If you have obstructive sleep apnea  You were given anesthesia medicine during surgery to keep you comfortable and free of pain. After surgery, you may have more apnea spells because of this medicine and other medicines you were given. The spells may last longer than usual.   At home:  · Keep using the continuous positive airway pressure (CPAP) device when you sleep. Unless your health care provider tells you not to, use it when you sleep, day or night. CPAP is a common device used to treat obstructive sleep apnea.  · Talk with your provider before taking any pain medicine, muscle relaxants, or sedatives. Your provider will tell you about the possible dangers of taking these medicines.  © 4805-7006 [employer] [address] [place] [address]  [place] [address]. All rights reserved. This information is not intended as a substitute for professional medical care. Always follow your healthcare professional's instructions.    Post op instructions for prevention of DVT  What is deep vein thrombosis?  Deep vein thrombosis (DVT) is the medical term for blood clots in the deep veins of the leg.  These blood clots can be dangerous.  A DVT can block a blood vessel and keep blood from getting where it needs to go.  Another problem is that the clot can travel to other parts of the body such as the lungs.  A clot that travels to the lungs is called a pulmonary embolus (PE) and can cause serious problems with breathing which can lead to death.  Am I at risk for DVT/PE?  If you are not very active, you are at risk of DVT.  Anyone confined to bed, sitting for long periods of time, recovering from surgery, etc. increases the risk of DVT.  Other risk factors are cancer diagnosis, certain medications, estrogen replacement in any form,older age, obesity, pregnancy, smoking, history of clotting disorders, and dehydration.  How will I know if I have a DVT?   Swelling in the lower leg   Pain   Warmth, redness, hardness or bulging of the vein  If you have any of these symptoms, call your doctors office right away.  Some people will not have any symptoms until the clot moves to the lungs.  What are the symptoms of a PE?   Panting, shortness of breath, or trouble breathing   Sharp, knife-like chest pain when you breathe   Coughing or coughing up blood   Rapid heartbeat  If you have any of these symptoms or get worse quickly, call 911 for emergency treatment.  How can I prevent a DVT?   Avoid long periods of inactivity and dont cross your legs--get up and walk around every hour or so.   Stay active--walking after surgery is highly encouraged.  This means you should get out of the house and walk in the neighborhood.  Going up and down stairs will not impair healing (unless advised  against such activity by your doctor).     Drink plenty of noncaffeinated, nonalcoholic fluids each day to prevent dehydration.   Wear special support stockings, if they have been advised by your doctor.   If you travel, stop at least once an hour and walk around.   Avoid smoking (assistance with stopping is available through your healthcare provider)  Always notify your doctor if you are not able to follow the post operative instructions that are given to you at the time of discharge.  It may be necessary to prescribe one of the medications available to prevent DVT.

## 2022-01-07 NOTE — ANESTHESIA PROCEDURE NOTES
Intubation    Date/Time: 1/7/2022 7:58 AM  Performed by: Rashaun Yanez Jr., CRNA  Authorized by: Marcos Rehman MD     Intubation:     Induction:  Intravenous    Intubated:  Postinduction    Mask Ventilation:  Easy mask    Attempts:  1    Attempted By:  CRNA    Difficult Airway Encountered?: No      Complications:  None    Airway Device:  Supraglottic airway/LMA    Airway Device Size:  3.0    Style/Cuff Inflation:  Cuffed (inflated to minimal occlusive pressure)    Placement Verified By:  Capnometry    Complicating Factors:  None    Findings Post-Intubation:  BS equal bilateral and atraumatic/condition of teeth unchanged

## 2022-01-07 NOTE — PLAN OF CARE
Patient transferred to postop at this time.  AAOX3.  NAD noted.  Tolerating po intake well with no complaints of nausea/vomiting.  Pain 4/10.  Dressing remains clean, dry and intact to right stent.  Due to void.

## 2022-01-10 ENCOUNTER — OFFICE VISIT (OUTPATIENT)
Dept: UROLOGY | Facility: CLINIC | Age: 52
End: 2022-01-10
Payer: COMMERCIAL

## 2022-01-10 VITALS
HEIGHT: 65 IN | OXYGEN SATURATION: 100 % | BODY MASS INDEX: 33.82 KG/M2 | TEMPERATURE: 98 F | HEART RATE: 82 BPM | RESPIRATION RATE: 16 BRPM | WEIGHT: 203 LBS | SYSTOLIC BLOOD PRESSURE: 123 MMHG | DIASTOLIC BLOOD PRESSURE: 74 MMHG

## 2022-01-10 VITALS
HEART RATE: 80 BPM | WEIGHT: 207.25 LBS | HEIGHT: 65 IN | BODY MASS INDEX: 34.53 KG/M2 | DIASTOLIC BLOOD PRESSURE: 89 MMHG | SYSTOLIC BLOOD PRESSURE: 149 MMHG

## 2022-01-10 DIAGNOSIS — N36.1 URETHRAL DIVERTICULUM: ICD-10-CM

## 2022-01-10 PROCEDURE — 81002 URINALYSIS NONAUTO W/O SCOPE: CPT | Mod: S$GLB,,, | Performed by: UROLOGY

## 2022-01-10 PROCEDURE — 81002 PR URINALYSIS NONAUTO W/O SCOPE: ICD-10-PCS | Mod: S$GLB,,, | Performed by: UROLOGY

## 2022-01-10 PROCEDURE — 99215 OFFICE O/P EST HI 40 MIN: CPT | Mod: S$GLB,,, | Performed by: UROLOGY

## 2022-01-10 PROCEDURE — 3079F PR MOST RECENT DIASTOLIC BLOOD PRESSURE 80-89 MM HG: ICD-10-PCS | Mod: CPTII,S$GLB,, | Performed by: UROLOGY

## 2022-01-10 PROCEDURE — 87086 URINE CULTURE/COLONY COUNT: CPT | Performed by: UROLOGY

## 2022-01-10 PROCEDURE — 1159F PR MEDICATION LIST DOCUMENTED IN MEDICAL RECORD: ICD-10-PCS | Mod: CPTII,S$GLB,, | Performed by: UROLOGY

## 2022-01-10 PROCEDURE — 3008F PR BODY MASS INDEX (BMI) DOCUMENTED: ICD-10-PCS | Mod: CPTII,S$GLB,, | Performed by: UROLOGY

## 2022-01-10 PROCEDURE — 99215 PR OFFICE/OUTPT VISIT, EST, LEVL V, 40-54 MIN: ICD-10-PCS | Mod: S$GLB,,, | Performed by: UROLOGY

## 2022-01-10 PROCEDURE — 1159F MED LIST DOCD IN RCRD: CPT | Mod: CPTII,S$GLB,, | Performed by: UROLOGY

## 2022-01-10 PROCEDURE — 3008F BODY MASS INDEX DOCD: CPT | Mod: CPTII,S$GLB,, | Performed by: UROLOGY

## 2022-01-10 PROCEDURE — 99999 PR PBB SHADOW E&M-EST. PATIENT-LVL III: ICD-10-PCS | Mod: PBBFAC,,, | Performed by: UROLOGY

## 2022-01-10 PROCEDURE — 3077F PR MOST RECENT SYSTOLIC BLOOD PRESSURE >= 140 MM HG: ICD-10-PCS | Mod: CPTII,S$GLB,, | Performed by: UROLOGY

## 2022-01-10 PROCEDURE — 99999 PR PBB SHADOW E&M-EST. PATIENT-LVL III: CPT | Mod: PBBFAC,,, | Performed by: UROLOGY

## 2022-01-10 PROCEDURE — 3077F SYST BP >= 140 MM HG: CPT | Mod: CPTII,S$GLB,, | Performed by: UROLOGY

## 2022-01-10 PROCEDURE — 3079F DIAST BP 80-89 MM HG: CPT | Mod: CPTII,S$GLB,, | Performed by: UROLOGY

## 2022-01-10 NOTE — PROGRESS NOTES
CHIEF COMPLAINT:    Mrs. Aldana is a 51 y.o. female presenting for a consultation at the request of Dr. Esthela Lin. Patient presents with urethral diverticulum.    PRESENTING ILLNESS:    Dennis Aldana is a 51 y.o. female who has a history of a urethral diverticulum.  She was being treated for an infected stone by Dr. Lin and the patient was found to have a urethral diverticulum.  MRI further characterized it.  She had a procedure in March 15, 2021 by Dr. Cohen in which she had a cystoscopy and he expressed the diverticulum.  No diverticulectomy was done.  She has some dyspareunia but no post void dribbling or dysuria.      She has a history of breast cancer, hysterectomy was done for heavy menses by Dr. Contreras but she had no cancer.      , hysterectomy for heavy bleeding, sexually active, bowels are normal.     REVIEW OF SYSTEMS:    Review of Systems   Constitutional: Negative.    HENT: Negative.    Eyes: Negative.    Respiratory: Negative.    Cardiovascular: Negative.    Gastrointestinal: Negative.    Genitourinary: Negative.         Dyspareunia   Musculoskeletal: Positive for joint pain.   Skin: Negative.    Neurological: Negative.    Endo/Heme/Allergies: Negative.    Psychiatric/Behavioral: Negative.        PATIENT HISTORY:    Past Medical History:   Diagnosis Date    Abnormal CT of liver 2020    Abnormal CT of the abdomen 2020    Anemia     Anxiety     Arthritis     Breast cancer 2018    left w/ radiation    COVID-19 virus infection 2021    Depression     Diabetes mellitus     ER+ (estrogen receptor positive status) 2020    Essential hypertension, benign 1/3/2018    Excessive menstruation 2020    Hypertension     Kidney stone     Liver mass     Malignant neoplasm of left breast in female, estrogen receptor positive 2018    S/p lumpectomy and radiation, discharge May 2018    Microcytic anemia 2020    Myxoma of skin determined by biopsy  (low grade) 1/19/2021    Pneumonia due to COVID-19 virus 9/6/2021    PONV (postoperative nausea and vomiting)     RLS (restless legs syndrome)     S/P lumpectomy, left breast 6/21/2020    Use of tamoxifen (Nolvadex) 6/21/2020       Past Surgical History:   Procedure Laterality Date    BIOPSY N/A 12/29/2020    Procedure: us guided muscle biopsy (latissimus Dorsi);  Surgeon: [facility];  Location: [facility] OR;  Service: Interventional Radiology;  Laterality: N/A;    BREAST LUMPECTOMY Left 2018    COLONOSCOPY N/A 12/3/2021    Procedure: COLONOSCOPY;  Surgeon: Silvio Montesinos MD;  Location: [facility] ENDO;  Service: Endoscopy;  Laterality: N/A;    CYSTOSCOPY      CYSTOSCOPY N/A 3/22/2021    Procedure: CYSTOSCOPY;  Surgeon: Jannie Contreras MD;  Location: [facility] OR;  Service: OB/GYN;  Laterality: N/A;    CYSTOSCOPY W/ URETERAL STENT PLACEMENT Right 12/17/2021    Procedure: CYSTOSCOPY, WITH URETERAL STENT INSERTION;  Surgeon: Esthela Lin MD;  Location: [facility] OR;  Service: Urology;  Laterality: Right;    CYSTOSCOPY WITH URETEROSCOPY, RETROGRADE PYELOGRAPHY, AND INSERTION OF STENT Right 1/7/2022    Procedure: CYSTOSCOPY, WITH RETROGRADE PYELOGRAM AND URETERAL STENT INSERTION;  Surgeon: Esthela Lin MD;  Location: [facility] OR;  Service: Urology;  Laterality: Right;    EXCISION OF MASS OF BACK N/A 2/22/2021    Procedure: EXCISION, MASS, BACK  LEFT;  Surgeon: Chico Baird MD;  Location: [address];  Service: General;  Laterality: N/A;  Specimen sent to pathology via  for research     EXCISION OF MASS OF BACK  02/22/2021    KNEE SURGERY      LASER LITHOTRIPSY Right 1/7/2022    Procedure: LITHOTRIPSY, USING LASER;  Surgeon: Esthela Lin MD;  Location: [facility];  Service: Urology;  Laterality: Right;    ROBOT-ASSISTED LAPAROSCOPIC HYSTERECTOMY N/A 3/22/2021    Procedure: ROBOTIC HYSTERECTOMY;  Surgeon: Jannie Contreras MD;  Location: [facility] OR;  Service: OB/GYN;   Laterality: N/A;    ROBOT-ASSISTED LAPAROSCOPIC SALPINGO-OOPHORECTOMY Bilateral 3/22/2021    Procedure: ROBOTIC SALPINGO-OOPHORECTOMY;  Surgeon: Jannie Contreras MD;  [facility] OR;  Service: OB/GYN;  Laterality: Bilateral;    SHOULDER SURGERY      TONSILLECTOMY      URETEROSCOPIC REMOVAL OF URETERIC CALCULUS Right 1/7/2022    Procedure: REMOVAL, CALCULUS, URETER, URETEROSCOPIC;  Surgeon: Esthela Lin MD;  Location: [facility] OR;  Service: Urology;  Laterality: Right;       Family History   Problem Relation Age of Onset    Hypertension Mother     Diabetes Mother     Diabetes Father     Breast cancer Maternal Grandmother     Cancer Paternal Grandfather     Colon cancer Neg Hx     Ovarian cancer Neg Hx     Glaucoma Neg Hx     Macular degeneration Neg Hx        Social History     Socioeconomic History    Marital status:     Number of children: 1   Occupational History    Occupation: Self Employed   Tobacco Use    Smoking status: Never Smoker    Smokeless tobacco: Never Used   Substance and Sexual Activity    Alcohol use: Yes     Comment: occ.     Drug use: No    Sexual activity: Yes     Partners: Male     Birth control/protection: None       Allergies:  Patient has no known allergies.    Medications:  Outpatient Encounter Medications as of 1/10/2022   Medication Sig Dispense Refill    anastrozole (ARIMIDEX) 1 mg Tab TAKE 1 TABLET BY MOUTH EVERY DAY 90 tablet 1    citalopram (CELEXA) 40 MG tablet TAKE 1 TABLET BY MOUTH EVERY DAY 90 tablet 1    glipiZIDE (GLUCOTROL) 5 MG TR24 Take 1 tablet (5 mg total) by mouth daily with breakfast. 90 tablet 1    LORazepam (ATIVAN) 1 MG tablet TAKE 1 TABLET (1 MG TOTAL) BY MOUTH ONCE DAILY. AS NEEDED FOR ANXIETY 30 tablet 1    phenazopyridine (PYRIDIUM) 100 MG tablet Take 1 tablet (100 mg total) by mouth 3 (three) times daily as needed for Pain. 30 tablet 3    pramipexole (MIRAPEX) 1 MG tablet TAKE 2 TABLETS (2 MG TOTAL) BY MOUTH EVERY EVENING.  180 tablet 1    rosuvastatin (CRESTOR) 10 MG tablet TAKE 1 TABLET BY MOUTH EVERY DAY 90 tablet 3    tamsulosin (FLOMAX) 0.4 mg Cap Take 1 capsule (0.4 mg total) by mouth once daily. 30 capsule 3    traMADoL (ULTRAM) 50 mg tablet Take 1 tablet (50 mg total) by mouth every 4 (four) hours as needed for Pain. 5 tablet 0    valsartan (DIOVAN) 80 MG tablet Take 1 tablet (80 mg total) by mouth once daily. 90 tablet 1    zolpidem (AMBIEN) 5 MG Tab TAKE 1 TABLET (5 MG TOTAL) BY MOUTH NIGHTLY AS NEEDED. 30 tablet 1    metoprolol succinate (TOPROL-XL) 200 MG 24 hr tablet TAKE 1 TABLET BY MOUTH EVERY DAY (Patient taking differently: Take 200 mg by mouth once daily.) 90 tablet 1     Facility-Administered Encounter Medications as of 1/10/2022   Medication Dose Route Frequency Provider Last Rate Last Admin    electrolyte-S (ISOLYTE)   Intravenous Continuous Nikole Neal MD 0 mL/hr at 12/17/21 1445 New Bag at 01/07/22 0838    LIDOcaine (PF) 10 mg/ml (1%) injection 10 mg  1 mL Intradermal Once PRN Nikole Neal MD        LIDOcaine (PF) 10 mg/ml (1%) injection 10 mg  1 mL Intradermal Once Nikole Neal MD        scopolamine 1.3-1.5 mg (1 mg over 3 days) 1 patch  1 patch Transdermal Q3 Days Nikole Neal MD   1 patch at 12/17/21 1009         PHYSICAL EXAMINATION:    The patient generally appears in good health, is appropriately interactive, and is in no apparent distress.    Skin: No lesions.    Mental: Cooperative with normal affect.    Neuro: Grossly intact.    HEENT: Normal. No evidence of lymphadenopathy.    Chest:  normal inspiratory effort.    Abdomen:  Soft, non-tender. No masses or organomegaly. Bladder is not palpable. No evidence of flank discomfort. No evidence of inguinal hernia.    Extremities: No clubbing, cyanosis, or edema    Normal external female genitalia  Urethral meatus is normal  Urethra and bladder are nontender to bimanual exam.  There is a palpable, ballotable mass  beneath the urethra. Proximal margin is palpable  Well supported anteriorly and posteriorly   Uterus and cervix are normal  No adnexal masses    LABS:    Lab Results   Component Value Date    BUN 14 11/12/2021    CREATININE 0.8 11/12/2021     UA 1.005, pH 8, + leuk, tr blood, otherwise, negative    MRI shows a urethral diverticulum to the right of the urethra, measures 2.7 x 2.6 cm.    IMPRESSION:    Encounter Diagnoses   Name Primary?    Urethral diverticulum        PLAN:    1.  The catheterized specimen was sent for culture  2.  For urethral diverticulectomy.  Discussed the risks per the consent and she signed it freely.     Copy to:  Dr. Esthela Lin

## 2022-01-11 ENCOUNTER — PATIENT MESSAGE (OUTPATIENT)
Dept: UROLOGY | Facility: CLINIC | Age: 52
End: 2022-01-11
Payer: COMMERCIAL

## 2022-01-11 LAB
COMPN STONE: NORMAL
SPECIMEN SOURCE: NORMAL
STONE ANALYSIS IR-IMP: NORMAL

## 2022-01-12 LAB — BACTERIA UR CULT: NO GROWTH

## 2022-01-21 ENCOUNTER — PATIENT MESSAGE (OUTPATIENT)
Dept: ADMINISTRATIVE | Facility: HOSPITAL | Age: 52
End: 2022-01-21
Payer: COMMERCIAL

## 2022-01-27 ENCOUNTER — TELEPHONE (OUTPATIENT)
Dept: UROLOGY | Facility: CLINIC | Age: 52
End: 2022-01-27
Payer: COMMERCIAL

## 2022-01-27 DIAGNOSIS — N36.1 URETHRAL DIVERTICULUM: Primary | ICD-10-CM

## 2022-01-30 DIAGNOSIS — I10 ESSENTIAL HYPERTENSION, BENIGN: ICD-10-CM

## 2022-01-30 NOTE — TELEPHONE ENCOUNTER
Care Due:                  Date            Visit Type   Department     Provider  --------------------------------------------------------------------------------                                             [facility]  Last Visit: 10-      None         PRIMARY CARE   Andrew Roger                                           [facility]  Next Visit: 02-      None         PRIMARY CARE   Andrew Roger                                                            Last  Test          Frequency    Reason                     Performed    Due Date  --------------------------------------------------------------------------------    HBA1C.......  6 months...  glipiZIDE................  10-   04-    Powered by [employer]. Reference number: [id number].   1/30/2022 7:23:03 AM CST

## 2022-01-31 RX ORDER — METOPROLOL SUCCINATE 200 MG/1
TABLET, EXTENDED RELEASE ORAL
Qty: 90 TABLET | Refills: 1 | Status: SHIPPED | OUTPATIENT
Start: 2022-01-31 | End: 2022-08-08

## 2022-02-02 DIAGNOSIS — C50.312 MALIGNANT NEOPLASM OF LOWER-INNER QUADRANT OF LEFT BREAST, ESTROGEN RECEPTOR POSITIVE: ICD-10-CM

## 2022-02-02 DIAGNOSIS — Z17.0 MALIGNANT NEOPLASM OF LEFT BREAST IN FEMALE, ESTROGEN RECEPTOR POSITIVE, UNSPECIFIED SITE OF BREAST: Primary | ICD-10-CM

## 2022-02-02 DIAGNOSIS — Z85.3 HISTORY OF LEFT BREAST CANCER: ICD-10-CM

## 2022-02-02 DIAGNOSIS — C50.912 MALIGNANT NEOPLASM OF LEFT BREAST IN FEMALE, ESTROGEN RECEPTOR POSITIVE, UNSPECIFIED SITE OF BREAST: Primary | ICD-10-CM

## 2022-02-02 DIAGNOSIS — Z17.0 MALIGNANT NEOPLASM OF LOWER-INNER QUADRANT OF LEFT BREAST, ESTROGEN RECEPTOR POSITIVE: ICD-10-CM

## 2022-02-08 ENCOUNTER — HOSPITAL ENCOUNTER (OUTPATIENT)
Dept: RADIOLOGY | Facility: HOSPITAL | Age: 52
Discharge: HOME | End: 2022-02-08
Attending: INTERNAL MEDICINE
Payer: COMMERCIAL

## 2022-02-08 ENCOUNTER — LAB VISIT (OUTPATIENT)
Dept: LAB | Facility: HOSPITAL | Age: 52
End: 2022-02-08
Attending: FAMILY MEDICINE
Payer: COMMERCIAL

## 2022-02-08 ENCOUNTER — LAB VISIT (OUTPATIENT)
Dept: LAB | Facility: HOSPITAL | Age: 52
End: 2022-02-08
Attending: NURSE PRACTITIONER
Payer: COMMERCIAL

## 2022-02-08 VITALS — HEIGHT: 65 IN | WEIGHT: 202 LBS | BODY MASS INDEX: 33.66 KG/M2

## 2022-02-08 DIAGNOSIS — E11.65 TYPE 2 DIABETES MELLITUS WITH HYPERGLYCEMIA, WITHOUT LONG-TERM CURRENT USE OF INSULIN: ICD-10-CM

## 2022-02-08 DIAGNOSIS — Z85.3 HISTORY OF LEFT BREAST CANCER: ICD-10-CM

## 2022-02-08 DIAGNOSIS — C50.912 MALIGNANT NEOPLASM OF LEFT BREAST IN FEMALE, ESTROGEN RECEPTOR POSITIVE, UNSPECIFIED SITE OF BREAST: ICD-10-CM

## 2022-02-08 DIAGNOSIS — N18.32 STAGE 3B CHRONIC KIDNEY DISEASE: ICD-10-CM

## 2022-02-08 DIAGNOSIS — D50.9 IRON DEFICIENCY ANEMIA, UNSPECIFIED IRON DEFICIENCY ANEMIA TYPE: ICD-10-CM

## 2022-02-08 DIAGNOSIS — Z17.0 MALIGNANT NEOPLASM OF LEFT BREAST IN FEMALE, ESTROGEN RECEPTOR POSITIVE, UNSPECIFIED SITE OF BREAST: ICD-10-CM

## 2022-02-08 DIAGNOSIS — R93.5 ABNORMAL CT OF THE ABDOMEN: ICD-10-CM

## 2022-02-08 LAB
ALBUMIN SERPL BCP-MCNC: 3.9 G/DL (ref 3.5–5.2)
ALP SERPL-CCNC: 98 U/L (ref 55–135)
ALT SERPL W/O P-5'-P-CCNC: 27 U/L (ref 10–44)
ANION GAP SERPL CALC-SCNC: 10 MMOL/L (ref 8–16)
ANION GAP SERPL CALC-SCNC: 11 MMOL/L (ref 8–16)
AST SERPL-CCNC: 18 U/L (ref 10–40)
BASOPHILS # BLD AUTO: 0.05 K/UL (ref 0–0.2)
BASOPHILS NFR BLD: 0.4 % (ref 0–1.9)
BILIRUB SERPL-MCNC: 1.2 MG/DL (ref 0.1–1)
BUN SERPL-MCNC: 15 MG/DL (ref 6–20)
BUN SERPL-MCNC: 15 MG/DL (ref 6–20)
CALCIUM SERPL-MCNC: 9.3 MG/DL (ref 8.7–10.5)
CALCIUM SERPL-MCNC: 9.3 MG/DL (ref 8.7–10.5)
CEA SERPL-MCNC: 0.7 NG/ML (ref 0–5)
CHLORIDE SERPL-SCNC: 101 MMOL/L (ref 95–110)
CHLORIDE SERPL-SCNC: 102 MMOL/L (ref 95–110)
CO2 SERPL-SCNC: 28 MMOL/L (ref 23–29)
CO2 SERPL-SCNC: 28 MMOL/L (ref 23–29)
CREAT SERPL-MCNC: 0.8 MG/DL (ref 0.5–1.4)
CREAT SERPL-MCNC: 0.9 MG/DL (ref 0.5–1.4)
DIFFERENTIAL METHOD: ABNORMAL
EOSINOPHIL # BLD AUTO: 0.2 K/UL (ref 0–0.5)
EOSINOPHIL NFR BLD: 1.5 % (ref 0–8)
ERYTHROCYTE [DISTWIDTH] IN BLOOD BY AUTOMATED COUNT: 13.8 % (ref 11.5–14.5)
EST. GFR  (AFRICAN AMERICAN): >60 ML/MIN/1.73 M^2
EST. GFR  (AFRICAN AMERICAN): >60 ML/MIN/1.73 M^2
EST. GFR  (NON AFRICAN AMERICAN): >60 ML/MIN/1.73 M^2
EST. GFR  (NON AFRICAN AMERICAN): >60 ML/MIN/1.73 M^2
ESTIMATED AVG GLUCOSE: 180 MG/DL (ref 68–131)
FERRITIN SERPL-MCNC: 35 NG/ML (ref 20–300)
GLUCOSE SERPL-MCNC: 139 MG/DL (ref 70–110)
GLUCOSE SERPL-MCNC: 171 MG/DL (ref 70–110)
GLUCOSE SERPL-MCNC: 172 MG/DL (ref 70–110)
HBA1C MFR BLD: 7.9 % (ref 4.5–6.2)
HCT VFR BLD AUTO: 43.7 % (ref 37–48.5)
HGB BLD-MCNC: 14.5 G/DL (ref 12–16)
IMM GRANULOCYTES # BLD AUTO: 0.05 K/UL (ref 0–0.04)
IMM GRANULOCYTES NFR BLD AUTO: 0.4 % (ref 0–0.5)
IRON SERPL-MCNC: 54 UG/DL (ref 30–160)
LYMPHOCYTES # BLD AUTO: 2.6 K/UL (ref 1–4.8)
LYMPHOCYTES NFR BLD: 22.8 % (ref 18–48)
MCH RBC QN AUTO: 27.5 PG (ref 27–31)
MCHC RBC AUTO-ENTMCNC: 33.2 G/DL (ref 32–36)
MCV RBC AUTO: 83 FL (ref 82–98)
MONOCYTES # BLD AUTO: 0.8 K/UL (ref 0.3–1)
MONOCYTES NFR BLD: 6.9 % (ref 4–15)
NEUTROPHILS # BLD AUTO: 7.6 K/UL (ref 1.8–7.7)
NEUTROPHILS NFR BLD: 68 % (ref 38–73)
NRBC BLD-RTO: 0 /100 WBC
PLATELET # BLD AUTO: 274 K/UL (ref 150–450)
PMV BLD AUTO: 10.1 FL (ref 9.2–12.9)
POTASSIUM SERPL-SCNC: 4 MMOL/L (ref 3.5–5.1)
POTASSIUM SERPL-SCNC: 4.2 MMOL/L (ref 3.5–5.1)
PROT SERPL-MCNC: 6.8 G/DL (ref 6–8.4)
RBC # BLD AUTO: 5.28 M/UL (ref 4–5.4)
SATURATED IRON: 14 % (ref 20–50)
SODIUM SERPL-SCNC: 139 MMOL/L (ref 136–145)
SODIUM SERPL-SCNC: 141 MMOL/L (ref 136–145)
TOTAL IRON BINDING CAPACITY: 398 UG/DL (ref 250–450)
TRANSFERRIN SERPL-MCNC: 284 MG/DL (ref 200–375)
WBC # BLD AUTO: 11.25 K/UL (ref 3.9–12.7)

## 2022-02-08 PROCEDURE — 82728 ASSAY OF FERRITIN: CPT | Performed by: NURSE PRACTITIONER

## 2022-02-08 PROCEDURE — 82378 CARCINOEMBRYONIC ANTIGEN: CPT | Performed by: NURSE PRACTITIONER

## 2022-02-08 PROCEDURE — 83036 HEMOGLOBIN GLYCOSYLATED A1C: CPT | Performed by: FAMILY MEDICINE

## 2022-02-08 PROCEDURE — 78815 PET IMAGE W/CT SKULL-THIGH: CPT | Mod: TC,PO

## 2022-02-08 PROCEDURE — 85025 COMPLETE CBC W/AUTO DIFF WBC: CPT | Performed by: NURSE PRACTITIONER

## 2022-02-08 PROCEDURE — 80053 COMPREHEN METABOLIC PANEL: CPT | Performed by: NURSE PRACTITIONER

## 2022-02-08 PROCEDURE — 84466 ASSAY OF TRANSFERRIN: CPT | Performed by: NURSE PRACTITIONER

## 2022-02-08 PROCEDURE — 80048 BASIC METABOLIC PNL TOTAL CA: CPT | Performed by: FAMILY MEDICINE

## 2022-02-14 ENCOUNTER — OFFICE VISIT (OUTPATIENT)
Dept: PRIMARY CARE CLINIC | Facility: CLINIC | Age: 52
End: 2022-02-14
Payer: COMMERCIAL

## 2022-02-14 VITALS
HEIGHT: 65 IN | SYSTOLIC BLOOD PRESSURE: 138 MMHG | WEIGHT: 210.75 LBS | BODY MASS INDEX: 35.11 KG/M2 | OXYGEN SATURATION: 91 % | HEART RATE: 105 BPM | DIASTOLIC BLOOD PRESSURE: 88 MMHG | RESPIRATION RATE: 18 BRPM

## 2022-02-14 DIAGNOSIS — E11.65 TYPE 2 DIABETES MELLITUS WITH HYPERGLYCEMIA, WITHOUT LONG-TERM CURRENT USE OF INSULIN: ICD-10-CM

## 2022-02-14 DIAGNOSIS — I10 ESSENTIAL HYPERTENSION, BENIGN: Primary | ICD-10-CM

## 2022-02-14 DIAGNOSIS — E66.01 SEVERE OBESITY (BMI 35.0-39.9) WITH COMORBIDITY: ICD-10-CM

## 2022-02-14 PROBLEM — N18.32 STAGE 3B CHRONIC KIDNEY DISEASE: Status: RESOLVED | Noted: 2021-10-27 | Resolved: 2022-02-14

## 2022-02-14 PROCEDURE — 1160F PR REVIEW ALL MEDS BY PRESCRIBER/CLIN PHARMACIST DOCUMENTED: ICD-10-PCS | Mod: CPTII,S$GLB,, | Performed by: FAMILY MEDICINE

## 2022-02-14 PROCEDURE — 3051F HG A1C>EQUAL 7.0%<8.0%: CPT | Mod: CPTII,S$GLB,, | Performed by: FAMILY MEDICINE

## 2022-02-14 PROCEDURE — 99214 OFFICE O/P EST MOD 30 MIN: CPT | Mod: S$GLB,,, | Performed by: FAMILY MEDICINE

## 2022-02-14 PROCEDURE — 1159F MED LIST DOCD IN RCRD: CPT | Mod: CPTII,S$GLB,, | Performed by: FAMILY MEDICINE

## 2022-02-14 PROCEDURE — 1159F PR MEDICATION LIST DOCUMENTED IN MEDICAL RECORD: ICD-10-PCS | Mod: CPTII,S$GLB,, | Performed by: FAMILY MEDICINE

## 2022-02-14 PROCEDURE — 3008F BODY MASS INDEX DOCD: CPT | Mod: CPTII,S$GLB,, | Performed by: FAMILY MEDICINE

## 2022-02-14 PROCEDURE — 99999 PR PBB SHADOW E&M-EST. PATIENT-LVL IV: CPT | Mod: PBBFAC,,, | Performed by: FAMILY MEDICINE

## 2022-02-14 PROCEDURE — 1160F RVW MEDS BY RX/DR IN RCRD: CPT | Mod: CPTII,S$GLB,, | Performed by: FAMILY MEDICINE

## 2022-02-14 PROCEDURE — 3051F PR MOST RECENT HEMOGLOBIN A1C LEVEL 7.0 - < 8.0%: ICD-10-PCS | Mod: CPTII,S$GLB,, | Performed by: FAMILY MEDICINE

## 2022-02-14 PROCEDURE — 3079F PR MOST RECENT DIASTOLIC BLOOD PRESSURE 80-89 MM HG: ICD-10-PCS | Mod: CPTII,S$GLB,, | Performed by: FAMILY MEDICINE

## 2022-02-14 PROCEDURE — 3075F PR MOST RECENT SYSTOLIC BLOOD PRESS GE 130-139MM HG: ICD-10-PCS | Mod: CPTII,S$GLB,, | Performed by: FAMILY MEDICINE

## 2022-02-14 PROCEDURE — 99214 PR OFFICE/OUTPT VISIT, EST, LEVL IV, 30-39 MIN: ICD-10-PCS | Mod: S$GLB,,, | Performed by: FAMILY MEDICINE

## 2022-02-14 PROCEDURE — 3008F PR BODY MASS INDEX (BMI) DOCUMENTED: ICD-10-PCS | Mod: CPTII,S$GLB,, | Performed by: FAMILY MEDICINE

## 2022-02-14 PROCEDURE — 99999 PR PBB SHADOW E&M-EST. PATIENT-LVL IV: ICD-10-PCS | Mod: PBBFAC,,, | Performed by: FAMILY MEDICINE

## 2022-02-14 PROCEDURE — 3075F SYST BP GE 130 - 139MM HG: CPT | Mod: CPTII,S$GLB,, | Performed by: FAMILY MEDICINE

## 2022-02-14 PROCEDURE — 3079F DIAST BP 80-89 MM HG: CPT | Mod: CPTII,S$GLB,, | Performed by: FAMILY MEDICINE

## 2022-02-14 RX ORDER — METFORMIN HYDROCHLORIDE 500 MG/1
1000 TABLET, EXTENDED RELEASE ORAL
Qty: 180 TABLET | Refills: 3
Start: 2022-02-14 | End: 2022-08-31 | Stop reason: SDUPTHER

## 2022-02-14 RX ORDER — VALSARTAN 80 MG/1
80 TABLET ORAL DAILY
Qty: 90 TABLET | Refills: 3 | Status: SHIPPED | OUTPATIENT
Start: 2022-02-14 | End: 2022-08-31 | Stop reason: SDUPTHER

## 2022-02-14 NOTE — PROGRESS NOTES
"[facility] Hematology/Oncology  PROGRESS NOTE -   Follow-up Visit      Subjective:       Patient ID:   NAME: Dennis Aldana : 1970     51 y.o. female    Referring Doc: Kana  Other Physicians: Laya Morris (Gyn)    Chief Complaint:  anemia/hx/of Lft breast cancer f/u    History of Present Illness:     Patient returns today for a regularly scheduled follow-up visit.  The patient is here today to go over the results of the recently ordered labs, tests and studies.  She is here by herself.     She reports that she has been feeling "excellent"     Patient saw Dr Rendon in the past and had biopsy done of the skin lesion on the back on 2020 with pathology reporting a low grade myxoid lesion. She saw Dr Rendon again on 2021. 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     She saw Dr Contreras on 2021 and she had surgery with Dr Contreras on 3/22/2021 and all of the pathology was benign as well.    She had stent placement with Dr Lin in Dec 2021 and stone removal on 2022.    She is undergoing surgery next week for diverticulum of the urethra with Dr Greer in [place].    She had colonoscopy on Dec 3rd 2021 with Dr Montesinos with one polyp removed. Planned repeat scope in 3 yrs per patient    She had repeat PEt recently on 2022 with overall  Good report    No CP, SOB, HA's or N/V.     She saw Dr Roger yesterday     Discussed covid19 precautions - she had covid with pneumonia in 2021 and was hospitalized            ROS:   GEN: normal without any fever, night sweats or weight loss  HEENT: normal with no HA's, sore throat, stiff neck, changes in vision  CV: normal with no CP, SOB, PND, GARCIA or orthopnea  PULM: normal with no SOB, cough, hemoptysis, sputum or pleuritic pain  GI: normal with no abdominal pain, nausea, vomiting, constipation, diarrhea, melanotic " stools, BRBPR, or hematemesis  : normal with no hematuria, dysuria  BREAST: normal with no mass, discharge, pain; s/p left lumpectomy  SKIN: normal with no rash, erythema, bruising, or swelling; nodule in skin    Pain Scale:  0    Allergies:  Review of patient's allergies indicates:  No Known Allergies    Medications:    Current Outpatient Medications:     anastrozole (ARIMIDEX) 1 mg Tab, TAKE 1 TABLET BY MOUTH EVERY DAY, Disp: 90 tablet, Rfl: 1    citalopram (CELEXA) 40 MG tablet, TAKE 1 TABLET BY MOUTH EVERY DAY, Disp: 90 tablet, Rfl: 1    glipiZIDE (GLUCOTROL) 5 MG TR24, Take 1 tablet (5 mg total) by mouth daily with breakfast., Disp: 90 tablet, Rfl: 1    LORazepam (ATIVAN) 1 MG tablet, TAKE 1 TABLET (1 MG TOTAL) BY MOUTH ONCE DAILY. AS NEEDED FOR ANXIETY, Disp: 30 tablet, Rfl: 1    metFORMIN (GLUCOPHAGE-XR) 500 MG ER 24hr tablet, Take 2 tablets (1,000 mg total) by mouth daily with breakfast., Disp: 180 tablet, Rfl: 3    metoprolol succinate (TOPROL-XL) 200 MG 24 hr tablet, TAKE 1 TABLET BY MOUTH EVERY DAY, Disp: 90 tablet, Rfl: 1    pramipexole (MIRAPEX) 1 MG tablet, TAKE 2 TABLETS (2 MG TOTAL) BY MOUTH EVERY EVENING., Disp: 180 tablet, Rfl: 1    rosuvastatin (CRESTOR) 10 MG tablet, TAKE 1 TABLET BY MOUTH EVERY DAY, Disp: 90 tablet, Rfl: 3    traMADoL (ULTRAM) 50 mg tablet, Take 1 tablet (50 mg total) by mouth every 4 (four) hours as needed for Pain., Disp: 5 tablet, Rfl: 0    valsartan (DIOVAN) 80 MG tablet, Take 1 tablet (80 mg total) by mouth once daily., Disp: 90 tablet, Rfl: 3    zolpidem (AMBIEN) 5 MG Tab, TAKE 1 TABLET (5 MG TOTAL) BY MOUTH NIGHTLY AS NEEDED., Disp: 30 tablet, Rfl: 1  No current facility-administered medications for this visit.    Facility-Administered Medications Ordered in Other Visits:     electrolyte-S (ISOLYTE), , Intravenous, Continuous, Nikole Neal MD, Last Rate: 0 mL/hr at 12/17/21 1445, New Bag at 01/07/22 0838    LIDOcaine (PF) 10 mg/ml (1%) injection 10  mg, 1 mL, Intradermal, Once PRN, Nikole Neal MD    LIDOcaine (PF) 10 mg/ml (1%) injection 10 mg, 1 mL, Intradermal, Once, Nikole Neal MD    scopolamine 1.3-1.5 mg (1 mg over 3 days) 1 patch, 1 patch, Transdermal, Q3 Days, Nikole Neal MD, 1 patch at 12/17/21 1009    PMHx/PSHx Updates:  See patient's last visit with me on 11/15/2021  See H&P on 8/17/2020        Pathology:  Cancer Staging  No matching staging information was found for the patient.      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 UTERUS, CERVIX, FALLOPIAN TUBES, AND OVARIES; HYSTERECTOMY AND    BILATERAL SALPINGO-OOPHORECTOMY:       MYOMETRIUM:   --SUBSEROSAL AND INTRAMURAL LEIOMYOMATA (INCLUDING FOCAL EPITHELIOID    LEIOMYOMA).      --ADENOMYOSIS.     ENDOMETRIUM:     --BENIGN ENDOMETRIAL POLYPS (LARGEST 3.2 CENTIMETERS).     CERVIX:   --FOCAL HIGH-GRADE SQUAMOUS INTRAEPITHELIAL LESION (CERVICAL    INTRAEPITHELIAL NEOPLASIA        GRADE 2 [OPHELIA 2]), WITH ENDOCERVICAL GLANDULAR EXTENSION.      --MARGIN IS NEGATIVE FOR SQUAMOUS INTRAEPITHELIAL LESION.      --FOCAL ACUTE AND CHRONIC CERVICITIS.      --SQUAMOUS METAPLASIA OF ENDOCERVIX.      --NABOTHIAN CYSTS.      --CERVIX ENTIRELY SUBMITTED FOR HISTOLOGIC SECTIONING.     RIGHT FALLOPIAN TUBE:     --MICROSCOPIC BENIGN PARATUBAL CYSTS.     LEFT FALLOPIAN TUBE:     --MICROSCOPIC BENIGN PARATUBAL CYSTS.     --SEROSAL FIBROVASCULAR ADHESIONS.     RIGHT OVARY:     --SEROUS CYSTADENOMA (1.0 CENTIMETER).     --ENDOMETRIOSIS.     --SURFACE EPITHELIAL INCLUSION CYSTS.     --FOCAL ENDOSALPINGIOSIS.     --CYSTIC CORPUS LUTEUM.     --CYSTIC FOLLICLES.     --CORPORA ALBICANTIA.     --SEROSAL FIBROVASCULAR ADHESIONS.     LEFT OVARY:     --ENDOMETRIOSIS.     --SURFACE EPITHELIAL INCLUSION CYSTS.     --FOCAL ENDOSALPINGIOSIS.      --HEMORRHAGIC CYSTIC CORPUS LUTEUM.  "     --COPORA ALBICANTIA.      --SEROSAL FIBROVASCULAR ADHESIONS.     2. SPECIMEN SUBMITTED AS "PERITONEAL NODULE," EXCISION:   --INFARCTED APPENDIX EPIPLOICA WITH DYSTROPHIC CALCIFICATION AND    FIBROSIS.            Left back soft tissue biopsy: 12/29/2020:    SOFT TISSUE, LEFT LATISSIMUS DORSI, CORE BIOPSY:   - LOW GRADE MYXOID LESION.   - SEE COMMENT.         Objective:     Vitals:  Blood pressure 125/84, pulse 101, temperature 99.1 °F (37.3 °C), resp. rate 18, height 5' 5" (1.651 m), weight 95.3 kg (210 lb), last menstrual period 02/24/2021.    Physical Examination:   GEN: no apparent distress, comfortable; AAOx3; overweight  HEAD: atraumatic and normocephalic  EYES: no pallor, no icterus, PERRLA  ENT: OMM, no pharyngeal erythema, external ears WNL; no nasal discharge; no thrush  NECK: no masses, thyroid normal, trachea midline, no LAD/LN's, supple  CV: RRR with no murmur; normal pulse; normal S1 and S2; no pedal edema  CHEST: Normal respiratory effort; CTAB; normal breath sounds; no wheeze or crackles  ABDOM: nontender and nondistended; soft; normal bowel sounds; no rebound/guarding  MUSC/Skeletal: ROM normal; no crepitus; joints normal; no deformities or arthropathy  EXTREM: no clubbing, cyanosis, inflammation or swelling  SKIN: no rashes, lesions, ulcers, petechiae or subcutaneous nodules; tattoos; subcutaneous nodule excised and healing post-op  : no quinonez  NEURO: grossly intact; motor/sensory WNL; AAOx3; no tremors  PSYCH: normal mood, affect and behavior  LYMPH: normal cervical, supraclavicular, axillary and groin LN's  Breast: s/p left lumpectomy        Labs:     Lab Results   Component Value Date    WBC 11.25 02/08/2022    HGB 14.5 02/08/2022    HCT 43.7 02/08/2022    MCV 83 02/08/2022     02/08/2022       Lab Results   Component Value Date    IRON 54 02/08/2022    TIBC 398 02/08/2022    FERRITIN 35 02/08/2022     CMP  Sodium   Date Value Ref Range Status   02/08/2022 139 136 - 145 mmol/L Final "   02/08/2022 141 136 - 145 mmol/L Final     Potassium   Date Value Ref Range Status   02/08/2022 4.2 3.5 - 5.1 mmol/L Final   02/08/2022 4.0 3.5 - 5.1 mmol/L Final     Chloride   Date Value Ref Range Status   02/08/2022 101 95 - 110 mmol/L Final   02/08/2022 102 95 - 110 mmol/L Final     CO2   Date Value Ref Range Status   02/08/2022 28 23 - 29 mmol/L Final   02/08/2022 28 23 - 29 mmol/L Final     Glucose   Date Value Ref Range Status   02/08/2022 171 (H) 70 - 110 mg/dL Final   02/08/2022 172 (H) 70 - 110 mg/dL Final     BUN   Date Value Ref Range Status   02/08/2022 15 6 - 20 mg/dL Final   02/08/2022 15 6 - 20 mg/dL Final     Creatinine   Date Value Ref Range Status   02/08/2022 0.8 0.5 - 1.4 mg/dL Final   02/08/2022 0.9 0.5 - 1.4 mg/dL Final     Calcium   Date Value Ref Range Status   02/08/2022 9.3 8.7 - 10.5 mg/dL Final   02/08/2022 9.3 8.7 - 10.5 mg/dL Final     Total Protein   Date Value Ref Range Status   02/08/2022 6.8 6.0 - 8.4 g/dL Final     Albumin   Date Value Ref Range Status   02/08/2022 3.9 3.5 - 5.2 g/dL Final     Total Bilirubin   Date Value Ref Range Status   02/08/2022 1.2 (H) 0.1 - 1.0 mg/dL Final     Comment:     For infants and newborns, interpretation of results should be based  on gestational age, weight and in agreement with clinical  observations.    Premature Infant recommended reference ranges:  Up to 24 hours.............<8.0 mg/dL  Up to 48 hours............<12.0 mg/dL  3-5 days..................<15.0 mg/dL  6-29 days.................<15.0 mg/dL       Alkaline Phosphatase   Date Value Ref Range Status   02/08/2022 98 55 - 135 U/L Final     AST   Date Value Ref Range Status   02/08/2022 18 10 - 40 U/L Final     ALT   Date Value Ref Range Status   02/08/2022 27 10 - 44 U/L Final     Anion Gap   Date Value Ref Range Status   02/08/2022 10 8 - 16 mmol/L Final   02/08/2022 11 8 - 16 mmol/L Final     eGFR if    Date Value Ref Range Status   02/08/2022 >60.0 >60 mL/min/1.73 m^2  Final   02/08/2022 >60.0 >60 mL/min/1.73 m^2 Final     eGFR if non    Date Value Ref Range Status   02/08/2022 >60.0 >60 mL/min/1.73 m^2 Final     Comment:     Calculation used to obtain the estimated glomerular filtration  rate (eGFR) is the CKD-EPI equation.      02/08/2022 >60.0 >60 mL/min/1.73 m^2 Final     Comment:     Calculation used to obtain the estimated glomerular filtration  rate (eGFR) is the CKD-EPI equation.                 Radiology/Diagnostic Studies:    PET 2/8/2022:    IMPRESSION:  No evidence of recurrent or metastatic disease     Interval removal of the intramuscular mass in the left latissimus dorsi.  Stable mild increased mineralization of the right T7 vertebral body with no FDG activity.        CT scans  8/2/2021:    IMPRESSION:  1. Stable CT appearance of the chest, abdomen, and pelvis when compared to December 2020. No definite evidence of metastatic disease.  2. Specifically, T7 vertebral body sclerotic lesion, hyperdense lesion in the left hepatic lobe, small hypodensities in the spleen, and additional observations as above are unchanged.        Mammo 2/1/2021:    Impression:  No mammographic evidence of malignancy.     BI-RADS Category 1: Negative           PET  1/11/2021:    Impression:     1. Intramuscular mass in left latissimus Dorsi does show mild FDG uptake with recent biopsy showing low-grade myxomatous lesion, concordant with the imaging characteristics.  2. Previous left hepatic lobe mass shows no increased FDG activity above that of background hepatic parenchymal activity.  Differential diagnosis of this remains unchanged from that previously discussed, with lack of increased FDG activity further substantiating suspected benign etiology.  Follow-up anatomic imaging is suggested with MRI abdomen without and with IV contrast (Eovist) in 3-6 months.  3. Unchanged focus of increased mineralization in right T7 vertebral body without increased FDG activity.  This  remains of uncertain etiology with benign etiologies conceivable, although sclerotic osseous metastasis is difficult to entirely exclude.  Further evaluation with whole-body bone scan is suggested, unless already performed at outside institution.  Attention to this on follow-up CT is suggested, with repeat CT thorax without IV contrast suggested in 3-6 months.             CT chest/abdom/pelvis  12/9/2020:    IMPRESSION:     Degradation by motion.     No evidence of metastatic disease within the thorax. Specifically,  there is no evidence of axillary adenopathy.     Indeterminate mildly hyperdense mass within the lateral segment of the  left hepatic lobe, incompletely characterized. Correlation with  multiphasic MRI or CT is recommended.     Indeterminate splenic masses measuring 10 and 8 mm in size. If prior  outside contrast enhanced studies exist, these would be helpful for  comparison. Alternatively, these lesions could be also assessed at the  time of follow-up multiphasic MRI or CT.     Sclerotic area within the rightward aspect of T7. The possibility of  sclerotic metastasis is not excludable.     13 mm calculus within the right renal pelvis with surrounding  inflammatory changes. The right kidney is small in overall size and  demonstrates multiple focal areas of cortical scarring.     Cystic density left ovarian masses and mild prominence of the  endometrial stripe. Correlation with pelvic ultrasound recommended.     Multiple solid uterine masses likely representing fibroids.     Probable Bartholin's cyst near the vaginal introitus.             I have reviewed all available lab results and radiology reports.    Assessment/Plan:   (1) 51 y.o. female with diagnosis of anemia referred by Dr Wade  - microcytic indices  - hgb at 10.2 and MCV at 66  - bilirubin is wnl, so I do not suspect any hemolysis at this time  - low iron and ferritin with elevated TIBC most consistent with an iron deficiency condition  -  "hx/of endometriosis and heavy menstrual cycles - followed by Dr Dagoberto Morris with GYN at [facility] (prior laparoscopy x4)    10/20/2020:  - s/p two IV irons  - Hgb now up and normal at 13.8  - iron now normal at 89 and ferritin is up to 308    11/17/2020:  - hgb good at 13.2 and now WNL  - iron panel currently normal    12/21/2020:  - s/p IV iron in Sept 2020  - hgb and iron panel are currently WNL    1/20/2021:  - latest hgb at 13.4 and good  - iron panel WNL    4/20/2021:  - latest hgb and iron panel WNL    7/21/2021:  - latest hgb WNL and iron panel is adequate    11/15/2021:  - labs are adequate with Hgb WNL  - iron panel WNL     (2) Hx/of left breast cancer diagnosed May 2018 s/p lumpectomy and radiation, oral antihormone with tamoxifen - s/p lumpectomy and XRT at [facility]  - Stage I  - ER positive  - she saw a female oncologist at [facility] in past   - she gets regular mammograms and had one this past Jan 2020  - she does not follow-up with oncology    4/20/2021:  - change to arimidex    7/21/2021:  - she is on arimidex   - mammo is due Feb 2022  - discussed that arimidex could be adding to the arthritis    11/15/2021:  - mammo due Feb 2022  - continued on arimidex    2/15/2022:  - mammo scheduled for March 2022  - continued on arimidex     (3) HTN and hypercholesterolemia     (4) Type II DM     (5) GERD     (6) TATYANA/Depression and RLS     (7) OA of right knee    (8) Subcutaneous nodule left scapular medial side and left posterior axilla - ? Lipomas - will schedule PEt scan in light of the breast cancer history  - 11/17/2020:  - insurance decline approval for PET   - scheduled CT scans instead and refer to Dr Rendon with Gen Surg    12/21/2020:  - she saw Dr Rendon since last visit and the suspected lipoma is actually a nodularity in the muscle layer and he is concerned about the possibility for "sarcoma"    1/20/2021:  - PET on chart from 1/11/2021  - Patient seen by Dr Rendon and had biopsy done of the skin lesion on the " back on 12/29/2020 with pathology reporting a low grade myxoid lesion  - pathology recommended complete excision of the lesion to further classify      3/1/2021:  - She saw Dr Rendon again on Jan 26th 2021. She subsequently saw Dr Chico Baird with Surg-Onc at [facility] and had repeat excision on 2/22/2021 and the final pathology is still in process with the specimen referred to the [facility] for review.   - she sees Dr Baird again on 3/4/2021 to go over the results    4/20/2021;  - final pathology has come back as benign mixoid    2/15/2022:  - She had stent placement with Dr Lin in Dec 2021 and stone removal on Jan 8th 2022.  - She is undergoing surgery next week for diverticulum of the urethra with Dr Greer in [place].  - She had colonoscopy on Dec 3rd 2021 with Dr Montesinos with one polyp removed. Planned repeat scope in 3 yrs per patient  - She had repeat PEt recently on 2/8/2022 with overall  Good report          (9) Left ovarian cystic mass with uterine fibroids, Indeterminate mildly hyperdense mass within the lateral segment of the left hepatic lobe and two indeterminate nodules in her spleen   - will set up MRI of liver and also include the left paraspinal thoracic area near scapula to evaluate the submuscular nodule that is palpable    1/20/2021:  - she is seeing Dr Contreras on [place] 27th 2010    3/1/2021:  - She saw Dr Contreras on Jan 27th 2021 and is having surgery with Dr Contreras on 3/8/2021.     4/20/2021;  -  She had hysterectomy on 3/22/2021 with Dr Contreras with negative pathology per patientT    7/21/2021:   - she is seeing Dr Contreras again in Nov 2021    2/15/2022:'  - planned f/u with Dr Contreras after her expected urethral surgery    (10) Kidney stone - refer to dr Lin with       VISIT DIAGNOSES:      Malignant neoplasm of left breast in female, estrogen receptor positive, unspecified site of breast    Microcytic anemia    S/P lumpectomy, left breast    Use of tamoxifen (Nolvadex)    Iron  deficiency anemia, unspecified iron deficiency anemia type    ER+ (estrogen receptor positive status)    Abnormal CT of liver          PLAN:  1. f/u with Dr Contreras as directed  2. Check labs monthly and resume IV iron as needed  3. F/u with Dr Baird again if the lesion ever returns on her back  4. mammogram yearly recommended (due Feb/Marc 2022)  5. Continue arimidex for now  6. F/u with PCP, etc  7.  plans as directed by them             RTC in 3 -4 months   Fax note to Andrew Roger MD; Carmen; Alexandra; Justice; Ben; Chico Baird; Riley    Discussion:     COVID-19 Discussion:    I had long discussion with patient and any applicable family about the COVID-19 coronavirus epidemic and the recommended precautions with regard to cancer and/or hematology patients. I have re-iterated the CDC recommendations for adequate hand washing, use of hand -like products, and coughing into elbow, etc. In addition, especially for our patients who are on chemotherapy and/or our otherwise immunocompromised patients, I have recommended avoidance of crowds, including movie theaters, restaurants, churches, etc. I have recommended avoidance of any sick or symptomatic family members and/or friends. I have also recommended avoidance of any raw and unwashed food products, and general avoidance of food items that have not been prepared by themselves. The patient has been asked to call us immediately with any symptom developments, issues, questions or other general concerns.     Pathology Discussion:     I reviewed and discussed the pathology report(s) and radiograph reports (if available) in as simple to understand and/or laymen's terms to the best of my ability. I had an indepth conversation with the patient and went over the patient's individual diagnosis based on the information that was currently available. I discussed the TNM staging process with regard to the patient's particular cancer type, and the calculated  "stage based on the currently available TNM data and literature. I discussed the available prognostic data with regard to the current staging information and how it relates to the prognosis of their particular neoplastic process.          NCCN Guidelines:     I discussed the available treatment option(s) in accordance with the latest literature from the NCCN Clinical Practice Guidelines for the patient's particular type of cancer disorder. The NCCN Guidelines provide a "document evidence-based (and) consensus-driven management" of the care of oncology patients. The treatment recommendations were made not only in accordance to the NCCN guidelines, but also factored in to account the patient's overall age, condition, performance status and their medical co-morbidities. I went over the risks and benefits of the the treatment options (if any could be made) with regard to their particular cancer type, their cancer stage, their age, and their co-morbidities.          Antihormone Therapy Discussion:    I discussed the advantages of antihormone therapy with the patient with regard to their particular neoplastic or carcinoma in situ condition. I went over the side-effect profile of the medication including risk for potential development of endometrial cancer and/or hyperplasia in women who still have a uterus and the need for yearly GYN evaluation and follow-up. I discussed the risks for thromboembolic events such as DVT's, pulmonary emboli, CVA's, retinal vascular clots, phlebitis, and TIA's. I discussed the potential risks for development of ocular disturbances, retinopathy, cataracts, corneal changes, flushing, hot flashes, amenorrhea, altered menses, fluid retention, weight changes, elevations in LFT's, liver damage, and mood disturbances. I discussed the potential risk of arthropathy and joint pains/aches which could be chronic and debilitating. I discussed potential adverse effects on bone mineralization and the risk of " osteopenia and/or osteoporosis which could led to increase risk of fractures.   A consent form was obtained and a copy was provided to the patient.    Iron Infusion Therapy Discussion:     I provided literature/learning materials on the particular IV iron regimen and discussed the potential side-effect profiles of the drug(s). I discussed the importance of compliance with obtaining and monitoring requested lab work, and went over the potential risk for the development of anaphylactic shock, bronchospasm, dysrhythmia, liver and/or kidney damage, and respiratory/cardiovascular arrest and/or failure. I discussed the potential risks for development of alopecia, fevers, itching, chills and/or rigors, cold sensory issues, ringing in ears, vertigo and neuropathy, all of which are usually acute but sometimes could end up being chronic and life-long. I discussed the risks of hand-foot syndrome and rashes, and development of other autoimmune mediated processes such as pneumonitis and colitis which could be life threatening.     The patient's consent has been obtained to proceed with the IV iron therapy.The patient will be referred to [facility] for training and education on IV iron therapy, use of antiemetics and/or anti-diarrheals, use of NSAID's, potential IV iron therapy side-effects, and any specific recommendations and precautions with the particular IV iron agents.      I answered all of the patient's (and family's, if applicable) questions to the best of my ability and to their complete satisfaction. The patient acknowledged full understanding of the risks, recommendations and plan(s).         I spent over 25 mins of time with the patient. Reviewed results of the recently ordered labs, tests and studies; made directives with regards to the results. Over half of this time was spent couseling and coordinating care.    I have explained all of the above in detail and the patient understands all  of the current recommendation(s). I have answered all of their questions to the best of my ability and to their complete satisfaction.   The patient is to continue with the current management plan.            Electronically signed by Hossein Carranza MD                      Answers for HPI/ROS submitted by the patient on 2/15/2022  appetite change : No  unexpected weight change: No  mouth sores: No  visual disturbance: No  cough: No  shortness of breath: No  chest pain: No  abdominal pain: No  diarrhea: No  frequency: No  back pain: No  rash: No  headaches: No  adenopathy: No  nervous/ anxious: No

## 2022-02-15 ENCOUNTER — OFFICE VISIT (OUTPATIENT)
Dept: HEMATOLOGY/ONCOLOGY | Facility: CLINIC | Age: 52
End: 2022-02-15
Payer: COMMERCIAL

## 2022-02-15 VITALS
HEIGHT: 65 IN | HEART RATE: 101 BPM | BODY MASS INDEX: 34.99 KG/M2 | WEIGHT: 210 LBS | SYSTOLIC BLOOD PRESSURE: 125 MMHG | DIASTOLIC BLOOD PRESSURE: 84 MMHG | TEMPERATURE: 99 F | RESPIRATION RATE: 18 BRPM

## 2022-02-15 DIAGNOSIS — Z79.810 USE OF TAMOXIFEN (NOLVADEX): ICD-10-CM

## 2022-02-15 DIAGNOSIS — Z17.0 ER+ (ESTROGEN RECEPTOR POSITIVE STATUS): ICD-10-CM

## 2022-02-15 DIAGNOSIS — D50.9 MICROCYTIC ANEMIA: ICD-10-CM

## 2022-02-15 DIAGNOSIS — R93.2 ABNORMAL CT OF LIVER: ICD-10-CM

## 2022-02-15 DIAGNOSIS — Z98.890 S/P LUMPECTOMY, LEFT BREAST: ICD-10-CM

## 2022-02-15 DIAGNOSIS — C50.912 MALIGNANT NEOPLASM OF LEFT BREAST IN FEMALE, ESTROGEN RECEPTOR POSITIVE, UNSPECIFIED SITE OF BREAST: Primary | ICD-10-CM

## 2022-02-15 DIAGNOSIS — D50.9 IRON DEFICIENCY ANEMIA, UNSPECIFIED IRON DEFICIENCY ANEMIA TYPE: ICD-10-CM

## 2022-02-15 DIAGNOSIS — Z17.0 MALIGNANT NEOPLASM OF LEFT BREAST IN FEMALE, ESTROGEN RECEPTOR POSITIVE, UNSPECIFIED SITE OF BREAST: Primary | ICD-10-CM

## 2022-02-15 PROCEDURE — 1160F PR REVIEW ALL MEDS BY PRESCRIBER/CLIN PHARMACIST DOCUMENTED: ICD-10-PCS | Mod: S$GLB,,, | Performed by: INTERNAL MEDICINE

## 2022-02-15 PROCEDURE — 1160F RVW MEDS BY RX/DR IN RCRD: CPT | Mod: S$GLB,,, | Performed by: INTERNAL MEDICINE

## 2022-02-15 PROCEDURE — 3074F SYST BP LT 130 MM HG: CPT | Mod: S$GLB,,, | Performed by: INTERNAL MEDICINE

## 2022-02-15 PROCEDURE — 3079F DIAST BP 80-89 MM HG: CPT | Mod: S$GLB,,, | Performed by: INTERNAL MEDICINE

## 2022-02-15 PROCEDURE — 3008F BODY MASS INDEX DOCD: CPT | Mod: S$GLB,,, | Performed by: INTERNAL MEDICINE

## 2022-02-15 PROCEDURE — 3008F PR BODY MASS INDEX (BMI) DOCUMENTED: ICD-10-PCS | Mod: S$GLB,,, | Performed by: INTERNAL MEDICINE

## 2022-02-15 PROCEDURE — 3051F PR MOST RECENT HEMOGLOBIN A1C LEVEL 7.0 - < 8.0%: ICD-10-PCS | Mod: S$GLB,,, | Performed by: INTERNAL MEDICINE

## 2022-02-15 PROCEDURE — 99214 OFFICE O/P EST MOD 30 MIN: CPT | Mod: S$GLB,,, | Performed by: INTERNAL MEDICINE

## 2022-02-15 PROCEDURE — 3079F PR MOST RECENT DIASTOLIC BLOOD PRESSURE 80-89 MM HG: ICD-10-PCS | Mod: S$GLB,,, | Performed by: INTERNAL MEDICINE

## 2022-02-15 PROCEDURE — 3051F HG A1C>EQUAL 7.0%<8.0%: CPT | Mod: S$GLB,,, | Performed by: INTERNAL MEDICINE

## 2022-02-15 PROCEDURE — 99214 PR OFFICE/OUTPT VISIT, EST, LEVL IV, 30-39 MIN: ICD-10-PCS | Mod: S$GLB,,, | Performed by: INTERNAL MEDICINE

## 2022-02-15 PROCEDURE — 4010F ACE/ARB THERAPY RXD/TAKEN: CPT | Mod: S$GLB,,, | Performed by: INTERNAL MEDICINE

## 2022-02-15 PROCEDURE — 4010F PR ACE/ARB THEARPY RXD/TAKEN: ICD-10-PCS | Mod: S$GLB,,, | Performed by: INTERNAL MEDICINE

## 2022-02-15 PROCEDURE — 3074F PR MOST RECENT SYSTOLIC BLOOD PRESSURE < 130 MM HG: ICD-10-PCS | Mod: S$GLB,,, | Performed by: INTERNAL MEDICINE

## 2022-02-16 NOTE — ANESTHESIA PAT ROS NOTE
02/16/2022  Dennis Aldana is a 51 y.o., female.      Pre-op Assessment          Review of Systems  Anesthesia Hx:  Hx of Anesthetic complications Zofran works for PONV per patient Denies Family Hx of Anesthesia complications.  Personal Hx of Anesthesia complications, Post-Operative Nausea/Vomiting   Social:  Non-Smoker, Social Alcohol Use    Hematology/Oncology:         -- Anemia: Hematology Comments: Iron Infusion Current/Recent Cancer. Breast left surgery and radiation    EENT/Dental:EENT/Dental Normal   Cardiovascular:   Exercise tolerance: good Hypertension Gardening/Housework/Cares for a 2 year old   Pulmonary:   Pneumonia Sept., 2021-with Covid-19   Renal/:   Chronic Renal Disease renal calculi    Hepatic/GI:   GERD    Musculoskeletal:   Arthritis  Right knee   OB/GYN/PEDS:  S/P-Hysterectomy   Neurological:  Neurology Normal Diabetic neuropathy-moses. feet   Endocrine:   Diabetes, type 2 A.M. blood glucose-120-149   Psych:   Psychiatric History      Yara Lynne RN  2/16/22       Anesthesia Assessment: Preoperative EQUATION    Planned Procedure: Procedure(s) (LRB):  CYSTOSCOPY (N/A)  REPAIR, DIVERTICULUM, URETHRA (N/A)  Requested Anesthesia Type:General  Surgeon: Anali Greer MD  Service: Urology  Known or anticipated Date of Surgery:2/22/2022    Surgeon notes: reviewed and Urethral diverticulum    Previous anesthesia records:Hx PONV and 1/7/22-Removal, Calculus, Ureter, Ureteroscopic Cystoscopy, with Retrograde Pyelogram and Ureteral Stent Insertion Lithotriosy, Using Laser-Right-General - no apparent anesthetic complications and tolerated procedure well-no nausea/vomiting    Zofran helps with PONV per patient.    Anesthesia Hx:  Hx of Anesthetic complications (PONV)     Airway/Jaw/Neck:  Airway Findings: Mouth Opening: Normal Tongue: Normal  General Airway Assessment: Adult   Oropharynx Findings:  Mallampati: II  Jaw/Neck Findings:  Neck ROM: Normal ROM        Last PCP note: within 1 month , within [facility] , 2/14/22-Andrew Roger MD-[facility]-Essential hypertension, begign +2 more-Dx-Diabetes  Subspecialty notes: Hematology/Oncology, General Surgery visit/ OB & GYN visit/Ophthalmolog    Other important co-morbidities: DM2, HTN and Urethral diverticulum    Medical History    Diagnosis Date Comment Source   Abnormal CT of liver 12/21/2020     Abnormal CT of the abdomen 12/21/2020     Anemia      Anxiety      Arthritis      Breast cancer 2018 left w/ radiation    COVID-19 virus infection 9/6/2021     Depression      Diabetes mellitus      ER+ (estrogen receptor positive status) 8/16/2020     Essential hypertension, benign 1/3/2018     Excessive menstruation 8/16/2020     Hypertension      Kidney stone      Liver mass      Malignant neoplasm of left breast in female, estrogen receptor positive 4/11/2018 S/p lumpectomy and radiation, discharge May 2018    Microcytic anemia 6/21/2020     Myxoma of skin determined by biopsy (low grade) 1/19/2021     Pneumonia due to COVID-19 virus 9/6/2021     PONV (postoperative nausea and vomiting)      RLS (restless legs syndrome)      S/P lumpectomy, left breast 6/21/2020     Use of tamoxifen (Nolvadex) 6/21/2020         Tests already available:  Results have been reviewed. CXR-12/17/21/ EKG-9/6/21/ Labs-2/8/22-POCT glucose/ BMP/A1c/CEA/Ferriten/ Iron & TIBC/CBC/CMP/ 1/7/22-Urinary Stone Analysis/ 1/4/22-COVID-19 Routine Screening/ 12/17/21- Covid-19 screen/12/13/21-Urine dipstick wthout microscope/ 11/30/21-Covid-19 screen/ SARS-COV-2/ SARS- COV-2 Cycle      Plan:Phone pending      Testing:  None Needed     Patient  has previously scheduled Medical Appointment:Appointments on 2/15/22 & 2/19/22, prior to the surgery date.    Navigation: Phone Complete                                     Consults scheduled.None needed at this time.                         Straight Line to surgery.               No tests, anesthesia preop clinic visit, or consult required.          Yara Lynne RN  2/16/22

## 2022-02-19 ENCOUNTER — LAB VISIT (OUTPATIENT)
Dept: PRIMARY CARE CLINIC | Facility: CLINIC | Age: 52
End: 2022-02-19
Payer: COMMERCIAL

## 2022-02-19 DIAGNOSIS — Z01.818 PREOP TESTING: ICD-10-CM

## 2022-02-19 PROCEDURE — U0003 INFECTIOUS AGENT DETECTION BY NUCLEIC ACID (DNA OR RNA); SEVERE ACUTE RESPIRATORY SYNDROME CORONAVIRUS 2 (SARS-COV-2) (CORONAVIRUS DISEASE [COVID-19]), AMPLIFIED PROBE TECHNIQUE, MAKING USE OF HIGH THROUGHPUT TECHNOLOGIES AS DESCRIBED BY CMS-2020-01-R: HCPCS | Performed by: UROLOGY

## 2022-02-21 ENCOUNTER — ANESTHESIA EVENT (OUTPATIENT)
Dept: SURGERY | Facility: HOSPITAL | Age: 52
End: 2022-02-21
Payer: COMMERCIAL

## 2022-02-21 LAB
SARS-COV-2 RNA RESP QL NAA+PROBE: NOT DETECTED
SARS-COV-2- CYCLE NUMBER: NORMAL

## 2022-02-22 ENCOUNTER — HOSPITAL ENCOUNTER (OUTPATIENT)
Facility: HOSPITAL | Age: 52
Discharge: HOME | End: 2022-02-23
Attending: UROLOGY | Admitting: UROLOGY
Payer: COMMERCIAL

## 2022-02-22 ENCOUNTER — ANESTHESIA (OUTPATIENT)
Dept: SURGERY | Facility: HOSPITAL | Age: 52
End: 2022-02-22
Payer: COMMERCIAL

## 2022-02-22 DIAGNOSIS — N36.1 URETHRAL DIVERTICULUM: Primary | ICD-10-CM

## 2022-02-22 LAB
GRAM STN SPEC: NORMAL
GRAM STN SPEC: NORMAL
POCT GLUCOSE: 204 MG/DL (ref 70–110)
POCT GLUCOSE: 236 MG/DL (ref 70–110)
POCT GLUCOSE: 276 MG/DL (ref 70–110)
POCT GLUCOSE: 296 MG/DL (ref 70–110)

## 2022-02-22 PROCEDURE — 82962 GLUCOSE BLOOD TEST: CPT | Performed by: UROLOGY

## 2022-02-22 PROCEDURE — D9220A PRA ANESTHESIA: Mod: ANES,,, | Performed by: ANESTHESIOLOGY

## 2022-02-22 PROCEDURE — 71000016 HC POSTOP RECOV ADDL HR: Performed by: UROLOGY

## 2022-02-22 PROCEDURE — 94799 UNLISTED PULMONARY SVC/PX: CPT

## 2022-02-22 PROCEDURE — 87077 CULTURE AEROBIC IDENTIFY: CPT | Performed by: UROLOGY

## 2022-02-22 PROCEDURE — 88305 TISSUE EXAM BY PATHOLOGIST: CPT | Mod: 26,,, | Performed by: PATHOLOGY

## 2022-02-22 PROCEDURE — 37000008 HC ANESTHESIA 1ST 15 MINUTES: Performed by: UROLOGY

## 2022-02-22 PROCEDURE — 37000009 HC ANESTHESIA EA ADD 15 MINS: Performed by: UROLOGY

## 2022-02-22 PROCEDURE — 63600175 PHARM REV CODE 636 W HCPCS: Performed by: STUDENT IN AN ORGANIZED HEALTH CARE EDUCATION/TRAINING PROGRAM

## 2022-02-22 PROCEDURE — 25000003 PHARM REV CODE 250: Performed by: STUDENT IN AN ORGANIZED HEALTH CARE EDUCATION/TRAINING PROGRAM

## 2022-02-22 PROCEDURE — D9220A PRA ANESTHESIA: ICD-10-PCS | Mod: ANES,,, | Performed by: ANESTHESIOLOGY

## 2022-02-22 PROCEDURE — 88305 TISSUE EXAM BY PATHOLOGIST: CPT | Mod: 59 | Performed by: PATHOLOGY

## 2022-02-22 PROCEDURE — 63600175 PHARM REV CODE 636 W HCPCS: Performed by: ANESTHESIOLOGY

## 2022-02-22 PROCEDURE — 88305 TISSUE EXAM BY PATHOLOGIST: ICD-10-PCS | Mod: 26,,, | Performed by: PATHOLOGY

## 2022-02-22 PROCEDURE — 36000708 HC OR TIME LEV III 1ST 15 MIN: Performed by: UROLOGY

## 2022-02-22 PROCEDURE — 87075 CULTR BACTERIA EXCEPT BLOOD: CPT | Performed by: UROLOGY

## 2022-02-22 PROCEDURE — 25000003 PHARM REV CODE 250: Performed by: ANESTHESIOLOGY

## 2022-02-22 PROCEDURE — 36000709 HC OR TIME LEV III EA ADD 15 MIN: Performed by: UROLOGY

## 2022-02-22 PROCEDURE — 87186 SC STD MICRODIL/AGAR DIL: CPT | Performed by: UROLOGY

## 2022-02-22 PROCEDURE — 53230 PR EXCIS URETHRAL TIC,FEMALE: ICD-10-PCS | Mod: ,,, | Performed by: UROLOGY

## 2022-02-22 PROCEDURE — 71000033 HC RECOVERY, INTIAL HOUR: Performed by: UROLOGY

## 2022-02-22 PROCEDURE — D9220A PRA ANESTHESIA: Mod: CRNA,,, | Performed by: STUDENT IN AN ORGANIZED HEALTH CARE EDUCATION/TRAINING PROGRAM

## 2022-02-22 PROCEDURE — D9220A PRA ANESTHESIA: ICD-10-PCS | Mod: CRNA,,, | Performed by: STUDENT IN AN ORGANIZED HEALTH CARE EDUCATION/TRAINING PROGRAM

## 2022-02-22 PROCEDURE — 71000015 HC POSTOP RECOV 1ST HR: Performed by: UROLOGY

## 2022-02-22 PROCEDURE — 27201423 OPTIME MED/SURG SUP & DEVICES STERILE SUPPLY: Performed by: UROLOGY

## 2022-02-22 PROCEDURE — 87205 SMEAR GRAM STAIN: CPT | Performed by: UROLOGY

## 2022-02-22 PROCEDURE — 87070 CULTURE OTHR SPECIMN AEROBIC: CPT | Performed by: UROLOGY

## 2022-02-22 PROCEDURE — 53230 REMOVAL OF URETHRA LESION: CPT | Mod: ,,, | Performed by: UROLOGY

## 2022-02-22 RX ORDER — SCOLOPAMINE TRANSDERMAL SYSTEM 1 MG/1
1 PATCH, EXTENDED RELEASE TRANSDERMAL
Status: DISCONTINUED | OUTPATIENT
Start: 2022-02-22 | End: 2022-02-22

## 2022-02-22 RX ORDER — CITALOPRAM 10 MG/1
40 TABLET ORAL DAILY
Status: DISCONTINUED | OUTPATIENT
Start: 2022-02-22 | End: 2022-02-23 | Stop reason: HOSPADM

## 2022-02-22 RX ORDER — METOPROLOL TARTRATE 1 MG/ML
INJECTION, SOLUTION INTRAVENOUS
Status: DISCONTINUED | OUTPATIENT
Start: 2022-02-22 | End: 2022-02-22

## 2022-02-22 RX ORDER — HYDROCODONE BITARTRATE AND ACETAMINOPHEN 5; 325 MG/1; MG/1
1 TABLET ORAL EVERY 4 HOURS PRN
Status: DISCONTINUED | OUTPATIENT
Start: 2022-02-22 | End: 2022-02-23 | Stop reason: HOSPADM

## 2022-02-22 RX ORDER — ZOLPIDEM TARTRATE 5 MG/1
5 TABLET ORAL NIGHTLY PRN
Status: DISCONTINUED | OUTPATIENT
Start: 2022-02-22 | End: 2022-02-23 | Stop reason: HOSPADM

## 2022-02-22 RX ORDER — DEXAMETHASONE SODIUM PHOSPHATE 4 MG/ML
INJECTION, SOLUTION INTRA-ARTICULAR; INTRALESIONAL; INTRAMUSCULAR; INTRAVENOUS; SOFT TISSUE
Status: DISCONTINUED | OUTPATIENT
Start: 2022-02-22 | End: 2022-02-22

## 2022-02-22 RX ORDER — NEOSTIGMINE METHYLSULFATE 0.5 MG/ML
INJECTION, SOLUTION INTRAVENOUS
Status: DISCONTINUED | OUTPATIENT
Start: 2022-02-22 | End: 2022-02-22

## 2022-02-22 RX ORDER — VALSARTAN 40 MG/1
80 TABLET ORAL DAILY
Status: DISCONTINUED | OUTPATIENT
Start: 2022-02-22 | End: 2022-02-23 | Stop reason: HOSPADM

## 2022-02-22 RX ORDER — LORAZEPAM 1 MG/1
1 TABLET ORAL EVERY 12 HOURS PRN
Status: DISCONTINUED | OUTPATIENT
Start: 2022-02-22 | End: 2022-02-23 | Stop reason: HOSPADM

## 2022-02-22 RX ORDER — MIDAZOLAM HYDROCHLORIDE 1 MG/ML
INJECTION, SOLUTION INTRAMUSCULAR; INTRAVENOUS
Status: DISCONTINUED | OUTPATIENT
Start: 2022-02-22 | End: 2022-02-22

## 2022-02-22 RX ORDER — ACETAMINOPHEN 10 MG/ML
INJECTION, SOLUTION INTRAVENOUS
Status: DISCONTINUED | OUTPATIENT
Start: 2022-02-22 | End: 2022-02-22

## 2022-02-22 RX ORDER — ROCURONIUM BROMIDE 10 MG/ML
INJECTION, SOLUTION INTRAVENOUS
Status: DISCONTINUED | OUTPATIENT
Start: 2022-02-22 | End: 2022-02-22

## 2022-02-22 RX ORDER — SODIUM CHLORIDE, SODIUM LACTATE, POTASSIUM CHLORIDE, CALCIUM CHLORIDE 600; 310; 30; 20 MG/100ML; MG/100ML; MG/100ML; MG/100ML
INJECTION, SOLUTION INTRAVENOUS CONTINUOUS
Status: DISCONTINUED | OUTPATIENT
Start: 2022-02-22 | End: 2022-02-23 | Stop reason: HOSPADM

## 2022-02-22 RX ORDER — FENTANYL CITRATE 50 UG/ML
25 INJECTION, SOLUTION INTRAMUSCULAR; INTRAVENOUS EVERY 5 MIN PRN
Status: DISCONTINUED | OUTPATIENT
Start: 2022-02-22 | End: 2022-02-22 | Stop reason: HOSPADM

## 2022-02-22 RX ORDER — INSULIN ASPART 100 [IU]/ML
1-10 INJECTION, SOLUTION INTRAVENOUS; SUBCUTANEOUS
Status: DISCONTINUED | OUTPATIENT
Start: 2022-02-22 | End: 2022-02-23 | Stop reason: HOSPADM

## 2022-02-22 RX ORDER — SCOLOPAMINE TRANSDERMAL SYSTEM 1 MG/1
PATCH, EXTENDED RELEASE TRANSDERMAL
Status: DISPENSED
Start: 2022-02-22 | End: 2022-02-22

## 2022-02-22 RX ORDER — LORAZEPAM 0.5 MG/1
1 TABLET ORAL DAILY
Status: DISCONTINUED | OUTPATIENT
Start: 2022-02-22 | End: 2022-02-22

## 2022-02-22 RX ORDER — PRAMIPEXOLE DIHYDROCHLORIDE 1 MG/1
2 TABLET ORAL NIGHTLY
Status: DISCONTINUED | OUTPATIENT
Start: 2022-02-22 | End: 2022-02-23 | Stop reason: HOSPADM

## 2022-02-22 RX ORDER — IBUPROFEN 200 MG
24 TABLET ORAL
Status: DISCONTINUED | OUTPATIENT
Start: 2022-02-22 | End: 2022-02-23 | Stop reason: HOSPADM

## 2022-02-22 RX ORDER — METOPROLOL SUCCINATE 100 MG/1
200 TABLET, EXTENDED RELEASE ORAL DAILY
Status: DISCONTINUED | OUTPATIENT
Start: 2022-02-22 | End: 2022-02-23 | Stop reason: HOSPADM

## 2022-02-22 RX ORDER — PROPOFOL 10 MG/ML
VIAL (ML) INTRAVENOUS
Status: DISCONTINUED | OUTPATIENT
Start: 2022-02-22 | End: 2022-02-22

## 2022-02-22 RX ORDER — LIDOCAINE HYDROCHLORIDE 20 MG/ML
INJECTION INTRAVENOUS
Status: DISCONTINUED | OUTPATIENT
Start: 2022-02-22 | End: 2022-02-22

## 2022-02-22 RX ORDER — FENTANYL CITRATE 50 UG/ML
INJECTION, SOLUTION INTRAMUSCULAR; INTRAVENOUS
Status: DISCONTINUED | OUTPATIENT
Start: 2022-02-22 | End: 2022-02-22

## 2022-02-22 RX ORDER — ONDANSETRON 2 MG/ML
INJECTION INTRAMUSCULAR; INTRAVENOUS
Status: DISCONTINUED | OUTPATIENT
Start: 2022-02-22 | End: 2022-02-22

## 2022-02-22 RX ORDER — CEFAZOLIN SODIUM/WATER 2 G/20 ML
2 SYRINGE (ML) INTRAVENOUS
Status: COMPLETED | OUTPATIENT
Start: 2022-02-22 | End: 2022-02-22

## 2022-02-22 RX ORDER — GLUCAGON 1 MG
1 KIT INJECTION
Status: DISCONTINUED | OUTPATIENT
Start: 2022-02-22 | End: 2022-02-23 | Stop reason: HOSPADM

## 2022-02-22 RX ORDER — DEXMEDETOMIDINE HYDROCHLORIDE 100 UG/ML
INJECTION, SOLUTION INTRAVENOUS
Status: DISCONTINUED | OUTPATIENT
Start: 2022-02-22 | End: 2022-02-22

## 2022-02-22 RX ORDER — ATORVASTATIN CALCIUM 20 MG/1
40 TABLET, FILM COATED ORAL DAILY
Refills: 3 | Status: DISCONTINUED | OUTPATIENT
Start: 2022-02-22 | End: 2022-02-23 | Stop reason: HOSPADM

## 2022-02-22 RX ORDER — KETAMINE HCL IN 0.9 % NACL 50 MG/5 ML
SYRINGE (ML) INTRAVENOUS
Status: DISCONTINUED | OUTPATIENT
Start: 2022-02-22 | End: 2022-02-22

## 2022-02-22 RX ORDER — IBUPROFEN 200 MG
16 TABLET ORAL
Status: DISCONTINUED | OUTPATIENT
Start: 2022-02-22 | End: 2022-02-23 | Stop reason: HOSPADM

## 2022-02-22 RX ADMIN — CITALOPRAM HYDROBROMIDE 40 MG: 20 TABLET ORAL at 12:02

## 2022-02-22 RX ADMIN — METOPROLOL TARTRATE 1 MG: 5 INJECTION, SOLUTION INTRAVENOUS at 10:02

## 2022-02-22 RX ADMIN — GLYCOPYRROLATE 0.6 MG: 0.2 INJECTION INTRAMUSCULAR; INTRAVENOUS at 11:02

## 2022-02-22 RX ADMIN — DEXMEDETOMIDINE HYDROCHLORIDE 8 MCG: 100 INJECTION, SOLUTION, CONCENTRATE INTRAVENOUS at 10:02

## 2022-02-22 RX ADMIN — ATORVASTATIN CALCIUM 40 MG: 20 TABLET, FILM COATED ORAL at 12:02

## 2022-02-22 RX ADMIN — FENTANYL CITRATE 25 MCG: 50 INJECTION INTRAMUSCULAR; INTRAVENOUS at 12:02

## 2022-02-22 RX ADMIN — METOPROLOL SUCCINATE 200 MG: 100 TABLET, EXTENDED RELEASE ORAL at 12:02

## 2022-02-22 RX ADMIN — DEXAMETHASONE SODIUM PHOSPHATE 4 MG: 4 INJECTION, SOLUTION INTRAMUSCULAR; INTRAVENOUS at 09:02

## 2022-02-22 RX ADMIN — Medication 25 MG: at 09:02

## 2022-02-22 RX ADMIN — ROCURONIUM BROMIDE 20 MG: 10 INJECTION, SOLUTION INTRAVENOUS at 09:02

## 2022-02-22 RX ADMIN — PROPOFOL 50 MG: 10 INJECTION, EMULSION INTRAVENOUS at 10:02

## 2022-02-22 RX ADMIN — DEXMEDETOMIDINE HYDROCHLORIDE 8 MCG: 100 INJECTION, SOLUTION, CONCENTRATE INTRAVENOUS at 11:02

## 2022-02-22 RX ADMIN — ZOLPIDEM TARTRATE 5 MG: 5 TABLET ORAL at 08:02

## 2022-02-22 RX ADMIN — ROCURONIUM BROMIDE 10 MG: 10 INJECTION, SOLUTION INTRAVENOUS at 09:02

## 2022-02-22 RX ADMIN — PROPOFOL 200 MG: 10 INJECTION, EMULSION INTRAVENOUS at 09:02

## 2022-02-22 RX ADMIN — Medication 2 G: at 09:02

## 2022-02-22 RX ADMIN — ACETAMINOPHEN 1000 MG: 10 INJECTION, SOLUTION INTRAVENOUS at 09:02

## 2022-02-22 RX ADMIN — LIDOCAINE HYDROCHLORIDE 60 MG: 20 INJECTION, SOLUTION INTRAVENOUS at 09:02

## 2022-02-22 RX ADMIN — SODIUM CHLORIDE, SODIUM LACTATE, POTASSIUM CHLORIDE, AND CALCIUM CHLORIDE: .6; .31; .03; .02 INJECTION, SOLUTION INTRAVENOUS at 08:02

## 2022-02-22 RX ADMIN — SCOPALAMINE 1 PATCH: 1 PATCH, EXTENDED RELEASE TRANSDERMAL at 08:02

## 2022-02-22 RX ADMIN — FENTANYL CITRATE 25 MCG: 50 INJECTION, SOLUTION INTRAMUSCULAR; INTRAVENOUS at 11:02

## 2022-02-22 RX ADMIN — Medication 10 MG: at 10:02

## 2022-02-22 RX ADMIN — VALSARTAN 80 MG: 80 TABLET, FILM COATED ORAL at 03:02

## 2022-02-22 RX ADMIN — GLYCOPYRROLATE 0.2 MG: 0.2 INJECTION INTRAMUSCULAR; INTRAVENOUS at 09:02

## 2022-02-22 RX ADMIN — INSULIN ASPART 3 UNITS: 100 INJECTION, SOLUTION INTRAVENOUS; SUBCUTANEOUS at 09:02

## 2022-02-22 RX ADMIN — FENTANYL CITRATE 25 MCG: 50 INJECTION, SOLUTION INTRAMUSCULAR; INTRAVENOUS at 10:02

## 2022-02-22 RX ADMIN — FENTANYL CITRATE 50 MCG: 50 INJECTION, SOLUTION INTRAMUSCULAR; INTRAVENOUS at 09:02

## 2022-02-22 RX ADMIN — ROCURONIUM BROMIDE 10 MG: 10 INJECTION, SOLUTION INTRAVENOUS at 10:02

## 2022-02-22 RX ADMIN — ROCURONIUM BROMIDE 40 MG: 10 INJECTION, SOLUTION INTRAVENOUS at 09:02

## 2022-02-22 RX ADMIN — NEOSTIGMINE METHYLSULFATE 5 MG: 0.5 INJECTION INTRAVENOUS at 11:02

## 2022-02-22 RX ADMIN — ROCURONIUM BROMIDE 10 MG: 10 INJECTION, SOLUTION INTRAVENOUS at 11:02

## 2022-02-22 RX ADMIN — SODIUM CHLORIDE, SODIUM GLUCONATE, SODIUM ACETATE, POTASSIUM CHLORIDE, MAGNESIUM CHLORIDE, SODIUM PHOSPHATE, DIBASIC, AND POTASSIUM PHOSPHATE: .53; .5; .37; .037; .03; .012; .00082 INJECTION, SOLUTION INTRAVENOUS at 10:02

## 2022-02-22 RX ADMIN — INDIGO CARMINE 5 ML: 8 INJECTION, SOLUTION INTRAMUSCULAR; INTRAVENOUS at 10:02

## 2022-02-22 RX ADMIN — MIDAZOLAM HYDROCHLORIDE 2 MG: 1 INJECTION, SOLUTION INTRAMUSCULAR; INTRAVENOUS at 09:02

## 2022-02-22 RX ADMIN — ONDANSETRON 4 MG: 2 INJECTION INTRAMUSCULAR; INTRAVENOUS at 11:02

## 2022-02-22 RX ADMIN — SODIUM CHLORIDE: 0.9 INJECTION, SOLUTION INTRAVENOUS at 09:02

## 2022-02-22 RX ADMIN — SODIUM CHLORIDE, SODIUM LACTATE, POTASSIUM CHLORIDE, AND CALCIUM CHLORIDE: .6; .31; .03; .02 INJECTION, SOLUTION INTRAVENOUS at 12:02

## 2022-02-22 RX ADMIN — PRAMIPEXOLE DIHYDROCHLORIDE 2 MG: 1 TABLET ORAL at 08:02

## 2022-02-22 RX ADMIN — DEXMEDETOMIDINE HYDROCHLORIDE 4 MCG: 100 INJECTION, SOLUTION, CONCENTRATE INTRAVENOUS at 11:02

## 2022-02-22 RX ADMIN — HYDROCODONE BITARTRATE AND ACETAMINOPHEN 1 TABLET: 5; 325 TABLET ORAL at 12:02

## 2022-02-22 NOTE — OP NOTE
[facility]  Operative Note    Date: 02/22/2022    Pre-Op Diagnosis:  Urethral diverticulum    Patient Active Problem List    Diagnosis Date Noted    Severe obesity (BMI 35.0-39.9) with comorbidity 02/14/2022    Renal stone 01/03/2022    Urethral diverticulum 12/13/2021    Microalbuminuria due to type 2 diabetes mellitus 10/14/2021    History of myxoma 02/22/2021    Myxoma of skin determined by biopsy (low grade) 01/19/2021    Abnormal CT of liver 12/21/2020    ER+ (estrogen receptor positive status) 08/16/2020    Microcytic anemia 06/21/2020    S/P lumpectomy, left breast 06/21/2020    Use of tamoxifen (Nolvadex) 06/21/2020    Vitamin D deficiency 06/02/2020    Primary osteoarthritis of right knee 05/28/2020    Type 2 diabetes mellitus with hyperglycemia, without long-term current use of insulin 02/14/2020    Hyperlipidemia 02/14/2020    Iron deficiency anemia 02/14/2020    Primary insomnia 03/11/2019    Sinus tachycardia 04/11/2018    Malignant neoplasm of left breast in female, estrogen receptor positive 04/11/2018    Gastroesophageal reflux disease 04/11/2018    RLS (restless legs syndrome) 01/03/2018    Essential hypertension, benign 01/03/2018    TATYANA (generalized anxiety disorder) 01/03/2018    Depression 01/03/2018         Post-Op Diagnosis: same    Procedure(s) Performed:   1.  Urethral diverticulectomy  2.  Cystoscopy     Specimen(s):     1. urethral diverticulum  2. Pus from urethral diverticulum  3. Vaginal epithelium    Staff Surgeon: Anali Greer MD    Assistant Surgeon: Lalito Mena MD    Anesthesia:  General endotracheal anesthesia    Indications: Dennis Aldana is a 51 y.o. female with urethral diverticulum presenting for surgical excision presenting for surgical intervention.        Findings:    1. Initial cystoscopy showed os of diverticulum at 5 o'clock, on left side of urethra  2.  Large urethral diverticulum with os on left side excised. Care taken to ensure all of  the diverticular sac was removed.   3.  Cystoscopy after excision of diverticulum showed     Estimated Blood Loss: 100 mL    Drains:   1.  16 Fr quinonez catheter with 10 mL in the balloon    Counts:  All needle and sponge counts were correct at the conclusion of the case.      Procedure in detail:  After informed consent was obtained and all questions were answered, the patient was brought to the operating suite and placed in the supine position. Anesthesia was administered. TEDs and SCDs were applied and working prior to induction of anesthesia. She was then placed in the dorsal lithotomy position and appropriately padded. She was prepped and draped in the usual sterile fashion. Time out was performed and preoperative antibiotics were confirmed.     A rigid cystoscope in a 22 Fr sheath was introduced into the bladder per urethra. This passed easily. The entire urethra was visualized which showed a narrow diveritcular os in the urethra, just left of midline. No masses or strictures. The right and left ureteral orifices were identified in the normal anatomic position and were seen effluxing clear urine. Formal cystoscopy was performed which revealed no masses or lesions suspicious for malignancy, no trabeculations, no bladder stones and no bladder diverticuli. The cystoscope was removed and a 16 Fr quinonez catheter was placed steriley on the field.     A large fluctuant mass was palpable on the anterior vaginal wall.  An inverted U shaped flap was marked with the apex at the level of the palpable diverticulum and extending approximately 3 cm downwards on either side.  The marked flap was incised sharply using a 15 blade. The flap was dissected free from the underlying pubocervical fascia sharply using tenotomy scissors. We then carefully circumferentially dissected the urethral diverticulum free. A transverse incision in the pubocervical fascia was made sharply and the pubocervical fascia was  from the  diverticulum. We dissected the urethra free circumferentially. We identified its connection to the urethra and amputated it sharply. This was passed off the field for pathologic analysis. Fluid from the diverticulum was sent for cultures and Gram stain.     Hemostasis was achieved. The opening to the urethra was then closed using 4-0 monocryl in a combination of simple interrupted and running sutures. The pubocervical fascia was closed over the urethral closure using 3-0 monocryl    Rigid cystoscopy with a 17 Fr sheath was then repeated which showed brisk efflux of indigo carmine (which had been previously administered) from the ureteral orifices bilaterally. Additionally, there was no leakage of fluid from the urethral closure. The vagina was irrigated copiously. Hemostasis was excellent.  The quinonez was replaced found to pass easily.      Excess vaginal epithelium was sharply excised. The vaginal epithelium was then closed with running 2-0 Monocryl.  The vagina was irrigated and then packed with lubricated kerlex.     The patient tolerated the procedure well and was transferred to the PACU in stable condition.     Disposition:  The patient will be admitted for monitoring.      MD CAROL Garcia was present for the entire case and agree with the above note.

## 2022-02-22 NOTE — NURSING TRANSFER
Nursing Transfer Note      2/22/2022     Reason patient is being transferred: postop     Transfer To: 508    Transfer via bed    Transfer with iv pole/fluids    Transported by PCT    Medicines sent: n/a    Chart send with patient: Yes    Notified: spouse    Patient reassessed at: 2/22 1500

## 2022-02-22 NOTE — ANESTHESIA POSTPROCEDURE EVALUATION
Anesthesia Post Evaluation    Patient: Dennis Aldana    Procedure(s) Performed: Procedure(s) (LRB):  CYSTOSCOPY (N/A)  REPAIR, DIVERTICULUM, URETHRA (N/A)    Final Anesthesia Type: general      Patient location during evaluation: PACU  Patient participation: Yes- Able to Participate  Level of consciousness: awake and alert  Post-procedure vital signs: reviewed and stable  Pain management: adequate  Airway patency: patent    PONV status at discharge: No PONV  Anesthetic complications: no      Cardiovascular status: blood pressure returned to baseline  Respiratory status: spontaneous ventilation  Hydration status: euvolemic  Follow-up not needed.          Vitals Value Taken Time   /63 02/22/22 1331   Temp 36.4 °C (97.5 °F) 02/22/22 1157   Pulse 71 02/22/22 1332   Resp 17 02/22/22 1332   SpO2 100 % 02/22/22 1332   Vitals shown include unvalidated device data.      Event Time   Out of Recovery 12:30:00         Pain/[place] Score: Pain Rating Prior to Med Admin: 6 (2/22/2022 12:30 PM)  [place] Score: 10 (2/22/2022  1:00 PM)

## 2022-02-22 NOTE — TRANSFER OF CARE
"Anesthesia Transfer of Care Note    Patient: Dennis Aldana    Procedure(s) Performed: Procedure(s) (LRB):  CYSTOSCOPY (N/A)  REPAIR, DIVERTICULUM, URETHRA (N/A)    Patient location: PACU    Anesthesia Type: general    Transport from OR: Transported from OR on 6-10 L/min O2 by face mask with adequate spontaneous ventilation    Post pain: adequate analgesia    Post assessment: no apparent anesthetic complications and tolerated procedure well    Post vital signs: stable    Level of consciousness: sedated and responds to stimulation    Nausea/Vomiting: no nausea/vomiting    Complications: none    Transfer of care protocol was followedComments: Bedside report to PACU RN, opportunity for questions given.       Last vitals:   Visit Vitals  /69 (BP Location: Left arm, Patient Position: Lying)   Pulse 94   Temp 36.4 °C (97.5 °F) (Temporal)   Resp 18   Ht 5' 6" (1.676 m)   Wt 94.8 kg (209 lb)   LMP 02/24/2021   SpO2 100%   Breastfeeding No   BMI 33.73 kg/m²     "

## 2022-02-22 NOTE — H&P (VIEW-ONLY)
CHIEF COMPLAINT:    Mrs. Aldana is a 51 y.o. female presenting for excision of a urethra diverticulum.      PRESENTING ILLNESS:    Dennis Aldana is a 51 y.o. female who has a history of a urethral diverticulum.  She was being treated for an infected stone by Dr. Lin and the patient was found to have a urethral diverticulum.  MRI further characterized it.  She had a procedure in March 15, 2021 by Dr. Cohen in which she had a cystoscopy and he expressed the diverticulum.  No diverticulectomy was done.  She has some dyspareunia but no post void dribbling or dysuria.      She has a history of breast cancer, hysterectomy was done for heavy menses by Dr. Contreras but she had no cancer.      , hysterectomy for heavy bleeding, sexually active, bowels are normal.     REVIEW OF SYSTEMS:    Review of Systems   Constitutional: Negative.    HENT: Negative.    Eyes: Negative.    Respiratory: Negative.    Cardiovascular: Negative.    Gastrointestinal: Negative.    Genitourinary: Negative.         Dyspareunia   Musculoskeletal: Positive for joint pain.   Skin: Negative.    Neurological: Negative.    Endo/Heme/Allergies: Negative.    Psychiatric/Behavioral: Negative.        PATIENT HISTORY:    Past Medical History:   Diagnosis Date    Abnormal CT of liver 2020    Abnormal CT of the abdomen 2020    Anemia     Anxiety     Arthritis     Breast cancer 2018    left w/ radiation    COVID-19 virus infection 2021    Depression     Diabetes mellitus     ER+ (estrogen receptor positive status) 2020    Essential hypertension, benign 1/3/2018    Excessive menstruation 2020    Hypertension     Kidney stone     Liver mass     Malignant neoplasm of left breast in female, estrogen receptor positive 2018    S/p lumpectomy and radiation, discharge May 2018    Microcytic anemia 2020    Myxoma of skin determined by biopsy (low grade) 2021    Pneumonia due to COVID-19 virus 2021    PONV  (postoperative nausea and vomiting)     Zofran helps with PONV per patient(every surgery)    RLS (restless legs syndrome)     S/P lumpectomy, left breast 6/21/2020    Use of tamoxifen (Nolvadex) 6/21/2020       Past Surgical History:   Procedure Laterality Date    BIOPSY N/A 12/29/2020    Procedure: us guided muscle biopsy (latissimus Dorsi);  Surgeon: [facility];  Location: [facility] OR;  Service: Interventional Radiology;  Laterality: N/A;    BREAST LUMPECTOMY Left 2018    COLONOSCOPY N/A 12/3/2021    Procedure: COLONOSCOPY;  Surgeon: Silvio Montesinos MD;  Location: [facility] ENDO;  Service: Endoscopy;  Laterality: N/A;    CYSTOSCOPY      CYSTOSCOPY N/A 3/22/2021    Procedure: CYSTOSCOPY;  Surgeon: Jannie Contreras MD;  Location: [facility] OR;  Service: OB/GYN;  Laterality: N/A;    CYSTOSCOPY W/ URETERAL STENT PLACEMENT Right 12/17/2021    Procedure: CYSTOSCOPY, WITH URETERAL STENT INSERTION;  Surgeon: Esthela Lin MD;  Location: [facility] OR;  Service: Urology;  Laterality: Right;    CYSTOSCOPY WITH URETEROSCOPY, RETROGRADE PYELOGRAPHY, AND INSERTION OF STENT Right 1/7/2022    Procedure: CYSTOSCOPY, WITH RETROGRADE PYELOGRAM AND URETERAL STENT INSERTION;  Surgeon: Esthela Lin MD;  Location: [facility] OR;  Service: Urology;  Laterality: Right;    EXCISION OF MASS OF BACK N/A 2/22/2021    Procedure: EXCISION, MASS, BACK  LEFT;  Surgeon: Chico Baird MD;  Location: [address];  Service: General;  Laterality: N/A;  Specimen sent to pathology via  for research     EXCISION OF MASS OF BACK  02/22/2021    KNEE SURGERY      LASER LITHOTRIPSY Right 1/7/2022    Procedure: LITHOTRIPSY, USING LASER;  Surgeon: Esthela Lin MD;  Location: [facility];  Service: Urology;  Laterality: Right;    ROBOT-ASSISTED LAPAROSCOPIC HYSTERECTOMY N/A 3/22/2021    Procedure: ROBOTIC HYSTERECTOMY;  Surgeon: Jannie Contreras MD;  Location: [facility] OR;  Service: OB/GYN;  Laterality: N/A;    ROBOT-ASSISTED LAPAROSCOPIC  SALPINGO-OOPHORECTOMY Bilateral 3/22/2021    Procedure: ROBOTIC SALPINGO-OOPHORECTOMY;  Surgeon: Jannie Contreras MD;  Location: [facility] OR;  Service: OB/GYN;  Laterality: Bilateral;    SHOULDER SURGERY      TONSILLECTOMY      URETEROSCOPIC REMOVAL OF URETERIC CALCULUS Right 1/7/2022    Procedure: REMOVAL, CALCULUS, URETER, URETEROSCOPIC;  Surgeon: Esthela Lin MD;  [facility];  Service: Urology;  Laterality: Right;       Family History   Problem Relation Age of Onset    Hypertension Mother     Diabetes Mother     Diabetes Father     Breast cancer Maternal Grandmother     Cancer Paternal Grandfather        Social History     Socioeconomic History    Marital status:     Number of children: 1   Occupational History    Occupation: Self Employed   Tobacco Use    Smoking status: Never Smoker    Smokeless tobacco: Never Used   Substance and Sexual Activity    Alcohol use: Yes     Comment: occ.     Drug use: No    Sexual activity: Yes     Partners: Male     Birth control/protection: None       Allergies:  Patient has no known allergies.    Medications:  Outpatient Encounter Medications as of 1/10/2022   Medication Sig Dispense Refill    anastrozole (ARIMIDEX) 1 mg Tab TAKE 1 TABLET BY MOUTH EVERY DAY 90 tablet 1    citalopram (CELEXA) 40 MG tablet TAKE 1 TABLET BY MOUTH EVERY DAY 90 tablet 1    glipiZIDE (GLUCOTROL) 5 MG TR24 Take 1 tablet (5 mg total) by mouth daily with breakfast. 90 tablet 1    LORazepam (ATIVAN) 1 MG tablet TAKE 1 TABLET (1 MG TOTAL) BY MOUTH ONCE DAILY. AS NEEDED FOR ANXIETY 30 tablet 1    phenazopyridine (PYRIDIUM) 100 MG tablet Take 1 tablet (100 mg total) by mouth 3 (three) times daily as needed for Pain. 30 tablet 3    pramipexole (MIRAPEX) 1 MG tablet TAKE 2 TABLETS (2 MG TOTAL) BY MOUTH EVERY EVENING. 180 tablet 1    rosuvastatin (CRESTOR) 10 MG tablet TAKE 1 TABLET BY MOUTH EVERY DAY 90 tablet 3    tamsulosin (FLOMAX) 0.4 mg Cap Take 1 capsule (0.4 mg total) by mouth once  daily. 30 capsule 3    traMADoL (ULTRAM) 50 mg tablet Take 1 tablet (50 mg total) by mouth every 4 (four) hours as needed for Pain. 5 tablet 0    valsartan (DIOVAN) 80 MG tablet Take 1 tablet (80 mg total) by mouth once daily. 90 tablet 1    zolpidem (AMBIEN) 5 MG Tab TAKE 1 TABLET (5 MG TOTAL) BY MOUTH NIGHTLY AS NEEDED. 30 tablet 1    metoprolol succinate (TOPROL-XL) 200 MG 24 hr tablet TAKE 1 TABLET BY MOUTH EVERY DAY (Patient taking differently: Take 200 mg by mouth once daily.) 90 tablet 1     Facility-Administered Encounter Medications as of 1/10/2022   Medication Dose Route Frequency Provider Last Rate Last Admin    electrolyte-S (ISOLYTE)   Intravenous Continuous Nikole Neal MD 0 mL/hr at 12/17/21 1445 New Bag at 01/07/22 0838    LIDOcaine (PF) 10 mg/ml (1%) injection 10 mg  1 mL Intradermal Once PRN Nikole Neal MD        LIDOcaine (PF) 10 mg/ml (1%) injection 10 mg  1 mL Intradermal Once Nikole Neal MD        scopolamine 1.3-1.5 mg (1 mg over 3 days) 1 patch  1 patch Transdermal Q3 Days Nikole Neal MD   1 patch at 12/17/21 1009         PHYSICAL EXAMINATION:    The patient generally appears in good health, is appropriately interactive, and is in no apparent distress.    Skin: No lesions.    Mental: Cooperative with normal affect.    Neuro: Grossly intact.    HEENT: Normal. No evidence of lymphadenopathy.    Chest:  normal inspiratory effort.    Abdomen:  Soft, non-tender. No masses or organomegaly. Bladder is not palpable. No evidence of flank discomfort. No evidence of inguinal hernia.    Extremities: No clubbing, cyanosis, or edema    Normal external female genitalia  Urethral meatus is normal  Urethra and bladder are nontender to bimanual exam.  There is a palpable, ballotable mass beneath the urethra. Proximal margin is palpable  Well supported anteriorly and posteriorly   Uterus and cervix are normal  No adnexal masses    LABS:    Lab Results   Component Value Date     BUN 15 02/08/2022    BUN 15 02/08/2022    CREATININE 0.8 02/08/2022    CREATININE 0.9 02/08/2022     UA 1.015 pH 5 trace protein trace blood 50 glucose    MRI shows a urethral diverticulum to the right of the urethra, measures 2.7 x 2.6 cm.    IMPRESSION:    Encounter Diagnoses   Name Primary?    Urethral diverticulum        PLAN:    OR today for urethral diverticulectomy.    Patient seen in holding.  No changes in clinical condition.  Proceed with planned procedure.

## 2022-02-22 NOTE — H&P
CHIEF COMPLAINT:    Mrs. Aldana is a 51 y.o. female presenting for excision of a urethra diverticulum.      PRESENTING ILLNESS:    Dennis Aldana is a 51 y.o. female who has a history of a urethral diverticulum.  She was being treated for an infected stone by Dr. Lin and the patient was found to have a urethral diverticulum.  MRI further characterized it.  She had a procedure in March 15, 2021 by Dr. Cohen in which she had a cystoscopy and he expressed the diverticulum.  No diverticulectomy was done.  She has some dyspareunia but no post void dribbling or dysuria.      She has a history of breast cancer, hysterectomy was done for heavy menses by Dr. Contreras but she had no cancer.      , hysterectomy for heavy bleeding, sexually active, bowels are normal.     REVIEW OF SYSTEMS:    Review of Systems   Constitutional: Negative.    HENT: Negative.    Eyes: Negative.    Respiratory: Negative.    Cardiovascular: Negative.    Gastrointestinal: Negative.    Genitourinary: Negative.         Dyspareunia   Musculoskeletal: Positive for joint pain.   Skin: Negative.    Neurological: Negative.    Endo/Heme/Allergies: Negative.    Psychiatric/Behavioral: Negative.        PATIENT HISTORY:    Past Medical History:   Diagnosis Date    Abnormal CT of liver 2020    Abnormal CT of the abdomen 2020    Anemia     Anxiety     Arthritis     Breast cancer 2018    left w/ radiation    COVID-19 virus infection 2021    Depression     Diabetes mellitus     ER+ (estrogen receptor positive status) 2020    Essential hypertension, benign 1/3/2018    Excessive menstruation 2020    Hypertension     Kidney stone     Liver mass     Malignant neoplasm of left breast in female, estrogen receptor positive 2018    S/p lumpectomy and radiation, discharge May 2018    Microcytic anemia 2020    Myxoma of skin determined by biopsy (low grade) 2021    Pneumonia due to COVID-19 virus 2021    PONV  (postoperative nausea and vomiting)     Zofran helps with PONV per patient(every surgery)    RLS (restless legs syndrome)     S/P lumpectomy, left breast 6/21/2020    Use of tamoxifen (Nolvadex) 6/21/2020       Past Surgical History:   Procedure Laterality Date    BIOPSY N/A 12/29/2020    Procedure: us guided muscle biopsy (latissimus Dorsi);  Surgeon: [facility] Diagnostic Provider;  Location: [facility] OR;  Service: Interventional Radiology;  Laterality: N/A;    BREAST LUMPECTOMY Left 2018    COLONOSCOPY N/A 12/3/2021    Procedure: COLONOSCOPY;  Surgeon: Silvio Montesinos MD;  Location: [facility] ENDO;  Service: Endoscopy;  Laterality: N/A;    CYSTOSCOPY      CYSTOSCOPY N/A 3/22/2021    Procedure: CYSTOSCOPY;  Surgeon: Jannie Contreras MD;  Location: [facility] OR;  Service: OB/GYN;  Laterality: N/A;    CYSTOSCOPY W/ URETERAL STENT PLACEMENT Right 12/17/2021    Procedure: CYSTOSCOPY, WITH URETERAL STENT INSERTION;  Surgeon: Esthela Lin MD;  Location: [facility] OR;  Service: Urology;  Laterality: Right;    CYSTOSCOPY WITH URETEROSCOPY, RETROGRADE PYELOGRAPHY, AND INSERTION OF STENT Right 1/7/2022    Procedure: CYSTOSCOPY, WITH RETROGRADE PYELOGRAM AND URETERAL STENT INSERTION;  Surgeon: Esthela Lin MD;  Location: [facility] OR;  Service: Urology;  Laterality: Right;    EXCISION OF MASS OF BACK N/A 2/22/2021    Procedure: EXCISION, MASS, BACK  LEFT;  Surgeon: Chico Baird MD;  Location: [address];  Service: General;  Laterality: N/A;  Specimen sent to pathology via  for research     EXCISION OF MASS OF BACK  02/22/2021    KNEE SURGERY      LASER LITHOTRIPSY Right 1/7/2022    Procedure: LITHOTRIPSY, USING LASER;  Surgeon: Esthela Lin MD;  [facility];  Service: Urology;  Laterality: Right;    ROBOT-ASSISTED LAPAROSCOPIC HYSTERECTOMY N/A 3/22/2021    Procedure: ROBOTIC HYSTERECTOMY;  Surgeon: Jannie Contreras MD;  Location: [facility] OR;  Service: OB/GYN;  Laterality: N/A;    ROBOT-ASSISTED LAPAROSCOPIC  SALPINGO-OOPHORECTOMY Bilateral 3/22/2021    Procedure: ROBOTIC SALPINGO-OOPHORECTOMY;  Surgeon: Jannie Contreras MD;  Location: [facility] OR;  Service: OB/GYN;  Laterality: Bilateral;    SHOULDER SURGERY      TONSILLECTOMY      URETEROSCOPIC REMOVAL OF URETERIC CALCULUS Right 1/7/2022    Procedure: REMOVAL, CALCULUS, URETER, URETEROSCOPIC;  Surgeon: Esthela Lin MD;  Location: [facility] OR;  Service: Urology;  Laterality: Right;       Family History   Problem Relation Age of Onset    Hypertension Mother     Diabetes Mother     Diabetes Father     Breast cancer Maternal Grandmother     Cancer Paternal Grandfather        Social History     Socioeconomic History    Marital status:     Number of children: 1   Occupational History    Occupation: Self Employed   Tobacco Use    Smoking status: Never Smoker    Smokeless tobacco: Never Used   Substance and Sexual Activity    Alcohol use: Yes     Comment: occ.     Drug use: No    Sexual activity: Yes     Partners: Male     Birth control/protection: None       Allergies:  Patient has no known allergies.    Medications:  Outpatient Encounter Medications as of 1/10/2022   Medication Sig Dispense Refill    anastrozole (ARIMIDEX) 1 mg Tab TAKE 1 TABLET BY MOUTH EVERY DAY 90 tablet 1    citalopram (CELEXA) 40 MG tablet TAKE 1 TABLET BY MOUTH EVERY DAY 90 tablet 1    glipiZIDE (GLUCOTROL) 5 MG TR24 Take 1 tablet (5 mg total) by mouth daily with breakfast. 90 tablet 1    LORazepam (ATIVAN) 1 MG tablet TAKE 1 TABLET (1 MG TOTAL) BY MOUTH ONCE DAILY. AS NEEDED FOR ANXIETY 30 tablet 1    phenazopyridine (PYRIDIUM) 100 MG tablet Take 1 tablet (100 mg total) by mouth 3 (three) times daily as needed for Pain. 30 tablet 3    pramipexole (MIRAPEX) 1 MG tablet TAKE 2 TABLETS (2 MG TOTAL) BY MOUTH EVERY EVENING. 180 tablet 1    rosuvastatin (CRESTOR) 10 MG tablet TAKE 1 TABLET BY MOUTH EVERY DAY 90 tablet 3    tamsulosin (FLOMAX) 0.4 mg Cap Take 1 capsule (0.4 mg total) by mouth once  daily. 30 capsule 3    traMADoL (ULTRAM) 50 mg tablet Take 1 tablet (50 mg total) by mouth every 4 (four) hours as needed for Pain. 5 tablet 0    valsartan (DIOVAN) 80 MG tablet Take 1 tablet (80 mg total) by mouth once daily. 90 tablet 1    zolpidem (AMBIEN) 5 MG Tab TAKE 1 TABLET (5 MG TOTAL) BY MOUTH NIGHTLY AS NEEDED. 30 tablet 1    metoprolol succinate (TOPROL-XL) 200 MG 24 hr tablet TAKE 1 TABLET BY MOUTH EVERY DAY (Patient taking differently: Take 200 mg by mouth once daily.) 90 tablet 1     Facility-Administered Encounter Medications as of 1/10/2022   Medication Dose Route Frequency Provider Last Rate Last Admin    electrolyte-S (ISOLYTE)   Intravenous Continuous Nikole Neal MD 0 mL/hr at 12/17/21 1445 New Bag at 01/07/22 0838    LIDOcaine (PF) 10 mg/ml (1%) injection 10 mg  1 mL Intradermal Once PRN Nikole Neal MD        LIDOcaine (PF) 10 mg/ml (1%) injection 10 mg  1 mL Intradermal Once Nikole Neal MD        scopolamine 1.3-1.5 mg (1 mg over 3 days) 1 patch  1 patch Transdermal Q3 Days Nikole Neal MD   1 patch at 12/17/21 1009         PHYSICAL EXAMINATION:    The patient generally appears in good health, is appropriately interactive, and is in no apparent distress.    Skin: No lesions.    Mental: Cooperative with normal affect.    Neuro: Grossly intact.    HEENT: Normal. No evidence of lymphadenopathy.    Chest:  normal inspiratory effort.    Abdomen:  Soft, non-tender. No masses or organomegaly. Bladder is not palpable. No evidence of flank discomfort. No evidence of inguinal hernia.    Extremities: No clubbing, cyanosis, or edema    Normal external female genitalia  Urethral meatus is normal  Urethra and bladder are nontender to bimanual exam.  There is a palpable, ballotable mass beneath the urethra. Proximal margin is palpable  Well supported anteriorly and posteriorly   Uterus and cervix are normal  No adnexal masses    LABS:    Lab Results   Component Value Date     BUN 15 02/08/2022    BUN 15 02/08/2022    CREATININE 0.8 02/08/2022    CREATININE 0.9 02/08/2022     UA 1.015 pH 5 trace protein trace blood 50 glucose    MRI shows a urethral diverticulum to the right of the urethra, measures 2.7 x 2.6 cm.    IMPRESSION:    Encounter Diagnoses   Name Primary?    Urethral diverticulum        PLAN:    OR today for urethral diverticulectomy.    Patient seen in holding.  No changes in clinical condition.  Proceed with planned procedure.

## 2022-02-22 NOTE — ANESTHESIA PROCEDURE NOTES
Intubation    Date/Time: 2/22/2022 9:09 AM  Performed by: Hermelinda Connors CRNA  Authorized by: Alexys Jimenez III, MD     Intubation:     Induction:  Intravenous    Intubated:  Postinduction    Mask Ventilation:  Easy mask    Attempts:  1    Attempted By:  Student    Method of Intubation:  Direct    Blade:  Stewart 2    Laryngeal View Grade: Grade I - full view of cords      Difficult Airway Encountered?: No      Complications:  None    Airway Device:  Oral endotracheal tube    Airway Device Size:  7.0    Style/Cuff Inflation:  Cuffed (inflated to minimal occlusive pressure)    Tube secured:  21    Secured at:  The lips    Placement Verified By:  Capnometry    Complicating Factors:  None    Findings Post-Intubation:  BS equal bilateral and atraumatic/condition of teeth unchanged

## 2022-02-22 NOTE — ANESTHESIA PREPROCEDURE EVALUATION
02/22/2022  Dennis Aldana is a 51 y.o., female.  Pre-operative evaluation for Procedure(s) (LRB):  CYSTOSCOPY (N/A)  REPAIR, DIVERTICULUM, URETHRA (N/A)    Dennis Aldana is a 51 y.o. female     Patient Active Problem List   Diagnosis    RLS (restless legs syndrome)    Essential hypertension, benign    TATYANA (generalized anxiety disorder)    Depression    Sinus tachycardia    Malignant neoplasm of left breast in female, estrogen receptor positive    Gastroesophageal reflux disease    Primary insomnia    Type 2 diabetes mellitus with hyperglycemia, without long-term current use of insulin    Hyperlipidemia    Iron deficiency anemia    Primary osteoarthritis of right knee    Vitamin D deficiency    Microcytic anemia    S/P lumpectomy, left breast    Use of tamoxifen (Nolvadex)    ER+ (estrogen receptor positive status)    Abnormal CT of liver    Myxoma of skin determined by biopsy (low grade)    History of myxoma    Microalbuminuria due to type 2 diabetes mellitus    Urethral diverticulum    Renal stone    Severe obesity (BMI 35.0-39.9) with comorbidity       Review of patient's allergies indicates:  No Known Allergies    Current Facility-Administered Medications on File Prior to Encounter   Medication Dose Route Frequency Provider Last Rate Last Admin    electrolyte-S (ISOLYTE)   Intravenous Continuous Nikole Neal MD 0 mL/hr at 12/17/21 1445 New Bag at 01/07/22 0838    LIDOcaine (PF) 10 mg/ml (1%) injection 10 mg  1 mL Intradermal Once PRN Nikole Neal MD        LIDOcaine (PF) 10 mg/ml (1%) injection 10 mg  1 mL Intradermal Once Nikole Neal MD        scopolamine 1.3-1.5 mg (1 mg over 3 days) 1 patch  1 patch Transdermal Q3 Days Nikole Neal MD   1 patch at 12/17/21 1009     Current Outpatient Medications on File Prior to Encounter    Medication Sig Dispense Refill    anastrozole (ARIMIDEX) 1 mg Tab TAKE 1 TABLET BY MOUTH EVERY DAY 90 tablet 1    citalopram (CELEXA) 40 MG tablet TAKE 1 TABLET BY MOUTH EVERY DAY 90 tablet 1    glipiZIDE (GLUCOTROL) 5 MG TR24 Take 1 tablet (5 mg total) by mouth daily with breakfast. 90 tablet 1    LORazepam (ATIVAN) 1 MG tablet TAKE 1 TABLET (1 MG TOTAL) BY MOUTH ONCE DAILY. AS NEEDED FOR ANXIETY 30 tablet 1    pramipexole (MIRAPEX) 1 MG tablet TAKE 2 TABLETS (2 MG TOTAL) BY MOUTH EVERY EVENING. (Patient taking differently: Take 2 mg by mouth every evening.) 180 tablet 1    rosuvastatin (CRESTOR) 10 MG tablet TAKE 1 TABLET BY MOUTH EVERY DAY 90 tablet 3    zolpidem (AMBIEN) 5 MG Tab TAKE 1 TABLET (5 MG TOTAL) BY MOUTH NIGHTLY AS NEEDED. 30 tablet 1       Past Surgical History:   Procedure Laterality Date    BIOPSY N/A 12/29/2020    Procedure: us guided muscle biopsy (latissimus Dorsi);  Surgeon: [facility] Diagnostic Provider;  Location: [facility] OR;  Service: Interventional Radiology;  Laterality: N/A;    BREAST LUMPECTOMY Left 2018    COLONOSCOPY N/A 12/3/2021    Procedure: COLONOSCOPY;  Surgeon: Silvio Montesinos MD;  Location: [facility];  Service: Endoscopy;  Laterality: N/A;    CYSTOSCOPY      CYSTOSCOPY N/A 3/22/2021    Procedure: CYSTOSCOPY;  Surgeon: Jannie Contreras MD;  Location: [facility] OR;  Service: OB/GYN;  Laterality: N/A;    CYSTOSCOPY W/ URETERAL STENT PLACEMENT Right 12/17/2021    Procedure: CYSTOSCOPY, WITH URETERAL STENT INSERTION;  Surgeon: Esthela Lin MD;  Location: [facility] OR;  Service: Urology;  Laterality: Right;    CYSTOSCOPY WITH URETEROSCOPY, RETROGRADE PYELOGRAPHY, AND INSERTION OF STENT Right 1/7/2022    Procedure: CYSTOSCOPY, WITH RETROGRADE PYELOGRAM AND URETERAL STENT INSERTION;  Surgeon: Esthela Lin MD;  Location: [facility];  Service: Urology;  Laterality: Right;    EXCISION OF MASS OF BACK N/A 2/22/2021    Procedure: EXCISION, MASS, BACK  LEFT;  Surgeon: Chico FIERRO  MD Brie;  Location: [facility] OR [address] [place] [address];  Service: General;  Laterality: N/A;  Specimen sent to pathology via  for research     EXCISION OF MASS OF BACK  2021    KNEE SURGERY      LASER LITHOTRIPSY Right 2022    Procedure: LITHOTRIPSY, USING LASER;  Surgeon: Esthela Lin MD;  Location: [facility];  Service: Urology;  Laterality: Right;    ROBOT-ASSISTED LAPAROSCOPIC HYSTERECTOMY N/A 3/22/2021    Procedure: ROBOTIC HYSTERECTOMY;  Surgeon: Jannie Contreras MD;  Location: [facility] OR;  Service: OB/GYN;  Laterality: N/A;    ROBOT-ASSISTED LAPAROSCOPIC SALPINGO-OOPHORECTOMY Bilateral 3/22/2021    Procedure: ROBOTIC SALPINGO-OOPHORECTOMY;  Surgeon: Jannie Contreras MD;  Location: [facility] OR;  Service: OB/GYN;  Laterality: Bilateral;    SHOULDER SURGERY      TONSILLECTOMY      URETEROSCOPIC REMOVAL OF URETERIC CALCULUS Right 2022    Procedure: REMOVAL, CALCULUS, URETER, URETEROSCOPIC;  Surgeon: Esthela Lin MD;  Location: [facility];  Service: Urology;  Laterality: Right;       Social History     Socioeconomic History    Marital status:     Number of children: 1   Occupational History    Occupation: Self Employed   Tobacco Use    Smoking status: Never Smoker    Smokeless tobacco: Never Used   Substance and Sexual Activity    Alcohol use: Yes     Comment: occ.     Drug use: No    Sexual activity: Yes     Partners: Male     Birth control/protection: None         CBC: No results for input(s): WBC, RBC, HGB, HCT, PLT, MCV, MCH, MCHC in the last 72 hours.    CMP: No results for input(s): NA, K, CL, CO2, BUN, CREATININE, GLU, MG, PHOS, CALCIUM, ALBUMIN, PROT, ALKPHOS, ALT, AST, BILITOT in the last 72 hours.    INR  No results for input(s): PT, INR, PROTIME, APTT in the last 72 hours.        Diagnostic Studies:      EKD Echo:  No results found for this or any previous visit.      Pre-op Assessment    I have reviewed the Patient Summary Reports.    I have reviewed the NPO  Status.   I have reviewed the Medications.     Review of Systems  Anesthesia Hx:  Hx of Anesthetic complications PONV History of prior surgery of interest to airway management or planning: Denies Family Hx of Anesthesia complications.  Personal Hx of Anesthesia complications, Post-Operative Nausea/Vomiting   Social:  Social Alcohol Use, Non-Smoker    Hematology/Oncology:         -- Anemia: Current/Recent Cancer. Breast   EENT/Dental:EENT/Dental Normal   Cardiovascular:   Hypertension Denies Dysrhythmias.   Denies Angina. PVD: restless leg syndrome. hyperlipidemia    Pulmonary:  Pulmonary Normal  Denies Shortness of breath.    Renal/:   renal calculi    Hepatic/GI:   GERD    Musculoskeletal:   Arthritis     Endocrine:   Diabetes, using insulin    Dermatological:   Myxoma of skin determined by biopsy (low grade)   Psych:   Psychiatric History anxiety depression          Physical Exam  General: Well nourished and Cooperative    Airway:  Mallampati: II   Mouth Opening: Normal  TM Distance: Normal  Tongue: Normal  Neck ROM: Normal ROM    Dental:  Intact    Chest/Lungs:  Normal Respiratory Rate    Heart:  Rate: Normal  Rhythm: Regular Rhythm    Abdomen:  Normal        Anesthesia Plan  Type of Anesthesia, risks & benefits discussed:    Anesthesia Type: Gen ETT  Intra-op Monitoring Plan: Standard ASA Monitors  Post Op Pain Control Plan: IV/PO Opioids PRN and multimodal analgesia  Induction:  IV  Airway Plan: Direct, Post-Induction  Informed Consent: Informed consent signed with the Patient and all parties understand the risks and agree with anesthesia plan.  All questions answered.   ASA Score: 3    Ready For Surgery From Anesthesia Perspective.     .

## 2022-02-23 VITALS
TEMPERATURE: 96 F | OXYGEN SATURATION: 96 % | BODY MASS INDEX: 33.59 KG/M2 | SYSTOLIC BLOOD PRESSURE: 117 MMHG | RESPIRATION RATE: 18 BRPM | HEIGHT: 66 IN | WEIGHT: 209 LBS | HEART RATE: 89 BPM | DIASTOLIC BLOOD PRESSURE: 63 MMHG

## 2022-02-23 PROBLEM — N36.1 URETHRAL DIVERTICULUM: Status: RESOLVED | Noted: 2021-12-13 | Resolved: 2022-02-23

## 2022-02-23 LAB
ANION GAP SERPL CALC-SCNC: 11 MMOL/L (ref 8–16)
ANION GAP SERPL CALC-SCNC: 12 MMOL/L (ref 8–16)
BASOPHILS # BLD AUTO: 0.02 K/UL (ref 0–0.2)
BASOPHILS # BLD AUTO: 0.03 K/UL (ref 0–0.2)
BASOPHILS NFR BLD: 0.1 % (ref 0–1.9)
BASOPHILS NFR BLD: 0.2 % (ref 0–1.9)
BUN SERPL-MCNC: 13 MG/DL (ref 6–20)
BUN SERPL-MCNC: 13 MG/DL (ref 6–20)
CALCIUM SERPL-MCNC: 9.2 MG/DL (ref 8.7–10.5)
CALCIUM SERPL-MCNC: 9.3 MG/DL (ref 8.7–10.5)
CHLORIDE SERPL-SCNC: 100 MMOL/L (ref 95–110)
CHLORIDE SERPL-SCNC: 101 MMOL/L (ref 95–110)
CO2 SERPL-SCNC: 24 MMOL/L (ref 23–29)
CO2 SERPL-SCNC: 25 MMOL/L (ref 23–29)
CREAT SERPL-MCNC: 0.7 MG/DL (ref 0.5–1.4)
CREAT SERPL-MCNC: 0.7 MG/DL (ref 0.5–1.4)
DIFFERENTIAL METHOD: ABNORMAL
DIFFERENTIAL METHOD: ABNORMAL
EOSINOPHIL # BLD AUTO: 0 K/UL (ref 0–0.5)
EOSINOPHIL # BLD AUTO: 0 K/UL (ref 0–0.5)
EOSINOPHIL NFR BLD: 0.2 % (ref 0–8)
EOSINOPHIL NFR BLD: 0.2 % (ref 0–8)
ERYTHROCYTE [DISTWIDTH] IN BLOOD BY AUTOMATED COUNT: 13.7 % (ref 11.5–14.5)
ERYTHROCYTE [DISTWIDTH] IN BLOOD BY AUTOMATED COUNT: 13.7 % (ref 11.5–14.5)
EST. GFR  (AFRICAN AMERICAN): >60 ML/MIN/1.73 M^2
EST. GFR  (AFRICAN AMERICAN): >60 ML/MIN/1.73 M^2
EST. GFR  (NON AFRICAN AMERICAN): >60 ML/MIN/1.73 M^2
EST. GFR  (NON AFRICAN AMERICAN): >60 ML/MIN/1.73 M^2
GLUCOSE SERPL-MCNC: 143 MG/DL (ref 70–110)
GLUCOSE SERPL-MCNC: 144 MG/DL (ref 70–110)
HCT VFR BLD AUTO: 41.2 % (ref 37–48.5)
HCT VFR BLD AUTO: 41.4 % (ref 37–48.5)
HGB BLD-MCNC: 13.2 G/DL (ref 12–16)
HGB BLD-MCNC: 13.3 G/DL (ref 12–16)
IMM GRANULOCYTES # BLD AUTO: 0.09 K/UL (ref 0–0.04)
IMM GRANULOCYTES # BLD AUTO: 0.12 K/UL (ref 0–0.04)
IMM GRANULOCYTES NFR BLD AUTO: 0.5 % (ref 0–0.5)
IMM GRANULOCYTES NFR BLD AUTO: 0.7 % (ref 0–0.5)
LYMPHOCYTES # BLD AUTO: 2.2 K/UL (ref 1–4.8)
LYMPHOCYTES # BLD AUTO: 2.3 K/UL (ref 1–4.8)
LYMPHOCYTES NFR BLD: 13.5 % (ref 18–48)
LYMPHOCYTES NFR BLD: 13.6 % (ref 18–48)
MCH RBC QN AUTO: 27.9 PG (ref 27–31)
MCH RBC QN AUTO: 28 PG (ref 27–31)
MCHC RBC AUTO-ENTMCNC: 31.9 G/DL (ref 32–36)
MCHC RBC AUTO-ENTMCNC: 32.3 G/DL (ref 32–36)
MCV RBC AUTO: 87 FL (ref 82–98)
MCV RBC AUTO: 88 FL (ref 82–98)
MONOCYTES # BLD AUTO: 1.1 K/UL (ref 0.3–1)
MONOCYTES # BLD AUTO: 1.2 K/UL (ref 0.3–1)
MONOCYTES NFR BLD: 6.8 % (ref 4–15)
MONOCYTES NFR BLD: 7.1 % (ref 4–15)
NEUTROPHILS # BLD AUTO: 13 K/UL (ref 1.8–7.7)
NEUTROPHILS # BLD AUTO: 13.2 K/UL (ref 1.8–7.7)
NEUTROPHILS NFR BLD: 78.4 % (ref 38–73)
NEUTROPHILS NFR BLD: 78.7 % (ref 38–73)
NRBC BLD-RTO: 0 /100 WBC
NRBC BLD-RTO: 0 /100 WBC
PLATELET # BLD AUTO: 242 K/UL (ref 150–450)
PLATELET # BLD AUTO: 258 K/UL (ref 150–450)
PMV BLD AUTO: 11 FL (ref 9.2–12.9)
PMV BLD AUTO: 11.4 FL (ref 9.2–12.9)
POCT GLUCOSE: 147 MG/DL (ref 70–110)
POCT GLUCOSE: 232 MG/DL (ref 70–110)
POTASSIUM SERPL-SCNC: 4.2 MMOL/L (ref 3.5–5.1)
POTASSIUM SERPL-SCNC: 4.2 MMOL/L (ref 3.5–5.1)
RBC # BLD AUTO: 4.72 M/UL (ref 4–5.4)
RBC # BLD AUTO: 4.76 M/UL (ref 4–5.4)
SODIUM SERPL-SCNC: 136 MMOL/L (ref 136–145)
SODIUM SERPL-SCNC: 137 MMOL/L (ref 136–145)
WBC # BLD AUTO: 16.48 K/UL (ref 3.9–12.7)
WBC # BLD AUTO: 16.81 K/UL (ref 3.9–12.7)

## 2022-02-23 PROCEDURE — 36415 COLL VENOUS BLD VENIPUNCTURE: CPT | Performed by: STUDENT IN AN ORGANIZED HEALTH CARE EDUCATION/TRAINING PROGRAM

## 2022-02-23 PROCEDURE — 94761 N-INVAS EAR/PLS OXIMETRY MLT: CPT

## 2022-02-23 PROCEDURE — 80048 BASIC METABOLIC PNL TOTAL CA: CPT | Performed by: STUDENT IN AN ORGANIZED HEALTH CARE EDUCATION/TRAINING PROGRAM

## 2022-02-23 PROCEDURE — 25000003 PHARM REV CODE 250: Performed by: STUDENT IN AN ORGANIZED HEALTH CARE EDUCATION/TRAINING PROGRAM

## 2022-02-23 PROCEDURE — 63600175 PHARM REV CODE 636 W HCPCS: Performed by: STUDENT IN AN ORGANIZED HEALTH CARE EDUCATION/TRAINING PROGRAM

## 2022-02-23 PROCEDURE — 85025 COMPLETE CBC W/AUTO DIFF WBC: CPT | Performed by: STUDENT IN AN ORGANIZED HEALTH CARE EDUCATION/TRAINING PROGRAM

## 2022-02-23 RX ORDER — CEPHALEXIN 500 MG/1
500 CAPSULE ORAL EVERY 8 HOURS
Qty: 9 CAPSULE | Refills: 0 | Status: SHIPPED | OUTPATIENT
Start: 2022-02-23 | End: 2022-02-26

## 2022-02-23 RX ORDER — OXYCODONE AND ACETAMINOPHEN 5; 325 MG/1; MG/1
1 TABLET ORAL EVERY 6 HOURS PRN
Qty: 10 TABLET | Refills: 0 | Status: SHIPPED | OUTPATIENT
Start: 2022-02-23 | End: 2022-08-31

## 2022-02-23 RX ORDER — POLYETHYLENE GLYCOL 3350 17 G/17G
17 POWDER, FOR SOLUTION ORAL DAILY
Qty: 238 G | Refills: 0 | Status: SHIPPED | OUTPATIENT
Start: 2022-02-23 | End: 2022-08-31

## 2022-02-23 RX ADMIN — ATORVASTATIN CALCIUM 40 MG: 20 TABLET, FILM COATED ORAL at 09:02

## 2022-02-23 RX ADMIN — CITALOPRAM HYDROBROMIDE 40 MG: 20 TABLET ORAL at 09:02

## 2022-02-23 RX ADMIN — VALSARTAN 80 MG: 80 TABLET, FILM COATED ORAL at 09:02

## 2022-02-23 RX ADMIN — INSULIN ASPART 4 UNITS: 100 INJECTION, SOLUTION INTRAVENOUS; SUBCUTANEOUS at 12:02

## 2022-02-23 RX ADMIN — METOPROLOL SUCCINATE 200 MG: 100 TABLET, EXTENDED RELEASE ORAL at 09:02

## 2022-02-23 NOTE — PLAN OF CARE
[facility] - [facility]  Discharge Assessment    Primary Care Provider: Andrew Roger MD     Discharge Assessment (most recent)     BRIEF DISCHARGE ASSESSMENT - 02/23/22 1054        Discharge Planning    Assessment Type Discharge Planning Brief Assessment     Resource/Environmental Concerns none     Support Systems Spouse/significant other     Equipment Currently Used at Home none     Current Living Arrangements home/apartment/condo     Patient/Family Anticipates Transition to home with family     Patient/Family Anticipated Services at Transition none     DME Needed Upon Discharge  none                   D/C orders have been placed by the team. No discharge needs noted. Patient's spouse to provide transportation home.

## 2022-02-23 NOTE — DISCHARGE INSTRUCTIONS
Post Op Instructions for Pelvic Surgery    RECOVERY ROOM  You will likely awaken with a vaginal packing in place.  This will feel like a large tampon.  If you have a catheter and feel the need to urinate, relax and let the urine flow.  If you do not have a catheter and feel the need to urinate, please let the recovery room nurse know so they can either assist you to the bathroom or provide you with a bedpan.  You may see blue or green urine after surgery.  This is from a dye that is given to you during surgery and will clear within 24 hours.      ACTIVITY  The first six weeks is a critical time period for wound healing.  We depend on the tissue scarring to provide the necessary vaginal support.    After receiving an anesthetic you may feel sleepy or nauseated.  It is best to have little activity for the first 24-48 hours.  Gradually increase activity.  No heavy lifting (nothing greater than 10 pounds or a gallon of milk) for 6 weeks.  Pelvic rest (no sexual intercourse, douching or tampons) for 6 weeks.  It is OK to walk around the house.  Avoid riding a bicycle or putting similar pressure over this area.  No strenuous workouts.    Do not drive for the first 48 hours or if you are taking narcotic pain medication.  Before driving, be sure you can press the brakes without difficulty or pain.    WOUND CARE INSTRUCTIONS  You may have small incisions that were closed with a medical grade superglue.  You may wash these and pat them dry after 24 hours  Do not immerse yourself in water, (i.e. no tub baths, swimming or hot tubbing) for 6 weeks.  You may shower.    It is normal to have some light bleeding which should gradually resolve over the next week.  This will look like a light period.    DIET  In the first 24 hours, it is common to experience some nausea, so take clear liquids.  Once the nausea has resolved, a soft diet is recommended with gradual increase to your normal diet.    MEDICATIONS  Pain medication should be  taken on an as needed basis.  If the narcotic is too much, over-the-counter pain relievers may be taken.  However, do not take additional Tylenol/acetaminophen while taking narcotic pain medication as this can lead to an overdose of the Tylenol/acetaminophen.  Antibiotics, if ordered, should be taken as directed until the prescription has been completed.    A stool softener (Colace or docusate sodium) is recommended to maintain soft stools after surgery.  If you do not have a bowel movement within 36-48 hours of surgery, take 2 tablespoons of Milk of Magnesia.  If there is still no bowel movement by the next day, take ½ bottle of Magnesium Citrate (refrigerate and drink with a straw.)    WHEN TO CALL THE DOCTOR:  For heavy bleeding (vaginal discharge like a light period is expected for the 3rd week)  Redness or swelling around the incision  Fever over 101oF (38.5oC)  Significant vaginal drainage   Persistent pain unrelieved by the pain medication  Leg swelling  Shortness of breath  Burning with urination  Inability to urinate  Abdominal pain not improving day by day      FOR EMERGENCIES  If any unusual problems, questions or concerns, please contact your physician or the resident on call at [phone] after hours or during office hours, [phone]

## 2022-02-23 NOTE — DISCHARGE SUMMARY
[facility] - [facility]  Urology  Discharge Summary      Patient Name: Dennis Aldana  MRN: [id number]  Admission Date: 2/22/2022  Hospital Length of Stay: 0 days  Discharge Date and Time: 2/23/2022  3:33 PM  Attending Physician: Anali Greer MD   Discharging Provider: Luc Irwin MD  Primary Care Physician: Andrew Roger MD    HPI:   No notes on file     Procedure(s) (LRB):  CYSTOSCOPY (N/A)  REPAIR, DIVERTICULUM, URETHRA (N/A)     Indwelling Lines/Drains at time of discharge:   Lines/Drains/Airways       Drain  Duration                  Ureteral Drain/Stent 12/17/21 1309 Right ureter 6 Fr. 67 days         Urethral Catheter 02/22/22 0925 Straight-tip;Silicone 16 Fr. <1 day                    Hospital Course (synopsis of major diagnoses, care, treatment, and services provided during the course of the hospital stay):  The patient tolerated the procedure well. She was transferred to recovery post-op and then to the floor. Once on the floor her diet was advanced and she ambulated the night of surgery. On POD 1, she was tolerating a regular diet and ambulating without difficulty. Her  pain was well controlled. She was draining clear urine. Her vaginal packing was removed on rounds with quinonez left in place to be d/c'd with. There was minimal dried blood on the vaginal packing and no evidence of any active bleeding. She was then stable for discharge to home.    Medications and instructions as below.  For more thorough information, please refer to the hospital record and operative report.    Consults:     Significant Diagnostic Studies:     Pending Diagnostic Studies:       Procedure Component Value Units Date/Time    Specimen to Pathology, Surgery Urology [[id number]] Collected: 02/22/22 1137    Order Status: Sent Lab Status: In process Updated: 02/23/22 1202            Final Active Diagnoses:      Problems Resolved During this Admission:    Diagnosis Date Noted Date Resolved POA    PRINCIPAL PROBLEM:  Urethral  diverticulum [N36.1] 12/13/2021 02/23/2022 Yes       Discharged Condition: good    Disposition: Home or Self Care    Follow Up:   Follow-up Information       Anali Greer MD Follow up in 2 week(s).    Specialty: Urology  Contact information:  [address]  [place] [address]  [phone]               Silvio Morales MD Follow up.    Specialty: Family Medicine  Why: PCP appt scheduled for 3/2/22 @ 1220PM  Contact information:  [address] [place] [place] [address]  [phone]                           Patient Instructions:      Lifting restrictions   Order Comments: Do not drive while taking pain medications. No strenuous activity or lifting greater than 10 pounds for 4 weeks.     No dressing needed   Order Comments: Do not soak incisions in tub or bath and do not scrub incisions. You may shower over incisions. If you have surgical glue in place, the glue will come off over the next few weeks.     Notify your health care provider if you experience any of the following:  temperature >100.4     Notify your health care provider if you experience any of the following:  persistent nausea and vomiting or diarrhea     Notify your health care provider if you experience any of the following:  severe uncontrolled pain     Notify your health care provider if you experience any of the following:  redness, tenderness, or signs of infection (pain, swelling, redness, odor or green/yellow discharge around incision site)     Notify your health care provider if you experience any of the following:  difficulty breathing or increased cough     Notify your health care provider if you experience any of the following:  persistent dizziness, light-headedness, or visual disturbances     Medications:  Reconciled Home Medications:      Medication List        START taking these medications      cephALEXin 500 MG capsule  Commonly known as: KEFLEX  Take 1 capsule (500 mg total) by mouth every 8 (eight) hours. for 3 days      oxyCODONE-acetaminophen 5-325 mg per tablet  Commonly known as: PERCOCET  Take 1 tablet by mouth every 6 (six) hours as needed for Pain.     polyethylene glycol 17 gram/dose powder  Commonly known as: GLYCOLAX  Mix 1 capful (17 grams) with a liquid and take by mouth once daily for 10 days.            CONTINUE taking these medications      anastrozole 1 mg Tab  Commonly known as: ARIMIDEX  TAKE 1 TABLET BY MOUTH EVERY DAY     citalopram 40 MG tablet  Commonly known as: CeleXA  TAKE 1 TABLET BY MOUTH EVERY DAY     glipiZIDE 5 MG Tr24  Commonly known as: GLUCOTROL  Take 1 tablet (5 mg total) by mouth daily with breakfast.     LORazepam 1 MG tablet  Commonly known as: ATIVAN  TAKE 1 TABLET (1 MG TOTAL) BY MOUTH ONCE DAILY. AS NEEDED FOR ANXIETY     metFORMIN 500 MG ER 24hr tablet  Commonly known as: GLUCOPHAGE-XR  Take 2 tablets (1,000 mg total) by mouth daily with breakfast.     metoprolol succinate 200 MG 24 hr tablet  Commonly known as: TOPROL-XL  TAKE 1 TABLET BY MOUTH EVERY DAY     pramipexole 1 MG tablet  Commonly known as: MIRAPEX  TAKE 2 TABLETS (2 MG TOTAL) BY MOUTH EVERY EVENING.     rosuvastatin 10 MG tablet  Commonly known as: CRESTOR  TAKE 1 TABLET BY MOUTH EVERY DAY     valsartan 80 MG tablet  Commonly known as: DIOVAN  Take 1 tablet (80 mg total) by mouth once daily.     zolpidem 5 MG Tab  Commonly known as: AMBIEN  TAKE 1 TABLET (5 MG TOTAL) BY MOUTH NIGHTLY AS NEEDED.              Time spent on the discharge of patient: 15 minutes    Luc Irwin MD  Urology  [facility] - Surgery    As above.

## 2022-02-23 NOTE — PROGRESS NOTES
[facility] - [facility]  Urology  Progress Note    Patient Name: Dennis Aldana  MRN: [id number]  Admission Date: 2/22/2022  Hospital Length of Stay: 0 days  Code Status: Prior   Attending Provider: Anali Greer MD   Primary Care Physician: Andrew Roger MD    Subjective:     HPI:  No notes on file    Interval History: NAEON. VSS. POD#1. Pain well controlled. Vaginal packing removed, quinonez draining clear yellow urine.      Objective:     Temp:  [97.1 °F (36.2 °C)-98.6 °F (37 °C)] 98.1 °F (36.7 °C)  Pulse:  [] 91  Resp:  [14-20] 18  SpO2:  [92 %-100 %] 92 %  BP: ()/(58-72) 113/67     Body mass index is 33.73 kg/m².           Drains       Drain  Duration                  Ureteral Drain/Stent 12/17/21 1309 Right ureter 6 Fr. 67 days         Urethral Catheter 02/22/22 0925 Straight-tip;Silicone 16 Fr. <1 day                    Physical Exam  Vitals and nursing note reviewed.   Constitutional:       General: She is not in acute distress.  HENT:      Head: Atraumatic.      Nose: Nose normal.   Eyes:      Extraocular Movements: Extraocular movements intact.   Cardiovascular:      Rate and Rhythm: Normal rate.   Pulmonary:      Effort: Pulmonary effort is normal.   Abdominal:      General: Abdomen is flat.      Tenderness: There is no right CVA tenderness or left CVA tenderness.   Genitourinary:     Comments: Vaginal packing removed, minimal bleeding. Quinonez draining clear yellow urine.  Musculoskeletal:         General: Normal range of motion.      Cervical back: Normal range of motion.   Neurological:      General: No focal deficit present.      Mental Status: She is alert. Mental status is at baseline.   Psychiatric:         Mood and Affect: Mood normal.         Behavior: Behavior normal.         Thought Content: Thought content normal.         Judgment: Judgment normal.       Significant Labs:    BMP:  No results for input(s): NA, K, CL, CO2, BUN, CREATININE, LABGLOM, GLUCOSE, CALCIUM in the last 168  hours.    CBC:   No results for input(s): WBC, HGB, HCT, PLT in the last 168 hours.    All pertinent labs results from the past 24 hours have been reviewed.    Significant Imaging:  All pertinent imaging results/findings from the past 24 hours have been reviewed.                    Assessment/Plan:     Urethral diverticulum  1.  regular diet  2.  Ambulate qid in hallways  3.  D/c planning for POD1  4.  Vaginal packing removal POD 1   5.  Frost in place, will go home with it        VTE Risk Mitigation (From admission, onward)         Ordered     IP VTE HIGH RISK PATIENT  Once         02/22/22 1154     Place sequential compression device  Until discontinued         02/22/22 1154                Luc Irwin MD  Urology  [facility] - Surgery

## 2022-02-23 NOTE — SUBJECTIVE & OBJECTIVE
Interval History: HARSHAL RAMÍREZS. POD#1. Pain well controlled. Vaginal packing removed, quinonez draining clear yellow urine.      Objective:     Temp:  [97.1 °F (36.2 °C)-98.6 °F (37 °C)] 98.1 °F (36.7 °C)  Pulse:  [] 91  Resp:  [14-20] 18  SpO2:  [92 %-100 %] 92 %  BP: ()/(58-72) 113/67     Body mass index is 33.73 kg/m².           Drains       Drain  Duration                  Ureteral Drain/Stent 12/17/21 1309 Right ureter 6 Fr. 67 days         Urethral Catheter 02/22/22 0925 Straight-tip;Silicone 16 Fr. <1 day                    Physical Exam  Vitals and nursing note reviewed.   Constitutional:       General: She is not in acute distress.  HENT:      Head: Atraumatic.      Nose: Nose normal.   Eyes:      Extraocular Movements: Extraocular movements intact.   Cardiovascular:      Rate and Rhythm: Normal rate.   Pulmonary:      Effort: Pulmonary effort is normal.   Abdominal:      General: Abdomen is flat.      Tenderness: There is no right CVA tenderness or left CVA tenderness.   Genitourinary:     Comments: Vaginal packing removed, minimal bleeding. Quinonez draining clear yellow urine.  Musculoskeletal:         General: Normal range of motion.      Cervical back: Normal range of motion.   Neurological:      General: No focal deficit present.      Mental Status: She is alert. Mental status is at baseline.   Psychiatric:         Mood and Affect: Mood normal.         Behavior: Behavior normal.         Thought Content: Thought content normal.         Judgment: Judgment normal.       Significant Labs:    BMP:  No results for input(s): NA, K, CL, CO2, BUN, CREATININE, LABGLOM, GLUCOSE, CALCIUM in the last 168 hours.    CBC:   No results for input(s): WBC, HGB, HCT, PLT in the last 168 hours.    All pertinent labs results from the past 24 hours have been reviewed.    Significant Imaging:  All pertinent imaging results/findings from the past 24 hours have been reviewed.

## 2022-02-23 NOTE — PLAN OF CARE
Pt ready for discharge. Discharge instructions given and patient verbalizes understanding. IVs removed. Pt will discharge via wheel chair. Will continue to monitor until discharge.    Statement Selected

## 2022-02-23 NOTE — ASSESSMENT & PLAN NOTE
1.  regular diet  2.  Ambulate qid in hallways  3.  D/c planning for POD1  4.  Vaginal packing removal POD 1   5.  Frost in place, will go home with it

## 2022-02-24 LAB — BACTERIA SPEC AEROBE CULT: ABNORMAL

## 2022-02-24 NOTE — PLAN OF CARE
[facility] - Surgery  Discharge Final Note    Primary Care Provider: Andrew Roger MD    Expected Discharge Date: 2/23/2022    Final Discharge Note (most recent)     Final Note - 02/24/22 1422        Final Note    Assessment Type Final Discharge Note     Anticipated Discharge Disposition Home or Self Care     What phone number can be called within the next 1-3 days to see how you are doing after discharge? --   [phone]    Hospital Resources/Appts/Education Provided Appointments scheduled and added to AVS                 Important Message from Medicare             Contact Info     Anali Greer MD   Specialty: Urology    [address]  [place] [address]   Phone: [phone]       Next Steps: Follow up in 2 week(s)    Silvio Morales MD   Specialty: Family Medicine    [address] [place] [address]   Phone: [phone]       Next Steps: Follow up    Instructions: PCP appt scheduled for 3/2/22 @ 1220PM            Future Appointments   Date Time Provider Department Center   3/24/2022 10:00 AM [facility] MAMMO2 [facility] IM [facility]   3/24/2022 10:30 AM [facility] US2 [facility] IM ULT [facility]   4/5/2022  9:15 AM [facility] US 1 [facility]   4/8/2022  8:45 AM Jerry Sewell MD [facility] OPHTHAL [facility]   4/11/2022  1:45 PM Esthela Lin MD [facility] UROLOGY [facility]   5/31/2022 12:45 PM Hossein Carranza MD [facility] HEM ONC [facility]     Patient discharged home to care of self on 2/23/22.

## 2022-02-25 DIAGNOSIS — F51.01 PRIMARY INSOMNIA: ICD-10-CM

## 2022-02-25 DIAGNOSIS — F41.1 GAD (GENERALIZED ANXIETY DISORDER): ICD-10-CM

## 2022-02-25 RX ORDER — ZOLPIDEM TARTRATE 5 MG/1
5 TABLET ORAL NIGHTLY PRN
Qty: 30 TABLET | Refills: 0 | Status: SHIPPED | OUTPATIENT
Start: 2022-02-25 | End: 2022-04-13

## 2022-02-25 RX ORDER — LORAZEPAM 1 MG/1
1 TABLET ORAL DAILY
Qty: 30 TABLET | Refills: 0 | Status: SHIPPED | OUTPATIENT
Start: 2022-02-25 | End: 2022-04-13

## 2022-02-25 NOTE — TELEPHONE ENCOUNTER
No new care gaps identified.  Powered by [employer]. Reference number: [id number].   2/25/2022 6:46:27 AM CST

## 2022-02-26 ENCOUNTER — NURSE TRIAGE (OUTPATIENT)
Dept: ADMINISTRATIVE | Facility: CLINIC | Age: 52
End: 2022-02-26
Payer: COMMERCIAL

## 2022-02-27 NOTE — TELEPHONE ENCOUNTER
Pt states leaking urine around the catheter X2 episodes but quinonez bag also filling with urine, no blood noted in urine or around urethra area, no abd pain, no back pain, no fever.  Pt states prior to d/ on 2/22 the secure device that was on her leg was removed.  The pt did go to the pharmacy today and get another one and put it in place.       Dr. Ngo, Urology on call was updated on pt status and said as the pts quinonez bag is filling with urine and no pain or blood, no need at this time to change catheter as pt was a fresh Cystoscopy, Diverticulum Urethra repair on 2/22/22.  States to place secure tab on pts leg to secure quinonez and call back for any changes.      Pt was made aware of contact with on call physician and all her questions were answered.  Pt encouraged to call back for anything further needed or any other concerns.    Reason for Disposition   Leakage of urine around catheter    Additional Information   Negative: Shock suspected (e.g., cold/pale/clammy skin, too weak to stand, low BP, rapid pulse)   Negative: Sounds like a life-threatening emergency to the triager   Negative: [1] Catheter was accidentally pulled-out AND [2] bright red continuous bleeding   Negative: SEVERE abdominal pain   Negative: Fever > 100.4 F (38.0 C)   Negative: [1] Drinking very little AND [2] dehydration suspected (e.g., no urine > 12 hours, very dry mouth, very lightheaded)   Negative: Patient sounds very sick or weak to the triager   Negative: Catheter was accidentally pulled-out   Negative: [1] Catheter is broken AND [2] is not usable   Negative: Bleeding around catheter (e.g., from penis or female urethra)   Negative: Lower abdominal pain or distention   Negative: [1] No urine in bag > 4 hours AND [2] catheter is not kinked   Negative: [1] Tea-colored or slightly red urine lasts > 24 hours AND [2] not cleared by increasing fluid intake    AND [3] no recent prostate or bladder surgery   Negative: [1] Cloudy  urine lasts > 24 hours AND [2] not cleared by increasing fluid intake   Negative: [1] Bloody or red-colored urine AND [2] no recent prostate or bladder surgery  (Exception: brief episode and urine now clear)   Negative: [1] Bloody or red-colored urine AND [2] prostate or bladder surgery > 3 days (72 hours) ago   Negative: Urine smells bad   Negative: [1] Catheter is broken or cracked AND [2] is still usable    Protocols used: URINARY CATHETER SYMPTOMS AND [id number]

## 2022-02-28 LAB — BACTERIA SPEC ANAEROBE CULT: NORMAL

## 2022-03-02 ENCOUNTER — TELEPHONE (OUTPATIENT)
Dept: UROLOGY | Facility: CLINIC | Age: 52
End: 2022-03-02
Payer: COMMERCIAL

## 2022-03-02 NOTE — TELEPHONE ENCOUNTER
----- Message from Adela Moss RN sent at 2/28/2022  5:36 PM CST -----  Contact: [phone]    ----- Message -----  From: Master James  Sent: 2/28/2022   9:27 AM CST  To: Percy Briones Staff    Over the weekend pt had to call the 24 hr nurse line due to some complications and was told to call the office and let you guys know that she had to call them. Pt would like a call back.    [phone]

## 2022-03-02 NOTE — TELEPHONE ENCOUNTER
----- Message from Mabel Bhatia sent at 3/2/2022  8:36 AM CST -----  Dennis Aldana calling regarding Patient Advice (message) about a surgery.  Call back [phone]

## 2022-03-04 LAB
FINAL PATHOLOGIC DIAGNOSIS: NORMAL
Lab: NORMAL

## 2022-03-07 ENCOUNTER — TELEPHONE (OUTPATIENT)
Dept: UROLOGY | Facility: CLINIC | Age: 52
End: 2022-03-07
Payer: COMMERCIAL

## 2022-03-07 DIAGNOSIS — N36.1 URETHRAL DIVERTICULUM: Primary | ICD-10-CM

## 2022-03-08 ENCOUNTER — PATIENT OUTREACH (OUTPATIENT)
Dept: ADMINISTRATIVE | Facility: HOSPITAL | Age: 52
End: 2022-03-08
Payer: COMMERCIAL

## 2022-03-08 ENCOUNTER — HOSPITAL ENCOUNTER (OUTPATIENT)
Facility: HOSPITAL | Age: 52
Discharge: HOME | End: 2022-03-08
Attending: UROLOGY | Admitting: UROLOGY
Payer: COMMERCIAL

## 2022-03-08 VITALS
HEART RATE: 89 BPM | BODY MASS INDEX: 33.59 KG/M2 | DIASTOLIC BLOOD PRESSURE: 89 MMHG | TEMPERATURE: 98 F | WEIGHT: 209 LBS | SYSTOLIC BLOOD PRESSURE: 139 MMHG | HEIGHT: 66 IN | OXYGEN SATURATION: 99 % | RESPIRATION RATE: 18 BRPM

## 2022-03-08 DIAGNOSIS — Z09 SURGICAL FOLLOWUP: ICD-10-CM

## 2022-03-08 DIAGNOSIS — N36.1 URETHRAL DIVERTICULUM: Primary | ICD-10-CM

## 2022-03-08 PROCEDURE — 25500020 PHARM REV CODE 255: Performed by: UROLOGY

## 2022-03-08 PROCEDURE — 74455 X-RAY URETHRA/BLADDER: CPT | Mod: 26,,, | Performed by: UROLOGY

## 2022-03-08 PROCEDURE — 51600 INJECTION FOR BLADDER X-RAY: CPT | Mod: 58,,, | Performed by: UROLOGY

## 2022-03-08 PROCEDURE — 51600 PR INJECTION FOR BLADDER X-RAY: ICD-10-PCS | Mod: 58,,, | Performed by: UROLOGY

## 2022-03-08 PROCEDURE — 36000706: Performed by: UROLOGY

## 2022-03-08 PROCEDURE — 36000707: Performed by: UROLOGY

## 2022-03-08 PROCEDURE — SPCCPT FACILITY SINGLE PATH SOFT-CODING CPT: Mod: SPCCPT | Performed by: UROLOGY

## 2022-03-08 PROCEDURE — 74455 PR X-RAY URETHROCYSTOGRAM+VOIDING: ICD-10-PCS | Mod: 26,,, | Performed by: UROLOGY

## 2022-03-08 PROCEDURE — 51600 INJECTION FOR BLADDER X-RAY: CPT | Mod: SPCCPT | Performed by: UROLOGY

## 2022-03-08 NOTE — OP NOTE
[facility]  Operative Note     Date: 3/8/2022      Pre-Op Diagnosis:    1. Urethral diverticulum [N36.1]    Patient Active Problem List    Diagnosis Date Noted    Severe obesity (BMI 35.0-39.9) with comorbidity 02/14/2022    Renal stone 01/03/2022    Microalbuminuria due to type 2 diabetes mellitus 10/14/2021    History of myxoma 02/22/2021    Myxoma of skin determined by biopsy (low grade) 01/19/2021    Abnormal CT of liver 12/21/2020    ER+ (estrogen receptor positive status) 08/16/2020    Microcytic anemia 06/21/2020    S/P lumpectomy, left breast 06/21/2020    Use of tamoxifen (Nolvadex) 06/21/2020    Vitamin D deficiency 06/02/2020    Primary osteoarthritis of right knee 05/28/2020    Type 2 diabetes mellitus with hyperglycemia, without long-term current use of insulin 02/14/2020    Hyperlipidemia 02/14/2020    Iron deficiency anemia 02/14/2020    Primary insomnia 03/11/2019    Sinus tachycardia 04/11/2018    Malignant neoplasm of left breast in female, estrogen receptor positive 04/11/2018    Gastroesophageal reflux disease 04/11/2018    RLS (restless legs syndrome) 01/03/2018    Essential hypertension, benign 01/03/2018    TATYANA (generalized anxiety disorder) 01/03/2018    Depression 01/03/2018     Post-Op Diagnosis: same     Procedure(s) Performed:   1.  Voiding cystourethrogram     Specimen(s): none     Staff Surgeon: Anali Greer MD    Assistant Surgeon: Luc Iwrin MD     Anesthesia: none     Indications: Dennis Aldana is a 51 y.o. female who presents for VCUG to assess for leakage from the urethra.      Findings:    No leakage seen from the urethra.      Estimated Blood Loss: none     Drains: none     Procedure in detail:  After informed consent was obtained and all questions were answered, the patient was brought to the operating suite and placed in the supine position. She was then obliqued. Her Frost was then filled will cysto conray under gravity drainage until she felt  the sensation of bladder fullness. Fluoroscopic imaging revealed a normal bladder contour. The catheter was clamped and advanced so the balloon would not occlude the bladder neck. We then instructed the patient to void under fluoroscopy. This revealed no leakage from the urethra or diverticular repair. We then removed the Frost.     The patient tolerated the procedure well and was transferred to the PACU in stable condition.       Disposition:  The patient will be discharged home. We will plan to f/u in 6 weeks in clinic.     As above.

## 2022-03-08 NOTE — DISCHARGE SUMMARY
[facility]  Discharge Note  Short Stay    Admit Date: 3/8/2022    Discharge Date and Time: 03/08/2022 3:04 PM      Attending Physician: No att. providers found     Discharge Provider: Luc Irwin MD    Diagnoses:  There are no hospital problems to display for this patient.      Discharged Condition: stable    Hospital Course: Patient was admitted for VCUG and tolerated the procedure well with no complications. She was discharged home in good condition on the same day.       Final Diagnoses: Same as principal problem.    Disposition: Home or Self Care    Follow up/Patient Instructions:    Medications:  Reconciled Home Medications:   Discharge Medication List as of 3/8/2022  2:39 PM        CONTINUE these medications which have NOT CHANGED    Details   anastrozole (ARIMIDEX) 1 mg Tab TAKE 1 TABLET BY MOUTH EVERY DAY, Normal      citalopram (CELEXA) 40 MG tablet TAKE 1 TABLET BY MOUTH EVERY DAY, Normal      glipiZIDE (GLUCOTROL) 5 MG TR24 Take 1 tablet (5 mg total) by mouth daily with breakfast., Starting Wed 10/27/2021, Normal      LORazepam (ATIVAN) 1 MG tablet TAKE 1 TABLET (1 MG TOTAL) BY MOUTH ONCE DAILY. AS NEEDED FOR ANXIETY, Starting Fri 2/25/2022, Normal      metFORMIN (GLUCOPHAGE-XR) 500 MG ER 24hr tablet Take 2 tablets (1,000 mg total) by mouth daily with breakfast., Starting Mon 2/14/2022, No Print      metoprolol succinate (TOPROL-XL) 200 MG 24 hr tablet TAKE 1 TABLET BY MOUTH EVERY DAY, Normal      oxyCODONE-acetaminophen (PERCOCET) 5-325 mg per tablet Take 1 tablet by mouth every 6 (six) hours as needed for Pain., Starting Wed 2/23/2022, Normal      polyethylene glycol (GLYCOLAX) 17 gram/dose powder Mix 1 capful (17 grams) with a liquid and take by mouth once daily for 10 days., Starting Wed 2/23/2022, Normal      pramipexole (MIRAPEX) 1 MG tablet TAKE 2 TABLETS (2 MG TOTAL) BY MOUTH EVERY EVENING., Starting Mon 1/3/2022, Normal      rosuvastatin (CRESTOR) 10 MG tablet TAKE 1 TABLET BY  MOUTH EVERY DAY, Normal      valsartan (DIOVAN) 80 MG tablet Take 1 tablet (80 mg total) by mouth once daily., Starting Mon 2/14/2022, Normal      zolpidem (AMBIEN) 5 MG Tab TAKE 1 TABLET (5 MG TOTAL) BY MOUTH NIGHTLY AS NEEDED., Starting Fri 2/25/2022, Normal           As above.

## 2022-03-08 NOTE — LETTER
AUTHORIZATION FOR RELEASE OF   CONFIDENTIAL INFORMATION    Dear Dr. Sewell,    We are seeing Dennis Aldana, date of birth 1970, in the clinic at [facility]. Andrew Roger MD is the patient's PCP. Dennis Aldana has an outstanding lab/procedure at the time we reviewed her chart. In order to help keep her health information updated, she has authorized us to request the following medical record(s):        (  )  MAMMOGRAM                                      (  )  COLONOSCOPY      (  )  PAP SMEAR                                          (  )  OUTSIDE LAB RESULTS     (  )  DEXA SCAN                                          (x  )  EYE EXAM            (  )  FOOT EXAM                                          (  )  ENTIRE RECORD     (  )  OUTSIDE IMMUNIZATIONS                 (  )  _______________         [facility], [phone]        Patient Name: Dennis Aldana  : 1970  Patient Phone #: [phone]

## 2022-03-08 NOTE — PROGRESS NOTES
ELENA SENT TO DR GILBERT FOR THE PATIENT EYE EXAM  Chart review done. HM updated. Immunizations reviewed & updated. Care Everywhere updated.

## 2022-03-08 NOTE — INTERVAL H&P NOTE
The patient has been examined and the H&P has been reviewed:    I concur with the findings and changes have been noted since the H&P was written: Patient underwent urethral diverticulectomy on 2/22/22. Surgery was uncomplicated. She was d/c'ed with a quinonez and is here today for a post-op VCUG.    Procedure risks, benefits and alternative options discussed and understood by patient/family.    There are no hospital problems to display for this patient.    Patient seen in holding.  No changes in clinical condition.  Proceed with planned procedure.

## 2022-03-24 ENCOUNTER — HOSPITAL ENCOUNTER (OUTPATIENT)
Dept: RADIOLOGY | Facility: HOSPITAL | Age: 52
Discharge: HOME | End: 2022-03-24
Attending: INTERNAL MEDICINE
Payer: COMMERCIAL

## 2022-03-24 VITALS — HEIGHT: 66 IN | BODY MASS INDEX: 33.59 KG/M2 | WEIGHT: 209 LBS

## 2022-03-24 DIAGNOSIS — C50.312 MALIGNANT NEOPLASM OF LOWER-INNER QUADRANT OF LEFT BREAST, ESTROGEN RECEPTOR POSITIVE: ICD-10-CM

## 2022-03-24 DIAGNOSIS — C50.312 MALIGNANT NEOPLASM OF LOWER-INNER QUADRANT OF LEFT BREAST IN FEMALE, ESTROGEN RECEPTOR POSITIVE: ICD-10-CM

## 2022-03-24 DIAGNOSIS — Z17.0 MALIGNANT NEOPLASM OF LOWER-INNER QUADRANT OF LEFT BREAST, ESTROGEN RECEPTOR POSITIVE: ICD-10-CM

## 2022-03-24 DIAGNOSIS — C50.912 MALIGNANT NEOPLASM OF LEFT BREAST IN FEMALE, ESTROGEN RECEPTOR POSITIVE, UNSPECIFIED SITE OF BREAST: ICD-10-CM

## 2022-03-24 DIAGNOSIS — Z85.3 HISTORY OF LEFT BREAST CANCER: ICD-10-CM

## 2022-03-24 DIAGNOSIS — Z17.0 MALIGNANT NEOPLASM OF LOWER-INNER QUADRANT OF LEFT BREAST IN FEMALE, ESTROGEN RECEPTOR POSITIVE: ICD-10-CM

## 2022-03-24 DIAGNOSIS — Z17.0 MALIGNANT NEOPLASM OF LEFT BREAST IN FEMALE, ESTROGEN RECEPTOR POSITIVE, UNSPECIFIED SITE OF BREAST: ICD-10-CM

## 2022-03-24 PROCEDURE — 77066 DX MAMMO INCL CAD BI: CPT | Mod: TC,PO

## 2022-03-25 ENCOUNTER — TELEPHONE (OUTPATIENT)
Dept: HEMATOLOGY/ONCOLOGY | Facility: CLINIC | Age: 52
End: 2022-03-25
Payer: COMMERCIAL

## 2022-03-25 DIAGNOSIS — C50.912 MALIGNANT NEOPLASM OF LEFT BREAST IN FEMALE, ESTROGEN RECEPTOR POSITIVE, UNSPECIFIED SITE OF BREAST: Primary | ICD-10-CM

## 2022-03-25 DIAGNOSIS — Z17.0 MALIGNANT NEOPLASM OF LEFT BREAST IN FEMALE, ESTROGEN RECEPTOR POSITIVE, UNSPECIFIED SITE OF BREAST: Primary | ICD-10-CM

## 2022-03-25 DIAGNOSIS — Z85.3 HISTORY OF LEFT BREAST CANCER: ICD-10-CM

## 2022-03-25 DIAGNOSIS — Z17.0 MALIGNANT NEOPLASM OF LOWER-INNER QUADRANT OF LEFT BREAST IN FEMALE, ESTROGEN RECEPTOR POSITIVE: ICD-10-CM

## 2022-03-25 DIAGNOSIS — C50.312 MALIGNANT NEOPLASM OF LOWER-INNER QUADRANT OF LEFT BREAST IN FEMALE, ESTROGEN RECEPTOR POSITIVE: ICD-10-CM

## 2022-03-25 NOTE — TELEPHONE ENCOUNTER
Per Dr. Carranza's verbal order, I placed orders for 6 month f/u mammogram, patient called and made aware and also instructed that if she does not receive call to schedule in the next month or so that she should call to schedule. Verbalized understanding.

## 2022-04-05 ENCOUNTER — HOSPITAL ENCOUNTER (OUTPATIENT)
Dept: RADIOLOGY | Facility: HOSPITAL | Age: 52
Discharge: HOME | End: 2022-04-05
Attending: STUDENT IN AN ORGANIZED HEALTH CARE EDUCATION/TRAINING PROGRAM
Payer: COMMERCIAL

## 2022-04-05 DIAGNOSIS — N20.0 RENAL STONE: ICD-10-CM

## 2022-04-05 PROCEDURE — 76770 US EXAM ABDO BACK WALL COMP: CPT | Mod: 26,,, | Performed by: RADIOLOGY

## 2022-04-05 PROCEDURE — 76770 US RETROPERITONEAL COMPLETE: ICD-10-PCS | Mod: 26,,, | Performed by: RADIOLOGY

## 2022-04-05 PROCEDURE — 76770 US EXAM ABDO BACK WALL COMP: CPT | Mod: TC

## 2022-04-06 ENCOUNTER — OFFICE VISIT (OUTPATIENT)
Dept: UROLOGY | Facility: CLINIC | Age: 52
End: 2022-04-06
Payer: COMMERCIAL

## 2022-04-06 VITALS
WEIGHT: 205 LBS | SYSTOLIC BLOOD PRESSURE: 135 MMHG | HEIGHT: 66 IN | BODY MASS INDEX: 32.95 KG/M2 | HEART RATE: 102 BPM | DIASTOLIC BLOOD PRESSURE: 91 MMHG

## 2022-04-06 DIAGNOSIS — Z98.890 POST-OPERATIVE STATE: Primary | ICD-10-CM

## 2022-04-06 PROCEDURE — 99024 PR POST-OP FOLLOW-UP VISIT: ICD-10-PCS | Mod: S$GLB,,, | Performed by: UROLOGY

## 2022-04-06 PROCEDURE — 99024 POSTOP FOLLOW-UP VISIT: CPT | Mod: S$GLB,,, | Performed by: UROLOGY

## 2022-04-06 PROCEDURE — 3075F SYST BP GE 130 - 139MM HG: CPT | Mod: CPTII,S$GLB,, | Performed by: UROLOGY

## 2022-04-06 PROCEDURE — 3051F PR MOST RECENT HEMOGLOBIN A1C LEVEL 7.0 - < 8.0%: ICD-10-PCS | Mod: CPTII,S$GLB,, | Performed by: UROLOGY

## 2022-04-06 PROCEDURE — 3080F PR MOST RECENT DIASTOLIC BLOOD PRESSURE >= 90 MM HG: ICD-10-PCS | Mod: CPTII,S$GLB,, | Performed by: UROLOGY

## 2022-04-06 PROCEDURE — 81002 URINALYSIS NONAUTO W/O SCOPE: CPT | Mod: S$GLB,,, | Performed by: UROLOGY

## 2022-04-06 PROCEDURE — 81002 PR URINALYSIS NONAUTO W/O SCOPE: ICD-10-PCS | Mod: S$GLB,,, | Performed by: UROLOGY

## 2022-04-06 PROCEDURE — 3080F DIAST BP >= 90 MM HG: CPT | Mod: CPTII,S$GLB,, | Performed by: UROLOGY

## 2022-04-06 PROCEDURE — 99999 PR PBB SHADOW E&M-EST. PATIENT-LVL III: ICD-10-PCS | Mod: PBBFAC,,, | Performed by: UROLOGY

## 2022-04-06 PROCEDURE — 4010F PR ACE/ARB THEARPY RXD/TAKEN: ICD-10-PCS | Mod: CPTII,S$GLB,, | Performed by: UROLOGY

## 2022-04-06 PROCEDURE — 1159F MED LIST DOCD IN RCRD: CPT | Mod: CPTII,S$GLB,, | Performed by: UROLOGY

## 2022-04-06 PROCEDURE — 3008F BODY MASS INDEX DOCD: CPT | Mod: CPTII,S$GLB,, | Performed by: UROLOGY

## 2022-04-06 PROCEDURE — 3051F HG A1C>EQUAL 7.0%<8.0%: CPT | Mod: CPTII,S$GLB,, | Performed by: UROLOGY

## 2022-04-06 PROCEDURE — 3008F PR BODY MASS INDEX (BMI) DOCUMENTED: ICD-10-PCS | Mod: CPTII,S$GLB,, | Performed by: UROLOGY

## 2022-04-06 PROCEDURE — 1159F PR MEDICATION LIST DOCUMENTED IN MEDICAL RECORD: ICD-10-PCS | Mod: CPTII,S$GLB,, | Performed by: UROLOGY

## 2022-04-06 PROCEDURE — 3075F PR MOST RECENT SYSTOLIC BLOOD PRESS GE 130-139MM HG: ICD-10-PCS | Mod: CPTII,S$GLB,, | Performed by: UROLOGY

## 2022-04-06 PROCEDURE — 99999 PR PBB SHADOW E&M-EST. PATIENT-LVL III: CPT | Mod: PBBFAC,,, | Performed by: UROLOGY

## 2022-04-06 PROCEDURE — 4010F ACE/ARB THERAPY RXD/TAKEN: CPT | Mod: CPTII,S$GLB,, | Performed by: UROLOGY

## 2022-04-06 NOTE — PROGRESS NOTES
CHIEF COMPLAINT:    Mrs. Aldana is a 51 y.o. female presenting for a post op follow up after urethral diverticulectomy on 2/22/2022    PRESENTING ILLNESS:    Dennis Aldana is a 51 y.o. female who returns for follow up.  She states she is doing well.  No vaginal mass sensation, she had no dysuria, no dribbling.  No vaginal irritation or pain.     Allergies:  Patient has no known allergies.    Medications:  Outpatient Encounter Medications as of 4/6/2022   Medication Sig Dispense Refill    anastrozole (ARIMIDEX) 1 mg Tab TAKE 1 TABLET BY MOUTH EVERY DAY 90 tablet 1    citalopram (CELEXA) 40 MG tablet TAKE 1 TABLET BY MOUTH EVERY DAY 90 tablet 1    glipiZIDE (GLUCOTROL) 5 MG TR24 Take 1 tablet (5 mg total) by mouth daily with breakfast. 90 tablet 1    LORazepam (ATIVAN) 1 MG tablet TAKE 1 TABLET (1 MG TOTAL) BY MOUTH ONCE DAILY. AS NEEDED FOR ANXIETY 30 tablet 0    metFORMIN (GLUCOPHAGE-XR) 500 MG ER 24hr tablet Take 2 tablets (1,000 mg total) by mouth daily with breakfast. 180 tablet 3    metoprolol succinate (TOPROL-XL) 200 MG 24 hr tablet TAKE 1 TABLET BY MOUTH EVERY DAY 90 tablet 1    oxyCODONE-acetaminophen (PERCOCET) 5-325 mg per tablet Take 1 tablet by mouth every 6 (six) hours as needed for Pain. 10 tablet 0    polyethylene glycol (GLYCOLAX) 17 gram/dose powder Mix 1 capful (17 grams) with a liquid and take by mouth once daily for 10 days. 238 g 0    pramipexole (MIRAPEX) 1 MG tablet TAKE 2 TABLETS (2 MG TOTAL) BY MOUTH EVERY EVENING. 180 tablet 1    rosuvastatin (CRESTOR) 10 MG tablet TAKE 1 TABLET BY MOUTH EVERY DAY 90 tablet 3    valsartan (DIOVAN) 80 MG tablet Take 1 tablet (80 mg total) by mouth once daily. 90 tablet 3    zolpidem (AMBIEN) 5 MG Tab TAKE 1 TABLET (5 MG TOTAL) BY MOUTH NIGHTLY AS NEEDED. 30 tablet 0     Facility-Administered Encounter Medications as of 4/6/2022   Medication Dose Route Frequency Provider Last Rate Last Admin    electrolyte-S (ISOLYTE)   Intravenous  Continuous Nikole Neal MD 0 mL/hr at 12/17/21 1445 New Bag at 01/07/22 0838    LIDOcaine (PF) 10 mg/ml (1%) injection 10 mg  1 mL Intradermal Once PRN Nikole Neal MD        LIDOcaine (PF) 10 mg/ml (1%) injection 10 mg  1 mL Intradermal Once Nikole Neal MD        scopolamine 1.3-1.5 mg (1 mg over 3 days) 1 patch  1 patch Transdermal Q3 Days Nikole Neal MD   1 patch at 12/17/21 1009         PHYSICAL EXAMINATION:    The patient generally appears in good health, is appropriately interactive, and is in no apparent distress.    Skin: No lesions.    Mental: Cooperative with normal affect.    Neuro: Grossly intact.    HEENT: Normal. No evidence of lymphadenopathy.    Chest:  normal inspiratory effort.    Abdomen:  Soft, non-tender. No masses or organomegaly. Bladder is not palpable. No evidence of flank discomfort. No evidence of inguinal hernia.    Extremities: No clubbing, cyanosis, or edema    Normal external female genitalia  Urethral meatus is normal  Urethra and bladder are nontender to bimanual exam  Well supported anteriorly and posteriorly   Uterus and cervix are surgically absent  No adnexal masses    LABS:    Lab Results   Component Value Date    BUN 13 02/23/2022    CREATININE 0.7 02/23/2022     UA 1.015, pH 5, 500 glucose, otherwise, negative (admits having waffles with syrup this am.)     IMPRESSION:    Encounter Diagnoses   Name Primary?    Post-operative state Yes       PLAN:    1.  Continue vaginal rest another 6 weeks  2.  Follow up in 3 months.

## 2022-04-11 DIAGNOSIS — E11.65 TYPE 2 DIABETES MELLITUS WITH HYPERGLYCEMIA, WITHOUT LONG-TERM CURRENT USE OF INSULIN: ICD-10-CM

## 2022-04-11 RX ORDER — GLIPIZIDE 5 MG/1
TABLET, FILM COATED, EXTENDED RELEASE ORAL
Qty: 90 TABLET | Refills: 1 | Status: SHIPPED | OUTPATIENT
Start: 2022-04-11 | End: 2022-08-31 | Stop reason: SDUPTHER

## 2022-04-11 NOTE — TELEPHONE ENCOUNTER
No new care gaps identified.  Powered by [employer]. Reference number: [id number].   4/11/2022 4:48:09 PM CDT

## 2022-04-11 NOTE — TELEPHONE ENCOUNTER
This Rx Request does not qualify for assessment with the [facility]   Please review protocol details and the Care Due Message for extra clinical information    Reasons Rx Request may be deferred:  1. Labs/Vitals overdue  2. Labs/Vitals abnormal  3. Patient has been seen in the ED/Hospital since the last PCP visit  4. Medication is non-delegated  5. Medication associated diagnosis code is outside of scope  6. Provider is a non-participating provider  7. Visit criteria not met (Patient needs to be seen at least every 15 months by PCP)    Note composed:5:30 PM 04/11/2022

## 2022-04-25 ENCOUNTER — PATIENT OUTREACH (OUTPATIENT)
Dept: ADMINISTRATIVE | Facility: OTHER | Age: 52
End: 2022-04-25
Payer: COMMERCIAL

## 2022-04-29 ENCOUNTER — OFFICE VISIT (OUTPATIENT)
Dept: OPHTHALMOLOGY | Facility: CLINIC | Age: 52
End: 2022-04-29
Payer: COMMERCIAL

## 2022-04-29 DIAGNOSIS — H04.123 DRY EYE SYNDROME, BILATERAL: ICD-10-CM

## 2022-04-29 DIAGNOSIS — E11.9 DIABETES MELLITUS TYPE 2 WITHOUT RETINOPATHY: Primary | ICD-10-CM

## 2022-04-29 DIAGNOSIS — H35.342 LAMELLAR MACULAR HOLE, LEFT EYE: ICD-10-CM

## 2022-04-29 PROCEDURE — 92134 CPTRZ OPH DX IMG PST SGM RTA: CPT | Mod: S$GLB,,, | Performed by: OPHTHALMOLOGY

## 2022-04-29 PROCEDURE — 3051F PR MOST RECENT HEMOGLOBIN A1C LEVEL 7.0 - < 8.0%: ICD-10-PCS | Mod: CPTII,S$GLB,, | Performed by: OPHTHALMOLOGY

## 2022-04-29 PROCEDURE — 3051F HG A1C>EQUAL 7.0%<8.0%: CPT | Mod: CPTII,S$GLB,, | Performed by: OPHTHALMOLOGY

## 2022-04-29 PROCEDURE — 92014 PR EYE EXAM, EST PATIENT,COMPREHESV: ICD-10-PCS | Mod: S$GLB,,, | Performed by: OPHTHALMOLOGY

## 2022-04-29 PROCEDURE — 4010F PR ACE/ARB THEARPY RXD/TAKEN: ICD-10-PCS | Mod: CPTII,S$GLB,, | Performed by: OPHTHALMOLOGY

## 2022-04-29 PROCEDURE — 92014 COMPRE OPH EXAM EST PT 1/>: CPT | Mod: S$GLB,,, | Performed by: OPHTHALMOLOGY

## 2022-04-29 PROCEDURE — 2023F PR DILATED RETINAL EXAM W/O EVID OF RETINOPATHY: ICD-10-PCS | Mod: CPTII,S$GLB,, | Performed by: OPHTHALMOLOGY

## 2022-04-29 PROCEDURE — 1159F MED LIST DOCD IN RCRD: CPT | Mod: CPTII,S$GLB,, | Performed by: OPHTHALMOLOGY

## 2022-04-29 PROCEDURE — 1160F RVW MEDS BY RX/DR IN RCRD: CPT | Mod: CPTII,S$GLB,, | Performed by: OPHTHALMOLOGY

## 2022-04-29 PROCEDURE — 1159F PR MEDICATION LIST DOCUMENTED IN MEDICAL RECORD: ICD-10-PCS | Mod: CPTII,S$GLB,, | Performed by: OPHTHALMOLOGY

## 2022-04-29 PROCEDURE — 99999 PR PBB SHADOW E&M-EST. PATIENT-LVL III: CPT | Mod: PBBFAC,,, | Performed by: OPHTHALMOLOGY

## 2022-04-29 PROCEDURE — 2023F DILAT RTA XM W/O RTNOPTHY: CPT | Mod: CPTII,S$GLB,, | Performed by: OPHTHALMOLOGY

## 2022-04-29 PROCEDURE — 1160F PR REVIEW ALL MEDS BY PRESCRIBER/CLIN PHARMACIST DOCUMENTED: ICD-10-PCS | Mod: CPTII,S$GLB,, | Performed by: OPHTHALMOLOGY

## 2022-04-29 PROCEDURE — 4010F ACE/ARB THERAPY RXD/TAKEN: CPT | Mod: CPTII,S$GLB,, | Performed by: OPHTHALMOLOGY

## 2022-04-29 PROCEDURE — 99999 PR PBB SHADOW E&M-EST. PATIENT-LVL III: ICD-10-PCS | Mod: PBBFAC,,, | Performed by: OPHTHALMOLOGY

## 2022-04-29 PROCEDURE — 92134 POSTERIOR SEGMENT OCT RETINA (OCULAR COHERENCE TOMOGRAPHY)-BOTH EYES: ICD-10-PCS | Mod: S$GLB,,, | Performed by: OPHTHALMOLOGY

## 2022-04-29 NOTE — PROGRESS NOTES
HPI     Dry Eye      Additional comments: Itchy . No gtts.                 Blurred Vision      Additional comments: OS- MACULAR HOLE               Diabetic Eye Exam      Additional comments: DM has been doing really well.      Hemoglobin A1C       Date                     Value               Ref Range             Status                02/08/2022               7.9 (H)             4.5 - 6.2 %           Final                  10/14/2021               11.7 (H)            4.0 - 5.6 %           Final                  07/21/2021               8.0 (H)             4.5 - 6.2 %           Final                        Last edited by Rajni Wayne on 4/29/2022  8:19 AM. (History)            Assessment /Plan     For exam results, see Encounter Report.    Diabetes mellitus type 2 without retinopathy    Lamellar macular hole, left eye    Dry eye syndrome, bilateral      1. Diabetes mellitus type 2 without retinopathy  Diabetes without retinopathy, discussed with patient importance of glucose control and follow up.  Patient voices understanding.    2. Lamellar macular hole, left eye  Stable on OCT, first Dx in ~2008  minimal symptoms, observe    F/u Dr. Turpin 1 year    3. Dry eye syndrome, bilateral  ATs PRN

## 2022-06-29 ENCOUNTER — PATIENT MESSAGE (OUTPATIENT)
Dept: UROLOGY | Facility: CLINIC | Age: 52
End: 2022-06-29
Payer: COMMERCIAL

## 2022-07-26 ENCOUNTER — PATIENT MESSAGE (OUTPATIENT)
Dept: UROLOGY | Facility: CLINIC | Age: 52
End: 2022-07-26

## 2022-08-24 ENCOUNTER — PATIENT MESSAGE (OUTPATIENT)
Dept: PRIMARY CARE CLINIC | Facility: CLINIC | Age: 52
End: 2022-08-24

## 2022-08-31 ENCOUNTER — OFFICE VISIT (OUTPATIENT)
Dept: PRIMARY CARE CLINIC | Facility: CLINIC | Age: 52
End: 2022-08-31

## 2022-08-31 VITALS
SYSTOLIC BLOOD PRESSURE: 122 MMHG | TEMPERATURE: 98 F | HEART RATE: 54 BPM | OXYGEN SATURATION: 96 % | HEIGHT: 66 IN | DIASTOLIC BLOOD PRESSURE: 64 MMHG | BODY MASS INDEX: 32.78 KG/M2 | RESPIRATION RATE: 18 BRPM | WEIGHT: 203.94 LBS

## 2022-08-31 DIAGNOSIS — E11.69 TYPE 2 DIABETES MELLITUS WITH HYPERLIPIDEMIA: ICD-10-CM

## 2022-08-31 DIAGNOSIS — E78.5 TYPE 2 DIABETES MELLITUS WITH HYPERLIPIDEMIA: ICD-10-CM

## 2022-08-31 DIAGNOSIS — E11.42 DIABETIC PERIPHERAL NEUROPATHY ASSOCIATED WITH TYPE 2 DIABETES MELLITUS: Primary | ICD-10-CM

## 2022-08-31 DIAGNOSIS — E11.65 TYPE 2 DIABETES MELLITUS WITH HYPERGLYCEMIA, WITHOUT LONG-TERM CURRENT USE OF INSULIN: ICD-10-CM

## 2022-08-31 DIAGNOSIS — F32.A DEPRESSION, UNSPECIFIED DEPRESSION TYPE: ICD-10-CM

## 2022-08-31 DIAGNOSIS — F51.01 PRIMARY INSOMNIA: ICD-10-CM

## 2022-08-31 DIAGNOSIS — F41.1 GAD (GENERALIZED ANXIETY DISORDER): ICD-10-CM

## 2022-08-31 DIAGNOSIS — I10 ESSENTIAL HYPERTENSION, BENIGN: ICD-10-CM

## 2022-08-31 DIAGNOSIS — G25.81 RLS (RESTLESS LEGS SYNDROME): ICD-10-CM

## 2022-08-31 PROCEDURE — 99214 OFFICE O/P EST MOD 30 MIN: CPT | Mod: ,,, | Performed by: FAMILY MEDICINE

## 2022-08-31 PROCEDURE — 99999 PR PBB SHADOW E&M-EST. PATIENT-LVL IV: ICD-10-PCS | Mod: PBBFAC,,, | Performed by: FAMILY MEDICINE

## 2022-08-31 PROCEDURE — 99214 PR OFFICE/OUTPT VISIT, EST, LEVL IV, 30-39 MIN: ICD-10-PCS | Mod: ,,, | Performed by: FAMILY MEDICINE

## 2022-08-31 PROCEDURE — 99999 PR PBB SHADOW E&M-EST. PATIENT-LVL IV: CPT | Mod: PBBFAC,,, | Performed by: FAMILY MEDICINE

## 2022-08-31 RX ORDER — LORAZEPAM 1 MG/1
1 TABLET ORAL DAILY PRN
Qty: 30 TABLET | Refills: 1 | Status: SHIPPED | OUTPATIENT
Start: 2022-08-31 | End: 2022-12-30

## 2022-08-31 RX ORDER — PRAMIPEXOLE DIHYDROCHLORIDE 1 MG/1
2 TABLET ORAL NIGHTLY
Qty: 60 TABLET | Refills: 5 | Status: SHIPPED | OUTPATIENT
Start: 2022-08-31 | End: 2023-05-01

## 2022-08-31 RX ORDER — ROSUVASTATIN CALCIUM 10 MG/1
10 TABLET, COATED ORAL DAILY
Qty: 30 TABLET | Refills: 5 | Status: SHIPPED | OUTPATIENT
Start: 2022-08-31 | End: 2023-05-01

## 2022-08-31 RX ORDER — GABAPENTIN 100 MG/1
100 CAPSULE ORAL 3 TIMES DAILY
Qty: 90 CAPSULE | Refills: 5 | Status: SHIPPED | OUTPATIENT
Start: 2022-08-31 | End: 2023-05-12

## 2022-08-31 RX ORDER — METOPROLOL SUCCINATE 200 MG/1
200 TABLET, EXTENDED RELEASE ORAL DAILY
Qty: 90 TABLET | Refills: 1 | Status: CANCELLED | OUTPATIENT
Start: 2022-08-31

## 2022-08-31 RX ORDER — VALSARTAN 80 MG/1
80 TABLET ORAL DAILY
Qty: 30 TABLET | Refills: 5 | Status: SHIPPED | OUTPATIENT
Start: 2022-08-31 | End: 2022-12-09

## 2022-08-31 RX ORDER — CITALOPRAM 40 MG/1
40 TABLET, FILM COATED ORAL DAILY
Qty: 30 TABLET | Refills: 5 | Status: SHIPPED | OUTPATIENT
Start: 2022-08-31 | End: 2023-02-24

## 2022-08-31 RX ORDER — ZOLPIDEM TARTRATE 5 MG/1
5 TABLET ORAL NIGHTLY PRN
Qty: 30 TABLET | Refills: 2 | Status: SHIPPED | OUTPATIENT
Start: 2022-08-31 | End: 2023-02-15

## 2022-08-31 RX ORDER — METFORMIN HYDROCHLORIDE 500 MG/1
1000 TABLET, EXTENDED RELEASE ORAL
Qty: 60 TABLET | Refills: 5 | Status: SHIPPED | OUTPATIENT
Start: 2022-08-31 | End: 2022-12-09

## 2022-08-31 RX ORDER — GLIPIZIDE 5 MG/1
5 TABLET, FILM COATED, EXTENDED RELEASE ORAL DAILY
Qty: 30 TABLET | Refills: 5 | Status: SHIPPED | OUTPATIENT
Start: 2022-08-31 | End: 2023-01-25

## 2022-08-31 NOTE — PROGRESS NOTES
[facility]    Subjective:       Patient ID: Dennis Aldana is a 52 y.o. female.    Chief Complaint: Foot Pain (Pt states having bilateral foot pain.)    Dennis is a 52 year old female with T2DM, HTN, HLD, depression, anxiety, and restless leg syndrome who presents today to clinic for foot pain and medication refill.    Patient reports that 6-8 months ago she began experiencing an insidious onset of numbness in her toes bilaterally. She has noticed decrease sensation as well as sporadic stabbing and burning pain throughout the day. Pain is worse at night. Denies any falls, foot ulcers, bruising, or similar sensation in her hands.    Additionally, patient reports that she ran out of almost all of her medications 6 weeks ago. She has continued to take Metformin and Celexa. Patient reports she is currently in between insurance coverage and will be have coverage on 10/1/22. Inquires about refills on her medications. Has not been checking blood pressure at home.    Review of Systems   Constitutional:  Negative for activity change, chills, fever and unexpected weight change.   HENT:  Negative for nasal congestion, hearing loss, rhinorrhea, sore throat and trouble swallowing.    Eyes:  Negative for discharge and visual disturbance.   Respiratory:  Negative for chest tightness, shortness of breath and wheezing.    Cardiovascular:  Negative for chest pain and palpitations.   Gastrointestinal:  Negative for blood in stool, constipation, diarrhea and vomiting.   Endocrine: Negative for polydipsia and polyuria.   Genitourinary:  Negative for difficulty urinating, dysuria, hematuria and menstrual problem.   Musculoskeletal:  Negative for arthralgias, joint swelling and neck pain.   Neurological:  Positive for tremors (Restless legs at night) and numbness (Toes, bilateral). Negative for weakness and headaches.   Psychiatric/Behavioral:  Positive for dysphoric mood. Negative for confusion.         Objective:      Physical Exam  Vitals reviewed.   Constitutional:       General: She is not in acute distress.     Appearance: Normal appearance. She is obese. She is not ill-appearing or toxic-appearing.   HENT:      Head: Normocephalic and atraumatic.   Eyes:      General: No scleral icterus.        Right eye: No discharge.         Left eye: No discharge.   Cardiovascular:      Rate and Rhythm: Normal rate and regular rhythm.      Pulses: Normal pulses.      Heart sounds: Normal heart sounds.   Pulmonary:      Effort: Pulmonary effort is normal. No respiratory distress.      Breath sounds: No stridor. No wheezing.   Abdominal:      General: Bowel sounds are normal.      Palpations: Abdomen is soft.   Musculoskeletal:         General: No tenderness, deformity or signs of injury.      Cervical back: Normal range of motion.   Feet:      Right foot:      Protective Sensation: 10 sites tested.  8 sites sensed.      Left foot:      Protective Sensation: 10 sites tested.  7 sites sensed.   Skin:     General: Skin is warm.      Capillary Refill: Capillary refill takes less than 2 seconds.      Findings: No erythema or rash.   Neurological:      General: No focal deficit present.      Mental Status: She is alert.      Sensory: Sensory deficit (Mildly decreased sensation of distal phalanges) present.   Psychiatric:         Behavior: Behavior normal.         Judgment: Judgment normal.       Assessment and Plan:       Problem List Items Addressed This Visit          Neuro    RLS (restless legs syndrome)       Psychiatric    TATYANA (generalized anxiety disorder)    Depression       Cardiac/Vascular    Essential hypertension, benign       Endocrine    Type 2 diabetes mellitus with hyperglycemia, without long-term current use of insulin       Other    Primary insomnia     Other Visit Diagnoses       Type 2 diabetes mellitus with hyperlipidemia                        Sherif Guzman, MS4  [employer] [facility] [employer] [facility]    Diabetic peripheral neuropathy associated with type 2 diabetes mellitus  -     Foot Exam Performed  -     gabapentin (NEURONTIN) 100 MG capsule; Take 1 capsule (100 mg total) by mouth 3 (three) times daily.  Dispense: 90 capsule; Refill: 5  Needs to get diabetes under better control  Depression, unspecified depression type  -     citalopram (CELEXA) 40 MG tablet; Take 1 tablet (40 mg total) by mouth once daily.  Dispense: 30 tablet; Refill: 5    Type 2 diabetes mellitus with hyperglycemia, without long-term current use of insulin  -     glipiZIDE 5 MG TR24; Take 1 tablet (5 mg total) by mouth once daily.  Dispense: 30 tablet; Refill: 5  -     metFORMIN (GLUCOPHAGE-XR) 500 MG ER 24hr tablet; Take 2 tablets (1,000 mg total) by mouth daily with breakfast.  Dispense: 60 tablet; Refill: 5  -     Foot Exam Performed  Continue metformin, restart glipizide  TATYANA (generalized anxiety disorder)  -     LORazepam (ATIVAN) 1 MG tablet; Take 1 tablet (1 mg total) by mouth daily as needed for Anxiety.  Dispense: 30 tablet; Refill: 1    Essential hypertension, benign  -     valsartan (DIOVAN) 80 MG tablet; Take 1 tablet (80 mg total) by mouth once daily.  Dispense: 30 tablet; Refill: 5  Blood pressure has been good off meds.  Will DC metoprolol for now, monitor at home  RLS (restless legs syndrome)  -     pramipexole (MIRAPEX) 1 MG tablet; Take 2 tablets (2 mg total) by mouth every evening.  Dispense: 60 tablet; Refill: 5    Type 2 diabetes mellitus with hyperlipidemia  -     rosuvastatin (CRESTOR) 10 MG tablet; Take 1 tablet (10 mg total) by mouth once daily.  Dispense: 30 tablet; Refill: 5  -     Foot Exam Performed    Primary insomnia  -     zolpidem (AMBIEN) 5 MG Tab; Take 1 tablet (5 mg total) by mouth nightly as needed (insomnia).  Dispense: 30 tablet; Refill: 2      I hereby acknowledge that I am relying upon documentation authored by a medical student working under my supervision  and further I hereby attest that I have verified the student documentation or findings by personally re-performing the physical exam and medical decision making activities of the Evaluation and Management service to be billed.  Andrew Roger

## 2022-09-01 ENCOUNTER — TELEPHONE (OUTPATIENT)
Dept: HEMATOLOGY/ONCOLOGY | Facility: CLINIC | Age: 52
End: 2022-09-01

## 2022-09-01 DIAGNOSIS — Z85.3 HISTORY OF LEFT BREAST CANCER: ICD-10-CM

## 2022-09-01 DIAGNOSIS — Z17.0 MALIGNANT NEOPLASM OF LOWER-INNER QUADRANT OF LEFT BREAST IN FEMALE, ESTROGEN RECEPTOR POSITIVE: ICD-10-CM

## 2022-09-01 DIAGNOSIS — C50.312 MALIGNANT NEOPLASM OF LOWER-INNER QUADRANT OF LEFT BREAST IN FEMALE, ESTROGEN RECEPTOR POSITIVE: ICD-10-CM

## 2022-09-01 DIAGNOSIS — Z17.0 MALIGNANT NEOPLASM OF LEFT BREAST IN FEMALE, ESTROGEN RECEPTOR POSITIVE, UNSPECIFIED SITE OF BREAST: Primary | ICD-10-CM

## 2022-09-01 DIAGNOSIS — C50.912 MALIGNANT NEOPLASM OF LEFT BREAST IN FEMALE, ESTROGEN RECEPTOR POSITIVE, UNSPECIFIED SITE OF BREAST: Primary | ICD-10-CM

## 2022-09-01 NOTE — TELEPHONE ENCOUNTER
----- Message from Juli Haywood sent at 8/31/2022  6:16 PM CDT -----  Regarding: Mammo Digital Diagnostic [facility] with [facility].  Please send an order for breast U/S for this patient to be performed at time of Mammo Digital Diagnostic Bilat with [facility].      Thank you!    Juli

## 2022-09-27 ENCOUNTER — PATIENT MESSAGE (OUTPATIENT)
Dept: PRIMARY CARE CLINIC | Facility: CLINIC | Age: 52
End: 2022-09-27
Payer: COMMERCIAL

## 2022-10-10 ENCOUNTER — PATIENT MESSAGE (OUTPATIENT)
Dept: ADMINISTRATIVE | Facility: HOSPITAL | Age: 52
End: 2022-10-10
Payer: COMMERCIAL

## 2022-10-27 DIAGNOSIS — E11.9 TYPE 2 DIABETES MELLITUS WITHOUT COMPLICATION: ICD-10-CM

## 2022-10-31 ENCOUNTER — PATIENT MESSAGE (OUTPATIENT)
Dept: ADMINISTRATIVE | Facility: HOSPITAL | Age: 52
End: 2022-10-31
Payer: COMMERCIAL

## 2022-11-29 DIAGNOSIS — Z17.0 ER+ (ESTROGEN RECEPTOR POSITIVE STATUS): ICD-10-CM

## 2022-11-29 RX ORDER — ANASTROZOLE 1 MG/1
TABLET ORAL
Qty: 90 TABLET | Refills: 1 | OUTPATIENT
Start: 2022-11-29

## 2022-12-08 ENCOUNTER — TELEPHONE (OUTPATIENT)
Dept: HEMATOLOGY/ONCOLOGY | Facility: CLINIC | Age: 52
End: 2022-12-08

## 2022-12-08 ENCOUNTER — HOSPITAL ENCOUNTER (OUTPATIENT)
Dept: RADIOLOGY | Facility: HOSPITAL | Age: 52
Discharge: HOME | End: 2022-12-08
Attending: INTERNAL MEDICINE
Payer: COMMERCIAL

## 2022-12-08 DIAGNOSIS — C50.312 MALIGNANT NEOPLASM OF LOWER-INNER QUADRANT OF LEFT BREAST IN FEMALE, ESTROGEN RECEPTOR POSITIVE: ICD-10-CM

## 2022-12-08 DIAGNOSIS — Z17.0 MALIGNANT NEOPLASM OF LEFT BREAST IN FEMALE, ESTROGEN RECEPTOR POSITIVE, UNSPECIFIED SITE OF BREAST: ICD-10-CM

## 2022-12-08 DIAGNOSIS — Z17.0 MALIGNANT NEOPLASM OF LOWER-INNER QUADRANT OF LEFT BREAST IN FEMALE, ESTROGEN RECEPTOR POSITIVE: ICD-10-CM

## 2022-12-08 DIAGNOSIS — C50.912 MALIGNANT NEOPLASM OF LEFT BREAST IN FEMALE, ESTROGEN RECEPTOR POSITIVE, UNSPECIFIED SITE OF BREAST: ICD-10-CM

## 2022-12-08 DIAGNOSIS — Z17.0 MALIGNANT NEOPLASM OF LEFT BREAST IN FEMALE, ESTROGEN RECEPTOR POSITIVE, UNSPECIFIED SITE OF BREAST: Primary | ICD-10-CM

## 2022-12-08 DIAGNOSIS — C50.912 MALIGNANT NEOPLASM OF LEFT BREAST IN FEMALE, ESTROGEN RECEPTOR POSITIVE, UNSPECIFIED SITE OF BREAST: Primary | ICD-10-CM

## 2022-12-08 DIAGNOSIS — Z85.3 HISTORY OF LEFT BREAST CANCER: ICD-10-CM

## 2022-12-08 PROCEDURE — 77065 DX MAMMO INCL CAD UNI: CPT | Mod: TC,PO,LT

## 2022-12-08 NOTE — TELEPHONE ENCOUNTER
----- Message from Hossein Carranza MD sent at 12/8/2022  2:30 PM CST -----  Will need another short-term follow-up mammo  
Informed patient of mmg results.  Order placed   
BURNING URINATION

## 2022-12-09 ENCOUNTER — OFFICE VISIT (OUTPATIENT)
Dept: PRIMARY CARE CLINIC | Facility: CLINIC | Age: 52
End: 2022-12-09
Payer: COMMERCIAL

## 2022-12-09 ENCOUNTER — PATIENT MESSAGE (OUTPATIENT)
Dept: ADMINISTRATIVE | Facility: HOSPITAL | Age: 52
End: 2022-12-09
Payer: COMMERCIAL

## 2022-12-09 VITALS
SYSTOLIC BLOOD PRESSURE: 136 MMHG | OXYGEN SATURATION: 96 % | HEART RATE: 114 BPM | RESPIRATION RATE: 16 BRPM | BODY MASS INDEX: 30.72 KG/M2 | HEIGHT: 66 IN | DIASTOLIC BLOOD PRESSURE: 80 MMHG | WEIGHT: 191.13 LBS

## 2022-12-09 DIAGNOSIS — E78.5 HYPERLIPIDEMIA, UNSPECIFIED HYPERLIPIDEMIA TYPE: ICD-10-CM

## 2022-12-09 DIAGNOSIS — I10 ESSENTIAL HYPERTENSION, BENIGN: ICD-10-CM

## 2022-12-09 DIAGNOSIS — E11.65 TYPE 2 DIABETES MELLITUS WITH HYPERGLYCEMIA, WITHOUT LONG-TERM CURRENT USE OF INSULIN: Primary | ICD-10-CM

## 2022-12-09 PROCEDURE — 99214 PR OFFICE/OUTPT VISIT, EST, LEVL IV, 30-39 MIN: ICD-10-PCS | Mod: S$GLB,,, | Performed by: FAMILY MEDICINE

## 2022-12-09 PROCEDURE — 1160F RVW MEDS BY RX/DR IN RCRD: CPT | Mod: CPTII,S$GLB,, | Performed by: FAMILY MEDICINE

## 2022-12-09 PROCEDURE — 3051F HG A1C>EQUAL 7.0%<8.0%: CPT | Mod: CPTII,S$GLB,, | Performed by: FAMILY MEDICINE

## 2022-12-09 PROCEDURE — 3008F PR BODY MASS INDEX (BMI) DOCUMENTED: ICD-10-PCS | Mod: CPTII,S$GLB,, | Performed by: FAMILY MEDICINE

## 2022-12-09 PROCEDURE — 82043 UR ALBUMIN QUANTITATIVE: CPT | Performed by: FAMILY MEDICINE

## 2022-12-09 PROCEDURE — 3075F PR MOST RECENT SYSTOLIC BLOOD PRESS GE 130-139MM HG: ICD-10-PCS | Mod: CPTII,S$GLB,, | Performed by: FAMILY MEDICINE

## 2022-12-09 PROCEDURE — 1159F MED LIST DOCD IN RCRD: CPT | Mod: CPTII,S$GLB,, | Performed by: FAMILY MEDICINE

## 2022-12-09 PROCEDURE — 1160F PR REVIEW ALL MEDS BY PRESCRIBER/CLIN PHARMACIST DOCUMENTED: ICD-10-PCS | Mod: CPTII,S$GLB,, | Performed by: FAMILY MEDICINE

## 2022-12-09 PROCEDURE — 3079F DIAST BP 80-89 MM HG: CPT | Mod: CPTII,S$GLB,, | Performed by: FAMILY MEDICINE

## 2022-12-09 PROCEDURE — 3008F BODY MASS INDEX DOCD: CPT | Mod: CPTII,S$GLB,, | Performed by: FAMILY MEDICINE

## 2022-12-09 PROCEDURE — 3079F PR MOST RECENT DIASTOLIC BLOOD PRESSURE 80-89 MM HG: ICD-10-PCS | Mod: CPTII,S$GLB,, | Performed by: FAMILY MEDICINE

## 2022-12-09 PROCEDURE — 82570 ASSAY OF URINE CREATININE: CPT | Performed by: FAMILY MEDICINE

## 2022-12-09 PROCEDURE — 3051F PR MOST RECENT HEMOGLOBIN A1C LEVEL 7.0 - < 8.0%: ICD-10-PCS | Mod: CPTII,S$GLB,, | Performed by: FAMILY MEDICINE

## 2022-12-09 PROCEDURE — 1159F PR MEDICATION LIST DOCUMENTED IN MEDICAL RECORD: ICD-10-PCS | Mod: CPTII,S$GLB,, | Performed by: FAMILY MEDICINE

## 2022-12-09 PROCEDURE — 4010F ACE/ARB THERAPY RXD/TAKEN: CPT | Mod: CPTII,S$GLB,, | Performed by: FAMILY MEDICINE

## 2022-12-09 PROCEDURE — 99214 OFFICE O/P EST MOD 30 MIN: CPT | Mod: S$GLB,,, | Performed by: FAMILY MEDICINE

## 2022-12-09 PROCEDURE — 4010F PR ACE/ARB THEARPY RXD/TAKEN: ICD-10-PCS | Mod: CPTII,S$GLB,, | Performed by: FAMILY MEDICINE

## 2022-12-09 PROCEDURE — 99999 PR PBB SHADOW E&M-EST. PATIENT-LVL III: ICD-10-PCS | Mod: PBBFAC,,, | Performed by: FAMILY MEDICINE

## 2022-12-09 PROCEDURE — 3075F SYST BP GE 130 - 139MM HG: CPT | Mod: CPTII,S$GLB,, | Performed by: FAMILY MEDICINE

## 2022-12-09 PROCEDURE — 99999 PR PBB SHADOW E&M-EST. PATIENT-LVL III: CPT | Mod: PBBFAC,,, | Performed by: FAMILY MEDICINE

## 2022-12-09 RX ORDER — METFORMIN HYDROCHLORIDE 500 MG/1
500 TABLET ORAL 2 TIMES DAILY WITH MEALS
Qty: 180 TABLET | Refills: 1 | Status: SHIPPED | OUTPATIENT
Start: 2022-12-09 | End: 2023-01-25

## 2022-12-09 NOTE — PROGRESS NOTES
"Subjective:       Patient ID: Dennis Aldana is a 52 y.o. female.    Chief Complaint: [employer] [facility] (3 mth) and Foot Pain (Bilateral)    Has been off metoprolol for several months, and also off valsartan for past couple of months, says blood pressure and heart rate have been stable.  Does not want to get back on any medications unless absolutely necessary.  Blood sugars have been a little higher at times, but has not been taking metformin for the past few months, says extended-release metformin causes nausea and vomiting, did better on regular metformin.  No recent hypoglycemia.  Has follow-up with [facility] next month    Review of Systems   Constitutional:  Negative for chills, fatigue and fever.   HENT:  Negative for congestion.    Eyes:  Negative for visual disturbance.   Respiratory:  Negative for cough and shortness of breath.    Cardiovascular:  Negative for chest pain.   Gastrointestinal:  Negative for abdominal pain, nausea and vomiting.   Genitourinary:  Negative for difficulty urinating.   Musculoskeletal:  Negative for arthralgias.   Skin:  Negative for rash.   Neurological:  Negative for dizziness.   Psychiatric/Behavioral:  Negative for sleep disturbance.      Objective:      Vitals:    12/09/22 0750   BP: 136/80   BP Location: Right arm   Patient Position: Sitting   BP Method: Medium (Manual)   Pulse: (!) 114   Resp: 16   SpO2: 96%   Weight: 86.7 kg (191 lb 2.2 oz)   Height: 5' 6" (1.676 m)     Physical Exam  Vitals and nursing note reviewed.   Constitutional:       General: She is not in acute distress.     Appearance: Normal appearance. She is well-developed.   HENT:      Head: Normocephalic and atraumatic.   Cardiovascular:      Rate and Rhythm: Normal rate and regular rhythm.      Heart sounds: Normal heart sounds.   Pulmonary:      Effort: Pulmonary effort is normal.      Breath sounds: Normal breath sounds.   Musculoskeletal:      Right lower leg: No edema.      Left lower leg: No edema. "   Skin:     General: Skin is warm and dry.   Neurological:      Mental Status: She is alert and oriented to person, place, and time.   Psychiatric:         Mood and Affect: Mood normal.         Behavior: Behavior normal.       Lab Results   Component Value Date    WBC 16.48 (H) 02/23/2022    WBC 16.81 (H) 02/23/2022    HGB 13.3 02/23/2022    HGB 13.2 02/23/2022    HCT 41.2 02/23/2022    HCT 41.4 02/23/2022     02/23/2022     02/23/2022    CHOL 122 10/14/2021    TRIG 211 (H) 10/14/2021    HDL 32 (L) 10/14/2021    ALT 27 02/08/2022    AST 18 02/08/2022     02/23/2022    K 4.2 02/23/2022     02/23/2022    CREATININE 0.7 02/23/2022    BUN 13 02/23/2022    CO2 24 02/23/2022    TSH 2.35 01/30/2020    INR 1.1 12/29/2020    GLUF 122 (H) 02/13/2020    HGBA1C 7.9 (H) 02/08/2022      Assessment:       1. Type 2 diabetes mellitus with hyperglycemia, without long-term current use of insulin    2. Essential hypertension, benign    3. Hyperlipidemia, unspecified hyperlipidemia type          Plan:       Type 2 diabetes mellitus with hyperglycemia, without long-term current use of insulin  -     MICROALBUMIN / CREATININE RATIO URINE  -     Hemoglobin A1C; Future; Expected date: 12/09/2022  -     metFORMIN (GLUCOPHAGE) 500 MG tablet; Take 1 tablet (500 mg total) by mouth 2 (two) times daily with meals.  Dispense: 180 tablet; Refill: 1  Needs updated labs, adjust regimen if necessary.  Switch to regular metformin  Essential hypertension, benign  Well controlled  Hyperlipidemia, unspecified hyperlipidemia type  -     Lipid Panel; Future; Expected date: 12/09/2022  Needs updated labs  Medication List with Changes/Refills   New Medications    METFORMIN (GLUCOPHAGE) 500 MG TABLET    Take 1 tablet (500 mg total) by mouth 2 (two) times daily with meals.   Current Medications    ANASTROZOLE (ARIMIDEX) 1 MG TAB    TAKE 1 TABLET BY MOUTH EVERY DAY    CITALOPRAM (CELEXA) 40 MG TABLET    Take 1 tablet (40 mg total) by  mouth once daily.    GABAPENTIN (NEURONTIN) 100 MG CAPSULE    Take 1 capsule (100 mg total) by mouth 3 (three) times daily.    GLIPIZIDE 5 MG TR24    Take 1 tablet (5 mg total) by mouth once daily.    LORAZEPAM (ATIVAN) 1 MG TABLET    Take 1 tablet (1 mg total) by mouth daily as needed for Anxiety.    PRAMIPEXOLE (MIRAPEX) 1 MG TABLET    Take 2 tablets (2 mg total) by mouth every evening.    ROSUVASTATIN (CRESTOR) 10 MG TABLET    Take 1 tablet (10 mg total) by mouth once daily.    ZOLPIDEM (AMBIEN) 5 MG TAB    Take 1 tablet (5 mg total) by mouth nightly as needed (insomnia).   Discontinued Medications    METFORMIN (GLUCOPHAGE-XR) 500 MG ER 24HR TABLET    Take 2 tablets (1,000 mg total) by mouth daily with breakfast.    VALSARTAN (DIOVAN) 80 MG TABLET    Take 1 tablet (80 mg total) by mouth once daily.

## 2022-12-10 LAB
ALBUMIN/CREAT UR: 41.2 UG/MG (ref 0–30)
CREAT UR-MCNC: 102 MG/DL (ref 15–325)
MICROALBUMIN UR DL<=1MG/L-MCNC: 42 UG/ML

## 2023-01-04 ENCOUNTER — TELEPHONE (OUTPATIENT)
Dept: HEMATOLOGY/ONCOLOGY | Facility: CLINIC | Age: 53
End: 2023-01-04

## 2023-01-06 NOTE — PROGRESS NOTES
"[facility] Hematology/Oncology  PROGRESS NOTE -   Follow-up Visit      Subjective:       Patient ID:   NAME: Dennis Aldana : 1970     52 y.o. female    Referring Doc: Kana  Other Physicians: Laya Morris (Gyn)    Chief Complaint:  anemia/hx/of Lft breast cancer f/u    History of Present Illness:     Patient returns today for a regularly scheduled follow-up visit.  The patient is here today to go over the results of the recently ordered labs, tests and studies.  She is here by herself.     She reports that she has been feeling "good"           She had stent placement with Dr Lin in Dec 2021 and stone removal on 2022. She previously had surgery for diverticulum of the urethra with Dr Greer in [place].    She had colonoscopy on Dec 3rd 2021 with Dr Montesinos with one polyp removed. Planned repeat scope in 3 yrs per patient    She had mammogram on 2022 with planned repeat in 6 months    No CP, SOB, HA's or N/V.     She saw Dr Roger in Dec 2022    She has been dieting and has lost some weight     Discussed covid19 precautions           ROS:   GEN: normal without any fever, night sweats or weight loss  HEENT: normal with no HA's, sore throat, stiff neck, changes in vision  CV: normal with no CP, SOB, PND, GARCIA or orthopnea  PULM: normal with no SOB, cough, hemoptysis, sputum or pleuritic pain  GI: normal with no abdominal pain, nausea, vomiting, constipation, diarrhea, melanotic stools, BRBPR, or hematemesis  : normal with no hematuria, dysuria  BREAST: normal with no mass, discharge, pain; s/p left lumpectomy  SKIN: normal with no rash, erythema, bruising, or swelling; nodule in skin    Pain Scale:  0    Allergies:  Review of patient's allergies indicates:  No Known Allergies    Medications:    Current Outpatient Medications:     LORazepam (ATIVAN) 1 MG tablet, TAKE 1 TABLET BY MOUTH DAILY AS NEEDED FOR ANXIETY, Disp: 30 tablet, Rfl: 1    anastrozole (ARIMIDEX) 1 mg Tab, TAKE " 1 TABLET BY MOUTH EVERY DAY (Patient not taking: Reported on 8/31/2022.), Disp: 90 tablet, Rfl: 1    citalopram (CELEXA) 40 MG tablet, Take 1 tablet (40 mg total) by mouth once daily., Disp: 30 tablet, Rfl: 5    gabapentin (NEURONTIN) 100 MG capsule, Take 1 capsule (100 mg total) by mouth 3 (three) times daily., Disp: 90 capsule, Rfl: 5    glipiZIDE 5 MG TR24, Take 1 tablet (5 mg total) by mouth once daily., Disp: 30 tablet, Rfl: 5    metFORMIN (GLUCOPHAGE) 500 MG tablet, Take 1 tablet (500 mg total) by mouth 2 (two) times daily with meals., Disp: 180 tablet, Rfl: 1    pramipexole (MIRAPEX) 1 MG tablet, Take 2 tablets (2 mg total) by mouth every evening., Disp: 60 tablet, Rfl: 5    rosuvastatin (CRESTOR) 10 MG tablet, Take 1 tablet (10 mg total) by mouth once daily., Disp: 30 tablet, Rfl: 5    zolpidem (AMBIEN) 5 MG Tab, Take 1 tablet (5 mg total) by mouth nightly as needed (insomnia)., Disp: 30 tablet, Rfl: 2  No current facility-administered medications for this visit.    Facility-Administered Medications Ordered in Other Visits:     electrolyte-S (ISOLYTE), , Intravenous, Continuous, Nikole Neal MD, Last Rate: 0 mL/hr at 12/17/21 1445, New Bag at 01/07/22 0838    LIDOcaine (PF) 10 mg/ml (1%) injection 10 mg, 1 mL, Intradermal, Once PRN, Nikole Neal MD    LIDOcaine (PF) 10 mg/ml (1%) injection 10 mg, 1 mL, Intradermal, Once, Nikole Neal MD    scopolamine 1.3-1.5 mg (1 mg over 3 days) 1 patch, 1 patch, Transdermal, Q3 Days, Nikole Neal MD, 1 patch at 12/17/21 1009    PMHx/PSHx Updates:  See patient's last visit with me on 2/15/2022  See H&P on 8/17/2020        Pathology:   Cancer Staging   No matching staging information was found for the patient.      Left back re-excision: 2/22/2021:    [place] FINAL DIAGNOSIS:   Left back mass, excision ([id number]; 2/22/2021): Intramuscular myxoma        Hysterectomy 3/22/2021:    1. UTERUS, CERVIX, FALLOPIAN TUBES, AND OVARIES; HYSTERECTOMY  "AND    BILATERAL SALPINGO-OOPHORECTOMY:       MYOMETRIUM:   --SUBSEROSAL AND INTRAMURAL LEIOMYOMATA (INCLUDING FOCAL EPITHELIOID    LEIOMYOMA).      --ADENOMYOSIS.     ENDOMETRIUM:     --BENIGN ENDOMETRIAL POLYPS (LARGEST 3.2 CENTIMETERS).     CERVIX:   --FOCAL HIGH-GRADE SQUAMOUS INTRAEPITHELIAL LESION (CERVICAL    INTRAEPITHELIAL NEOPLASIA        GRADE 2 [OPHELIA 2]), WITH ENDOCERVICAL GLANDULAR EXTENSION.      --MARGIN IS NEGATIVE FOR SQUAMOUS INTRAEPITHELIAL LESION.      --FOCAL ACUTE AND CHRONIC CERVICITIS.      --SQUAMOUS METAPLASIA OF ENDOCERVIX.      --NABOTHIAN CYSTS.      --CERVIX ENTIRELY SUBMITTED FOR HISTOLOGIC SECTIONING.     RIGHT FALLOPIAN TUBE:     --MICROSCOPIC BENIGN PARATUBAL CYSTS.     LEFT FALLOPIAN TUBE:     --MICROSCOPIC BENIGN PARATUBAL CYSTS.     --SEROSAL FIBROVASCULAR ADHESIONS.     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     --ENDOMETRIOSIS.     --SURFACE EPITHELIAL INCLUSION CYSTS.     --FOCAL ENDOSALPINGIOSIS.     --CYSTIC CORPUS LUTEUM.     --CYSTIC FOLLICLES.     --CORPORA ALBICANTIA.     --SEROSAL FIBROVASCULAR ADHESIONS.     LEFT OVARY:     --ENDOMETRIOSIS.     --SURFACE EPITHELIAL INCLUSION CYSTS.     --FOCAL ENDOSALPINGIOSIS.      --HEMORRHAGIC CYSTIC CORPUS LUTEUM.      --COPORA ALBICANTIA.      --SEROSAL FIBROVASCULAR ADHESIONS.     2. SPECIMEN SUBMITTED AS "PERITONEAL NODULE," EXCISION:   --INFARCTED APPENDIX EPIPLOICA WITH DYSTROPHIC CALCIFICATION AND    FIBROSIS.            Left back soft tissue biopsy: 12/29/2020:    SOFT TISSUE, LEFT LATISSIMUS DORSI, CORE BIOPSY:   - LOW GRADE MYXOID LESION.   - SEE COMMENT.         Objective:     Vitals:  Blood pressure (!) 166/97, pulse (!) 147, temperature 98.3 °F (36.8 °C), weight 86.2 kg (190 lb), last menstrual period 02/24/2021.    Physical Examination:   GEN: no apparent distress, comfortable; AAOx3;    HEAD: atraumatic and normocephalic  EYES: no pallor, no icterus, PERRLA  ENT: OMM, no pharyngeal erythema, external " ears WNL; no nasal discharge; no thrush  NECK: no masses, thyroid normal, trachea midline, no LAD/LN's, supple  CV: RRR with no murmur; normal pulse; normal S1 and S2; no pedal edema  CHEST: Normal respiratory effort; CTAB; normal breath sounds; no wheeze or crackles  ABDOM: nontender and nondistended; soft; normal bowel sounds; no rebound/guarding  MUSC/Skeletal: ROM normal; no crepitus; joints normal; no deformities or arthropathy  EXTREM: no clubbing, cyanosis, inflammation or swelling  SKIN: no rashes, lesions, ulcers, petechiae or subcutaneous nodules; tattoos;    : no quinonez  NEURO: grossly intact; motor/sensory WNL; AAOx3; no tremors  PSYCH: normal mood, affect and behavior  LYMPH: normal cervical, supraclavicular, axillary and groin LN's  Breast: s/p left lumpectomy with no new changes        Labs:     Lab Results   Component Value Date    WBC 10.45 01/09/2023    HGB 15.1 01/09/2023    HCT 46.5 01/09/2023    MCV 86 01/09/2023     01/09/2023       Lab Results   Component Value Date    IRON 72 01/09/2023    TIBC 384 01/09/2023    FERRITIN 67 01/09/2023     CMP  Sodium   Date Value Ref Range Status   01/09/2023 139 136 - 145 mmol/L Final     Potassium   Date Value Ref Range Status   01/09/2023 4.2 3.5 - 5.1 mmol/L Final     Chloride   Date Value Ref Range Status   01/09/2023 101 95 - 110 mmol/L Final     CO2   Date Value Ref Range Status   01/09/2023 30 (H) 23 - 29 mmol/L Final     Glucose   Date Value Ref Range Status   01/09/2023 313 (H) 70 - 110 mg/dL Final     BUN   Date Value Ref Range Status   01/09/2023 14 6 - 20 mg/dL Final     Creatinine   Date Value Ref Range Status   01/09/2023 0.7 0.5 - 1.4 mg/dL Final     Calcium   Date Value Ref Range Status   01/09/2023 9.3 8.7 - 10.5 mg/dL Final     Total Protein   Date Value Ref Range Status   01/09/2023 7.2 6.0 - 8.4 g/dL Final     Albumin   Date Value Ref Range Status   01/09/2023 4.2 3.5 - 5.2 g/dL Final     Total Bilirubin   Date Value Ref Range  Status   01/09/2023 1.7 (H) 0.1 - 1.0 mg/dL Final     Comment:     For infants and newborns, interpretation of results should be based  on gestational age, weight and in agreement with clinical  observations.    Premature Infant recommended reference ranges:  Up to 24 hours.............<8.0 mg/dL  Up to 48 hours............<12.0 mg/dL  3-5 days..................<15.0 mg/dL  6-29 days.................<15.0 mg/dL       Alkaline Phosphatase   Date Value Ref Range Status   01/09/2023 151 (H) 55 - 135 U/L Final     AST   Date Value Ref Range Status   01/09/2023 14 10 - 40 U/L Final     ALT   Date Value Ref Range Status   01/09/2023 25 10 - 44 U/L Final     Anion Gap   Date Value Ref Range Status   01/09/2023 8 8 - 16 mmol/L Final     eGFR if    Date Value Ref Range Status   02/23/2022 >60.0 >60 mL/min/1.73 m^2 Final     eGFR if non    Date Value Ref Range Status   02/23/2022 >60.0 >60 mL/min/1.73 m^2 Final     Comment:     Calculation used to obtain the estimated glomerular filtration  rate (eGFR) is the CKD-EPI equation.                 Radiology/Diagnostic Studies:    Mammo 12/8/2022:    Impression:     Probably benign dystrophic calcifications and or evolving fat necrosis in the left breast as described.  Bilateral diagnostic mammogram follow-up in April 2023 is recommended, at which time patient will be due for annual bilateral mammography.  These findings and recommendations were given to the patient at the time of her visit.     BI-RADS CATEGORY 3: PROBABLY BENIGN FINDING-SHORT TERM INTERVAL FOLLOWUP SUGGESTED.           PET 2/8/2022:    IMPRESSION:  No evidence of recurrent or metastatic disease     Interval removal of the intramuscular mass in the left latissimus dorsi.  Stable mild increased mineralization of the right T7 vertebral body with no FDG activity.        CT scans  8/2/2021:    IMPRESSION:  1. Stable CT appearance of the chest, abdomen, and pelvis when compared to  December 2020. No definite evidence of metastatic disease.  2. Specifically, T7 vertebral body sclerotic lesion, hyperdense lesion in the left hepatic lobe, small hypodensities in the spleen, and additional observations as above are unchanged.        Mammo 2/1/2021:    Impression:  No mammographic evidence of malignancy.     BI-RADS Category 1: Negative           PET  1/11/2021:    Impression:     1. Intramuscular mass in left latissimus Dorsi does show mild FDG uptake with recent biopsy showing low-grade myxomatous lesion, concordant with the imaging characteristics.  2. Previous left hepatic lobe mass shows no increased FDG activity above that of background hepatic parenchymal activity.  Differential diagnosis of this remains unchanged from that previously discussed, with lack of increased FDG activity further substantiating suspected benign etiology.  Follow-up anatomic imaging is suggested with MRI abdomen without and with IV contrast (Eovist) in 3-6 months.  3. Unchanged focus of increased mineralization in right T7 vertebral body without increased FDG activity.  This remains of uncertain etiology with benign etiologies conceivable, although sclerotic osseous metastasis is difficult to entirely exclude.  Further evaluation with whole-body bone scan is suggested, unless already performed at outside institution.  Attention to this on follow-up CT is suggested, with repeat CT thorax without IV contrast suggested in 3-6 months.             CT chest/abdom/pelvis  12/9/2020:    IMPRESSION:     Degradation by motion.     No evidence of metastatic disease within the thorax. Specifically,  there is no evidence of axillary adenopathy.     Indeterminate mildly hyperdense mass within the lateral segment of the  left hepatic lobe, incompletely characterized. Correlation with  multiphasic MRI or CT is recommended.     Indeterminate splenic masses measuring 10 and 8 mm in size. If prior  outside contrast enhanced studies  exist, these would be helpful for  comparison. Alternatively, these lesions could be also assessed at the  time of follow-up multiphasic MRI or CT.     Sclerotic area within the rightward aspect of T7. The possibility of  sclerotic metastasis is not excludable.     13 mm calculus within the right renal pelvis with surrounding  inflammatory changes. The right kidney is small in overall size and  demonstrates multiple focal areas of cortical scarring.     Cystic density left ovarian masses and mild prominence of the  endometrial stripe. Correlation with pelvic ultrasound recommended.     Multiple solid uterine masses likely representing fibroids.     Probable Bartholin's cyst near the vaginal introitus.             I have reviewed all available lab results and radiology reports.    Assessment/Plan:   (1) 52 y.o. female with diagnosis of anemia referred by Dr Wade  - microcytic indices  - hgb at 10.2 and MCV at 66  - bilirubin is wnl, so I do not suspect any hemolysis at this time  - low iron and ferritin with elevated TIBC most consistent with an iron deficiency condition  - hx/of endometriosis and heavy menstrual cycles - [place] [facility] [place]    10/20/2020:  - s/p two IV irons  - Hgb now up and normal at 13.8  - iron now normal at 89 and ferritin is up to 308    11/17/2020:  - hgb good at 13.2 and now WNL  - iron panel currently normal    12/21/2020:  - s/p IV iron in Sept 2020  - hgb and iron panel are currently WNL    1/20/2021:  - latest hgb at 13.4 and good  - iron panel WNL    4/20/2021:  - latest hgb and iron panel WNL    7/21/2021:  - latest hgb WNL and iron panel is adequate    11/15/2021:  - labs are adequate with Hgb WNL  - iron panel WNL    1/9/2023:  - latest cbc and iron panel adequate     (2) Hx/of left breast cancer diagnosed May 2018 s/p lumpectomy and radiation, oral antihormone with tamoxifen - s/p lumpectomy and XRT at [facility]  - Stage I  - ER  "positive  - she saw a female oncologist at [facility] in past   - she gets regular mammograms and had one this past Jan 2020  - she does not follow-up with oncology    4/20/2021:  - change to arimidex    7/21/2021:  - she is on arimidex   - mammo is due Feb 2022  - discussed that arimidex could be adding to the arthritis    11/15/2021:  - mammo due Feb 2022  - continued on arimidex    2/15/2022:  - mammo scheduled for March 2022  - continued on arimidex    1/9/2023:  - patient stopped taking the arimidex about 3 months ago  - mammo on 12/8/2022 on chart with repeat planned for April 2023     (3) HTN and hypercholesterolemia     (4) Type II DM     (5) GERD     (6) TATYANA/Depression and RLS     (7) OA of right knee    (8) Subcutaneous nodule left scapular medial side and left posterior axilla - ? Lipomas - will schedule PEt scan in light of the breast cancer history  - 11/17/2020:  - insurance decline approval for PET   - scheduled CT scans instead and refer to Dr Rendon with Gen Surg    12/21/2020:  - she saw Dr Rendon since last visit and the suspected lipoma is actually a nodularity in the muscle layer and he is concerned about the possibility for "sarcoma"    1/20/2021:  - PET on chart from 1/11/2021  - Patient seen by Dr Rendon and had biopsy done of the skin lesion on the back on 12/29/2020 with pathology reporting a low grade myxoid lesion  - pathology recommended complete excision of the lesion to further classify      3/1/2021:  - She saw Dr Rendon again on Jan 26th 2021. She subsequently saw Dr Chico Baird with Surg-Onc at [facility] and had repeat excision on 2/22/2021 and the final pathology is still in process with the specimen referred to the [facility] for review.   - she sees Dr Baird again on 3/4/2021 to go over the results    4/20/2021;  - final pathology has come back as benign mixoid    2/15/2022:  - She had stent placement with Dr Lin in Dec 2021 and stone removal on Jan 8th 2022.  - She is " undergoing surgery next week for diverticulum of the urethra with Dr Greer in [place].  - She had colonoscopy on Dec 3rd 2021 with Dr Montesinos with one polyp removed. Planned repeat scope in 3 yrs per patient  - She had repeat PEt recently on 2/8/2022 with overall  Good report          (9) Left ovarian cystic mass with uterine fibroids, Indeterminate mildly hyperdense mass within the lateral segment of the left hepatic lobe and two indeterminate nodules in her spleen   - will set up MRI of liver and also include the left paraspinal thoracic area near scapula to evaluate the submuscular nodule that is palpable    1/20/2021:  - she is seeing Dr Contreras on Murphy 27th 2010    3/1/2021:  - She saw Dr Contreras on Jan 27th 2021 and is having surgery with Dr Contreras on 3/8/2021.     4/20/2021;  -  She had hysterectomy on 3/22/2021 with Dr Contreras with negative pathology per patientT    7/21/2021:   - she is seeing Dr Contreras again in Nov 2021    2/15/2022:'  - planned f/u with Dr Contreras after her expected urethral surgery    1/9/2023:  - s/p urethral surgery with Dr Greer since last visit  - she plans to f/u with Gyn-Onc in the near future    (10) Kidney stone - refer to dr Lin with       VISIT DIAGNOSES:      Malignant neoplasm of left breast in female, estrogen receptor positive, unspecified site of breast    Microcytic anemia    S/P lumpectomy, left breast    Use of tamoxifen (Nolvadex)    Iron deficiency anemia, unspecified iron deficiency anemia type    ER+ (estrogen receptor positive status)    Abnormal CT of liver          PLAN:  1. f/u with Gyn-Onc and  as directed  2. Check labs monthly and resume IV iron as needed  3. F/u with Dr Baird again if the lesion ever returns on her back  4. mammogram - short-term due in April 2023  5. Patient discontinued by patient 3 months ago  6. F/u with PCP, etc - needs to f/u with PCP about her hyperglycemia                RTC in  4 months   Fax note to Andrew Roger MD;  "Carmen; Alexandra; Mitzy Contreras; Chico Baird; Riley    Discussion:     COVID-19 Discussion:    I had long discussion with patient and any applicable family about the COVID-19 coronavirus epidemic and the recommended precautions with regard to cancer and/or hematology patients. I have re-iterated the CDC recommendations for adequate hand washing, use of hand -like products, and coughing into elbow, etc. In addition, especially for our patients who are on chemotherapy and/or our otherwise immunocompromised patients, I have recommended avoidance of crowds, including movie theaters, restaurants, churches, etc. I have recommended avoidance of any sick or symptomatic family members and/or friends. I have also recommended avoidance of any raw and unwashed food products, and general avoidance of food items that have not been prepared by themselves. The patient has been asked to call us immediately with any symptom developments, issues, questions or other general concerns.     Pathology Discussion:     I reviewed and discussed the pathology report(s) and radiograph reports (if available) in as simple to understand and/or laymen's terms to the best of my ability. I had an indepth conversation with the patient and went over the patient's individual diagnosis based on the information that was currently available. I discussed the TNM staging process with regard to the patient's particular cancer type, and the calculated stage based on the currently available TNM data and literature. I discussed the available prognostic data with regard to the current staging information and how it relates to the prognosis of their particular neoplastic process.          NCCN Guidelines:     I discussed the available treatment option(s) in accordance with the latest literature from the NCCN Clinical Practice Guidelines for the patient's particular type of cancer disorder. The NCCN Guidelines provide a "document evidence-based (and) " "consensus-driven management" of the care of oncology patients. The treatment recommendations were made not only in accordance to the NCCN guidelines, but also factored in to account the patient's overall age, condition, performance status and their medical co-morbidities. I went over the risks and benefits of the the treatment options (if any could be made) with regard to their particular cancer type, their cancer stage, their age, and their co-morbidities.          Antihormone Therapy Discussion:    I discussed the advantages of antihormone therapy with the patient with regard to their particular neoplastic or carcinoma in situ condition. I went over the side-effect profile of the medication including risk for potential development of endometrial cancer and/or hyperplasia in women who still have a uterus and the need for yearly GYN evaluation and follow-up. I discussed the risks for thromboembolic events such as DVT's, pulmonary emboli, CVA's, retinal vascular clots, phlebitis, and TIA's. I discussed the potential risks for development of ocular disturbances, retinopathy, cataracts, corneal changes, flushing, hot flashes, amenorrhea, altered menses, fluid retention, weight changes, elevations in LFT's, liver damage, and mood disturbances. I discussed the potential risk of arthropathy and joint pains/aches which could be chronic and debilitating. I discussed potential adverse effects on bone mineralization and the risk of osteopenia and/or osteoporosis which could led to increase risk of fractures.   A consent form was obtained and a copy was provided to the patient.    Iron Infusion Therapy Discussion:     I provided literature/learning materials on the particular IV iron regimen and discussed the potential side-effect profiles of the drug(s). I discussed the importance of compliance with obtaining and monitoring requested lab work, and went over the potential risk for the development of anaphylactic shock, " bronchospasm, dysrhythmia, liver and/or kidney damage, and respiratory/cardiovascular arrest and/or failure. I discussed the potential risks for development of alopecia, fevers, itching, chills and/or rigors, cold sensory issues, ringing in ears, vertigo and neuropathy, all of which are usually acute but sometimes could end up being chronic and life-long. I discussed the risks of hand-foot syndrome and rashes, and development of other autoimmune mediated processes such as pneumonitis and colitis which could be life threatening.     The patient's consent has been obtained to proceed with the IV iron therapy.The patient will be referred to [facility] for training and education on IV iron therapy, use of antiemetics and/or anti-diarrheals, use of NSAID's, potential IV iron therapy side-effects, and any specific recommendations and precautions with the particular IV iron agents.      I answered all of the patient's (and family's, if applicable) questions to the best of my ability and to their complete satisfaction. The patient acknowledged full understanding of the risks, recommendations and plan(s).         I spent over 25 mins of time with the patient. Reviewed results of the recently ordered labs, tests and studies; made directives with regards to the results. Over half of this time was spent couseling and coordinating care.    I have explained all of the above in detail and the patient understands all of the current recommendation(s). I have answered all of their questions to the best of my ability and to their complete satisfaction.   The patient is to continue with the current management plan.            Electronically signed by Hossein Carranza MD

## 2023-01-09 ENCOUNTER — LAB VISIT (OUTPATIENT)
Dept: LAB | Facility: HOSPITAL | Age: 53
End: 2023-01-09
Attending: INTERNAL MEDICINE
Payer: COMMERCIAL

## 2023-01-09 ENCOUNTER — LAB VISIT (OUTPATIENT)
Dept: LAB | Facility: HOSPITAL | Age: 53
End: 2023-01-09
Attending: FAMILY MEDICINE
Payer: COMMERCIAL

## 2023-01-09 ENCOUNTER — OFFICE VISIT (OUTPATIENT)
Dept: HEMATOLOGY/ONCOLOGY | Facility: CLINIC | Age: 53
End: 2023-01-09
Payer: COMMERCIAL

## 2023-01-09 VITALS
TEMPERATURE: 98 F | WEIGHT: 190 LBS | HEART RATE: 147 BPM | DIASTOLIC BLOOD PRESSURE: 97 MMHG | BODY MASS INDEX: 30.67 KG/M2 | SYSTOLIC BLOOD PRESSURE: 166 MMHG

## 2023-01-09 DIAGNOSIS — Z17.0 ER+ (ESTROGEN RECEPTOR POSITIVE STATUS): ICD-10-CM

## 2023-01-09 DIAGNOSIS — Z79.810 USE OF TAMOXIFEN (NOLVADEX): ICD-10-CM

## 2023-01-09 DIAGNOSIS — Z17.0 MALIGNANT NEOPLASM OF LEFT BREAST IN FEMALE, ESTROGEN RECEPTOR POSITIVE, UNSPECIFIED SITE OF BREAST: Primary | ICD-10-CM

## 2023-01-09 DIAGNOSIS — D50.9 MICROCYTIC ANEMIA: ICD-10-CM

## 2023-01-09 DIAGNOSIS — C50.912 MALIGNANT NEOPLASM OF LEFT BREAST IN FEMALE, ESTROGEN RECEPTOR POSITIVE, UNSPECIFIED SITE OF BREAST: Primary | ICD-10-CM

## 2023-01-09 DIAGNOSIS — D50.9 IRON DEFICIENCY ANEMIA, UNSPECIFIED IRON DEFICIENCY ANEMIA TYPE: ICD-10-CM

## 2023-01-09 DIAGNOSIS — Z98.890 S/P LUMPECTOMY, LEFT BREAST: ICD-10-CM

## 2023-01-09 DIAGNOSIS — E78.5 HYPERLIPIDEMIA, UNSPECIFIED HYPERLIPIDEMIA TYPE: ICD-10-CM

## 2023-01-09 DIAGNOSIS — E11.65 TYPE 2 DIABETES MELLITUS WITH HYPERGLYCEMIA, WITHOUT LONG-TERM CURRENT USE OF INSULIN: ICD-10-CM

## 2023-01-09 DIAGNOSIS — R93.2 ABNORMAL CT OF LIVER: ICD-10-CM

## 2023-01-09 LAB
ALBUMIN SERPL BCP-MCNC: 4.2 G/DL (ref 3.5–5.2)
ALP SERPL-CCNC: 151 U/L (ref 55–135)
ALT SERPL W/O P-5'-P-CCNC: 25 U/L (ref 10–44)
ANION GAP SERPL CALC-SCNC: 8 MMOL/L (ref 8–16)
AST SERPL-CCNC: 14 U/L (ref 10–40)
BASOPHILS # BLD AUTO: 0.04 K/UL (ref 0–0.2)
BASOPHILS NFR BLD: 0.4 % (ref 0–1.9)
BILIRUB SERPL-MCNC: 1.7 MG/DL (ref 0.1–1)
BUN SERPL-MCNC: 14 MG/DL (ref 6–20)
CALCIUM SERPL-MCNC: 9.3 MG/DL (ref 8.7–10.5)
CEA SERPL-MCNC: 1.4 NG/ML (ref 0–5)
CHLORIDE SERPL-SCNC: 101 MMOL/L (ref 95–110)
CHOLEST SERPL-MCNC: 137 MG/DL (ref 120–199)
CHOLEST/HDLC SERPL: 3.8 {RATIO} (ref 2–5)
CO2 SERPL-SCNC: 30 MMOL/L (ref 23–29)
CREAT SERPL-MCNC: 0.7 MG/DL (ref 0.5–1.4)
DIFFERENTIAL METHOD: NORMAL
EOSINOPHIL # BLD AUTO: 0.3 K/UL (ref 0–0.5)
EOSINOPHIL NFR BLD: 2.5 % (ref 0–8)
ERYTHROCYTE [DISTWIDTH] IN BLOOD BY AUTOMATED COUNT: 13.7 % (ref 11.5–14.5)
EST. GFR  (NO RACE VARIABLE): >60 ML/MIN/1.73 M^2
ESTIMATED AVG GLUCOSE: 278 MG/DL (ref 68–131)
FERRITIN SERPL-MCNC: 67 NG/ML (ref 20–300)
GLUCOSE SERPL-MCNC: 313 MG/DL (ref 70–110)
HBA1C MFR BLD: 11.3 % (ref 4.5–6.2)
HCT VFR BLD AUTO: 46.5 % (ref 37–48.5)
HDLC SERPL-MCNC: 36 MG/DL (ref 40–75)
HDLC SERPL: 26.3 % (ref 20–50)
HGB BLD-MCNC: 15.1 G/DL (ref 12–16)
IMM GRANULOCYTES # BLD AUTO: 0.04 K/UL (ref 0–0.04)
IMM GRANULOCYTES NFR BLD AUTO: 0.4 % (ref 0–0.5)
IRON SERPL-MCNC: 72 UG/DL (ref 30–160)
LDLC SERPL CALC-MCNC: 69.6 MG/DL (ref 63–159)
LYMPHOCYTES # BLD AUTO: 2.4 K/UL (ref 1–4.8)
LYMPHOCYTES NFR BLD: 23 % (ref 18–48)
MCH RBC QN AUTO: 28 PG (ref 27–31)
MCHC RBC AUTO-ENTMCNC: 32.5 G/DL (ref 32–36)
MCV RBC AUTO: 86 FL (ref 82–98)
MONOCYTES # BLD AUTO: 0.7 K/UL (ref 0.3–1)
MONOCYTES NFR BLD: 7 % (ref 4–15)
NEUTROPHILS # BLD AUTO: 7 K/UL (ref 1.8–7.7)
NEUTROPHILS NFR BLD: 66.7 % (ref 38–73)
NONHDLC SERPL-MCNC: 101 MG/DL
NRBC BLD-RTO: 0 /100 WBC
PLATELET # BLD AUTO: 271 K/UL (ref 150–450)
PMV BLD AUTO: 10.3 FL (ref 9.2–12.9)
POTASSIUM SERPL-SCNC: 4.2 MMOL/L (ref 3.5–5.1)
PROT SERPL-MCNC: 7.2 G/DL (ref 6–8.4)
RBC # BLD AUTO: 5.39 M/UL (ref 4–5.4)
SATURATED IRON: 19 % (ref 20–50)
SODIUM SERPL-SCNC: 139 MMOL/L (ref 136–145)
TOTAL IRON BINDING CAPACITY: 384 UG/DL (ref 250–450)
TRANSFERRIN SERPL-MCNC: 274 MG/DL (ref 200–375)
TRIGL SERPL-MCNC: 157 MG/DL (ref 30–150)
WBC # BLD AUTO: 10.45 K/UL (ref 3.9–12.7)

## 2023-01-09 PROCEDURE — 3080F DIAST BP >= 90 MM HG: CPT | Mod: CPTII,S$GLB,, | Performed by: INTERNAL MEDICINE

## 2023-01-09 PROCEDURE — 83036 HEMOGLOBIN GLYCOSYLATED A1C: CPT | Performed by: FAMILY MEDICINE

## 2023-01-09 PROCEDURE — 1160F RVW MEDS BY RX/DR IN RCRD: CPT | Mod: CPTII,S$GLB,, | Performed by: INTERNAL MEDICINE

## 2023-01-09 PROCEDURE — 99214 OFFICE O/P EST MOD 30 MIN: CPT | Mod: S$GLB,,, | Performed by: INTERNAL MEDICINE

## 2023-01-09 PROCEDURE — 1160F PR REVIEW ALL MEDS BY PRESCRIBER/CLIN PHARMACIST DOCUMENTED: ICD-10-PCS | Mod: CPTII,S$GLB,, | Performed by: INTERNAL MEDICINE

## 2023-01-09 PROCEDURE — 3077F PR MOST RECENT SYSTOLIC BLOOD PRESSURE >= 140 MM HG: ICD-10-PCS | Mod: CPTII,S$GLB,, | Performed by: INTERNAL MEDICINE

## 2023-01-09 PROCEDURE — 1159F PR MEDICATION LIST DOCUMENTED IN MEDICAL RECORD: ICD-10-PCS | Mod: CPTII,S$GLB,, | Performed by: INTERNAL MEDICINE

## 2023-01-09 PROCEDURE — 1159F MED LIST DOCD IN RCRD: CPT | Mod: CPTII,S$GLB,, | Performed by: INTERNAL MEDICINE

## 2023-01-09 PROCEDURE — 3072F LOW RISK FOR RETINOPATHY: CPT | Mod: CPTII,S$GLB,, | Performed by: INTERNAL MEDICINE

## 2023-01-09 PROCEDURE — 80053 COMPREHEN METABOLIC PANEL: CPT | Performed by: INTERNAL MEDICINE

## 2023-01-09 PROCEDURE — 99214 PR OFFICE/OUTPT VISIT, EST, LEVL IV, 30-39 MIN: ICD-10-PCS | Mod: S$GLB,,, | Performed by: INTERNAL MEDICINE

## 2023-01-09 PROCEDURE — 80061 LIPID PANEL: CPT | Performed by: FAMILY MEDICINE

## 2023-01-09 PROCEDURE — 3072F PR LOW RISK FOR RETINOPATHY: ICD-10-PCS | Mod: CPTII,S$GLB,, | Performed by: INTERNAL MEDICINE

## 2023-01-09 PROCEDURE — 84466 ASSAY OF TRANSFERRIN: CPT | Performed by: INTERNAL MEDICINE

## 2023-01-09 PROCEDURE — 82378 CARCINOEMBRYONIC ANTIGEN: CPT | Performed by: INTERNAL MEDICINE

## 2023-01-09 PROCEDURE — 3080F PR MOST RECENT DIASTOLIC BLOOD PRESSURE >= 90 MM HG: ICD-10-PCS | Mod: CPTII,S$GLB,, | Performed by: INTERNAL MEDICINE

## 2023-01-09 PROCEDURE — 3008F BODY MASS INDEX DOCD: CPT | Mod: CPTII,S$GLB,, | Performed by: INTERNAL MEDICINE

## 2023-01-09 PROCEDURE — 3077F SYST BP >= 140 MM HG: CPT | Mod: CPTII,S$GLB,, | Performed by: INTERNAL MEDICINE

## 2023-01-09 PROCEDURE — 36415 COLL VENOUS BLD VENIPUNCTURE: CPT | Performed by: FAMILY MEDICINE

## 2023-01-09 PROCEDURE — 82728 ASSAY OF FERRITIN: CPT | Performed by: INTERNAL MEDICINE

## 2023-01-09 PROCEDURE — 3008F PR BODY MASS INDEX (BMI) DOCUMENTED: ICD-10-PCS | Mod: CPTII,S$GLB,, | Performed by: INTERNAL MEDICINE

## 2023-01-09 PROCEDURE — 85025 COMPLETE CBC W/AUTO DIFF WBC: CPT | Performed by: INTERNAL MEDICINE

## 2023-01-20 ENCOUNTER — PATIENT OUTREACH (OUTPATIENT)
Dept: ADMINISTRATIVE | Facility: HOSPITAL | Age: 53
End: 2023-01-20
Payer: COMMERCIAL

## 2023-01-20 NOTE — PROGRESS NOTES
Health Maintenance Due   Topic Date Due    Influenza Vaccine (1) 09/01/2022        Chart review done.   HM updated.   Immunizations reviewed & updated.   Care Everywhere updated.  Orders Entered:  NA

## 2023-01-25 ENCOUNTER — OFFICE VISIT (OUTPATIENT)
Dept: PRIMARY CARE CLINIC | Facility: CLINIC | Age: 53
End: 2023-01-25
Payer: COMMERCIAL

## 2023-01-25 ENCOUNTER — PATIENT MESSAGE (OUTPATIENT)
Dept: PRIMARY CARE CLINIC | Facility: CLINIC | Age: 53
End: 2023-01-25

## 2023-01-25 DIAGNOSIS — Z91.148 NONCOMPLIANCE W/MEDICATION TREATMENT DUE TO INTERMIT USE OF MEDICATION: ICD-10-CM

## 2023-01-25 DIAGNOSIS — I10 ESSENTIAL HYPERTENSION, BENIGN: ICD-10-CM

## 2023-01-25 DIAGNOSIS — E11.65 TYPE 2 DIABETES MELLITUS WITH HYPERGLYCEMIA, WITHOUT LONG-TERM CURRENT USE OF INSULIN: Primary | ICD-10-CM

## 2023-01-25 PROBLEM — E66.01 SEVERE OBESITY (BMI 35.0-39.9) WITH COMORBIDITY: Status: RESOLVED | Noted: 2022-02-14 | Resolved: 2023-01-25

## 2023-01-25 PROCEDURE — 1159F MED LIST DOCD IN RCRD: CPT | Mod: CPTII,95,, | Performed by: FAMILY MEDICINE

## 2023-01-25 PROCEDURE — 1160F RVW MEDS BY RX/DR IN RCRD: CPT | Mod: CPTII,95,, | Performed by: FAMILY MEDICINE

## 2023-01-25 PROCEDURE — 99214 OFFICE O/P EST MOD 30 MIN: CPT | Mod: 95,,, | Performed by: FAMILY MEDICINE

## 2023-01-25 PROCEDURE — 1159F PR MEDICATION LIST DOCUMENTED IN MEDICAL RECORD: ICD-10-PCS | Mod: CPTII,95,, | Performed by: FAMILY MEDICINE

## 2023-01-25 PROCEDURE — 1160F PR REVIEW ALL MEDS BY PRESCRIBER/CLIN PHARMACIST DOCUMENTED: ICD-10-PCS | Mod: CPTII,95,, | Performed by: FAMILY MEDICINE

## 2023-01-25 PROCEDURE — 3046F PR MOST RECENT HEMOGLOBIN A1C LEVEL > 9.0%: ICD-10-PCS | Mod: CPTII,95,, | Performed by: FAMILY MEDICINE

## 2023-01-25 PROCEDURE — 3072F LOW RISK FOR RETINOPATHY: CPT | Mod: CPTII,95,, | Performed by: FAMILY MEDICINE

## 2023-01-25 PROCEDURE — 3072F PR LOW RISK FOR RETINOPATHY: ICD-10-PCS | Mod: CPTII,95,, | Performed by: FAMILY MEDICINE

## 2023-01-25 PROCEDURE — 3046F HEMOGLOBIN A1C LEVEL >9.0%: CPT | Mod: CPTII,95,, | Performed by: FAMILY MEDICINE

## 2023-01-25 PROCEDURE — 99214 PR OFFICE/OUTPT VISIT, EST, LEVL IV, 30-39 MIN: ICD-10-PCS | Mod: 95,,, | Performed by: FAMILY MEDICINE

## 2023-01-25 RX ORDER — GLIPIZIDE 10 MG/1
10 TABLET, FILM COATED, EXTENDED RELEASE ORAL DAILY
Qty: 90 TABLET | Refills: 1 | Status: SHIPPED | OUTPATIENT
Start: 2023-01-25 | End: 2023-05-12

## 2023-01-25 RX ORDER — METFORMIN HYDROCHLORIDE 500 MG/1
500 TABLET, EXTENDED RELEASE ORAL
Qty: 90 TABLET | Refills: 1 | Status: SHIPPED | OUTPATIENT
Start: 2023-01-25 | End: 2023-02-08 | Stop reason: SDUPTHER

## 2023-01-25 NOTE — PROGRESS NOTES
Subjective:       Patient ID: Dennis Aldana is a 52 y.o. female.    Chief Complaint: No chief complaint on file.      A1c back up to 11.3%.  Says she is struggling to remember to take her metformin, says she rarely takes it.  Taking glipizide consistently every day, however.  No recent diet changes.  Fasting blood sugar this morning was 260, says this is typical of what she has been seeing lately.  No side effects from current medications.  Says her blood pressure was high at her last oncology appointment.    The patient location is:  [place]  The chief complaint leading to consultation is:  Diabetes    Visit type: audiovisual    Face to Face time with patient:  9 minutes  15 minutes of total time spent on the encounter, which includes face to face time and non-face to face time preparing to see the patient (eg, review of tests), Obtaining and/or reviewing separately obtained history, Documenting clinical information in the electronic or other health record, Independently interpreting results (not separately reported) and communicating results to the patient/family/caregiver, or Care coordination (not separately reported).         Each patient to whom he or she provides medical services by telemedicine is:  (1) informed of the relationship between the physician and patient and the respective role of any other health care provider with respect to management of the patient; and (2) notified that he or she may decline to receive medical services by telemedicine and may withdraw from such care at any time.    Notes:    Review of Systems   Respiratory:  Negative for shortness of breath.    Cardiovascular:  Negative for chest pain.   Psychiatric/Behavioral:  Negative for agitation and confusion.      Objective:      There were no vitals filed for this visit.  Physical Exam  Constitutional:       General: She is not in acute distress.     Appearance: Normal appearance. She is well-developed.   Pulmonary:       Effort: No respiratory distress.   Neurological:      Mental Status: She is alert and oriented to person, place, and time.   Psychiatric:         Behavior: Behavior normal.       Lab Results   Component Value Date    WBC 10.45 01/09/2023    HGB 15.1 01/09/2023    HCT 46.5 01/09/2023     01/09/2023    CHOL 137 01/09/2023    TRIG 157 (H) 01/09/2023    HDL 36 (L) 01/09/2023    ALT 25 01/09/2023    AST 14 01/09/2023     01/09/2023    K 4.2 01/09/2023     01/09/2023    CREATININE 0.7 01/09/2023    BUN 14 01/09/2023    CO2 30 (H) 01/09/2023    TSH 2.35 01/30/2020    INR 1.1 12/29/2020    GLUF 122 (H) 02/13/2020    HGBA1C 11.3 (H) 01/09/2023      Assessment:       1. Type 2 diabetes mellitus with hyperglycemia, without long-term current use of insulin    2. Noncompliance w/medication treatment due to intermit use of medication    3. Essential hypertension, benign          Plan:       Type 2 diabetes mellitus with hyperglycemia, without long-term current use of insulin  -     glipiZIDE (GLUCOTROL) 10 MG TR24; Take 1 tablet (10 mg total) by mouth once daily.  Dispense: 90 tablet; Refill: 1  -     metFORMIN (GLUCOPHAGE-XR) 500 MG ER 24hr tablet; Take 1 tablet (500 mg total) by mouth daily with breakfast.  Dispense: 90 tablet; Refill: 1  -     Basic Metabolic Panel; Future; Expected date: 04/25/2023  -     Hemoglobin A1C; Future; Expected date: 04/25/2023  Needs much tighter glycemic control.  Increase glipizide in switch to extended-release metformin for ease of dosing.  Will message for updated blood sugar readings in a few weeks.  Recheck A1c in 3 months  Noncompliance w/medication treatment due to intermit use of medication  Stressed importance of taking meds as prescribed  Essential hypertension, benign  -     Hypertension Digital Medicine (HDMP) Enrollment Order  -     Hypertension Digital Medicine (HDMP): Assign Onboarding Questionnaires      Medication List with Changes/Refills   New Medications     METFORMIN (GLUCOPHAGE-XR) 500 MG ER 24HR TABLET    Take 1 tablet (500 mg total) by mouth daily with breakfast.   Current Medications    CITALOPRAM (CELEXA) 40 MG TABLET    Take 1 tablet (40 mg total) by mouth once daily.    GABAPENTIN (NEURONTIN) 100 MG CAPSULE    Take 1 capsule (100 mg total) by mouth 3 (three) times daily.    LORAZEPAM (ATIVAN) 1 MG TABLET    TAKE 1 TABLET BY MOUTH DAILY AS NEEDED FOR ANXIETY    PRAMIPEXOLE (MIRAPEX) 1 MG TABLET    Take 2 tablets (2 mg total) by mouth every evening.    ROSUVASTATIN (CRESTOR) 10 MG TABLET    Take 1 tablet (10 mg total) by mouth once daily.    ZOLPIDEM (AMBIEN) 5 MG TAB    Take 1 tablet (5 mg total) by mouth nightly as needed (insomnia).   Changed and/or Refilled Medications    Modified Medication Previous Medication    GLIPIZIDE (GLUCOTROL) 10 MG TR24 glipiZIDE 5 MG TR24       Take 1 tablet (10 mg total) by mouth once daily.    Take 1 tablet (5 mg total) by mouth once daily.   Discontinued Medications    ANASTROZOLE (ARIMIDEX) 1 MG TAB    TAKE 1 TABLET BY MOUTH EVERY DAY    METFORMIN (GLUCOPHAGE) 500 MG TABLET    Take 1 tablet (500 mg total) by mouth 2 (two) times daily with meals.

## 2023-01-26 ENCOUNTER — PATIENT MESSAGE (OUTPATIENT)
Dept: ADMINISTRATIVE | Facility: OTHER | Age: 53
End: 2023-01-26
Payer: COMMERCIAL

## 2023-02-08 ENCOUNTER — PATIENT MESSAGE (OUTPATIENT)
Dept: PRIMARY CARE CLINIC | Facility: CLINIC | Age: 53
End: 2023-02-08
Payer: COMMERCIAL

## 2023-02-08 DIAGNOSIS — E11.65 TYPE 2 DIABETES MELLITUS WITH HYPERGLYCEMIA, WITHOUT LONG-TERM CURRENT USE OF INSULIN: ICD-10-CM

## 2023-02-08 RX ORDER — METFORMIN HYDROCHLORIDE 500 MG/1
500 TABLET, EXTENDED RELEASE ORAL 2 TIMES DAILY WITH MEALS
Qty: 180 TABLET | Refills: 1 | Status: SHIPPED | OUTPATIENT
Start: 2023-02-08 | End: 2023-05-12

## 2023-03-09 DIAGNOSIS — Z17.0 ESTROGEN RECEPTOR POSITIVE: ICD-10-CM

## 2023-03-09 DIAGNOSIS — C50.312 MALIGNANT NEOPLASM OF LOWER-INNER QUADRANT OF LEFT FEMALE BREAST: Primary | ICD-10-CM

## 2023-04-04 ENCOUNTER — PATIENT MESSAGE (OUTPATIENT)
Dept: ADMINISTRATIVE | Facility: OTHER | Age: 53
End: 2023-04-04
Payer: COMMERCIAL

## 2023-04-18 ENCOUNTER — HOSPITAL ENCOUNTER (OUTPATIENT)
Dept: RADIOLOGY | Facility: HOSPITAL | Age: 53
Discharge: HOME | End: 2023-04-18
Attending: NURSE PRACTITIONER
Payer: COMMERCIAL

## 2023-04-18 DIAGNOSIS — C50.312 MALIGNANT NEOPLASM OF LOWER-INNER QUADRANT OF LEFT FEMALE BREAST: ICD-10-CM

## 2023-04-18 DIAGNOSIS — C50.312 MALIGNANT NEOPLASM OF LOWER-INNER QUADRANT OF LEFT BREAST IN FEMALE, ESTROGEN RECEPTOR POSITIVE: ICD-10-CM

## 2023-04-18 DIAGNOSIS — Z17.0 MALIGNANT NEOPLASM OF LOWER-INNER QUADRANT OF LEFT BREAST IN FEMALE, ESTROGEN RECEPTOR POSITIVE: ICD-10-CM

## 2023-04-18 DIAGNOSIS — Z17.0 ESTROGEN RECEPTOR POSITIVE: ICD-10-CM

## 2023-04-18 PROCEDURE — 77062 BREAST TOMOSYNTHESIS BI: CPT | Mod: TC,PO

## 2023-05-01 ENCOUNTER — TELEPHONE (OUTPATIENT)
Dept: HEMATOLOGY/ONCOLOGY | Facility: CLINIC | Age: 53
End: 2023-05-01

## 2023-05-01 DIAGNOSIS — E78.5 TYPE 2 DIABETES MELLITUS WITH HYPERLIPIDEMIA: ICD-10-CM

## 2023-05-01 DIAGNOSIS — G25.81 RLS (RESTLESS LEGS SYNDROME): ICD-10-CM

## 2023-05-01 DIAGNOSIS — E11.69 TYPE 2 DIABETES MELLITUS WITH HYPERLIPIDEMIA: ICD-10-CM

## 2023-05-01 DIAGNOSIS — F41.1 GAD (GENERALIZED ANXIETY DISORDER): ICD-10-CM

## 2023-05-01 RX ORDER — PRAMIPEXOLE DIHYDROCHLORIDE 1 MG/1
2 TABLET ORAL NIGHTLY
Qty: 180 TABLET | Refills: 1 | Status: SHIPPED | OUTPATIENT
Start: 2023-05-01 | End: 2023-08-28

## 2023-05-01 RX ORDER — ROSUVASTATIN CALCIUM 10 MG/1
TABLET, COATED ORAL
Qty: 90 TABLET | Refills: 2 | Status: SHIPPED | OUTPATIENT
Start: 2023-05-01 | End: 2023-12-04

## 2023-05-01 RX ORDER — LORAZEPAM 1 MG/1
TABLET ORAL
Qty: 30 TABLET | Refills: 1 | Status: SHIPPED | OUTPATIENT
Start: 2023-05-01 | End: 2023-10-19

## 2023-05-01 NOTE — TELEPHONE ENCOUNTER
Refill Routing Note   Medication(s) are not appropriate for processing by [facility] for the following reason(s):      Medication outside of protocol    [facility] action(s):  Route  Approve Labs due        Medication reconciliation completed: No     Appointments  past 12m or future 3m with PCP    Date Provider   Last Visit   1/25/2023 Andrew Roger MD   Next Visit   Visit date not found Andrew Roger MD   ED visits in past 90 days: 0        Note composed:2:37 PM 05/01/2023

## 2023-05-01 NOTE — TELEPHONE ENCOUNTER
Care Due:                  Date            Visit Type   Department     Provider  --------------------------------------------------------------------------------                                ESTABLISHED                              PATIENT -    [facility]  Last Visit: 01-      [facility]      PRIMARY CARE   Andrew Roger  Next Visit: None Scheduled  None         None Found                                                            Last  Test          Frequency    Reason                     Performed    Due Date  --------------------------------------------------------------------------------    HBA1C.......  6 months...  glipiZIDE, metFORMIN.....  01- 07-    [facility] Embedded Care Due Messages. Reference number: [id number].   5/01/2023 8:15:39 AM CDT

## 2023-05-08 ENCOUNTER — LAB VISIT (OUTPATIENT)
Dept: LAB | Facility: HOSPITAL | Age: 53
End: 2023-05-08
Attending: INTERNAL MEDICINE
Payer: COMMERCIAL

## 2023-05-08 ENCOUNTER — LAB VISIT (OUTPATIENT)
Dept: LAB | Facility: HOSPITAL | Age: 53
End: 2023-05-08
Attending: FAMILY MEDICINE
Payer: COMMERCIAL

## 2023-05-08 DIAGNOSIS — Z17.0 ER+ (ESTROGEN RECEPTOR POSITIVE STATUS): ICD-10-CM

## 2023-05-08 DIAGNOSIS — E11.65 TYPE 2 DIABETES MELLITUS WITH HYPERGLYCEMIA, WITHOUT LONG-TERM CURRENT USE OF INSULIN: ICD-10-CM

## 2023-05-08 LAB
ALBUMIN SERPL BCP-MCNC: 3.5 G/DL (ref 3.5–5.2)
ALP SERPL-CCNC: 142 U/L (ref 55–135)
ALT SERPL W/O P-5'-P-CCNC: 24 U/L (ref 10–44)
ANION GAP SERPL CALC-SCNC: 10 MMOL/L (ref 8–16)
AST SERPL-CCNC: 17 U/L (ref 10–40)
BASOPHILS # BLD AUTO: 0.03 K/UL (ref 0–0.2)
BASOPHILS NFR BLD: 0.3 % (ref 0–1.9)
BILIRUB SERPL-MCNC: 1.5 MG/DL (ref 0.1–1)
BUN SERPL-MCNC: 11 MG/DL (ref 6–20)
CALCIUM SERPL-MCNC: 9.2 MG/DL (ref 8.7–10.5)
CEA SERPL-MCNC: 1.2 NG/ML (ref 0–5)
CHLORIDE SERPL-SCNC: 102 MMOL/L (ref 95–110)
CO2 SERPL-SCNC: 26 MMOL/L (ref 23–29)
CREAT SERPL-MCNC: 0.7 MG/DL (ref 0.5–1.4)
DIFFERENTIAL METHOD: NORMAL
EOSINOPHIL # BLD AUTO: 0.2 K/UL (ref 0–0.5)
EOSINOPHIL NFR BLD: 1.9 % (ref 0–8)
ERYTHROCYTE [DISTWIDTH] IN BLOOD BY AUTOMATED COUNT: 13.3 % (ref 11.5–14.5)
EST. GFR  (NO RACE VARIABLE): >60 ML/MIN/1.73 M^2
ESTIMATED AVG GLUCOSE: 272 MG/DL (ref 68–131)
FERRITIN SERPL-MCNC: 69 NG/ML (ref 20–300)
GLUCOSE SERPL-MCNC: 247 MG/DL (ref 70–110)
HBA1C MFR BLD: 11.1 % (ref 4.5–6.2)
HCT VFR BLD AUTO: 44.3 % (ref 37–48.5)
HGB BLD-MCNC: 14.9 G/DL (ref 12–16)
IMM GRANULOCYTES # BLD AUTO: 0.03 K/UL (ref 0–0.04)
IMM GRANULOCYTES NFR BLD AUTO: 0.3 % (ref 0–0.5)
IRON SERPL-MCNC: 76 UG/DL (ref 30–160)
LYMPHOCYTES # BLD AUTO: 2.2 K/UL (ref 1–4.8)
LYMPHOCYTES NFR BLD: 22.2 % (ref 18–48)
MCH RBC QN AUTO: 28.3 PG (ref 27–31)
MCHC RBC AUTO-ENTMCNC: 33.6 G/DL (ref 32–36)
MCV RBC AUTO: 84 FL (ref 82–98)
MONOCYTES # BLD AUTO: 0.7 K/UL (ref 0.3–1)
MONOCYTES NFR BLD: 6.8 % (ref 4–15)
NEUTROPHILS # BLD AUTO: 6.9 K/UL (ref 1.8–7.7)
NEUTROPHILS NFR BLD: 68.5 % (ref 38–73)
NRBC BLD-RTO: 0 /100 WBC
PLATELET # BLD AUTO: 281 K/UL (ref 150–450)
PMV BLD AUTO: 10.3 FL (ref 9.2–12.9)
POTASSIUM SERPL-SCNC: 3.8 MMOL/L (ref 3.5–5.1)
PROT SERPL-MCNC: 6.6 G/DL (ref 6–8.4)
RBC # BLD AUTO: 5.26 M/UL (ref 4–5.4)
SATURATED IRON: 23 % (ref 20–50)
SODIUM SERPL-SCNC: 138 MMOL/L (ref 136–145)
TOTAL IRON BINDING CAPACITY: 335 UG/DL (ref 250–450)
TRANSFERRIN SERPL-MCNC: 239 MG/DL (ref 200–375)
WBC # BLD AUTO: 10.07 K/UL (ref 3.9–12.7)

## 2023-05-08 PROCEDURE — 36415 COLL VENOUS BLD VENIPUNCTURE: CPT | Performed by: INTERNAL MEDICINE

## 2023-05-08 PROCEDURE — 84466 ASSAY OF TRANSFERRIN: CPT | Performed by: INTERNAL MEDICINE

## 2023-05-08 PROCEDURE — 83036 HEMOGLOBIN GLYCOSYLATED A1C: CPT | Performed by: FAMILY MEDICINE

## 2023-05-08 PROCEDURE — 82378 CARCINOEMBRYONIC ANTIGEN: CPT | Performed by: INTERNAL MEDICINE

## 2023-05-08 PROCEDURE — 85025 COMPLETE CBC W/AUTO DIFF WBC: CPT | Performed by: INTERNAL MEDICINE

## 2023-05-08 PROCEDURE — 80053 COMPREHEN METABOLIC PANEL: CPT | Performed by: INTERNAL MEDICINE

## 2023-05-08 PROCEDURE — 82728 ASSAY OF FERRITIN: CPT | Performed by: INTERNAL MEDICINE

## 2023-05-08 NOTE — PROGRESS NOTES
"[facility] Hematology/Oncology  PROGRESS NOTE -   Follow-up Visit      Subjective:       Patient ID:   NAME: Dennis Aldana : 1970     52 y.o. female    Referring Doc: Kana  Other Physicians: Laya Morris (Gyn)    Chief Complaint:  anemia/hx/of Lft breast cancer f/u    History of Present Illness:     Patient returns today for a regularly scheduled follow-up visit.  The patient is here today to go over the results of the recently ordered labs, tests and studies.  She is here by herself.     She reports that she has been feeling "good" Energy levels are good. She has been losing weight on purpose.          She had stent placement with Dr Lin in Dec 2021 and stone removal on 2022. She previously had surgery for diverticulum of the urethra with Dr Greer in [place]. She is followed by Dr Percy garces now.     She had colonoscopy on Dec 3rd 2021 with Dr Montesinos with one polyp removed. Planned repeat scope in 3 yrs per patient    She had mammogram on  2023 which was good and she can now go back to annual mammograms    No CP, SOB, HA's or N/V.     She saw Dr Roger in 2023               ROS:   GEN: normal without any fever, night sweats or weight loss  HEENT: normal with no HA's, sore throat, stiff neck, changes in vision  CV: normal with no CP, SOB, PND, GARCIA or orthopnea  PULM: normal with no SOB, cough, hemoptysis, sputum or pleuritic pain  GI: normal with no abdominal pain, nausea, vomiting, constipation, diarrhea, melanotic stools, BRBPR, or hematemesis  : normal with no hematuria, dysuria  BREAST: normal with no mass, discharge, pain; s/p left lumpectomy  SKIN: normal with no rash, erythema, bruising, or swelling; nodule in skin    Pain Scale:  0    Allergies:  Review of patient's allergies indicates:  No Known Allergies    Medications:    Current Outpatient Medications:     citalopram (CELEXA) 40 MG tablet, TAKE 1 TABLET BY MOUTH EVERY DAY, Disp: 90 tablet, Rfl: 3    " gabapentin (NEURONTIN) 100 MG capsule, Take 1 capsule (100 mg total) by mouth 3 (three) times daily., Disp: 90 capsule, Rfl: 5    glipiZIDE (GLUCOTROL) 10 MG TR24, Take 1 tablet (10 mg total) by mouth once daily., Disp: 90 tablet, Rfl: 1    LORazepam (ATIVAN) 1 MG tablet, TAKE 1 TABLET BY MOUTH DAILY AS NEEDED FOR ANXIETY, Disp: 30 tablet, Rfl: 1    metFORMIN (GLUCOPHAGE-XR) 500 MG ER 24hr tablet, Take 1 tablet (500 mg total) by mouth 2 (two) times daily with meals., Disp: 180 tablet, Rfl: 1    pramipexole (MIRAPEX) 1 MG tablet, TAKE 2 TABLETS (2 MG TOTAL) BY MOUTH EVERY EVENING., Disp: 180 tablet, Rfl: 1    rosuvastatin (CRESTOR) 10 MG tablet, TAKE 1 TABLET BY MOUTH EVERY DAY, Disp: 90 tablet, Rfl: 2    valsartan (DIOVAN) 80 MG tablet, Take 1 tablet (80 mg total) by mouth once daily., Disp: 90 tablet, Rfl: 1    zolpidem (AMBIEN) 5 MG Tab, TAKE 1 TABLET BY MOUTH NIGHTLY AS NEEDED (INSOMNIA)., Disp: 30 tablet, Rfl: 2  No current facility-administered medications for this visit.    Facility-Administered Medications Ordered in Other Visits:     electrolyte-S (ISOLYTE), , Intravenous, Continuous, Nikole Neal MD, Last Rate: 0 mL/hr at 12/17/21 1445, New Bag at 01/07/22 0838    LIDOcaine (PF) 10 mg/ml (1%) injection 10 mg, 1 mL, Intradermal, Once PRN, Nikole Neal MD    LIDOcaine (PF) 10 mg/ml (1%) injection 10 mg, 1 mL, Intradermal, Once, Nikole Neal MD    scopolamine 1.3-1.5 mg (1 mg over 3 days) 1 patch, 1 patch, Transdermal, Q3 Days, Nikole Neal MD, 1 patch at 12/17/21 1009    PMHx/PSHx Updates:  See patient's last visit with me on 1/9/2023  See H&P on 8/17/2020        Pathology:   Cancer Staging   No matching staging information was found for the patient.      Left back re-excision: 2/22/2021:    [place] FINAL DIAGNOSIS:   Left back mass, excision ([id number]; 2/22/2021): Intramuscular myxoma        Hysterectomy 3/22/2021:    1. UTERUS, CERVIX, FALLOPIAN TUBES, AND OVARIES;  "HYSTERECTOMY AND    BILATERAL SALPINGO-OOPHORECTOMY:       MYOMETRIUM:   --SUBSEROSAL AND INTRAMURAL LEIOMYOMATA (INCLUDING FOCAL EPITHELIOID    LEIOMYOMA).      --ADENOMYOSIS.     ENDOMETRIUM:     --BENIGN ENDOMETRIAL POLYPS (LARGEST 3.2 CENTIMETERS).     CERVIX:   --FOCAL HIGH-GRADE SQUAMOUS INTRAEPITHELIAL LESION (CERVICAL    INTRAEPITHELIAL NEOPLASIA        GRADE 2 [OPHELIA 2]), WITH ENDOCERVICAL GLANDULAR EXTENSION.      --MARGIN IS NEGATIVE FOR SQUAMOUS INTRAEPITHELIAL LESION.      --FOCAL ACUTE AND CHRONIC CERVICITIS.      --SQUAMOUS METAPLASIA OF ENDOCERVIX.      --NABOTHIAN CYSTS.      --CERVIX ENTIRELY SUBMITTED FOR HISTOLOGIC SECTIONING.     RIGHT FALLOPIAN TUBE:     --MICROSCOPIC BENIGN PARATUBAL CYSTS.     LEFT FALLOPIAN TUBE:     --MICROSCOPIC BENIGN PARATUBAL CYSTS.     --SEROSAL FIBROVASCULAR ADHESIONS.     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     --ENDOMETRIOSIS.     --SURFACE EPITHELIAL INCLUSION CYSTS.     --FOCAL ENDOSALPINGIOSIS.     --CYSTIC CORPUS LUTEUM.     --CYSTIC FOLLICLES.     --CORPORA ALBICANTIA.     --SEROSAL FIBROVASCULAR ADHESIONS.     LEFT OVARY:     --ENDOMETRIOSIS.     --SURFACE EPITHELIAL INCLUSION CYSTS.     --FOCAL ENDOSALPINGIOSIS.      --HEMORRHAGIC CYSTIC CORPUS LUTEUM.      --COPORA ALBICANTIA.      --SEROSAL FIBROVASCULAR ADHESIONS.     2. SPECIMEN SUBMITTED AS "PERITONEAL NODULE," EXCISION:   --INFARCTED APPENDIX EPIPLOICA WITH DYSTROPHIC CALCIFICATION AND    FIBROSIS.            Left back soft tissue biopsy: 12/29/2020:    SOFT TISSUE, LEFT LATISSIMUS DORSI, CORE BIOPSY:   - LOW GRADE MYXOID LESION.   - SEE COMMENT.         Objective:     Vitals:  Blood pressure (!) 142/85, pulse 102, temperature 98.3 °F (36.8 °C), resp. rate 18, height 5' 6" (1.676 m), weight 82.6 kg (182 lb), last menstrual period 02/24/2021.    Physical Examination:   GEN: no apparent distress, comfortable; AAOx3;    HEAD: atraumatic and normocephalic  EYES: no pallor, no icterus, " PERRLA  ENT: OMM, no pharyngeal erythema, external ears WNL; no nasal discharge; no thrush  NECK: no masses, thyroid normal, trachea midline, no LAD/LN's, supple  CV: RRR with no murmur; normal pulse; normal S1 and S2; no pedal edema  CHEST: Normal respiratory effort; CTAB; normal breath sounds; no wheeze or crackles  ABDOM: nontender and nondistended; soft; normal bowel sounds; no rebound/guarding  MUSC/Skeletal: ROM normal; no crepitus; joints normal; no deformities or arthropathy  EXTREM: no clubbing, cyanosis, inflammation or swelling  SKIN: no rashes, lesions, ulcers, petechiae or subcutaneous nodules; tattoos;    : no quinonez  NEURO: grossly intact; motor/sensory WNL; AAOx3; no tremors  PSYCH: normal mood, affect and behavior  LYMPH: normal cervical, supraclavicular, axillary and groin LN's  Breast: s/p left lumpectomy with no new changes        Labs:     Lab Results   Component Value Date    WBC 10.07 05/08/2023    HGB 14.9 05/08/2023    HCT 44.3 05/08/2023    MCV 84 05/08/2023     05/08/2023       Lab Results   Component Value Date    IRON 76 05/08/2023    TIBC 335 05/08/2023    FERRITIN 69 05/08/2023     CMP  Sodium   Date Value Ref Range Status   05/08/2023 138 136 - 145 mmol/L Final     Potassium   Date Value Ref Range Status   05/08/2023 3.8 3.5 - 5.1 mmol/L Final     Chloride   Date Value Ref Range Status   05/08/2023 102 95 - 110 mmol/L Final     CO2   Date Value Ref Range Status   05/08/2023 26 23 - 29 mmol/L Final     Glucose   Date Value Ref Range Status   05/08/2023 247 (H) 70 - 110 mg/dL Final     BUN   Date Value Ref Range Status   05/08/2023 11 6 - 20 mg/dL Final     Creatinine   Date Value Ref Range Status   05/08/2023 0.7 0.5 - 1.4 mg/dL Final     Calcium   Date Value Ref Range Status   05/08/2023 9.2 8.7 - 10.5 mg/dL Final     Total Protein   Date Value Ref Range Status   05/08/2023 6.6 6.0 - 8.4 g/dL Final     Albumin   Date Value Ref Range Status   05/08/2023 3.5 3.5 - 5.2 g/dL Final      Total Bilirubin   Date Value Ref Range Status   05/08/2023 1.5 (H) 0.1 - 1.0 mg/dL Final     Comment:     For infants and newborns, interpretation of results should be based  on gestational age, weight and in agreement with clinical  observations.    Premature Infant recommended reference ranges:  Up to 24 hours.............<8.0 mg/dL  Up to 48 hours............<12.0 mg/dL  3-5 days..................<15.0 mg/dL  6-29 days.................<15.0 mg/dL       Alkaline Phosphatase   Date Value Ref Range Status   05/08/2023 142 (H) 55 - 135 U/L Final     AST   Date Value Ref Range Status   05/08/2023 17 10 - 40 U/L Final     ALT   Date Value Ref Range Status   05/08/2023 24 10 - 44 U/L Final     Anion Gap   Date Value Ref Range Status   05/08/2023 10 8 - 16 mmol/L Final     eGFR if    Date Value Ref Range Status   02/23/2022 >60.0 >60 mL/min/1.73 m^2 Final     eGFR if non    Date Value Ref Range Status   02/23/2022 >60.0 >60 mL/min/1.73 m^2 Final     Comment:     Calculation used to obtain the estimated glomerular filtration  rate (eGFR) is the CKD-EPI equation.                 Radiology/Diagnostic Studies:    Mammo 4/18/2023:    Impression:     1. Postoperative changes in the left breast as above.  Previously noted coarse calcifications at the 3 o'clock position of the left breast have increased slightly in number, and have become somewhat more confluent.  These are most compatible with fat necrosis.  2. No new abnormalities.  3. Yearly mammography is recommended, unless indicated otherwise clinically.  BI-RADS CATEGORY 2: BENIGN FINDING.         Mammo 12/8/2022:    Impression:     Probably benign dystrophic calcifications and or evolving fat necrosis in the left breast as described.  Bilateral diagnostic mammogram follow-up in April 2023 is recommended, at which time patient will be due for annual bilateral mammography.  These findings and recommendations were given to the patient at  the time of her visit.     BI-RADS CATEGORY 3: PROBABLY BENIGN FINDING-SHORT TERM INTERVAL FOLLOWUP SUGGESTED.           PET 2/8/2022:    IMPRESSION:  No evidence of recurrent or metastatic disease     Interval removal of the intramuscular mass in the left latissimus dorsi.  Stable mild increased mineralization of the right T7 vertebral body with no FDG activity.        CT scans  8/2/2021:    IMPRESSION:  1. Stable CT appearance of the chest, abdomen, and pelvis when compared to December 2020. No definite evidence of metastatic disease.  2. Specifically, T7 vertebral body sclerotic lesion, hyperdense lesion in the left hepatic lobe, small hypodensities in the spleen, and additional observations as above are unchanged.        Mammo 2/1/2021:    Impression:  No mammographic evidence of malignancy.     BI-RADS Category 1: Negative           PET  1/11/2021:    Impression:     1. Intramuscular mass in left latissimus Dorsi does show mild FDG uptake with recent biopsy showing low-grade myxomatous lesion, concordant with the imaging characteristics.  2. Previous left hepatic lobe mass shows no increased FDG activity above that of background hepatic parenchymal activity.  Differential diagnosis of this remains unchanged from that previously discussed, with lack of increased FDG activity further substantiating suspected benign etiology.  Follow-up anatomic imaging is suggested with MRI abdomen without and with IV contrast (Eovist) in 3-6 months.  3. Unchanged focus of increased mineralization in right T7 vertebral body without increased FDG activity.  This remains of uncertain etiology with benign etiologies conceivable, although sclerotic osseous metastasis is difficult to entirely exclude.  Further evaluation with whole-body bone scan is suggested, unless already performed at outside institution.  Attention to this on follow-up CT is suggested, with repeat CT thorax without IV contrast suggested in 3-6 months.              CT chest/abdom/pelvis  12/9/2020:    IMPRESSION:     Degradation by motion.     No evidence of metastatic disease within the thorax. Specifically,  there is no evidence of axillary adenopathy.     Indeterminate mildly hyperdense mass within the lateral segment of the  left hepatic lobe, incompletely characterized. Correlation with  multiphasic MRI or CT is recommended.     Indeterminate splenic masses measuring 10 and 8 mm in size. If prior  outside contrast enhanced studies exist, these would be helpful for  comparison. Alternatively, these lesions could be also assessed at the  time of follow-up multiphasic MRI or CT.     Sclerotic area within the rightward aspect of T7. The possibility of  sclerotic metastasis is not excludable.     13 mm calculus within the right renal pelvis with surrounding  inflammatory changes. The right kidney is small in overall size and  demonstrates multiple focal areas of cortical scarring.     Cystic density left ovarian masses and mild prominence of the  endometrial stripe. Correlation with pelvic ultrasound recommended.     Multiple solid uterine masses likely representing fibroids.     Probable Bartholin's cyst near the vaginal introitus.             I have reviewed all available lab results and radiology reports.    Assessment/Plan:   (1) 52 y.o. female with diagnosis of anemia referred by Dr Wade  - microcytic indices  - hgb at 10.2 and MCV at 66  - bilirubin is wnl, so I do not suspect any hemolysis at this time  - low iron and ferritin with elevated TIBC most consistent with an iron deficiency condition  - hx/of endometriosis and heavy menstrual cycles - followed by Dr Dagoberto Morris with GYN at [facility] (prior laparoscopy x4)    10/20/2020:  - s/p two IV irons  - Hgb now up and normal at 13.8  - iron now normal at 89 and ferritin is up to 308    11/17/2020:  - hgb good at 13.2 and now WNL  - iron panel currently normal    12/21/2020:  - s/p IV iron in Sept 2020  - hgb  "and iron panel are currently WNL    1/20/2021:  - latest hgb at 13.4 and good  - iron panel WNL    4/20/2021:  - latest hgb and iron panel WNL    7/21/2021:  - latest hgb WNL and iron panel is adequate    11/15/2021:  - labs are adequate with Hgb WNL  - iron panel WNL    1/9/2023:  - latest cbc and iron panel adequate    5/9/2023:  - latest CBC is adequate with normal iron panel and hgb     (2) Hx/of left breast cancer diagnosed May 2018 s/p lumpectomy and radiation, oral antihormone with tamoxifen - s/p lumpectomy and XRT at [facility]  - Stage I  - ER positive  - she saw a female oncologist at [facility] in past   - she gets regular mammograms and had one this past Jan 2020  - she does not follow-up with oncology    4/20/2021:  - change to arimidex    7/21/2021:  - she is on arimidex   - mammo is due Feb 2022  - discussed that arimidex could be adding to the arthritis    11/15/2021:  - mammo due Feb 2022  - continued on arimidex    2/15/2022:  - mammo scheduled for March 2022  - continued on arimidex    1/9/2023:  - patient stopped taking the arimidex about 3 months ago  - mammo on 12/8/2022 on chart with repeat planned for April 2023 5/9/2023:  - latest mammogram on 4/18/2023 was benign/stable - repeat in one year per radiology     (3) HTN and hypercholesterolemia     (4) Type II DM     (5) GERD     (6) TATYANA/Depression and RLS     (7) OA of right knee    (8) Subcutaneous nodule left scapular medial side and left posterior axilla - ? Lipomas - will schedule PEt scan in light of the breast cancer history  - 11/17/2020:  - insurance decline approval for PET   - scheduled CT scans instead and refer to Dr Rendon with Gen Surg    12/21/2020:  - she saw Dr Rendon since last visit and the suspected lipoma is actually a nodularity in the muscle layer and he is concerned about the possibility for "sarcoma"    1/20/2021:  - PET on chart from 1/11/2021  - Patient seen by Dr Rendon and had biopsy done of the skin lesion on the back " on 12/29/2020 with pathology reporting a low grade myxoid lesion  - pathology recommended complete excision of the lesion to further classify      3/1/2021:  - She saw Dr Rendon again on Jan 26th 2021. She subsequently saw Dr Chico Baird with Surg-Onc at [facility] and had repeat excision on 2/22/2021 and the final pathology is still in process with the specimen referred to the [facility] for review.   - she sees Dr Baird again on 3/4/2021 to go over the results    4/20/2021;  - final pathology has come back as benign mixoid    2/15/2022:  - She had stent placement with Dr Lin in Dec 2021 and stone removal on Jan 8th 2022.  - She is undergoing surgery next week for diverticulum of the urethra with Dr Greer in [place].  - She had colonoscopy on Dec 3rd 2021 with Dr Montesinos with one polyp removed. Planned repeat scope in 3 yrs per patient  - She had repeat PEt recently on 2/8/2022 with overall  Good report          (9) Left ovarian cystic mass with uterine fibroids, Indeterminate mildly hyperdense mass within the lateral segment of the left hepatic lobe and two indeterminate nodules in her spleen   - will set up MRI of liver and also include the left paraspinal thoracic area near scapula to evaluate the submuscular nodule that is palpable    1/20/2021:  - she is seeing Dr Contreras on [place] 27th 2010    3/1/2021:  - She saw Dr Contreras on Jan 27th 2021 and is having surgery with Dr Contreras on 3/8/2021.     4/20/2021;  -  She had hysterectomy on 3/22/2021 with Dr Contreras with negative pathology per patientT    7/21/2021:   - she is seeing Dr Contreras again in Nov 2021    2/15/2022:'  - planned f/u with Dr Contreras after her expected urethral surgery    1/9/2023:  - s/p urethral surgery with Dr Greer since last visit  - she plans to f/u with Gyn-Onc in the near future    5/9/2023:  - she plans to establish with new Gyn in near future  - follows with Dr Greer now PRN    (10) Kidney stone - refer to dr Lin with        VISIT DIAGNOSES:      Malignant neoplasm of left breast in female, estrogen receptor positive, unspecified site of breast    Microcytic anemia    S/P lumpectomy, left breast    Use of tamoxifen (Nolvadex)    Iron deficiency anemia, unspecified iron deficiency anemia type    ER+ (estrogen receptor positive status)    Abnormal CT of liver          PLAN:  1. f/u with Gyn-Onc and  as directed  2. Check labs monthly and resume IV iron as needed  3. F/u with Dr Baird again if the lesion ever returns on her back  4. mammogram - repeat April 2024  5. 6. F/u with PCP, etc                  RTC in  6 months   Fax note to Andrew Roger MD; Carmen; Alexandra; Justice; Ben; Chico Baird; Riley    Discussion:     COVID-19 Discussion:    I had long discussion with patient and any applicable family about the COVID-19 coronavirus epidemic and the recommended precautions with regard to cancer and/or hematology patients. I have re-iterated the CDC recommendations for adequate hand washing, use of hand -like products, and coughing into elbow, etc. In addition, especially for our patients who are on chemotherapy and/or our otherwise immunocompromised patients, I have recommended avoidance of crowds, including movie theaters, restaurants, churches, etc. I have recommended avoidance of any sick or symptomatic family members and/or friends. I have also recommended avoidance of any raw and unwashed food products, and general avoidance of food items that have not been prepared by themselves. The patient has been asked to call us immediately with any symptom developments, issues, questions or other general concerns.     Pathology Discussion:     I reviewed and discussed the pathology report(s) and radiograph reports (if available) in as simple to understand and/or laymen's terms to the best of my ability. I had an indepth conversation with the patient and went over the patient's individual diagnosis based on the  "information that was currently available. I discussed the TNM staging process with regard to the patient's particular cancer type, and the calculated stage based on the currently available TNM data and literature. I discussed the available prognostic data with regard to the current staging information and how it relates to the prognosis of their particular neoplastic process.          NCCN Guidelines:     I discussed the available treatment option(s) in accordance with the latest literature from the NCCN Clinical Practice Guidelines for the patient's particular type of cancer disorder. The NCCN Guidelines provide a "document evidence-based (and) consensus-driven management" of the care of oncology patients. The treatment recommendations were made not only in accordance to the NCCN guidelines, but also factored in to account the patient's overall age, condition, performance status and their medical co-morbidities. I went over the risks and benefits of the the treatment options (if any could be made) with regard to their particular cancer type, their cancer stage, their age, and their co-morbidities.          Antihormone Therapy Discussion:    I discussed the advantages of antihormone therapy with the patient with regard to their particular neoplastic or carcinoma in situ condition. I went over the side-effect profile of the medication including risk for potential development of endometrial cancer and/or hyperplasia in women who still have a uterus and the need for yearly GYN evaluation and follow-up. I discussed the risks for thromboembolic events such as DVT's, pulmonary emboli, CVA's, retinal vascular clots, phlebitis, and TIA's. I discussed the potential risks for development of ocular disturbances, retinopathy, cataracts, corneal changes, flushing, hot flashes, amenorrhea, altered menses, fluid retention, weight changes, elevations in LFT's, liver damage, and mood disturbances. I discussed the potential risk of " arthropathy and joint pains/aches which could be chronic and debilitating. I discussed potential adverse effects on bone mineralization and the risk of osteopenia and/or osteoporosis which could led to increase risk of fractures.   A consent form was obtained and a copy was provided to the patient.    Iron Infusion Therapy Discussion:     I provided literature/learning materials on the particular IV iron regimen and discussed the potential side-effect profiles of the drug(s). I discussed the importance of compliance with obtaining and monitoring requested lab work, and went over the potential risk for the development of anaphylactic shock, bronchospasm, dysrhythmia, liver and/or kidney damage, and respiratory/cardiovascular arrest and/or failure. I discussed the potential risks for development of alopecia, fevers, itching, chills and/or rigors, cold sensory issues, ringing in ears, vertigo and neuropathy, all of which are usually acute but sometimes could end up being chronic and life-long. I discussed the risks of hand-foot syndrome and rashes, and development of other autoimmune mediated processes such as pneumonitis and colitis which could be life threatening.     The patient's consent has been obtained to proceed with the IV iron therapy.The patient will be referred to [facility] for training and education on IV iron therapy, use of antiemetics and/or anti-diarrheals, use of NSAID's, potential IV iron therapy side-effects, and any specific recommendations and precautions with the particular IV iron agents.      I answered all of the patient's (and family's, if applicable) questions to the best of my ability and to their complete satisfaction. The patient acknowledged full understanding of the risks, recommendations and plan(s).         I spent over 25 mins of time with the patient. Reviewed results of the recently ordered labs, tests and studies; made directives with regards to the  results. Over half of this time was spent couseling and coordinating care.    I have explained all of the above in detail and the patient understands all of the current recommendation(s). I have answered all of their questions to the best of my ability and to their complete satisfaction.   The patient is to continue with the current management plan.            Electronically signed by Hossein Carranza MD                           Answers submitted by the patient for this visit:  Review of Systems Questionnaire (Submitted on 5/4/2023)  appetite change : No  unexpected weight change: No  mouth sores: No  visual disturbance: No  cough: No  shortness of breath: No  chest pain: No  abdominal pain: No  diarrhea: No  frequency: No  back pain: Yes  rash: No  headaches: No  adenopathy: No  nervous/ anxious: No

## 2023-05-09 ENCOUNTER — OFFICE VISIT (OUTPATIENT)
Dept: HEMATOLOGY/ONCOLOGY | Facility: CLINIC | Age: 53
End: 2023-05-09
Payer: COMMERCIAL

## 2023-05-09 VITALS
WEIGHT: 182 LBS | TEMPERATURE: 98 F | BODY MASS INDEX: 29.25 KG/M2 | HEART RATE: 102 BPM | RESPIRATION RATE: 18 BRPM | DIASTOLIC BLOOD PRESSURE: 85 MMHG | HEIGHT: 66 IN | SYSTOLIC BLOOD PRESSURE: 142 MMHG

## 2023-05-09 DIAGNOSIS — R93.2 ABNORMAL CT OF LIVER: ICD-10-CM

## 2023-05-09 DIAGNOSIS — D50.9 IRON DEFICIENCY ANEMIA, UNSPECIFIED IRON DEFICIENCY ANEMIA TYPE: ICD-10-CM

## 2023-05-09 DIAGNOSIS — Z17.0 MALIGNANT NEOPLASM OF LEFT BREAST IN FEMALE, ESTROGEN RECEPTOR POSITIVE, UNSPECIFIED SITE OF BREAST: Primary | ICD-10-CM

## 2023-05-09 DIAGNOSIS — D50.9 MICROCYTIC ANEMIA: ICD-10-CM

## 2023-05-09 DIAGNOSIS — Z98.890 S/P LUMPECTOMY, LEFT BREAST: ICD-10-CM

## 2023-05-09 DIAGNOSIS — Z79.810 USE OF TAMOXIFEN (NOLVADEX): ICD-10-CM

## 2023-05-09 DIAGNOSIS — Z17.0 ER+ (ESTROGEN RECEPTOR POSITIVE STATUS): ICD-10-CM

## 2023-05-09 DIAGNOSIS — C50.912 MALIGNANT NEOPLASM OF LEFT BREAST IN FEMALE, ESTROGEN RECEPTOR POSITIVE, UNSPECIFIED SITE OF BREAST: Primary | ICD-10-CM

## 2023-05-09 PROCEDURE — 3077F PR MOST RECENT SYSTOLIC BLOOD PRESSURE >= 140 MM HG: ICD-10-PCS | Mod: CPTII,S$GLB,, | Performed by: INTERNAL MEDICINE

## 2023-05-09 PROCEDURE — 3079F PR MOST RECENT DIASTOLIC BLOOD PRESSURE 80-89 MM HG: ICD-10-PCS | Mod: CPTII,S$GLB,, | Performed by: INTERNAL MEDICINE

## 2023-05-09 PROCEDURE — 1159F MED LIST DOCD IN RCRD: CPT | Mod: CPTII,S$GLB,, | Performed by: INTERNAL MEDICINE

## 2023-05-09 PROCEDURE — 3046F PR MOST RECENT HEMOGLOBIN A1C LEVEL > 9.0%: ICD-10-PCS | Mod: CPTII,S$GLB,, | Performed by: INTERNAL MEDICINE

## 2023-05-09 PROCEDURE — 3008F PR BODY MASS INDEX (BMI) DOCUMENTED: ICD-10-PCS | Mod: CPTII,S$GLB,, | Performed by: INTERNAL MEDICINE

## 2023-05-09 PROCEDURE — 1160F PR REVIEW ALL MEDS BY PRESCRIBER/CLIN PHARMACIST DOCUMENTED: ICD-10-PCS | Mod: CPTII,S$GLB,, | Performed by: INTERNAL MEDICINE

## 2023-05-09 PROCEDURE — 3008F BODY MASS INDEX DOCD: CPT | Mod: CPTII,S$GLB,, | Performed by: INTERNAL MEDICINE

## 2023-05-09 PROCEDURE — 3077F SYST BP >= 140 MM HG: CPT | Mod: CPTII,S$GLB,, | Performed by: INTERNAL MEDICINE

## 2023-05-09 PROCEDURE — 1160F RVW MEDS BY RX/DR IN RCRD: CPT | Mod: CPTII,S$GLB,, | Performed by: INTERNAL MEDICINE

## 2023-05-09 PROCEDURE — 4010F ACE/ARB THERAPY RXD/TAKEN: CPT | Mod: CPTII,S$GLB,, | Performed by: INTERNAL MEDICINE

## 2023-05-09 PROCEDURE — 3079F DIAST BP 80-89 MM HG: CPT | Mod: CPTII,S$GLB,, | Performed by: INTERNAL MEDICINE

## 2023-05-09 PROCEDURE — 1159F PR MEDICATION LIST DOCUMENTED IN MEDICAL RECORD: ICD-10-PCS | Mod: CPTII,S$GLB,, | Performed by: INTERNAL MEDICINE

## 2023-05-09 PROCEDURE — 3072F LOW RISK FOR RETINOPATHY: CPT | Mod: CPTII,S$GLB,, | Performed by: INTERNAL MEDICINE

## 2023-05-09 PROCEDURE — 4010F PR ACE/ARB THEARPY RXD/TAKEN: ICD-10-PCS | Mod: CPTII,S$GLB,, | Performed by: INTERNAL MEDICINE

## 2023-05-09 PROCEDURE — 99214 PR OFFICE/OUTPT VISIT, EST, LEVL IV, 30-39 MIN: ICD-10-PCS | Mod: S$GLB,,, | Performed by: INTERNAL MEDICINE

## 2023-05-09 PROCEDURE — 3072F PR LOW RISK FOR RETINOPATHY: ICD-10-PCS | Mod: CPTII,S$GLB,, | Performed by: INTERNAL MEDICINE

## 2023-05-09 PROCEDURE — 3046F HEMOGLOBIN A1C LEVEL >9.0%: CPT | Mod: CPTII,S$GLB,, | Performed by: INTERNAL MEDICINE

## 2023-05-09 PROCEDURE — 99214 OFFICE O/P EST MOD 30 MIN: CPT | Mod: S$GLB,,, | Performed by: INTERNAL MEDICINE

## 2023-05-12 ENCOUNTER — PATIENT MESSAGE (OUTPATIENT)
Dept: PRIMARY CARE CLINIC | Facility: CLINIC | Age: 53
End: 2023-05-12

## 2023-05-12 ENCOUNTER — OFFICE VISIT (OUTPATIENT)
Dept: PRIMARY CARE CLINIC | Facility: CLINIC | Age: 53
End: 2023-05-12
Payer: COMMERCIAL

## 2023-05-12 DIAGNOSIS — E11.65 TYPE 2 DIABETES MELLITUS WITH HYPERGLYCEMIA, WITHOUT LONG-TERM CURRENT USE OF INSULIN: Primary | ICD-10-CM

## 2023-05-12 PROCEDURE — 4010F PR ACE/ARB THEARPY RXD/TAKEN: ICD-10-PCS | Mod: CPTII,95,, | Performed by: FAMILY MEDICINE

## 2023-05-12 PROCEDURE — 99214 PR OFFICE/OUTPT VISIT, EST, LEVL IV, 30-39 MIN: ICD-10-PCS | Mod: 95,,, | Performed by: FAMILY MEDICINE

## 2023-05-12 PROCEDURE — 3072F PR LOW RISK FOR RETINOPATHY: ICD-10-PCS | Mod: CPTII,95,, | Performed by: FAMILY MEDICINE

## 2023-05-12 PROCEDURE — 1160F RVW MEDS BY RX/DR IN RCRD: CPT | Mod: CPTII,95,, | Performed by: FAMILY MEDICINE

## 2023-05-12 PROCEDURE — 3046F HEMOGLOBIN A1C LEVEL >9.0%: CPT | Mod: CPTII,95,, | Performed by: FAMILY MEDICINE

## 2023-05-12 PROCEDURE — 1159F MED LIST DOCD IN RCRD: CPT | Mod: CPTII,95,, | Performed by: FAMILY MEDICINE

## 2023-05-12 PROCEDURE — 1159F PR MEDICATION LIST DOCUMENTED IN MEDICAL RECORD: ICD-10-PCS | Mod: CPTII,95,, | Performed by: FAMILY MEDICINE

## 2023-05-12 PROCEDURE — 3046F PR MOST RECENT HEMOGLOBIN A1C LEVEL > 9.0%: ICD-10-PCS | Mod: CPTII,95,, | Performed by: FAMILY MEDICINE

## 2023-05-12 PROCEDURE — 99214 OFFICE O/P EST MOD 30 MIN: CPT | Mod: 95,,, | Performed by: FAMILY MEDICINE

## 2023-05-12 PROCEDURE — 4010F ACE/ARB THERAPY RXD/TAKEN: CPT | Mod: CPTII,95,, | Performed by: FAMILY MEDICINE

## 2023-05-12 PROCEDURE — 3072F LOW RISK FOR RETINOPATHY: CPT | Mod: CPTII,95,, | Performed by: FAMILY MEDICINE

## 2023-05-12 PROCEDURE — 1160F PR REVIEW ALL MEDS BY PRESCRIBER/CLIN PHARMACIST DOCUMENTED: ICD-10-PCS | Mod: CPTII,95,, | Performed by: FAMILY MEDICINE

## 2023-05-12 RX ORDER — METFORMIN HYDROCHLORIDE 500 MG/1
500 TABLET ORAL DAILY
COMMUNITY
Start: 2023-03-17 | End: 2023-05-12 | Stop reason: SDUPTHER

## 2023-05-12 RX ORDER — METFORMIN HYDROCHLORIDE 1000 MG/1
1000 TABLET ORAL 2 TIMES DAILY WITH MEALS
Qty: 180 TABLET | Refills: 1 | Status: SHIPPED | OUTPATIENT
Start: 2023-05-12 | End: 2023-08-15

## 2023-05-12 NOTE — PROGRESS NOTES
Subjective:       Patient ID: Dennis Aldana is a 52 y.o. female.    Chief Complaint: No chief complaint on file.      A1c still elevated at 11 1%.  Has tried to adjust her diet, but still eating bread on a daily basis.  Switched from extended release to regular metformin, says she tolerates this better, but has only been taking it once a day.  Fasting blood sugars have been in the 180-190 range.    The patient location is:  [place]  The chief complaint leading to consultation is:  Diabetes    Visit type: audiovisual    Face to Face time with patient:  9 minutes  15 minutes of total time spent on the encounter, which includes face to face time and non-face to face time preparing to see the patient (eg, review of tests), Obtaining and/or reviewing separately obtained history, Documenting clinical information in the electronic or other health record, Independently interpreting results (not separately reported) and communicating results to the patient/family/caregiver, or Care coordination (not separately reported).         Each patient to whom he or she provides medical services by telemedicine is:  (1) informed of the relationship between the physician and patient and the respective role of any other health care provider with respect to management of the patient; and (2) notified that he or she may decline to receive medical services by telemedicine and may withdraw from such care at any time.    Notes:    Review of Systems   Allergic/Immunologic: Negative for immunocompromised state.     Objective:      There were no vitals filed for this visit.  Physical Exam  Constitutional:       General: She is not in acute distress.     Appearance: Normal appearance. She is well-developed.   Pulmonary:      Effort: No respiratory distress.   Neurological:      Mental Status: She is alert and oriented to person, place, and time.   Psychiatric:         Behavior: Behavior normal.       Lab Results   Component Value Date     WBC 10.07 05/08/2023    HGB 14.9 05/08/2023    HCT 44.3 05/08/2023     05/08/2023    CHOL 137 01/09/2023    TRIG 157 (H) 01/09/2023    HDL 36 (L) 01/09/2023    ALT 24 05/08/2023    AST 17 05/08/2023     05/08/2023    K 3.8 05/08/2023     05/08/2023    CREATININE 0.7 05/08/2023    BUN 11 05/08/2023    CO2 26 05/08/2023    TSH 2.35 01/30/2020    INR 1.1 12/29/2020    GLUF 122 (H) 02/13/2020    HGBA1C 11.1 (H) 05/08/2023      Assessment:       1. Type 2 diabetes mellitus with hyperglycemia, without long-term current use of insulin        Plan:       Type 2 diabetes mellitus with hyperglycemia, without long-term current use of insulin  -     empagliflozin (JARDIANCE) 10 mg tablet; Take 1 tablet (10 mg total) by mouth once daily.  Dispense: 30 tablet; Refill: 0  -     empagliflozin (JARDIANCE) 25 mg tablet; Take 1 tablet (25 mg total) by mouth once daily.  Dispense: 30 tablet; Refill: 5  -     metFORMIN (GLUCOPHAGE) 1000 MG tablet; Take 1 tablet (1,000 mg total) by mouth 2 (two) times daily with meals.  Dispense: 180 tablet; Refill: 1  -     Basic Metabolic Panel; Future; Expected date: 08/12/2023  -     Hemoglobin A1C; Future; Expected date: 08/12/2023  Needs much tighter glycemic control.  Increase metformin to 1000 mg twice daily and switch from glipizide to Jardiance.  Instructed to increase fluid intake.  Counseled regarding potential side effects of Jardiance.  Will message for an update in 3 weeks.    Medication List with Changes/Refills   New Medications    EMPAGLIFLOZIN (JARDIANCE) 10 MG TABLET    Take 1 tablet (10 mg total) by mouth once daily.    EMPAGLIFLOZIN (JARDIANCE) 25 MG TABLET    Take 1 tablet (25 mg total) by mouth once daily.   Current Medications    CITALOPRAM (CELEXA) 40 MG TABLET    TAKE 1 TABLET BY MOUTH EVERY DAY    LORAZEPAM (ATIVAN) 1 MG TABLET    TAKE 1 TABLET BY MOUTH DAILY AS NEEDED FOR ANXIETY    PRAMIPEXOLE (MIRAPEX) 1 MG TABLET    TAKE 2 TABLETS (2 MG TOTAL) BY  MOUTH EVERY EVENING.    ROSUVASTATIN (CRESTOR) 10 MG TABLET    TAKE 1 TABLET BY MOUTH EVERY DAY    VALSARTAN (DIOVAN) 80 MG TABLET    Take 1 tablet (80 mg total) by mouth once daily.    ZOLPIDEM (AMBIEN) 5 MG TAB    TAKE 1 TABLET BY MOUTH NIGHTLY AS NEEDED (INSOMNIA).   Changed and/or Refilled Medications    Modified Medication Previous Medication    METFORMIN (GLUCOPHAGE) 1000 MG TABLET metFORMIN (GLUCOPHAGE) 500 MG tablet       Take 1 tablet (1,000 mg total) by mouth 2 (two) times daily with meals.    Take 500 mg by mouth once daily.   Discontinued Medications    GABAPENTIN (NEURONTIN) 100 MG CAPSULE    Take 1 capsule (100 mg total) by mouth 3 (three) times daily.    GLIPIZIDE (GLUCOTROL) 10 MG TR24    Take 1 tablet (10 mg total) by mouth once daily.    METFORMIN (GLUCOPHAGE-XR) 500 MG ER 24HR TABLET    Take 1 tablet (500 mg total) by mouth 2 (two) times daily with meals.

## 2023-06-05 ENCOUNTER — PATIENT MESSAGE (OUTPATIENT)
Dept: PRIMARY CARE CLINIC | Facility: CLINIC | Age: 53
End: 2023-06-05
Payer: COMMERCIAL

## 2023-07-26 DIAGNOSIS — E11.65 TYPE 2 DIABETES MELLITUS WITH HYPERGLYCEMIA, WITHOUT LONG-TERM CURRENT USE OF INSULIN: ICD-10-CM

## 2023-07-26 RX ORDER — GLIPIZIDE 10 MG/1
TABLET, FILM COATED, EXTENDED RELEASE ORAL
Qty: 90 TABLET | Refills: 1 | OUTPATIENT
Start: 2023-07-26

## 2023-07-26 NOTE — TELEPHONE ENCOUNTER
No care due was identified.  [facility] Embedded Care Due Messages. Reference number: [id number].   7/26/2023 12:24:11 AM CDT

## 2023-07-26 NOTE — TELEPHONE ENCOUNTER
Refill Decision Note   Dennis Aldana  is requesting a refill authorization.  Brief Assessment and Rationale for Refill:  Quick Discontinue     Medication Therapy Plan:  Pt was taken off of Glipizide by PCP on 5/12/23-now on metformin and jardiance      Comments:     Note composed:11:52 AM 07/26/2023

## 2023-08-11 ENCOUNTER — PATIENT MESSAGE (OUTPATIENT)
Dept: PRIMARY CARE CLINIC | Facility: CLINIC | Age: 53
End: 2023-08-11

## 2023-08-11 ENCOUNTER — OFFICE VISIT (OUTPATIENT)
Dept: PRIMARY CARE CLINIC | Facility: CLINIC | Age: 53
End: 2023-08-11
Payer: COMMERCIAL

## 2023-08-11 VITALS
HEIGHT: 66 IN | WEIGHT: 169.06 LBS | BODY MASS INDEX: 27.17 KG/M2 | DIASTOLIC BLOOD PRESSURE: 84 MMHG | RESPIRATION RATE: 18 BRPM | OXYGEN SATURATION: 93 % | HEART RATE: 112 BPM | SYSTOLIC BLOOD PRESSURE: 130 MMHG | TEMPERATURE: 98 F

## 2023-08-11 DIAGNOSIS — E11.65 TYPE 2 DIABETES MELLITUS WITH HYPERGLYCEMIA, WITHOUT LONG-TERM CURRENT USE OF INSULIN: Primary | ICD-10-CM

## 2023-08-11 DIAGNOSIS — Z91.148 NONCOMPLIANCE WITH MEDICATION TREATMENT DUE TO INTERMITTENT USE OF MEDICATION: ICD-10-CM

## 2023-08-11 PROCEDURE — 1159F MED LIST DOCD IN RCRD: CPT | Mod: CPTII,S$GLB,, | Performed by: FAMILY MEDICINE

## 2023-08-11 PROCEDURE — 3075F PR MOST RECENT SYSTOLIC BLOOD PRESS GE 130-139MM HG: ICD-10-PCS | Mod: CPTII,S$GLB,, | Performed by: FAMILY MEDICINE

## 2023-08-11 PROCEDURE — 99999 PR PBB SHADOW E&M-EST. PATIENT-LVL IV: CPT | Mod: PBBFAC,,, | Performed by: FAMILY MEDICINE

## 2023-08-11 PROCEDURE — 3046F HEMOGLOBIN A1C LEVEL >9.0%: CPT | Mod: CPTII,S$GLB,, | Performed by: FAMILY MEDICINE

## 2023-08-11 PROCEDURE — 3079F DIAST BP 80-89 MM HG: CPT | Mod: CPTII,S$GLB,, | Performed by: FAMILY MEDICINE

## 2023-08-11 PROCEDURE — 3072F LOW RISK FOR RETINOPATHY: CPT | Mod: CPTII,S$GLB,, | Performed by: FAMILY MEDICINE

## 2023-08-11 PROCEDURE — 4010F PR ACE/ARB THEARPY RXD/TAKEN: ICD-10-PCS | Mod: CPTII,S$GLB,, | Performed by: FAMILY MEDICINE

## 2023-08-11 PROCEDURE — 3046F PR MOST RECENT HEMOGLOBIN A1C LEVEL > 9.0%: ICD-10-PCS | Mod: CPTII,S$GLB,, | Performed by: FAMILY MEDICINE

## 2023-08-11 PROCEDURE — 3075F SYST BP GE 130 - 139MM HG: CPT | Mod: CPTII,S$GLB,, | Performed by: FAMILY MEDICINE

## 2023-08-11 PROCEDURE — 3008F PR BODY MASS INDEX (BMI) DOCUMENTED: ICD-10-PCS | Mod: CPTII,S$GLB,, | Performed by: FAMILY MEDICINE

## 2023-08-11 PROCEDURE — 4010F ACE/ARB THERAPY RXD/TAKEN: CPT | Mod: CPTII,S$GLB,, | Performed by: FAMILY MEDICINE

## 2023-08-11 PROCEDURE — 3072F PR LOW RISK FOR RETINOPATHY: ICD-10-PCS | Mod: CPTII,S$GLB,, | Performed by: FAMILY MEDICINE

## 2023-08-11 PROCEDURE — 1160F PR REVIEW ALL MEDS BY PRESCRIBER/CLIN PHARMACIST DOCUMENTED: ICD-10-PCS | Mod: CPTII,S$GLB,, | Performed by: FAMILY MEDICINE

## 2023-08-11 PROCEDURE — 3008F BODY MASS INDEX DOCD: CPT | Mod: CPTII,S$GLB,, | Performed by: FAMILY MEDICINE

## 2023-08-11 PROCEDURE — 1159F PR MEDICATION LIST DOCUMENTED IN MEDICAL RECORD: ICD-10-PCS | Mod: CPTII,S$GLB,, | Performed by: FAMILY MEDICINE

## 2023-08-11 PROCEDURE — 99999 PR PBB SHADOW E&M-EST. PATIENT-LVL IV: ICD-10-PCS | Mod: PBBFAC,,, | Performed by: FAMILY MEDICINE

## 2023-08-11 PROCEDURE — 99214 OFFICE O/P EST MOD 30 MIN: CPT | Mod: S$GLB,,, | Performed by: FAMILY MEDICINE

## 2023-08-11 PROCEDURE — 3079F PR MOST RECENT DIASTOLIC BLOOD PRESSURE 80-89 MM HG: ICD-10-PCS | Mod: CPTII,S$GLB,, | Performed by: FAMILY MEDICINE

## 2023-08-11 PROCEDURE — 1160F RVW MEDS BY RX/DR IN RCRD: CPT | Mod: CPTII,S$GLB,, | Performed by: FAMILY MEDICINE

## 2023-08-11 PROCEDURE — 99214 PR OFFICE/OUTPT VISIT, EST, LEVL IV, 30-39 MIN: ICD-10-PCS | Mod: S$GLB,,, | Performed by: FAMILY MEDICINE

## 2023-08-11 NOTE — PROGRESS NOTES
"Subjective:       Patient ID: Dennis Aldana is a 53 y.o. female.    Chief Complaint: Follow-up (Diabetes)    Slowly losing weight, but blood sugar remains elevated.  Fasting readings consistently above 200.  Compliant with Jardiance without adverse side effects, but says she rarely remembers to take metformin, and it still causes nausea.  Willing to consider taking a weekly injection.  No personal or family history of thyroid cancer.  No history of pancreatitis.      Review of Systems   Constitutional:  Negative for chills, fatigue and fever.   HENT:  Negative for congestion.    Eyes:  Negative for visual disturbance.   Respiratory:  Negative for cough and shortness of breath.    Cardiovascular:  Negative for chest pain.   Gastrointestinal:  Positive for nausea. Negative for abdominal pain and vomiting.   Genitourinary:  Negative for difficulty urinating.   Musculoskeletal:  Negative for arthralgias.   Skin:  Negative for rash.   Neurological:  Negative for dizziness.   Psychiatric/Behavioral:  Negative for sleep disturbance.        Objective:      Vitals:    08/11/23 0904   BP: 130/84   BP Location: Left arm   Patient Position: Sitting   BP Method: Medium (Manual)   Pulse: (!) 112   Resp: 18   Temp: 97.6 °F (36.4 °C)   TempSrc: Temporal   SpO2: (!) 93%   Weight: 76.7 kg (169 lb 1.5 oz)   Height: 5' 6" (1.676 m)     BP Readings from Last 5 Encounters:   08/11/23 130/84   05/09/23 (!) 142/85   01/09/23 (!) 166/97   12/09/22 136/80   08/31/22 122/64     Wt Readings from Last 5 Encounters:   08/11/23 76.7 kg (169 lb 1.5 oz)   05/09/23 82.6 kg (182 lb)   01/09/23 86.2 kg (190 lb)   12/09/22 86.7 kg (191 lb 2.2 oz)   08/31/22 92.5 kg (203 lb 14.8 oz)     Physical Exam  Vitals and nursing note reviewed.   Constitutional:       General: She is not in acute distress.     Appearance: Normal appearance. She is well-developed.   HENT:      Head: Normocephalic and atraumatic.   Cardiovascular:      Rate and Rhythm: " Normal rate and regular rhythm.      Heart sounds: Normal heart sounds.   Pulmonary:      Effort: Pulmonary effort is normal.      Breath sounds: Normal breath sounds.   Musculoskeletal:      Right lower leg: No edema.      Left lower leg: No edema.   Skin:     General: Skin is warm and dry.   Neurological:      Mental Status: She is alert and oriented to person, place, and time.   Psychiatric:         Mood and Affect: Mood normal.         Behavior: Behavior normal.         Lab Results   Component Value Date    WBC 10.07 05/08/2023    HGB 14.9 05/08/2023    HCT 44.3 05/08/2023     05/08/2023    CHOL 137 01/09/2023    TRIG 157 (H) 01/09/2023    HDL 36 (L) 01/09/2023    ALT 24 05/08/2023    AST 17 05/08/2023     05/08/2023    K 3.8 05/08/2023     05/08/2023    CREATININE 0.7 05/08/2023    BUN 11 05/08/2023    CO2 26 05/08/2023    TSH 2.35 01/30/2020    INR 1.1 12/29/2020    GLUF 122 (H) 02/13/2020    HGBA1C 11.1 (H) 05/08/2023      Assessment:       1. Type 2 diabetes mellitus with hyperglycemia, without long-term current use of insulin    2. Noncompliance with medication treatment due to intermittent use of medication        Plan:       Type 2 diabetes mellitus with hyperglycemia, without long-term current use of insulin  Check A1c today, switch from metformin to Ozempic if A1c still high  Noncompliance with medication treatment due to intermittent use of medication      Medication List with Changes/Refills   Current Medications    CITALOPRAM (CELEXA) 40 MG TABLET    TAKE 1 TABLET BY MOUTH EVERY DAY    EMPAGLIFLOZIN (JARDIANCE) 25 MG TABLET    Take 1 tablet (25 mg total) by mouth once daily.    LORAZEPAM (ATIVAN) 1 MG TABLET    TAKE 1 TABLET BY MOUTH DAILY AS NEEDED FOR ANXIETY    METFORMIN (GLUCOPHAGE) 1000 MG TABLET    Take 1 tablet (1,000 mg total) by mouth 2 (two) times daily with meals.    PRAMIPEXOLE (MIRAPEX) 1 MG TABLET    TAKE 2 TABLETS (2 MG TOTAL) BY MOUTH EVERY EVENING.    ROSUVASTATIN  (CRESTOR) 10 MG TABLET    TAKE 1 TABLET BY MOUTH EVERY DAY    VALSARTAN (DIOVAN) 80 MG TABLET    Take 1 tablet (80 mg total) by mouth once daily.    ZOLPIDEM (AMBIEN) 5 MG TAB    TAKE 1 TABLET BY MOUTH NIGHTLY AS NEEDED (INSOMNIA).

## 2023-08-14 ENCOUNTER — PATIENT MESSAGE (OUTPATIENT)
Dept: PRIMARY CARE CLINIC | Facility: CLINIC | Age: 53
End: 2023-08-14
Payer: COMMERCIAL

## 2023-08-15 DIAGNOSIS — E11.65 TYPE 2 DIABETES MELLITUS WITH HYPERGLYCEMIA, WITHOUT LONG-TERM CURRENT USE OF INSULIN: Primary | ICD-10-CM

## 2023-08-15 RX ORDER — SEMAGLUTIDE 1.34 MG/ML
1 INJECTION, SOLUTION SUBCUTANEOUS
Qty: 3 ML | Refills: 5 | Status: SHIPPED | OUTPATIENT
Start: 2023-10-10 | End: 2023-12-04

## 2023-08-15 RX ORDER — SEMAGLUTIDE 1.34 MG/ML
INJECTION, SOLUTION SUBCUTANEOUS
Qty: 3 ML | Refills: 0 | Status: SHIPPED | OUTPATIENT
Start: 2023-08-15 | End: 2023-10-23

## 2023-08-15 RX ORDER — SEMAGLUTIDE 0.68 MG/ML
0.5 INJECTION, SOLUTION SUBCUTANEOUS
Status: CANCELLED | OUTPATIENT
Start: 2023-08-15

## 2023-08-15 NOTE — TELEPHONE ENCOUNTER
Called pt regarding prescription request. Prescription was approved, pt verbalized understanding. Informed pt repeat labs before next appt.

## 2023-08-15 NOTE — TELEPHONE ENCOUNTER
No care due was identified.  [facility] Embedded Care Due Messages. Reference number: [id number].   8/15/2023 8:32:31 AM CDT

## 2023-08-26 DIAGNOSIS — G25.81 RLS (RESTLESS LEGS SYNDROME): ICD-10-CM

## 2023-08-28 ENCOUNTER — OFFICE VISIT (OUTPATIENT)
Dept: PODIATRY | Facility: CLINIC | Age: 53
End: 2023-08-28
Payer: COMMERCIAL

## 2023-08-28 VITALS — WEIGHT: 169.06 LBS | BODY MASS INDEX: 27.17 KG/M2 | HEIGHT: 66 IN

## 2023-08-28 DIAGNOSIS — M79.672 PAIN IN BOTH FEET: ICD-10-CM

## 2023-08-28 DIAGNOSIS — M79.671 PAIN IN BOTH FEET: ICD-10-CM

## 2023-08-28 DIAGNOSIS — E11.42 DIABETIC POLYNEUROPATHY ASSOCIATED WITH TYPE 2 DIABETES MELLITUS: Primary | ICD-10-CM

## 2023-08-28 DIAGNOSIS — B35.1 ONYCHOMYCOSIS DUE TO DERMATOPHYTE: ICD-10-CM

## 2023-08-28 DIAGNOSIS — M72.2 PLANTAR FASCIITIS: ICD-10-CM

## 2023-08-28 PROCEDURE — 99999 PR PBB SHADOW E&M-EST. PATIENT-LVL III: CPT | Mod: PBBFAC,,, | Performed by: PODIATRIST

## 2023-08-28 PROCEDURE — 99999 PR PBB SHADOW E&M-EST. PATIENT-LVL III: ICD-10-PCS | Mod: PBBFAC,,, | Performed by: PODIATRIST

## 2023-08-28 PROCEDURE — 4010F PR ACE/ARB THEARPY RXD/TAKEN: ICD-10-PCS | Mod: CPTII,S$GLB,, | Performed by: PODIATRIST

## 2023-08-28 PROCEDURE — 99204 PR OFFICE/OUTPT VISIT, NEW, LEVL IV, 45-59 MIN: ICD-10-PCS | Mod: S$GLB,,, | Performed by: PODIATRIST

## 2023-08-28 PROCEDURE — 1160F RVW MEDS BY RX/DR IN RCRD: CPT | Mod: CPTII,S$GLB,, | Performed by: PODIATRIST

## 2023-08-28 PROCEDURE — 3072F LOW RISK FOR RETINOPATHY: CPT | Mod: CPTII,S$GLB,, | Performed by: PODIATRIST

## 2023-08-28 PROCEDURE — 3046F PR MOST RECENT HEMOGLOBIN A1C LEVEL > 9.0%: ICD-10-PCS | Mod: CPTII,S$GLB,, | Performed by: PODIATRIST

## 2023-08-28 PROCEDURE — 3008F BODY MASS INDEX DOCD: CPT | Mod: CPTII,S$GLB,, | Performed by: PODIATRIST

## 2023-08-28 PROCEDURE — 99204 OFFICE O/P NEW MOD 45 MIN: CPT | Mod: S$GLB,,, | Performed by: PODIATRIST

## 2023-08-28 PROCEDURE — 1159F MED LIST DOCD IN RCRD: CPT | Mod: CPTII,S$GLB,, | Performed by: PODIATRIST

## 2023-08-28 PROCEDURE — 1160F PR REVIEW ALL MEDS BY PRESCRIBER/CLIN PHARMACIST DOCUMENTED: ICD-10-PCS | Mod: CPTII,S$GLB,, | Performed by: PODIATRIST

## 2023-08-28 PROCEDURE — 4010F ACE/ARB THERAPY RXD/TAKEN: CPT | Mod: CPTII,S$GLB,, | Performed by: PODIATRIST

## 2023-08-28 PROCEDURE — 1159F PR MEDICATION LIST DOCUMENTED IN MEDICAL RECORD: ICD-10-PCS | Mod: CPTII,S$GLB,, | Performed by: PODIATRIST

## 2023-08-28 PROCEDURE — 3008F PR BODY MASS INDEX (BMI) DOCUMENTED: ICD-10-PCS | Mod: CPTII,S$GLB,, | Performed by: PODIATRIST

## 2023-08-28 PROCEDURE — 3072F PR LOW RISK FOR RETINOPATHY: ICD-10-PCS | Mod: CPTII,S$GLB,, | Performed by: PODIATRIST

## 2023-08-28 PROCEDURE — 3046F HEMOGLOBIN A1C LEVEL >9.0%: CPT | Mod: CPTII,S$GLB,, | Performed by: PODIATRIST

## 2023-08-28 RX ORDER — CICLOPIROX 80 MG/ML
SOLUTION TOPICAL NIGHTLY
Qty: 6.6 ML | Refills: 3 | Status: SHIPPED | OUTPATIENT
Start: 2023-08-28 | End: 2023-12-04

## 2023-08-28 RX ORDER — PRAMIPEXOLE DIHYDROCHLORIDE 1 MG/1
TABLET ORAL
Qty: 180 TABLET | Refills: 3 | Status: SHIPPED | OUTPATIENT
Start: 2023-08-28 | End: 2023-12-04 | Stop reason: SDUPTHER

## 2023-08-28 NOTE — PATIENT INSTRUCTIONS
Understanding Ingrown Toenails    An ingrown nail is the result of a nail growing into the skin that surrounds it. This often occurs at either edge of the big toe. Ingrown nails may be caused by improper trimming, inherited nail deformities, injuries, fungal infections, or pressure.    Symptoms  Ingrown nails may cause pain at the tip of the toe or all the way to the base of the toe. The pain is often worse while walking. An ingrown nail may also lead to infection, inflammation, or a more serious condition. If its infected, you might see pus or redness.    Evaluation  To determine the extent of your problem, your healthcare provider examines and possibly presses the painful area. If other problems are suspected, blood tests, cultures, and X-rays may be done as well.    Treatment  If the nail isnt infected, your healthcare provider may trim the corner of it to help relieve your symptoms. He or she may need to remove one side of your nail back to the cuticle. The base of the nail may then be treated with a chemical to keep the ingrown part from growing back. Severe infections or ingrown nails may require antibiotics and temporary or permanent removal of a portion of the nail. To prevent pain, a local anesthetic may be used in these procedures. This treatment is usually done at your healthcare provider's office.    Prevention  Many nail problems can be prevented by wearing the right shoes and trimming your nails properly. To help avoid infection, keep your feet clean and dry. If you have diabetes, talk with your healthcare provider before doing any foot self-care.  The right shoes: Get your feet measured (your size may change as you age). Wear shoes that are supportive and roomy enough for your toes to wiggle. Look for shoes made of natural materials such as leather, which allow your feet to breathe.  Proper trimming: To avoid problems, trim your toenails straight across without cutting down into the corners. If you  cant trim your own nails, ask your healthcare provider to do so for you.    Date Last Reviewed: 10/1/2016  © 9569-2004 [employer]. [address] [place] [address], [place] [address]. All rights reserved. This information is not intended as a substitute for professional medical care. Always follow your healthcare professional's instructions.     Peripheral Neuropathy  Peripheral neuropathy is a condition that affects the nerves of the arms or legs. It causes a change in physical feeling. Sometimes it causes weakness in the muscles. You may feel tingling, numbness or shooting pains. Symptoms may be more common at night. Skin may be extra sensitive to light touch or temperature changes.  Neuropathy may be a complication of a chronic disease such as diabetes. A ruptured disk with pressure on the spinal nerve may also lead to the problem. Certain vitamin deficiencies may lead to it. It may also be caused by exposure to certain drugs or chemicals.    Home care  Tell the healthcare provider about all medicines you take. This includes prescription and over-the-counter medicines, vitamins, and herbs. Ask if any of the medicines may be causing your problems. Do not make any changes to prescription medicines without talking to your healthcare provider first.  You may be prescribed medicines to help relieve the tingling feeling or for pain. Take all medicines as directed.  A numb hand or foot may be more prone to injury. To help protect it:  Always use oven mitts.  Test water with an unaffected hand or foot.  Use caution when trimming nails. File sharp areas.  Wear shoes that fit well to avoid pressure points, blisters, and ulcers.  Inspect your hands and feet carefully (including the soles of your feet and between your toes) at least once a week. If you see red areas, sores, or other problems, tell your healthcare provider.    Follow-up care  Follow up with your doctor or as advised by our staff. You may need further  testing or evaluation.    When to seek medical advice  Call your healthcare provider right away if any of the following occur:  Redness, swelling, cracking, or ulcer on any numb area, especially the feet  New symptoms of numbness or muscle weakness numbness  Loss of bowel or bladder control  Slurred speech, confusion, or trouble speaking, walking, or seeing    Date Last Reviewed: 9/26/2015  © 9117-1860 [employer]. [address] [place] [address], [place] [address]. All rights reserved. This information is not intended as a substitute for professional medical care. Always follow your healthcare professional's instructions.       Your Diabetes Foot Care Program    Every day you depend on your feet to keep you moving. But when you have diabetes, your feet need special care. Even a small foot problem can become very serious. So dont take your feet for granted. By working with your diabetes healthcare team, you can learn how to protect your feet and keep them healthy.  Evaluating your feet  An evaluation helps your healthcare provider check the condition of your feet. The evaluation includes a review of your diabetes history and overall health. It may also include a foot exam, X-rays, or other tests. These can help show problems beneath the skin that you cant see or feel.  Medical history  You will be asked about your overall health and any history of foot problems. Youll also discuss your diabetes history, such as whether your blood sugar level has changed over time. It also includes questions about sensations of pain, tingling, pins and needles, or numbness. Your healthcare provider will also want to know if you have high blood pressure and heart disease, or if you smoke. Be sure to mention any medicines (including over-the-counter), supplements, or herbal remedies you take.  Foot exam  A foot exam checks the condition of different parts of your foot. First, your skin and nails are examined for any signs of  infection. Blood flow is checked by feeling for the pulses in each foot. You may also have tests to study the nerves in the foot. These include using a small filament (wire) to see how sensitive your feet are. In certain cases, you will be asked to walk a short distance to check for bone, joint, and muscle problems.  Diagnostic tests  If needed, your healthcare provider will suggest certain tests to learn more about your feet. These include:  Doppler tests to measure blood flow in the feet and lower leg.  X-rays, which can show bone or joint problems.  Other imaging tests, such as an MRI (magnetic resonance imaging), bone scan, and CT (computed tomography) scan. These can help show bone infections.  Other tests, such as vascular tests, which study the blood flow in your feet and legs. You may also have nerve studies to learn how sensitive your feet are.  Creating a foot care program  Based on the evaluation, your healthcare provider will create a foot care program for you. Your program may be as simple as starting a daily self-care routine and changing the types of shoes your wear. It may also involve treating minor foot problems, such as a corn or blister. In some cases, surgery will be needed to treat an infection or mechanical problems, such as hammer toes.  Preventing problems  When you have diabetes, its easier to prevent problems than to treat them later on. So see your healthcare team for regular checkups and foot care. Your healthcare team can also help you learn more about caring for your feet at home. For example, you may be told to avoid walking barefoot. Or you may be told that special footwear is needed to protect your feet.  Have regular checkups  Foot problems can develop quickly. So be sure to follow your healthcare teams schedule for regular checkups. During office visits, take off your shoes and socks as soon as you get in the exam room. Ask your healthcare provider to examine your feet for  problems. This will make it easier to find and treat small skin irritations before they get worse. Regular checkups can also help keep track of the blood flow and feeling in your feet. If you have neuropathy (lack of feeling in your feet), you will need to have checkups more often.  Learn about self-care  The more you know about diabetes and your feet, the easier it will be to prevent problems. Members of your healthcare team can teach you how to inspect your feet and teach you to look for warning signs. They can also give you other foot care tips. During office visits, be sure to ask any questions you have.  Date Last Reviewed: 7/1/2016  © 8262-2187 [employer]. [address] [place] [address], [place] [address]. All rights reserved. This information is not intended as a substitute for professional medical care. Always follow your healthcare professional's instructions.       Peripheral Neuropathy  Peripheral neuropathy is a condition that affects the nerves of the arms or legs. It causes a change in physical feeling. Sometimes it causes weakness in the muscles. You may feel tingling, numbness or shooting pains. Symptoms may be more common at night. Skin may be extra sensitive to light touch or temperature changes.  Neuropathy may be a complication of a chronic disease such as diabetes. A ruptured disk with pressure on the spinal nerve may also lead to the problem. Certain vitamin deficiencies may lead to it. It may also be caused by exposure to certain drugs or chemicals.    Home care  Tell the healthcare provider about all medicines you take. This includes prescription and over-the-counter medicines, vitamins, and herbs. Ask if any of the medicines may be causing your problems. Do not make any changes to prescription medicines without talking to your healthcare provider first.  You may be prescribed medicines to help relieve the tingling feeling or for pain. Take all medicines as directed.  A numb hand or  foot may be more prone to injury. To help protect it:  Always use oven mitts.  Test water with an unaffected hand or foot.  Use caution when trimming nails. File sharp areas.  Wear shoes that fit well to avoid pressure points, blisters, and ulcers.  Inspect your hands and feet carefully (including the soles of your feet and between your toes) at least once a week. If you see red areas, sores, or other problems, tell your healthcare provider.    Follow-up care  Follow up with your doctor or as advised by our staff. You may need further testing or evaluation.    When to seek medical advice  Call your healthcare provider right away if any of the following occur:  Redness, swelling, cracking, or ulcer on any numb area, especially the feet  New symptoms of numbness or muscle weakness numbness  Loss of bowel or bladder control  Slurred speech, confusion, or trouble speaking, walking, or seeing    Date Last Reviewed: 9/26/2015  © 4355-2929 [employer]. [address] [place] [address], [place] [address]. All rights reserved. This information is not intended as a substitute for professional medical care. Always follow your healthcare professional's instructions.

## 2023-08-28 NOTE — PROGRESS NOTES
"    [address] [facility] [address] [place] [address]  Phone: [phone]   [phone]    Patient's PCP:Andrew Roger MD  Referring Provider: Aaareferral Self    Subjective:      Chief Complaint:: Diabetic Foot Exam (Bilateral feet), Numbness (Neuropathy ), and Ingrown Toenail (Bilateral great toes)    Ingrown Toenail  Associated symptoms include arthralgias and numbness. Pertinent negatives include no abdominal pain, chest pain, chills, coughing, fatigue, fever, headaches, nausea, neck pain, rash or weakness.     Dennis Aldana is a 53 y.o. female who presents today for a diabetic foot exam.  Pt has seen Dr. Andrew Roger  on 08/11/2023 who treats them for their diabetes.  Pt has been a diabetic for 2 years.  Taking ozempic to treat diabetes.    Blood sugar: didn't take  Hemoglobin A1C: 13.1      Dennis Aldana is a 53 y.o. female who presents today with a complaint of bilateral numbness and possible plantar fasciitis. The current episode started 6-9 months.  The symptoms include pain and numbness, feels like walking on knots and marbles. Probable cause of complaint diabetes.  The symptoms are aggravated by walking. The problem has worsened. Treatment to date have included kinesio tape which provided some relief. Patient also presents with bilateral ingrown toe nails on great toes.Noticed a few months ago, tried to dig them out but scared to do herself due to neuropathy.       Vitals:    08/28/23 1608   Weight: 76.7 kg (169 lb 1.5 oz)   Height: 5' 6" (1.676 m)   PainSc:   5      Shoe Size: 8-8.5    Past Surgical History:   Procedure Laterality Date    BIOPSY N/A 12/29/2020    Procedure: us guided muscle biopsy (latissimus Dorsi);  Surgeon: German Diagnostic Provider;  Location: [facility] OR;  Service: Interventional Radiology;  Laterality: N/A;    BREAST LUMPECTOMY Left 2018    COLONOSCOPY N/A 12/3/2021    Procedure: COLONOSCOPY;  Surgeon: Silvio Montesinos MD;  Location: [facility];  Service: " Endoscopy;  Laterality: N/A;    CYSTOSCOPY      CYSTOSCOPY N/A 3/22/2021    Procedure: CYSTOSCOPY;  Surgeon: Jannie Contreras MD;  Location: [facility] OR;  Service: OB/GYN;  Laterality: N/A;    CYSTOSCOPY N/A 2/22/2022    Procedure: CYSTOSCOPY;  Surgeon: Anali Greer MD;  [facility];  Service: Urology;  Laterality: N/A;  2hrs    CYSTOSCOPY W/ URETERAL STENT PLACEMENT Right 12/17/2021    Procedure: CYSTOSCOPY, WITH URETERAL STENT INSERTION;  Surgeon: Esthela Lin MD;  Location: [facility] OR;  Service: Urology;  Laterality: Right;    CYSTOSCOPY WITH URETEROSCOPY, RETROGRADE PYELOGRAPHY, AND INSERTION OF STENT Right 1/7/2022    Procedure: CYSTOSCOPY, WITH RETROGRADE PYELOGRAM AND URETERAL STENT INSERTION;  Surgeon: Esthela Lin MD;  Location: [facility] OR;  Service: Urology;  Laterality: Right;    EXCISION OF MASS OF BACK N/A 2/22/2021    Procedure: EXCISION, MASS, BACK  LEFT;  Surgeon: Chico Baird MD;  Location: [facility] OR [address] [place] [address];  Service: General;  Laterality: N/A;  Specimen sent to pathology via  for research     EXCISION OF MASS OF BACK  02/22/2021    KNEE SURGERY      LASER LITHOTRIPSY Right 1/7/2022    Procedure: LITHOTRIPSY, USING LASER;  Surgeon: Esthela Lin MD;  Location: [facility] OR;  Service: Urology;  Laterality: Right;    REPAIR OF URETHRAL DIVERTICULUM N/A 2/22/2022    Procedure: REPAIR, DIVERTICULUM, URETHRA;  Surgeon: Anali Greer MD;  [facility];  Service: Urology;  Laterality: N/A;    ROBOT-ASSISTED LAPAROSCOPIC HYSTERECTOMY N/A 3/22/2021    Procedure: ROBOTIC HYSTERECTOMY;  Surgeon: Jannie Contreras MD;  Location: [facility] OR;  Service: OB/GYN;  Laterality: N/A;    ROBOT-ASSISTED LAPAROSCOPIC SALPINGO-OOPHORECTOMY Bilateral 3/22/2021    Procedure: ROBOTIC SALPINGO-OOPHORECTOMY;  Surgeon: Jannie Contreras MD;  Location: [facility] OR;  Service: OB/GYN;  Laterality: Bilateral;    SHOULDER SURGERY      TONSILLECTOMY      URETEROSCOPIC REMOVAL OF  URETERIC CALCULUS Right 1/7/2022    Procedure: REMOVAL, CALCULUS, URETER, URETEROSCOPIC;  Surgeon: Esthela Lin MD;  Location: [facility];  Service: Urology;  Laterality: Right;    VOIDING URETHROCYSTOGRAPHY  3/8/2022    Procedure: CYSTOURETHROGRAM, VOIDING;  Surgeon: Anali Greer MD;  Location: [facility] OR [address] [place] [address];  Service: Urology;;     Past Medical History:   Diagnosis Date    Abnormal CT of liver 12/21/2020    Abnormal CT of the abdomen 12/21/2020    Anemia     Anxiety     Arthritis     Breast cancer 2018    left w/ radiation    COVID-19 virus infection 9/6/2021    Depression     Diabetes mellitus     ER+ (estrogen receptor positive status) 8/16/2020    Essential hypertension, benign 1/3/2018    Excessive menstruation 8/16/2020    Hypertension     Kidney stone     Liver mass     Malignant neoplasm of left breast in female, estrogen receptor positive 4/11/2018    S/p lumpectomy and radiation, discharge May 2018    Microcytic anemia 6/21/2020    Myxoma of skin determined by biopsy (low grade) 1/19/2021    Pneumonia due to COVID-19 virus 9/6/2021    PONV (postoperative nausea and vomiting)     Zofran helps with PONV per patient(every surgery)    RLS (restless legs syndrome)     S/P lumpectomy, left breast 6/21/2020    Use of tamoxifen (Nolvadex) 6/21/2020     Family History   Problem Relation Age of Onset    Hypertension Mother     Diabetes Mother     Diabetes Father     Breast cancer Maternal Grandmother     Cancer Paternal Grandfather     Colon cancer Neg Hx     Ovarian cancer Neg Hx     Glaucoma Neg Hx     Macular degeneration Neg Hx     Anesthesia problems Neg Hx         Social History:   Marital Status:   Alcohol History:  reports current alcohol use.  Tobacco History:  reports that she has never smoked. She has never used smokeless tobacco.  Drug History:  reports no history of drug use.    Review of patient's allergies indicates:  No Known Allergies    Current Outpatient Medications   Medication  Sig Dispense Refill    chlorthalidone (HYGROTEN) 25 MG Tab Take 1 tablet (25 mg total) by mouth once daily. 30 tablet 5    citalopram (CELEXA) 40 MG tablet TAKE 1 TABLET BY MOUTH EVERY DAY 90 tablet 3    empagliflozin (JARDIANCE) 25 mg tablet Take 1 tablet (25 mg total) by mouth once daily. 30 tablet 5    LORazepam (ATIVAN) 1 MG tablet TAKE 1 TABLET BY MOUTH DAILY AS NEEDED FOR ANXIETY 30 tablet 1    pramipexole (MIRAPEX) 1 MG tablet TAKE 2 TABLETS (2MG TOTAL) BY MOUTH EVERY EVENING 180 tablet 3    rosuvastatin (CRESTOR) 10 MG tablet TAKE 1 TABLET BY MOUTH EVERY DAY 90 tablet 2    semaglutide (OZEMPIC) 0.25 mg or 0.5 mg(2 mg/1.5 mL) pen injector Inject 0.25 mg into the skin every 7 days for 28 days, THEN 0.5 mg every 7 days. 3 mL 0    [START ON 10/10/2023] semaglutide (OZEMPIC) 1 mg/dose (4 mg/3 mL) Inject 1 mg into the skin every 7 days. 3 mL 5    valsartan (DIOVAN) 80 MG tablet Take 1 tablet (80 mg total) by mouth once daily. 90 tablet 1    zolpidem (AMBIEN) 5 MG Tab TAKE 1 TABLET BY MOUTH NIGHTLY AS NEEDED (INSOMNIA). 30 tablet 2    ciclopirox (PENLAC) 8 % Soln Apply topically nightly. 6.6 mL 3     No current facility-administered medications for this visit.       Review of Systems   Constitutional:  Negative for chills, fatigue, fever and unexpected weight change.   HENT:  Negative for hearing loss and trouble swallowing.    Eyes:  Negative for photophobia and visual disturbance.   Respiratory:  Negative for cough, shortness of breath and wheezing.    Cardiovascular:  Negative for chest pain, palpitations and leg swelling.   Gastrointestinal:  Negative for abdominal pain and nausea.   Genitourinary:  Negative for dysuria and frequency.   Musculoskeletal:  Positive for arthralgias. Negative for back pain, gait problem and neck pain.   Skin:  Negative for rash and wound.   Neurological:  Positive for numbness. Negative for tremors, seizures, weakness and headaches.   Hematological:  Does not bruise/bleed easily.          Objective:        Physical Exam:   Foot Exam    General  Orientation: alert and oriented to person, place, and time   Affect: appropriate   Gait: antalgic       Right Foot/Ankle     Inspection and Palpation  Ecchymosis: none  Tenderness: plantar fascia (Mild generalized tenderness to the forefoot, great toenail)  Swelling: none   Arch: normal  Skin Exam: skin intact; no drainage, no ulcer and no erythema   Fungus Toenails: present    Neurovascular  Dorsalis pedis: 2+  Posterior tibial: 2+  Capillary Refill: 2+  Varicose veins: not present  Saphenous nerve sensation: normal  Tibial nerve sensation: normal  Superficial peroneal nerve sensation: normal  Deep peroneal nerve sensation: normal  Sural nerve sensation: normal    Edema  Type of edema: non-pitting    Muscle Strength  Ankle dorsiflexion: 5  Ankle plantar flexion: 5  Ankle inversion: 5  Ankle eversion: 5  Great toe extension: 5  Great toe flexion: 5    Range of Motion    Normal right ankle ROM    Tests  Anterior drawer: negative   Talar tilt: negative   PT Tinel's sign: negative    Paresthesia: negative    Left Foot/Ankle      Inspection and Palpation  Ecchymosis: none  Tenderness: plantar fascia (Mild generalized tenderness to the forefoot, great toenail)  Swelling: none   Arch: normal  Skin Exam: skin intact; no drainage, no ulcer and no erythema   Fungus Toenails: present    Neurovascular  Dorsalis pedis: 2+  Posterior tibial: 2+  Capillary refill: 2+  Varicose veins: not present  Saphenous nerve sensation: normal  Tibial nerve sensation: normal  Superficial peroneal nerve sensation: normal  Deep peroneal nerve sensation: normal  Sural nerve sensation: normal    Edema  Type of edema: non-pitting    Muscle Strength  Ankle dorsiflexion: 5  Ankle plantar flexion: 5  Ankle inversion: 5  Ankle eversion: 5  Great toe extension: 5  Great toe flexion: 5    Range of Motion    Normal left ankle ROM    Tests  Anterior drawer: negative   Talar tilt: negative    PT Tinel's sign: negative  Paresthesia: negative      Physical Exam  Cardiovascular:      Pulses:           Dorsalis pedis pulses are 2+ on the right side and 2+ on the left side.        Posterior tibial pulses are 2+ on the right side and 2+ on the left side.   Feet:      Right foot:      Skin integrity: No ulcer or erythema.      Toenail Condition: Fungal disease present.     Left foot:      Skin integrity: No ulcer or erythema.      Toenail Condition: Fungal disease present.        Imaging: none            Assessment:       1. Diabetic polyneuropathy associated with type 2 diabetes mellitus    2. Onychomycosis due to dermatophyte    3. Plantar fasciitis    4. Pain in both feet      Plan:   Diabetic polyneuropathy associated with type 2 diabetes mellitus  -      DIABETES FOOT EXAM    Onychomycosis due to dermatophyte  -     ciclopirox (PENLAC) 8 % Soln; Apply topically nightly.  Dispense: 6.6 mL; Refill: 3    Plantar fasciitis    Pain in both feet      Follow up in about 1 year (around 8/28/2024), or if symptoms worsen or fail to improve.    Procedures        Counseled patient on the aspects of diabetes and how it pertains to the feet.  I explained the importance of proper diabetic foot care and how it is essential for the health of their feet.    I discussed the importance of knowing their HGA1c and that the level needs to be as close to 6 as possible.  I discussed the increase complications of high blood sugar including stroke, blindness, heart attack, kidney failure and loss of limb secondary to neuropathy and PVD.    Patient  was made aware of inspecting their feet.  Patient was told to be aware of any breaks in the skin or redness.  With neuropathy, these areas are not recognized early due to the numbness.  I discussed different treatments available to control the symptoms but whcih may not cure the problem.      Shoe inspection. Patient instructed on proper foot hygeine. We discussed wearing proper shoe  gear, daily foot inspections, never walking without protective shoe gear, never putting sharp instruments to feet.    Fungal infection of toenails explained. Treatment options including no treatment, periodic debridement, topical medications, oral medications, and removal of the nail were discussed, as well as success rates and risks of recurrence. We agreed on topical medication        Counseling:     I provided patient education verbally regarding:   Patient diagnosis, treatment options, as well as alternatives, risks, and benefits.     This note was created using Dragon voice recognition software that occasionally misinterpreted phrases or words.

## 2023-08-28 NOTE — TELEPHONE ENCOUNTER
Refill Routing Note   Medication(s) are not appropriate for processing by [facility] for the following reason(s):      Medication outside of protocol    [facility] action(s):  Route Care Due:  None identified              Appointments  past 12m or future 3m with PCP    Date Provider   Last Visit   8/11/2023 Andrew Roger MD   Next Visit   11/13/2023 Andrew Roger MD   ED visits in past 90 days: 0        Note composed:8:42 AM 08/28/2023

## 2023-09-05 ENCOUNTER — PATIENT MESSAGE (OUTPATIENT)
Dept: PRIMARY CARE CLINIC | Facility: CLINIC | Age: 53
End: 2023-09-05
Payer: COMMERCIAL

## 2023-09-14 ENCOUNTER — LAB VISIT (OUTPATIENT)
Dept: LAB | Facility: HOSPITAL | Age: 53
End: 2023-09-14
Attending: FAMILY MEDICINE
Payer: COMMERCIAL

## 2023-09-14 ENCOUNTER — TELEPHONE (OUTPATIENT)
Dept: INTERNAL MEDICINE | Facility: CLINIC | Age: 53
End: 2023-09-14
Payer: COMMERCIAL

## 2023-09-14 DIAGNOSIS — E78.5 TYPE 2 DIABETES MELLITUS WITH HYPERLIPIDEMIA: ICD-10-CM

## 2023-09-14 DIAGNOSIS — E11.69 TYPE 2 DIABETES MELLITUS WITH HYPERLIPIDEMIA: ICD-10-CM

## 2023-09-14 LAB
ANION GAP SERPL CALC-SCNC: 15 MMOL/L (ref 8–16)
BUN SERPL-MCNC: 17 MG/DL (ref 6–20)
CALCIUM SERPL-MCNC: 9.2 MG/DL (ref 8.7–10.5)
CHLORIDE SERPL-SCNC: 99 MMOL/L (ref 95–110)
CO2 SERPL-SCNC: 22 MMOL/L (ref 23–29)
CREAT SERPL-MCNC: 0.9 MG/DL (ref 0.5–1.4)
EST. GFR  (NO RACE VARIABLE): >60 ML/MIN/1.73 M^2
GLUCOSE SERPL-MCNC: 505 MG/DL (ref 70–110)
POTASSIUM SERPL-SCNC: 3.5 MMOL/L (ref 3.5–5.1)
SODIUM SERPL-SCNC: 136 MMOL/L (ref 136–145)

## 2023-09-14 PROCEDURE — 80048 BASIC METABOLIC PNL TOTAL CA: CPT | Performed by: FAMILY MEDICINE

## 2023-09-14 PROCEDURE — 36415 COLL VENOUS BLD VENIPUNCTURE: CPT | Mod: PO | Performed by: FAMILY MEDICINE

## 2023-09-15 NOTE — TELEPHONE ENCOUNTER
Be sure she is taking Jardiance and Ozempic as prescribed.  Needs to dramatically reduce carbohydrate intake.  Have patient monitor her blood sugar for the next few days and call or message with update next week

## 2023-09-15 NOTE — TELEPHONE ENCOUNTER
Called pt regarding pt stated she is taking Jardiance and Ozempic as prescribed. Pt stated her blood sugar this am was 200. Pt stated she retook it again at 1:00 pm it went down to 180.Pt is requesting order for digital medicine.

## 2023-09-15 NOTE — TELEPHONE ENCOUNTER
Great. Continue on this path. This should get her more controlled. Continue checking sugars in am and let us know how they are running in 2 weeks.

## 2023-09-15 NOTE — TELEPHONE ENCOUNTER
How much ozempic Is this patient on? Should be 0.5 mg? I will send in 1 mg pen. Needs to increase ozempic to 1 mg weekly and follow

## 2023-09-15 NOTE — TELEPHONE ENCOUNTER
Called for critical blood sugar level of 505. Spoke to the pt and her recheck was down to 305. Pt feeling fine. Advised to not eat any carbs/sugars for the rest of the night. Told her I would send this message to her PCP for further management in the am.

## 2023-09-18 ENCOUNTER — PATIENT MESSAGE (OUTPATIENT)
Dept: PRIMARY CARE CLINIC | Facility: CLINIC | Age: 53
End: 2023-09-18
Payer: COMMERCIAL

## 2023-09-21 ENCOUNTER — PATIENT MESSAGE (OUTPATIENT)
Dept: PRIMARY CARE CLINIC | Facility: CLINIC | Age: 53
End: 2023-09-21
Payer: COMMERCIAL

## 2023-10-18 ENCOUNTER — PATIENT MESSAGE (OUTPATIENT)
Dept: ADMINISTRATIVE | Facility: OTHER | Age: 53
End: 2023-10-18

## 2023-10-18 ENCOUNTER — PATIENT MESSAGE (OUTPATIENT)
Dept: CARDIOLOGY | Facility: CLINIC | Age: 53
End: 2023-10-18

## 2023-10-19 DIAGNOSIS — F41.1 GAD (GENERALIZED ANXIETY DISORDER): ICD-10-CM

## 2023-10-19 DIAGNOSIS — F51.01 PRIMARY INSOMNIA: ICD-10-CM

## 2023-10-19 RX ORDER — ZOLPIDEM TARTRATE 5 MG/1
TABLET ORAL
Qty: 30 TABLET | Refills: 3 | Status: SHIPPED | OUTPATIENT
Start: 2023-10-19 | End: 2023-12-04

## 2023-10-19 RX ORDER — LORAZEPAM 1 MG/1
1 TABLET ORAL DAILY PRN
Qty: 30 TABLET | Refills: 3 | Status: SHIPPED | OUTPATIENT
Start: 2023-10-19 | End: 2023-12-04 | Stop reason: SDUPTHER

## 2023-10-19 NOTE — TELEPHONE ENCOUNTER
Care Due:                  Date            Visit Type   Department     Provider  --------------------------------------------------------------------------------                                EP -                              PRIMARY      [facility]  Last Visit: 08-      CARE ([facility])   PRIMARY CARE   Andrew Roger                               -                              PRIMARY      [facility]  Next Visit: 11-      CARE ([facility])   PRIMARY CARE   Andrew Roger                                                            Last  Test          Frequency    Reason                     Performed    Due Date  --------------------------------------------------------------------------------    Lipid Panel.  12 months..  rosuvastatin.............  01- 01-    [facility] Embedded Care Due Messages. Reference number: [id number].   10/19/2023 9:51:36 AM CDT

## 2023-10-30 ENCOUNTER — PATIENT OUTREACH (OUTPATIENT)
Dept: ADMINISTRATIVE | Facility: HOSPITAL | Age: 53
End: 2023-10-30

## 2023-10-30 NOTE — PROGRESS NOTES
Health Maintenance Due   Topic Date Due    Eye Exam  04/29/2023    Influenza Vaccine (1) 09/01/2023    Hemoglobin A1c  11/11/2023        Chart review done.   HM updated.   Immunizations reviewed & updated.   Care Everywhere updated.   Eye exam scheduled for 1/26/24.  HM will not refresh

## 2023-10-31 ENCOUNTER — PATIENT MESSAGE (OUTPATIENT)
Dept: PRIMARY CARE CLINIC | Facility: CLINIC | Age: 53
End: 2023-10-31

## 2023-12-04 ENCOUNTER — OFFICE VISIT (OUTPATIENT)
Dept: PRIMARY CARE CLINIC | Facility: CLINIC | Age: 53
End: 2023-12-04
Payer: COMMERCIAL

## 2023-12-04 VITALS
DIASTOLIC BLOOD PRESSURE: 80 MMHG | HEIGHT: 66 IN | HEART RATE: 100 BPM | BODY MASS INDEX: 27.37 KG/M2 | WEIGHT: 170.31 LBS | SYSTOLIC BLOOD PRESSURE: 126 MMHG | TEMPERATURE: 98 F | RESPIRATION RATE: 18 BRPM | OXYGEN SATURATION: 98 %

## 2023-12-04 DIAGNOSIS — E11.65 TYPE 2 DIABETES MELLITUS WITH HYPERGLYCEMIA, WITHOUT LONG-TERM CURRENT USE OF INSULIN: Primary | ICD-10-CM

## 2023-12-04 DIAGNOSIS — G25.81 RLS (RESTLESS LEGS SYNDROME): ICD-10-CM

## 2023-12-04 DIAGNOSIS — I10 ESSENTIAL HYPERTENSION, BENIGN: ICD-10-CM

## 2023-12-04 DIAGNOSIS — F32.A DEPRESSION, UNSPECIFIED DEPRESSION TYPE: ICD-10-CM

## 2023-12-04 DIAGNOSIS — E78.5 TYPE 2 DIABETES MELLITUS WITH HYPERLIPIDEMIA: ICD-10-CM

## 2023-12-04 DIAGNOSIS — F41.1 GAD (GENERALIZED ANXIETY DISORDER): ICD-10-CM

## 2023-12-04 DIAGNOSIS — E11.69 TYPE 2 DIABETES MELLITUS WITH HYPERLIPIDEMIA: ICD-10-CM

## 2023-12-04 PROCEDURE — 3074F SYST BP LT 130 MM HG: CPT | Mod: CPTII,S$GLB,, | Performed by: FAMILY MEDICINE

## 2023-12-04 PROCEDURE — 3046F PR MOST RECENT HEMOGLOBIN A1C LEVEL > 9.0%: ICD-10-PCS | Mod: CPTII,S$GLB,, | Performed by: FAMILY MEDICINE

## 2023-12-04 PROCEDURE — 1160F RVW MEDS BY RX/DR IN RCRD: CPT | Mod: CPTII,S$GLB,, | Performed by: FAMILY MEDICINE

## 2023-12-04 PROCEDURE — 4010F PR ACE/ARB THEARPY RXD/TAKEN: ICD-10-PCS | Mod: CPTII,S$GLB,, | Performed by: FAMILY MEDICINE

## 2023-12-04 PROCEDURE — 3046F HEMOGLOBIN A1C LEVEL >9.0%: CPT | Mod: CPTII,S$GLB,, | Performed by: FAMILY MEDICINE

## 2023-12-04 PROCEDURE — 1160F PR REVIEW ALL MEDS BY PRESCRIBER/CLIN PHARMACIST DOCUMENTED: ICD-10-PCS | Mod: CPTII,S$GLB,, | Performed by: FAMILY MEDICINE

## 2023-12-04 PROCEDURE — 3079F DIAST BP 80-89 MM HG: CPT | Mod: CPTII,S$GLB,, | Performed by: FAMILY MEDICINE

## 2023-12-04 PROCEDURE — 99214 PR OFFICE/OUTPT VISIT, EST, LEVL IV, 30-39 MIN: ICD-10-PCS | Mod: S$GLB,,, | Performed by: FAMILY MEDICINE

## 2023-12-04 PROCEDURE — 4010F ACE/ARB THERAPY RXD/TAKEN: CPT | Mod: CPTII,S$GLB,, | Performed by: FAMILY MEDICINE

## 2023-12-04 PROCEDURE — 3008F BODY MASS INDEX DOCD: CPT | Mod: CPTII,S$GLB,, | Performed by: FAMILY MEDICINE

## 2023-12-04 PROCEDURE — 3008F PR BODY MASS INDEX (BMI) DOCUMENTED: ICD-10-PCS | Mod: CPTII,S$GLB,, | Performed by: FAMILY MEDICINE

## 2023-12-04 PROCEDURE — 99999 PR PBB SHADOW E&M-EST. PATIENT-LVL III: ICD-10-PCS | Mod: PBBFAC,,, | Performed by: FAMILY MEDICINE

## 2023-12-04 PROCEDURE — 99999 PR PBB SHADOW E&M-EST. PATIENT-LVL III: CPT | Mod: PBBFAC,,, | Performed by: FAMILY MEDICINE

## 2023-12-04 PROCEDURE — 99214 OFFICE O/P EST MOD 30 MIN: CPT | Mod: S$GLB,,, | Performed by: FAMILY MEDICINE

## 2023-12-04 PROCEDURE — 3074F PR MOST RECENT SYSTOLIC BLOOD PRESSURE < 130 MM HG: ICD-10-PCS | Mod: CPTII,S$GLB,, | Performed by: FAMILY MEDICINE

## 2023-12-04 PROCEDURE — 1159F PR MEDICATION LIST DOCUMENTED IN MEDICAL RECORD: ICD-10-PCS | Mod: CPTII,S$GLB,, | Performed by: FAMILY MEDICINE

## 2023-12-04 PROCEDURE — 3079F PR MOST RECENT DIASTOLIC BLOOD PRESSURE 80-89 MM HG: ICD-10-PCS | Mod: CPTII,S$GLB,, | Performed by: FAMILY MEDICINE

## 2023-12-04 PROCEDURE — 3072F PR LOW RISK FOR RETINOPATHY: ICD-10-PCS | Mod: CPTII,S$GLB,, | Performed by: FAMILY MEDICINE

## 2023-12-04 PROCEDURE — 1159F MED LIST DOCD IN RCRD: CPT | Mod: CPTII,S$GLB,, | Performed by: FAMILY MEDICINE

## 2023-12-04 PROCEDURE — 3072F LOW RISK FOR RETINOPATHY: CPT | Mod: CPTII,S$GLB,, | Performed by: FAMILY MEDICINE

## 2023-12-04 RX ORDER — LORAZEPAM 1 MG/1
1 TABLET ORAL DAILY PRN
Qty: 30 TABLET | Refills: 3 | Status: SHIPPED | OUTPATIENT
Start: 2023-12-04

## 2023-12-04 RX ORDER — CITALOPRAM 40 MG/1
40 TABLET, FILM COATED ORAL DAILY
Qty: 90 TABLET | Refills: 3 | Status: SHIPPED | OUTPATIENT
Start: 2023-12-04

## 2023-12-04 RX ORDER — SEMAGLUTIDE 1.34 MG/ML
INJECTION, SOLUTION SUBCUTANEOUS
Qty: 3 ML | Refills: 0 | Status: SHIPPED | OUTPATIENT
Start: 2023-12-04 | End: 2024-01-31 | Stop reason: SDUPTHER

## 2023-12-04 RX ORDER — PRAMIPEXOLE DIHYDROCHLORIDE 0.5 MG/1
0.5 TABLET ORAL NIGHTLY
Qty: 90 TABLET | Refills: 3 | Status: SHIPPED | OUTPATIENT
Start: 2023-12-04

## 2023-12-04 RX ORDER — ROSUVASTATIN CALCIUM 10 MG/1
10 TABLET, COATED ORAL DAILY
Qty: 90 TABLET | Refills: 3 | Status: SHIPPED | OUTPATIENT
Start: 2023-12-04

## 2023-12-04 RX ORDER — SEMAGLUTIDE 1.34 MG/ML
1 INJECTION, SOLUTION SUBCUTANEOUS
Qty: 3 ML | Refills: 5 | Status: SHIPPED | OUTPATIENT
Start: 2024-01-29

## 2023-12-04 NOTE — PROGRESS NOTES
"Subjective:       Patient ID: Dennis Aldana is a 53 y.o. female.    Chief Complaint: Follow-up (3 month follow up)    Off all DM meds for a month due to insurance lapse, but back on insurance now. Fasting -140 off meds, postprandial 140-160, but readings have been trending up. No lows, highest reading 217. Had done great on Ozempic, was tolerating 1 mg dose before lost insurance. Was also on Jardiance at the time.      Review of Systems   Constitutional:  Negative for unexpected weight change.   Respiratory:  Negative for shortness of breath.    Cardiovascular:  Negative for chest pain.   Neurological:  Negative for dizziness and light-headedness.       Objective:      Vitals:    12/04/23 0935   BP: 126/80   BP Location: Left arm   Patient Position: Sitting   BP Method: Medium (Manual)   Pulse: 100   Resp: 18   Temp: 97.5 °F (36.4 °C)   TempSrc: Temporal   SpO2: 98%   Weight: 77.3 kg (170 lb 4.9 oz)   Height: 5' 6" (1.676 m)     BP Readings from Last 5 Encounters:   12/04/23 126/80   08/11/23 130/84   05/09/23 (!) 142/85   01/09/23 (!) 166/97   12/09/22 136/80     Wt Readings from Last 5 Encounters:   12/04/23 77.3 kg (170 lb 4.9 oz)   08/28/23 76.7 kg (169 lb 1.5 oz)   08/11/23 76.7 kg (169 lb 1.5 oz)   05/09/23 82.6 kg (182 lb)   01/09/23 86.2 kg (190 lb)     Physical Exam  Vitals and nursing note reviewed.   Constitutional:       General: She is not in acute distress.     Appearance: Normal appearance. She is well-developed.   HENT:      Head: Normocephalic and atraumatic.   Cardiovascular:      Rate and Rhythm: Normal rate and regular rhythm.      Heart sounds: Normal heart sounds.   Pulmonary:      Effort: Pulmonary effort is normal.      Breath sounds: Normal breath sounds.   Musculoskeletal:      Right lower leg: No edema.      Left lower leg: No edema.   Skin:     General: Skin is warm and dry.   Neurological:      Mental Status: She is alert and oriented to person, place, and time. "   Psychiatric:         Mood and Affect: Mood normal.         Behavior: Behavior normal.         Lab Results   Component Value Date    WBC 10.07 05/08/2023    HGB 14.9 05/08/2023    HCT 44.3 05/08/2023     05/08/2023    CHOL 137 01/09/2023    TRIG 157 (H) 01/09/2023    HDL 36 (L) 01/09/2023    ALT 24 05/08/2023    AST 17 05/08/2023     09/14/2023    K 3.5 09/14/2023    CL 99 09/14/2023    CREATININE 0.9 09/14/2023    BUN 17 09/14/2023    CO2 22 (L) 09/14/2023    TSH 2.35 01/30/2020    INR 1.1 12/29/2020    GLUF 122 (H) 02/13/2020    HGBA1C 13.1 (H) 08/11/2023      Assessment:       1. Type 2 diabetes mellitus with hyperglycemia, without long-term current use of insulin    2. Depression, unspecified depression type    3. Type 2 diabetes mellitus with hyperlipidemia    4. Essential hypertension, benign    5. TATYANA (generalized anxiety disorder)    6. RLS (restless legs syndrome)        Plan:       Type 2 diabetes mellitus with hyperglycemia, without long-term current use of insulin  -     empagliflozin (JARDIANCE) 10 mg tablet; Take 1 tablet (10 mg total) by mouth once daily.  Dispense: 30 tablet; Refill: 0  -     empagliflozin (JARDIANCE) 25 mg tablet; Take 1 tablet (25 mg total) by mouth once daily.  Dispense: 30 tablet; Refill: 5  -     semaglutide (OZEMPIC) 0.25 mg or 0.5 mg(2 mg/1.5 mL) pen injector; Inject 0.25 mg into the skin every 7 days for 28 days, THEN 0.5 mg every 7 days.  Dispense: 3 mL; Refill: 0  -     semaglutide (OZEMPIC) 1 mg/dose (4 mg/3 mL); Inject 1 mg into the skin every 7 days.  Dispense: 3 mL; Refill: 5  Restart meds, recheck labs in 3 months  Depression, unspecified depression type  -     citalopram (CELEXA) 40 MG tablet; Take 1 tablet (40 mg total) by mouth once daily.  Dispense: 90 tablet; Refill: 3    Type 2 diabetes mellitus with hyperlipidemia  -     rosuvastatin (CRESTOR) 10 MG tablet; Take 1 tablet (10 mg total) by mouth once daily.  Dispense: 90 tablet; Refill:  3    Essential hypertension, benign  Stable on chlorthalidone  TATYANA (generalized anxiety disorder)  -     LORazepam (ATIVAN) 1 MG tablet; Take 1 tablet (1 mg total) by mouth daily as needed for Anxiety.  Dispense: 30 tablet; Refill: 3  stable  RLS (restless legs syndrome)  -     pramipexole (MIRAPEX) 0.5 MG tablet; Take 1 tablet (0.5 mg total) by mouth every evening.  Dispense: 90 tablet; Refill: 3      Medication List with Changes/Refills   New Medications    EMPAGLIFLOZIN (JARDIANCE) 10 MG TABLET    Take 1 tablet (10 mg total) by mouth once daily.    EMPAGLIFLOZIN (JARDIANCE) 25 MG TABLET    Take 1 tablet (25 mg total) by mouth once daily.    SEMAGLUTIDE (OZEMPIC) 0.25 MG OR 0.5 MG(2 MG/1.5 ML) PEN INJECTOR    Inject 0.25 mg into the skin every 7 days for 28 days, THEN 0.5 mg every 7 days.    SEMAGLUTIDE (OZEMPIC) 1 MG/DOSE (4 MG/3 ML)    Inject 1 mg into the skin every 7 days.   Current Medications    CHLORTHALIDONE (HYGROTEN) 25 MG TAB    Take half a tablet daily   Changed and/or Refilled Medications    Modified Medication Previous Medication    CITALOPRAM (CELEXA) 40 MG TABLET citalopram (CELEXA) 40 MG tablet       Take 1 tablet (40 mg total) by mouth once daily.    TAKE 1 TABLET BY MOUTH EVERY DAY    LORAZEPAM (ATIVAN) 1 MG TABLET LORazepam (ATIVAN) 1 MG tablet       Take 1 tablet (1 mg total) by mouth daily as needed for Anxiety.    TAKE 1 TABLET BY MOUTH EVERY DAY AS NEEDED FOR ANXIETY    PRAMIPEXOLE (MIRAPEX) 0.5 MG TABLET pramipexole (MIRAPEX) 1 MG tablet       Take 1 tablet (0.5 mg total) by mouth every evening.    TAKE 2 TABLETS (2MG TOTAL) BY MOUTH EVERY EVENING    ROSUVASTATIN (CRESTOR) 10 MG TABLET rosuvastatin (CRESTOR) 10 MG tablet       Take 1 tablet (10 mg total) by mouth once daily.    TAKE 1 TABLET BY MOUTH EVERY DAY   Discontinued Medications    CICLOPIROX (PENLAC) 8 % SOLN    Apply topically nightly.    EMPAGLIFLOZIN (JARDIANCE) 25 MG TABLET    Take 1 tablet (25 mg total) by mouth once daily.     SEMAGLUTIDE (OZEMPIC) 1 MG/DOSE (4 MG/3 ML)    Inject 1 mg into the skin every 7 days.    VALSARTAN (DIOVAN) 80 MG TABLET    Take 1 tablet (80 mg total) by mouth once daily.    ZOLPIDEM (AMBIEN) 5 MG TAB    TAKE 1 TABLET BY MOUTH NIGHTLY AS NEEDED (INSOMNIA).

## 2023-12-04 NOTE — PROGRESS NOTES
"[facility] Hematology/Oncology  PROGRESS NOTE -   Follow-up Visit      Subjective:       Patient ID:   NAME: Dennis Aldana : 1970     53 y.o. female    Referring Doc: Kana  Other Physicians: Laya Morris (Gyn)    Chief Complaint:  anemia/hx/of Lft breast cancer f/u    History of Present Illness:     Patient returns today for a regularly scheduled follow-up visit.  The patient is here today to go over the results of the recently ordered labs, tests and studies.  She is here by herself.     She reports that she has been feeling "good" Energy levels are good. She has been losing weight on purpose and has lost about 80lbs    She is seeing a new GYN this Thursday (Dr Castellano)      She had stent placement with Dr Lin in Dec 2021 and stone removal on 2022. She previously had surgery for diverticulum of the urethra with Dr Greer in [place]. She is followed by Dr Percy garces now.     She had colonoscopy on Dec 3rd 2021 with Dr Montesinos with one polyp removed. Planned repeat scope in 3 yrs per patient    She had mammogram on  2023 which was good and she can now go back to annual mammograms    No CP, SOB, HA's or N/V.     She saw Dr Roger recently just yesterday                ROS:   GEN: normal without any fever, night sweats or weight loss  HEENT: normal with no HA's, sore throat, stiff neck, changes in vision  CV: normal with no CP, SOB, PND, GARCIA or orthopnea  PULM: normal with no SOB, cough, hemoptysis, sputum or pleuritic pain  GI: normal with no abdominal pain, nausea, vomiting, constipation, diarrhea, melanotic stools, BRBPR, or hematemesis  : normal with no hematuria, dysuria  BREAST: normal with no mass, discharge, pain; s/p left lumpectomy  SKIN: normal with no rash, erythema, bruising, or swelling; nodule in skin    Pain Scale:  0    Allergies:  Review of patient's allergies indicates:  No Known Allergies    Medications:    Current Outpatient Medications:     " chlorthalidone (HYGROTEN) 25 MG Tab, Take half a tablet daily, Disp: 45 tablet, Rfl: 1    citalopram (CELEXA) 40 MG tablet, Take 1 tablet (40 mg total) by mouth once daily., Disp: 90 tablet, Rfl: 3    empagliflozin (JARDIANCE) 10 mg tablet, Take 1 tablet (10 mg total) by mouth once daily., Disp: 30 tablet, Rfl: 0    [START ON 1/3/2024] empagliflozin (JARDIANCE) 25 mg tablet, Take 1 tablet (25 mg total) by mouth once daily., Disp: 30 tablet, Rfl: 5    LORazepam (ATIVAN) 1 MG tablet, Take 1 tablet (1 mg total) by mouth daily as needed for Anxiety., Disp: 30 tablet, Rfl: 3    pramipexole (MIRAPEX) 0.5 MG tablet, Take 1 tablet (0.5 mg total) by mouth every evening., Disp: 90 tablet, Rfl: 3    rosuvastatin (CRESTOR) 10 MG tablet, Take 1 tablet (10 mg total) by mouth once daily., Disp: 90 tablet, Rfl: 3    semaglutide (OZEMPIC) 0.25 mg or 0.5 mg(2 mg/1.5 mL) pen injector, Inject 0.25 mg into the skin every 7 days for 28 days, THEN 0.5 mg every 7 days., Disp: 3 mL, Rfl: 0    [START ON 1/29/2024] semaglutide (OZEMPIC) 1 mg/dose (4 mg/3 mL), Inject 1 mg into the skin every 7 days., Disp: 3 mL, Rfl: 5    PMHx/PSHx Updates:  See patient's last visit with me on 5/9/2023  See H&P on 8/17/2020        Pathology:   Cancer Staging   No matching staging information was found for the patient.      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 UTERUS, CERVIX, FALLOPIAN TUBES, AND OVARIES; HYSTERECTOMY AND    BILATERAL SALPINGO-OOPHORECTOMY:       MYOMETRIUM:   --SUBSEROSAL AND INTRAMURAL LEIOMYOMATA (INCLUDING FOCAL EPITHELIOID    LEIOMYOMA).      --ADENOMYOSIS.     ENDOMETRIUM:     --BENIGN ENDOMETRIAL POLYPS (LARGEST 3.2 CENTIMETERS).     CERVIX:   --FOCAL HIGH-GRADE SQUAMOUS INTRAEPITHELIAL LESION (CERVICAL    INTRAEPITHELIAL NEOPLASIA        GRADE 2 [OPHELIA 2]), WITH ENDOCERVICAL GLANDULAR EXTENSION.       "--MARGIN IS NEGATIVE FOR SQUAMOUS INTRAEPITHELIAL LESION.      --FOCAL ACUTE AND CHRONIC CERVICITIS.      --SQUAMOUS METAPLASIA OF ENDOCERVIX.      --NABOTHIAN CYSTS.      --CERVIX ENTIRELY SUBMITTED FOR HISTOLOGIC SECTIONING.     RIGHT FALLOPIAN TUBE:     --MICROSCOPIC BENIGN PARATUBAL CYSTS.     LEFT FALLOPIAN TUBE:     --MICROSCOPIC BENIGN PARATUBAL CYSTS.     --SEROSAL FIBROVASCULAR ADHESIONS.     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     --ENDOMETRIOSIS.     --SURFACE EPITHELIAL INCLUSION CYSTS.     --FOCAL ENDOSALPINGIOSIS.     --CYSTIC CORPUS LUTEUM.     --CYSTIC FOLLICLES.     --CORPORA ALBICANTIA.     --SEROSAL FIBROVASCULAR ADHESIONS.     LEFT OVARY:     --ENDOMETRIOSIS.     --SURFACE EPITHELIAL INCLUSION CYSTS.     --FOCAL ENDOSALPINGIOSIS.      --HEMORRHAGIC CYSTIC CORPUS LUTEUM.      --COPORA ALBICANTIA.      --SEROSAL FIBROVASCULAR ADHESIONS.     2. SPECIMEN SUBMITTED AS "PERITONEAL NODULE," EXCISION:   --INFARCTED APPENDIX EPIPLOICA WITH DYSTROPHIC CALCIFICATION AND    FIBROSIS.            Left back soft tissue biopsy: 12/29/2020:    SOFT TISSUE, LEFT LATISSIMUS DORSI, CORE BIOPSY:   - LOW GRADE MYXOID LESION.   - SEE COMMENT.         Objective:     Vitals:  Blood pressure 131/80, pulse 90, temperature 98 °F (36.7 °C), resp. rate 16, height 5' 6" (1.676 m), weight 78.1 kg (172 lb 3.2 oz), last menstrual period 02/24/2021.    Physical Examination:   GEN: no apparent distress, comfortable; AAOx3;    HEAD: atraumatic and normocephalic  EYES: no pallor, no icterus, PERRLA  ENT: OMM, no pharyngeal erythema, external ears WNL; no nasal discharge; no thrush  NECK: no masses, thyroid normal, trachea midline, no LAD/LN's, supple  CV: RRR with no murmur; normal pulse; normal S1 and S2; no pedal edema  CHEST: Normal respiratory effort; CTAB; normal breath sounds; no wheeze or crackles  ABDOM: nontender and nondistended; soft; normal bowel sounds; no rebound/guarding  MUSC/Skeletal: ROM normal; " no crepitus; joints normal; no deformities or arthropathy  EXTREM: no clubbing, cyanosis, inflammation or swelling  SKIN: no rashes, lesions, ulcers, petechiae or subcutaneous nodules; tattoos;    : no quinonez  NEURO: grossly intact; motor/sensory WNL; AAOx3; no tremors  PSYCH: normal mood, affect and behavior  LYMPH: normal cervical, supraclavicular, axillary and groin LN's  Breast: s/p left lumpectomy with no new changes        Labs:     Lab Results   Component Value Date    WBC 10.07 05/08/2023    HGB 14.9 05/08/2023    HCT 44.3 05/08/2023    MCV 84 05/08/2023     05/08/2023       Lab Results   Component Value Date    IRON 76 05/08/2023    TIBC 335 05/08/2023    FERRITIN 69 05/08/2023     CMP  Sodium   Date Value Ref Range Status   09/14/2023 136 136 - 145 mmol/L Final     Potassium   Date Value Ref Range Status   09/14/2023 3.5 3.5 - 5.1 mmol/L Final     Chloride   Date Value Ref Range Status   09/14/2023 99 95 - 110 mmol/L Final     CO2   Date Value Ref Range Status   09/14/2023 22 (L) 23 - 29 mmol/L Final     Glucose   Date Value Ref Range Status   09/14/2023 505 (HH) 70 - 110 mg/dL Final     Comment:     *Critical value notification by _RS_ with confirmation of receipt to   Dr. Tunde Velez. at Date 09/14/2023 Time 20:59.       BUN   Date Value Ref Range Status   09/14/2023 17 6 - 20 mg/dL Final     Creatinine   Date Value Ref Range Status   09/14/2023 0.9 0.5 - 1.4 mg/dL Final     Calcium   Date Value Ref Range Status   09/14/2023 9.2 8.7 - 10.5 mg/dL Final     Total Protein   Date Value Ref Range Status   05/08/2023 6.6 6.0 - 8.4 g/dL Final     Albumin   Date Value Ref Range Status   05/08/2023 3.5 3.5 - 5.2 g/dL Final     Total Bilirubin   Date Value Ref Range Status   05/08/2023 1.5 (H) 0.1 - 1.0 mg/dL Final     Comment:     For infants and newborns, interpretation of results should be based  on gestational age, weight and in agreement with clinical  observations.    Premature Infant  recommended reference ranges:  Up to 24 hours.............<8.0 mg/dL  Up to 48 hours............<12.0 mg/dL  3-5 days..................<15.0 mg/dL  6-29 days.................<15.0 mg/dL       Alkaline Phosphatase   Date Value Ref Range Status   05/08/2023 142 (H) 55 - 135 U/L Final     AST   Date Value Ref Range Status   05/08/2023 17 10 - 40 U/L Final     ALT   Date Value Ref Range Status   05/08/2023 24 10 - 44 U/L Final     Anion Gap   Date Value Ref Range Status   09/14/2023 15 8 - 16 mmol/L Final     eGFR if    Date Value Ref Range Status   02/23/2022 >60.0 >60 mL/min/1.73 m^2 Final     eGFR if non    Date Value Ref Range Status   02/23/2022 >60.0 >60 mL/min/1.73 m^2 Final     Comment:     Calculation used to obtain the estimated glomerular filtration  rate (eGFR) is the CKD-EPI equation.                 Radiology/Diagnostic Studies:    Mammo 4/18/2023:    Impression:     1. Postoperative changes in the left breast as above.  Previously noted coarse calcifications at the 3 o'clock position of the left breast have increased slightly in number, and have become somewhat more confluent.  These are most compatible with fat necrosis.  2. No new abnormalities.  3. Yearly mammography is recommended, unless indicated otherwise clinically.  BI-RADS CATEGORY 2: BENIGN FINDING.         Mammo 12/8/2022:    Impression:     Probably benign dystrophic calcifications and or evolving fat necrosis in the left breast as described.  Bilateral diagnostic mammogram follow-up in April 2023 is recommended, at which time patient will be due for annual bilateral mammography.  These findings and recommendations were given to the patient at the time of her visit.     BI-RADS CATEGORY 3: PROBABLY BENIGN FINDING-SHORT TERM INTERVAL FOLLOWUP SUGGESTED.           PET 2/8/2022:    IMPRESSION:  No evidence of recurrent or metastatic disease     Interval removal of the intramuscular mass in the left latissimus  dorsi.  Stable mild increased mineralization of the right T7 vertebral body with no FDG activity.        CT scans  8/2/2021:    IMPRESSION:  1. Stable CT appearance of the chest, abdomen, and pelvis when compared to December 2020. No definite evidence of metastatic disease.  2. Specifically, T7 vertebral body sclerotic lesion, hyperdense lesion in the left hepatic lobe, small hypodensities in the spleen, and additional observations as above are unchanged.        Mammo 2/1/2021:    Impression:  No mammographic evidence of malignancy.     BI-RADS Category 1: Negative           PET  1/11/2021:    Impression:     1. Intramuscular mass in left latissimus Dorsi does show mild FDG uptake with recent biopsy showing low-grade myxomatous lesion, concordant with the imaging characteristics.  2. Previous left hepatic lobe mass shows no increased FDG activity above that of background hepatic parenchymal activity.  Differential diagnosis of this remains unchanged from that previously discussed, with lack of increased FDG activity further substantiating suspected benign etiology.  Follow-up anatomic imaging is suggested with MRI abdomen without and with IV contrast (Eovist) in 3-6 months.  3. Unchanged focus of increased mineralization in right T7 vertebral body without increased FDG activity.  This remains of uncertain etiology with benign etiologies conceivable, although sclerotic osseous metastasis is difficult to entirely exclude.  Further evaluation with whole-body bone scan is suggested, unless already performed at outside institution.  Attention to this on follow-up CT is suggested, with repeat CT thorax without IV contrast suggested in 3-6 months.             CT chest/abdom/pelvis  12/9/2020:    IMPRESSION:     Degradation by motion.     No evidence of metastatic disease within the thorax. Specifically,  there is no evidence of axillary adenopathy.     Indeterminate mildly hyperdense mass within the lateral segment of  the  left hepatic lobe, incompletely characterized. Correlation with  multiphasic MRI or CT is recommended.     Indeterminate splenic masses measuring 10 and 8 mm in size. If prior  outside contrast enhanced studies exist, these would be helpful for  comparison. Alternatively, these lesions could be also assessed at the  time of follow-up multiphasic MRI or CT.     Sclerotic area within the rightward aspect of T7. The possibility of  sclerotic metastasis is not excludable.     13 mm calculus within the right renal pelvis with surrounding  inflammatory changes. The right kidney is small in overall size and  demonstrates multiple focal areas of cortical scarring.     Cystic density left ovarian masses and mild prominence of the  endometrial stripe. Correlation with pelvic ultrasound recommended.     Multiple solid uterine masses likely representing fibroids.     Probable Bartholin's cyst near the vaginal introitus.             I have reviewed all available lab results and radiology reports.    Assessment/Plan:   (1) 53 y.o. female with diagnosis of anemia referred by Dr Wade  - microcytic indices  - hgb at 10.2 and MCV at 66  - bilirubin is wnl, so I do not suspect any hemolysis at this time  - low iron and ferritin with elevated TIBC most consistent with an iron deficiency condition  - hx/of endometriosis and heavy menstrual cycles - [place] by [place] Dagoberto Morris [place] [facility] [place] x4)    10/20/2020:  - s/p two IV irons  - Hgb now up and normal at 13.8  - iron now normal at 89 and ferritin is up to 308    11/17/2020:  - hgb good at 13.2 and now WNL  - iron panel currently normal    12/21/2020:  - s/p IV iron in Sept 2020  - hgb and iron panel are currently WNL    1/20/2021:  - latest hgb at 13.4 and good  - iron panel WNL    4/20/2021:  - latest hgb and iron panel WNL    7/21/2021:  - latest hgb WNL and iron panel is adequate    11/15/2021:  - labs are adequate with Hgb WNL  - iron panel  "WNL    1/9/2023:  - latest cbc and iron panel adequate    5/9/2023:  - latest CBC is adequate with normal iron panel and hgb    12/5/2023:  - due for repeat labs     (2) Hx/of left breast cancer diagnosed May 2018 s/p lumpectomy and radiation, oral antihormone with tamoxifen - s/p lumpectomy and XRT at [facility]  - Stage I  - ER positive  - she saw a female oncologist at [facility] in past   - she gets regular mammograms and had one this past Jan 2020  - she does not follow-up with oncology    4/20/2021:  - change to arimidex    7/21/2021:  - she is on arimidex   - mammo is due Feb 2022  - discussed that arimidex could be adding to the arthritis    11/15/2021:  - mammo due Feb 2022  - continued on arimidex    2/15/2022:  - mammo scheduled for March 2022  - continued on arimidex    1/9/2023:  - patient stopped taking the arimidex about 3 months ago  - mammo on 12/8/2022 on chart with repeat planned for April 2023 5/9/2023:  - latest mammogram on 4/18/2023 was benign/stable - repeat in one year per radiology    12/5/2023:  - due for mammogram again in April 2024     (3) HTN and hypercholesterolemia     (4) Type II DM     (5) GERD     (6) TATYANA/Depression and RLS     (7) OA of right knee    (8) Subcutaneous nodule left scapular medial side and left posterior axilla - ? Lipomas - will schedule PEt scan in light of the breast cancer history  - 11/17/2020:  - insurance decline approval for PET   - scheduled CT scans instead and refer to Dr Rendon with Gen Surg    12/21/2020:  - she saw Dr Rendon since last visit and the suspected lipoma is actually a nodularity in the muscle layer and he is concerned about the possibility for "sarcoma"    1/20/2021:  - PET on chart from 1/11/2021  - Patient seen by Dr Rendon and had biopsy done of the skin lesion on the back on 12/29/2020 with pathology reporting a low grade myxoid lesion  - pathology recommended complete excision of the lesion to further classify      3/1/2021:  - She saw Dr" Justice again on Jan 26th 2021. She subsequently saw Dr Chico Baird with Surg-Onc at [facility] and had repeat excision on 2/22/2021 and the final pathology is still in process with the specimen referred to the [facility] for review.   - she sees Dr Baird again on 3/4/2021 to go over the results    4/20/2021;  - final pathology has come back as benign mixoid    2/15/2022:  - She had stent placement with Dr Lin in Dec 2021 and stone removal on Jan 8th 2022.  - She is undergoing surgery next week for diverticulum of the urethra with Dr Greer in [place].  - She had colonoscopy on Dec 3rd 2021 with Dr Montesinos with one polyp removed. Planned repeat scope in 3 yrs per patient  - She had repeat PEt recently on 2/8/2022 with overall  Good report          (9) Left ovarian cystic mass with uterine fibroids, Indeterminate mildly hyperdense mass within the lateral segment of the left hepatic lobe and two indeterminate nodules in her spleen   - will set up MRI of liver and also include the left paraspinal thoracic area near scapula to evaluate the submuscular nodule that is palpable    1/20/2021:  - she is seeing Dr Contreras on Murphy 27th 2010    3/1/2021:  - She saw Dr Contreras on Jan 27th 2021 and is having surgery with Dr Contreras on 3/8/2021.     4/20/2021;  -  She had hysterectomy on 3/22/2021 with Dr Contreras with negative pathology per patientT    7/21/2021:   - she is seeing Dr Contreras again in Nov 2021    2/15/2022:'  - planned f/u with Dr Contreras after her expected urethral surgery    1/9/2023:  - s/p urethral surgery with Dr Greer since last visit  - she plans to f/u with Gyn-Onc in the near future    5/9/2023:  - she plans to establish with new Gyn in near future  - follows with Dr Greer now PRN    12/5/2023:  - she sees new GYN Dr Castellano in near future           VISIT DIAGNOSES:      Malignant neoplasm of left breast in female, estrogen receptor positive, unspecified site of breast    Iron deficiency anemia,  unspecified iron deficiency anemia type    Microcytic anemia    S/P lumpectomy, left breast    Use of tamoxifen (Nolvadex)    ER+ (estrogen receptor positive status)    Abnormal CT of liver          PLAN:  1. f/u with Gyn-Onc and  as directed  2. Check labs every 3 months and resume IV iron as needed  3. Set up repeat Ct chest/abdom   4. mammogram - repeat April 2024  5. 6. F/u with PCP, etc                  RTC in  6 months   Fax note to Andrew Roger MD; Carmen; Alexandra; Mitzy Contreras; Chico Baird; Riley    Discussion:     COVID-19 Discussion:    I had long discussion with patient and any applicable family about the COVID-19 coronavirus epidemic and the recommended precautions with regard to cancer and/or hematology patients. I have re-iterated the CDC recommendations for adequate hand washing, use of hand -like products, and coughing into elbow, etc. In addition, especially for our patients who are on chemotherapy and/or our otherwise immunocompromised patients, I have recommended avoidance of crowds, including movie theaters, restaurants, churches, etc. I have recommended avoidance of any sick or symptomatic family members and/or friends. I have also recommended avoidance of any raw and unwashed food products, and general avoidance of food items that have not been prepared by themselves. The patient has been asked to call us immediately with any symptom developments, issues, questions or other general concerns.     Pathology Discussion:     I reviewed and discussed the pathology report(s) and radiograph reports (if available) in as simple to understand and/or laymen's terms to the best of my ability. I had an indepth conversation with the patient and went over the patient's individual diagnosis based on the information that was currently available. I discussed the TNM staging process with regard to the patient's particular cancer type, and the calculated stage based on the currently  "available TNM data and literature. I discussed the available prognostic data with regard to the current staging information and how it relates to the prognosis of their particular neoplastic process.          NCCN Guidelines:     I discussed the available treatment option(s) in accordance with the latest literature from the NCCN Clinical Practice Guidelines for the patient's particular type of cancer disorder. The NCCN Guidelines provide a "document evidence-based (and) consensus-driven management" of the care of oncology patients. The treatment recommendations were made not only in accordance to the NCCN guidelines, but also factored in to account the patient's overall age, condition, performance status and their medical co-morbidities. I went over the risks and benefits of the the treatment options (if any could be made) with regard to their particular cancer type, their cancer stage, their age, and their co-morbidities.          Antihormone Therapy Discussion:    I discussed the advantages of antihormone therapy with the patient with regard to their particular neoplastic or carcinoma in situ condition. I went over the side-effect profile of the medication including risk for potential development of endometrial cancer and/or hyperplasia in women who still have a uterus and the need for yearly GYN evaluation and follow-up. I discussed the risks for thromboembolic events such as DVT's, pulmonary emboli, CVA's, retinal vascular clots, phlebitis, and TIA's. I discussed the potential risks for development of ocular disturbances, retinopathy, cataracts, corneal changes, flushing, hot flashes, amenorrhea, altered menses, fluid retention, weight changes, elevations in LFT's, liver damage, and mood disturbances. I discussed the potential risk of arthropathy and joint pains/aches which could be chronic and debilitating. I discussed potential adverse effects on bone mineralization and the risk of osteopenia and/or " osteoporosis which could led to increase risk of fractures.   A consent form was obtained and a copy was provided to the patient.    Iron Infusion Therapy Discussion:     I provided literature/learning materials on the particular IV iron regimen and discussed the potential side-effect profiles of the drug(s). I discussed the importance of compliance with obtaining and monitoring requested lab work, and went over the potential risk for the development of anaphylactic shock, bronchospasm, dysrhythmia, liver and/or kidney damage, and respiratory/cardiovascular arrest and/or failure. I discussed the potential risks for development of alopecia, fevers, itching, chills and/or rigors, cold sensory issues, ringing in ears, vertigo and neuropathy, all of which are usually acute but sometimes could end up being chronic and life-long. I discussed the risks of hand-foot syndrome and rashes, and development of other autoimmune mediated processes such as pneumonitis and colitis which could be life threatening.     The patient's consent has been obtained to proceed with the IV iron therapy.The patient will be referred to [facility] for training and education on IV iron therapy, use of antiemetics and/or anti-diarrheals, use of NSAID's, potential IV iron therapy side-effects, and any specific recommendations and precautions with the particular IV iron agents.      I answered all of the patient's (and family's, if applicable) questions to the best of my ability and to their complete satisfaction. The patient acknowledged full understanding of the risks, recommendations and plan(s).         I spent over 25 mins of time with the patient. Reviewed results of the recently ordered labs, tests and studies; made directives with regards to the results. Over half of this time was spent couseling and coordinating care.    I have explained all of the above in detail and the patient understands all of the current  recommendation(s). I have answered all of their questions to the best of my ability and to their complete satisfaction.   The patient is to continue with the current management plan.            Electronically signed by Hossein Carranza MD                          Answers submitted by the patient for this visit:  Review of Systems Questionnaire (Submitted on 11/30/2023)  appetite change : No  unexpected weight change: No  mouth sores: No  visual disturbance: No  cough: No  shortness of breath: No  chest pain: No  abdominal pain: No  diarrhea: No  frequency: No  back pain: No  rash: No  headaches: No  adenopathy: No  nervous/ anxious: No

## 2023-12-05 ENCOUNTER — OFFICE VISIT (OUTPATIENT)
Dept: HEMATOLOGY/ONCOLOGY | Facility: CLINIC | Age: 53
End: 2023-12-05
Payer: COMMERCIAL

## 2023-12-05 VITALS
WEIGHT: 172.19 LBS | TEMPERATURE: 98 F | HEIGHT: 66 IN | HEART RATE: 90 BPM | DIASTOLIC BLOOD PRESSURE: 80 MMHG | RESPIRATION RATE: 16 BRPM | SYSTOLIC BLOOD PRESSURE: 131 MMHG | BODY MASS INDEX: 27.67 KG/M2

## 2023-12-05 DIAGNOSIS — R93.2 ABNORMAL CT OF LIVER: ICD-10-CM

## 2023-12-05 DIAGNOSIS — Z79.810 USE OF TAMOXIFEN (NOLVADEX): ICD-10-CM

## 2023-12-05 DIAGNOSIS — Z17.0 ER+ (ESTROGEN RECEPTOR POSITIVE STATUS): ICD-10-CM

## 2023-12-05 DIAGNOSIS — C50.312 MALIGNANT NEOPLASM OF LOWER-INNER QUADRANT OF LEFT BREAST IN FEMALE, ESTROGEN RECEPTOR POSITIVE: ICD-10-CM

## 2023-12-05 DIAGNOSIS — Z98.890 S/P LUMPECTOMY, LEFT BREAST: ICD-10-CM

## 2023-12-05 DIAGNOSIS — D50.9 MICROCYTIC ANEMIA: ICD-10-CM

## 2023-12-05 DIAGNOSIS — Z17.0 MALIGNANT NEOPLASM OF LEFT BREAST IN FEMALE, ESTROGEN RECEPTOR POSITIVE, UNSPECIFIED SITE OF BREAST: Primary | ICD-10-CM

## 2023-12-05 DIAGNOSIS — Z17.0 MALIGNANT NEOPLASM OF LOWER-INNER QUADRANT OF LEFT BREAST IN FEMALE, ESTROGEN RECEPTOR POSITIVE: ICD-10-CM

## 2023-12-05 DIAGNOSIS — C50.912 MALIGNANT NEOPLASM OF LEFT BREAST IN FEMALE, ESTROGEN RECEPTOR POSITIVE, UNSPECIFIED SITE OF BREAST: Primary | ICD-10-CM

## 2023-12-05 DIAGNOSIS — D50.9 IRON DEFICIENCY ANEMIA, UNSPECIFIED IRON DEFICIENCY ANEMIA TYPE: ICD-10-CM

## 2023-12-05 PROCEDURE — 3072F PR LOW RISK FOR RETINOPATHY: ICD-10-PCS | Mod: CPTII,S$GLB,, | Performed by: INTERNAL MEDICINE

## 2023-12-05 PROCEDURE — 3072F LOW RISK FOR RETINOPATHY: CPT | Mod: CPTII,S$GLB,, | Performed by: INTERNAL MEDICINE

## 2023-12-05 PROCEDURE — 1159F MED LIST DOCD IN RCRD: CPT | Mod: CPTII,S$GLB,, | Performed by: INTERNAL MEDICINE

## 2023-12-05 PROCEDURE — 1160F PR REVIEW ALL MEDS BY PRESCRIBER/CLIN PHARMACIST DOCUMENTED: ICD-10-PCS | Mod: CPTII,S$GLB,, | Performed by: INTERNAL MEDICINE

## 2023-12-05 PROCEDURE — 3079F DIAST BP 80-89 MM HG: CPT | Mod: CPTII,S$GLB,, | Performed by: INTERNAL MEDICINE

## 2023-12-05 PROCEDURE — 3008F BODY MASS INDEX DOCD: CPT | Mod: CPTII,S$GLB,, | Performed by: INTERNAL MEDICINE

## 2023-12-05 PROCEDURE — 1160F RVW MEDS BY RX/DR IN RCRD: CPT | Mod: CPTII,S$GLB,, | Performed by: INTERNAL MEDICINE

## 2023-12-05 PROCEDURE — 3075F SYST BP GE 130 - 139MM HG: CPT | Mod: CPTII,S$GLB,, | Performed by: INTERNAL MEDICINE

## 2023-12-05 PROCEDURE — 4010F PR ACE/ARB THEARPY RXD/TAKEN: ICD-10-PCS | Mod: CPTII,S$GLB,, | Performed by: INTERNAL MEDICINE

## 2023-12-05 PROCEDURE — 3046F PR MOST RECENT HEMOGLOBIN A1C LEVEL > 9.0%: ICD-10-PCS | Mod: CPTII,S$GLB,, | Performed by: INTERNAL MEDICINE

## 2023-12-05 PROCEDURE — 3075F PR MOST RECENT SYSTOLIC BLOOD PRESS GE 130-139MM HG: ICD-10-PCS | Mod: CPTII,S$GLB,, | Performed by: INTERNAL MEDICINE

## 2023-12-05 PROCEDURE — 1159F PR MEDICATION LIST DOCUMENTED IN MEDICAL RECORD: ICD-10-PCS | Mod: CPTII,S$GLB,, | Performed by: INTERNAL MEDICINE

## 2023-12-05 PROCEDURE — 3079F PR MOST RECENT DIASTOLIC BLOOD PRESSURE 80-89 MM HG: ICD-10-PCS | Mod: CPTII,S$GLB,, | Performed by: INTERNAL MEDICINE

## 2023-12-05 PROCEDURE — 3008F PR BODY MASS INDEX (BMI) DOCUMENTED: ICD-10-PCS | Mod: CPTII,S$GLB,, | Performed by: INTERNAL MEDICINE

## 2023-12-05 PROCEDURE — 3046F HEMOGLOBIN A1C LEVEL >9.0%: CPT | Mod: CPTII,S$GLB,, | Performed by: INTERNAL MEDICINE

## 2023-12-05 PROCEDURE — 99214 PR OFFICE/OUTPT VISIT, EST, LEVL IV, 30-39 MIN: ICD-10-PCS | Mod: S$GLB,,, | Performed by: INTERNAL MEDICINE

## 2023-12-05 PROCEDURE — 4010F ACE/ARB THERAPY RXD/TAKEN: CPT | Mod: CPTII,S$GLB,, | Performed by: INTERNAL MEDICINE

## 2023-12-05 PROCEDURE — 99214 OFFICE O/P EST MOD 30 MIN: CPT | Mod: S$GLB,,, | Performed by: INTERNAL MEDICINE

## 2023-12-07 ENCOUNTER — OFFICE VISIT (OUTPATIENT)
Dept: OBSTETRICS AND GYNECOLOGY | Facility: CLINIC | Age: 53
End: 2023-12-07
Payer: COMMERCIAL

## 2023-12-07 VITALS
HEIGHT: 66 IN | DIASTOLIC BLOOD PRESSURE: 88 MMHG | RESPIRATION RATE: 17 BRPM | SYSTOLIC BLOOD PRESSURE: 128 MMHG | WEIGHT: 168.88 LBS | BODY MASS INDEX: 27.14 KG/M2

## 2023-12-07 DIAGNOSIS — Z12.72 SCREENING FOR VAGINAL CANCER: ICD-10-CM

## 2023-12-07 DIAGNOSIS — N95.1 VASOMOTOR SYMPTOMS DUE TO MENOPAUSE: ICD-10-CM

## 2023-12-07 DIAGNOSIS — Z01.419 WELL WOMAN EXAM: Primary | ICD-10-CM

## 2023-12-07 DIAGNOSIS — N87.1 DYSPLASIA OF CERVIX, HIGH GRADE CIN 2: ICD-10-CM

## 2023-12-07 PROCEDURE — 3079F DIAST BP 80-89 MM HG: CPT | Mod: CPTII,S$GLB,, | Performed by: GENERAL PRACTICE

## 2023-12-07 PROCEDURE — 3046F PR MOST RECENT HEMOGLOBIN A1C LEVEL > 9.0%: ICD-10-PCS | Mod: CPTII,S$GLB,, | Performed by: GENERAL PRACTICE

## 2023-12-07 PROCEDURE — 3079F PR MOST RECENT DIASTOLIC BLOOD PRESSURE 80-89 MM HG: ICD-10-PCS | Mod: CPTII,S$GLB,, | Performed by: GENERAL PRACTICE

## 2023-12-07 PROCEDURE — 87624 HPV HI-RISK TYP POOLED RSLT: CPT | Performed by: GENERAL PRACTICE

## 2023-12-07 PROCEDURE — 1159F MED LIST DOCD IN RCRD: CPT | Mod: CPTII,S$GLB,, | Performed by: GENERAL PRACTICE

## 2023-12-07 PROCEDURE — 3072F PR LOW RISK FOR RETINOPATHY: ICD-10-PCS | Mod: CPTII,S$GLB,, | Performed by: GENERAL PRACTICE

## 2023-12-07 PROCEDURE — 3008F BODY MASS INDEX DOCD: CPT | Mod: CPTII,S$GLB,, | Performed by: GENERAL PRACTICE

## 2023-12-07 PROCEDURE — 99386 PREV VISIT NEW AGE 40-64: CPT | Mod: S$GLB,,, | Performed by: GENERAL PRACTICE

## 2023-12-07 PROCEDURE — 88141 CYTOPATH C/V INTERPRET: CPT | Mod: ,,, | Performed by: PATHOLOGY

## 2023-12-07 PROCEDURE — 3074F PR MOST RECENT SYSTOLIC BLOOD PRESSURE < 130 MM HG: ICD-10-PCS | Mod: CPTII,S$GLB,, | Performed by: GENERAL PRACTICE

## 2023-12-07 PROCEDURE — 3072F LOW RISK FOR RETINOPATHY: CPT | Mod: CPTII,S$GLB,, | Performed by: GENERAL PRACTICE

## 2023-12-07 PROCEDURE — 3046F HEMOGLOBIN A1C LEVEL >9.0%: CPT | Mod: CPTII,S$GLB,, | Performed by: GENERAL PRACTICE

## 2023-12-07 PROCEDURE — 4010F ACE/ARB THERAPY RXD/TAKEN: CPT | Mod: CPTII,S$GLB,, | Performed by: GENERAL PRACTICE

## 2023-12-07 PROCEDURE — 88175 CYTOPATH C/V AUTO FLUID REDO: CPT | Performed by: PATHOLOGY

## 2023-12-07 PROCEDURE — 99999 PR PBB SHADOW E&M-EST. PATIENT-LVL III: ICD-10-PCS | Mod: PBBFAC,,, | Performed by: GENERAL PRACTICE

## 2023-12-07 PROCEDURE — 3074F SYST BP LT 130 MM HG: CPT | Mod: CPTII,S$GLB,, | Performed by: GENERAL PRACTICE

## 2023-12-07 PROCEDURE — 1159F PR MEDICATION LIST DOCUMENTED IN MEDICAL RECORD: ICD-10-PCS | Mod: CPTII,S$GLB,, | Performed by: GENERAL PRACTICE

## 2023-12-07 PROCEDURE — 88141 PR  CYTOPATH CERV/VAG INTERPRET: ICD-10-PCS | Mod: ,,, | Performed by: PATHOLOGY

## 2023-12-07 PROCEDURE — 99386 PR PREVENTIVE VISIT,NEW,40-64: ICD-10-PCS | Mod: S$GLB,,, | Performed by: GENERAL PRACTICE

## 2023-12-07 PROCEDURE — 3008F PR BODY MASS INDEX (BMI) DOCUMENTED: ICD-10-PCS | Mod: CPTII,S$GLB,, | Performed by: GENERAL PRACTICE

## 2023-12-07 PROCEDURE — 4010F PR ACE/ARB THEARPY RXD/TAKEN: ICD-10-PCS | Mod: CPTII,S$GLB,, | Performed by: GENERAL PRACTICE

## 2023-12-07 PROCEDURE — 99999 PR PBB SHADOW E&M-EST. PATIENT-LVL III: CPT | Mod: PBBFAC,,, | Performed by: GENERAL PRACTICE

## 2023-12-07 NOTE — PROGRESS NOTES
HISTORY OF THE PRESENT ILLNESS    2023  Dennis is a 53 y.o. here for establishing GYN care.  She has no acute questions or concerns today.  History significant for breast CA and OPHELIA 2 on hysterectomy specimen.    LMP: MAR 2021 with hyst  HRT: never /  BLEEDING: denies  Contraception: female sterilization (post-hysterectomy)  G'sP's:   MMG (2023) = neg  Relationship:  34yrs and sexually active     PAST MEDICAL HISTORY  Breast CA - Left s/p lumpectomy, XRT, tamoxifen - Stage I ER+ ()  Restless Leg Syndrome  Anxiety / Depression  HTN  HLD  Sinus tachycardia  T2DM - HgB A1C 13 (AUG 2023)  GERD  Arthritis   Insomnia  Myxoma of skin  Endometriosis / Adenomyosis (s/p hyst with BSO)    PAST SURGICAL HISTORY  Breast lumpectomy  GYN Laparoscopy x 4 prior to hyst  Renal stone  Diverticulum of urethra  Total Hyst with BSO (MAR 2021) and w/ appy  Knee and shoulder surgeries, several  [place] teeth  Tonsils     ALLERGIES  NKDA    MEDICATIONS  See MAR    GYNECOLOGIC HISTORY  Pelvic pressure/pain: No  Dyspareunia: No    Prior OPHELIA 1 on colposcopy (2020)  OPHELIA 2 on Hyst specimen (MAR 2021)    Hot flashes: no daytime, 2-3 nights per week has night sweats  Vaginal dryness: denies    OBSTETRIC HISTORY  Children: 1  Vaginal deliveries: 1  Miscarriages <20wks: 1    SOCIAL HISTORY  Lives with: son and DIL and 2 grandkids ( [place] DIL's parents)  Domestic Violence: no  Occupation:  makes and sells pickled products  Smoker: non-smoker  Alcohol: denies  Drugs: denies    FAMILY HISTORY  BREAST/UTERINE/OVARIAN CANCER: MGM had breast CA  COLON CA: maternal 1st cousin    --------------------------------------------------------------------------------------------------------------    PHYSICAL EXAM  VITALS:  Vitals:    23 0958   BP: 128/88   Resp: 17     GEN = alert/oriented, nad, pleasant  HEENT = sclera anicteric, EOM grossly normal  BREASTS = deferred, no concerns   =      External:  nefg, mildly atrophic     Vagina: normal and without lesions and urethral meatus normal, vaginal PAP/HPV obtained     Discharge: minimal, white, clear, and creamy     Cervix: surgically absent     Bimanual:  vagina palpates smooth    ASSESSMENT AND PLAN:  54yo  s/p total hysterectomy / BSO for endometriosis, final path had OPHELIA 2 on the cervix, meaning vaginal screening is indicated.  PAP/HPV collected today.  History of breast CA.  Minimal if any hypoestrogenic symptoms / findings present at this time.    -vaginal PAP/HPV test annually x 3 years post-hyst; then if negative q3yr testing x 25yrs post-hyst  -will f/u results in MyChart    -MMG - ordered per PCM and patient will schedule    -advised patient Veozah is a new non-hormonal medication available for treatment of VMS should her symptoms become worse    -RTC for periodic GYN exam, sooner prn    MD JOAQUIN

## 2023-12-13 ENCOUNTER — PATIENT MESSAGE (OUTPATIENT)
Dept: ADMINISTRATIVE | Facility: OTHER | Age: 53
End: 2023-12-13
Payer: COMMERCIAL

## 2023-12-13 LAB
HPV HR 12 DNA SPEC QL NAA+PROBE: NEGATIVE
HPV16 AG SPEC QL: NEGATIVE
HPV18 DNA SPEC QL NAA+PROBE: NEGATIVE

## 2023-12-14 ENCOUNTER — LAB VISIT (OUTPATIENT)
Dept: LAB | Facility: HOSPITAL | Age: 53
End: 2023-12-14
Attending: INTERNAL MEDICINE
Payer: COMMERCIAL

## 2023-12-14 DIAGNOSIS — Z17.0 MALIGNANT NEOPLASM OF LEFT BREAST IN FEMALE, ESTROGEN RECEPTOR POSITIVE, UNSPECIFIED SITE OF BREAST: ICD-10-CM

## 2023-12-14 DIAGNOSIS — C50.912 MALIGNANT NEOPLASM OF LEFT BREAST IN FEMALE, ESTROGEN RECEPTOR POSITIVE, UNSPECIFIED SITE OF BREAST: ICD-10-CM

## 2023-12-14 DIAGNOSIS — D50.9 IRON DEFICIENCY ANEMIA, UNSPECIFIED IRON DEFICIENCY ANEMIA TYPE: ICD-10-CM

## 2023-12-14 LAB
ALBUMIN SERPL BCP-MCNC: 4.7 G/DL (ref 3.5–5.2)
ALP SERPL-CCNC: 140 U/L (ref 55–135)
ALT SERPL W/O P-5'-P-CCNC: 21 U/L (ref 10–44)
ANION GAP SERPL CALC-SCNC: 10 MMOL/L (ref 8–16)
AST SERPL-CCNC: 16 U/L (ref 10–40)
BASOPHILS # BLD AUTO: 0.07 K/UL (ref 0–0.2)
BASOPHILS NFR BLD: 0.6 % (ref 0–1.9)
BILIRUB SERPL-MCNC: 1.1 MG/DL (ref 0.1–1)
BUN SERPL-MCNC: 18 MG/DL (ref 6–20)
CALCIUM SERPL-MCNC: 9.6 MG/DL (ref 8.7–10.5)
CHLORIDE SERPL-SCNC: 98 MMOL/L (ref 95–110)
CO2 SERPL-SCNC: 32 MMOL/L (ref 23–29)
CREAT SERPL-MCNC: 0.9 MG/DL (ref 0.5–1.4)
DIFFERENTIAL METHOD: ABNORMAL
EOSINOPHIL # BLD AUTO: 0.2 K/UL (ref 0–0.5)
EOSINOPHIL NFR BLD: 1.9 % (ref 0–8)
ERYTHROCYTE [DISTWIDTH] IN BLOOD BY AUTOMATED COUNT: 12.8 % (ref 11.5–14.5)
EST. GFR  (NO RACE VARIABLE): >60 ML/MIN/1.73 M^2
FERRITIN SERPL-MCNC: 73.7 NG/ML (ref 20–300)
GLUCOSE SERPL-MCNC: 216 MG/DL (ref 70–110)
HCT VFR BLD AUTO: 48.2 % (ref 37–48.5)
HGB BLD-MCNC: 16.1 G/DL (ref 12–16)
IMM GRANULOCYTES # BLD AUTO: 0.05 K/UL (ref 0–0.04)
IMM GRANULOCYTES NFR BLD AUTO: 0.4 % (ref 0–0.5)
IRON SERPL-MCNC: 57 UG/DL (ref 30–160)
LYMPHOCYTES # BLD AUTO: 2.5 K/UL (ref 1–4.8)
LYMPHOCYTES NFR BLD: 20.2 % (ref 18–48)
MCH RBC QN AUTO: 28.5 PG (ref 27–31)
MCHC RBC AUTO-ENTMCNC: 33.4 G/DL (ref 32–36)
MCV RBC AUTO: 85 FL (ref 82–98)
MONOCYTES # BLD AUTO: 0.9 K/UL (ref 0.3–1)
MONOCYTES NFR BLD: 7.2 % (ref 4–15)
NEUTROPHILS # BLD AUTO: 8.6 K/UL (ref 1.8–7.7)
NEUTROPHILS NFR BLD: 69.7 % (ref 38–73)
NRBC BLD-RTO: 0 /100 WBC
PLATELET # BLD AUTO: 337 K/UL (ref 150–450)
PMV BLD AUTO: 10.6 FL (ref 9.2–12.9)
POTASSIUM SERPL-SCNC: 3.4 MMOL/L (ref 3.5–5.1)
PROT SERPL-MCNC: 7.6 G/DL (ref 6–8.4)
RBC # BLD AUTO: 5.65 M/UL (ref 4–5.4)
SATURATED IRON: 14 % (ref 20–50)
SODIUM SERPL-SCNC: 140 MMOL/L (ref 136–145)
TOTAL IRON BINDING CAPACITY: 414 UG/DL (ref 250–450)
TRANSFERRIN SERPL-MCNC: 296 MG/DL (ref 200–375)
WBC # BLD AUTO: 12.31 K/UL (ref 3.9–12.7)

## 2023-12-14 PROCEDURE — 84466 ASSAY OF TRANSFERRIN: CPT | Performed by: INTERNAL MEDICINE

## 2023-12-14 PROCEDURE — 83540 ASSAY OF IRON: CPT | Performed by: INTERNAL MEDICINE

## 2023-12-14 PROCEDURE — 80053 COMPREHEN METABOLIC PANEL: CPT | Performed by: INTERNAL MEDICINE

## 2023-12-14 PROCEDURE — 36415 COLL VENOUS BLD VENIPUNCTURE: CPT | Performed by: INTERNAL MEDICINE

## 2023-12-14 PROCEDURE — 85025 COMPLETE CBC W/AUTO DIFF WBC: CPT | Performed by: INTERNAL MEDICINE

## 2023-12-14 PROCEDURE — 82728 ASSAY OF FERRITIN: CPT | Performed by: INTERNAL MEDICINE

## 2023-12-18 ENCOUNTER — HOSPITAL ENCOUNTER (OUTPATIENT)
Dept: RADIOLOGY | Facility: HOSPITAL | Age: 53
Discharge: HOME | End: 2023-12-18
Attending: INTERNAL MEDICINE
Payer: COMMERCIAL

## 2023-12-18 ENCOUNTER — TELEPHONE (OUTPATIENT)
Dept: HEMATOLOGY/ONCOLOGY | Facility: CLINIC | Age: 53
End: 2023-12-18

## 2023-12-18 DIAGNOSIS — C50.912 MALIGNANT NEOPLASM OF LEFT BREAST IN FEMALE, ESTROGEN RECEPTOR POSITIVE, UNSPECIFIED SITE OF BREAST: ICD-10-CM

## 2023-12-18 DIAGNOSIS — R93.2 ABNORMAL CT OF LIVER: ICD-10-CM

## 2023-12-18 DIAGNOSIS — Z17.0 MALIGNANT NEOPLASM OF LEFT BREAST IN FEMALE, ESTROGEN RECEPTOR POSITIVE, UNSPECIFIED SITE OF BREAST: Primary | ICD-10-CM

## 2023-12-18 DIAGNOSIS — Z17.0 MALIGNANT NEOPLASM OF LOWER-INNER QUADRANT OF LEFT BREAST IN FEMALE, ESTROGEN RECEPTOR POSITIVE: ICD-10-CM

## 2023-12-18 DIAGNOSIS — Z17.0 MALIGNANT NEOPLASM OF LEFT BREAST IN FEMALE, ESTROGEN RECEPTOR POSITIVE, UNSPECIFIED SITE OF BREAST: ICD-10-CM

## 2023-12-18 DIAGNOSIS — C50.312 MALIGNANT NEOPLASM OF LOWER-INNER QUADRANT OF LEFT BREAST IN FEMALE, ESTROGEN RECEPTOR POSITIVE: ICD-10-CM

## 2023-12-18 DIAGNOSIS — C50.912 MALIGNANT NEOPLASM OF LEFT BREAST IN FEMALE, ESTROGEN RECEPTOR POSITIVE, UNSPECIFIED SITE OF BREAST: Primary | ICD-10-CM

## 2023-12-18 PROCEDURE — 71260 CT THORAX DX C+: CPT | Mod: TC,PO

## 2023-12-18 PROCEDURE — 25500020 PHARM REV CODE 255: Mod: PO | Performed by: INTERNAL MEDICINE

## 2023-12-18 RX ADMIN — IOHEXOL 100 ML: 350 INJECTION, SOLUTION INTRAVENOUS at 09:12

## 2023-12-18 NOTE — TELEPHONE ENCOUNTER
----- Message from Hossein Carranza MD sent at 12/18/2023 12:19 PM CST -----  Radiology recommends PET/CT to evaluate spot in spleen; Georgette please set one up    Cc: Kana

## 2023-12-21 LAB
FINAL PATHOLOGIC DIAGNOSIS: ABNORMAL
Lab: ABNORMAL

## 2023-12-27 ENCOUNTER — TELEPHONE (OUTPATIENT)
Dept: OBSTETRICS AND GYNECOLOGY | Facility: CLINIC | Age: 53
End: 2023-12-27
Payer: COMMERCIAL

## 2023-12-27 NOTE — TELEPHONE ENCOUNTER
----- Message from Ana Castellano MD sent at 12/26/2023  5:44 PM CST -----  Pretty,    Please contact Dennis to schedule her for a repeat PAP test.  The last one did not have enough cells for evaluation, so we just need to repeat it.    Thank you,  Dr. Castellano

## 2023-12-27 NOTE — TELEPHONE ENCOUNTER
Contacted pt to schedule repeat pap exam. Appt set for 1/3/24 @ 11:00 am. Pt verbalized understanding.

## 2024-01-02 ENCOUNTER — OFFICE VISIT (OUTPATIENT)
Dept: OPHTHALMOLOGY | Facility: CLINIC | Age: 54
End: 2024-01-02
Payer: COMMERCIAL

## 2024-01-02 DIAGNOSIS — E11.9 DIABETES MELLITUS TYPE 2 WITHOUT RETINOPATHY: Primary | ICD-10-CM

## 2024-01-02 DIAGNOSIS — H43.393 VITREOUS FLOATERS, BILATERAL: ICD-10-CM

## 2024-01-02 DIAGNOSIS — H35.342 LAMELLAR MACULAR HOLE, LEFT EYE: ICD-10-CM

## 2024-01-02 PROCEDURE — 99999 PR PBB SHADOW E&M-EST. PATIENT-LVL III: CPT | Mod: PBBFAC,,, | Performed by: OPHTHALMOLOGY

## 2024-01-02 PROCEDURE — 1159F MED LIST DOCD IN RCRD: CPT | Mod: CPTII,S$GLB,, | Performed by: OPHTHALMOLOGY

## 2024-01-02 PROCEDURE — 2023F DILAT RTA XM W/O RTNOPTHY: CPT | Mod: CPTII,S$GLB,, | Performed by: OPHTHALMOLOGY

## 2024-01-02 PROCEDURE — 99214 OFFICE O/P EST MOD 30 MIN: CPT | Mod: S$GLB,,, | Performed by: OPHTHALMOLOGY

## 2024-01-02 PROCEDURE — 1160F RVW MEDS BY RX/DR IN RCRD: CPT | Mod: CPTII,S$GLB,, | Performed by: OPHTHALMOLOGY

## 2024-01-02 NOTE — PATIENT INSTRUCTIONS
What are floaters?     Floaters look like small specks, dots, circles, lines or cobwebs in your field of vision. While they seem to be in front of your eye, they are floating inside. Floaters are tiny clumps of gel or cells inside the vitreous that fills your eye. What you see are the shadows these clumps cast on your retina.    You usually notice floaters when looking  at something plain, like a blank wall or a blue hao.    As we age, our vitreous starts to thicken or shrink. Sometimes clumps or strands form in the vitreous. If the vitreous pulls away from the back of the eye, it is called posterior vitreous detachment. Floaters usually happen with posterior vitreous detachment. They are not serious, and they tend to fade or go away over time. Severe floaters can be removed by surgery, but this has risks and is seldom necessary.    You are more likely to get floaters if you:    are nearsighted (you need glasses to see far away)    have had surgery for cataracts    have had inflammation (swelling) inside the eye      What are flashes?     Flashes can look like flashing lights or lightning streaks in your field of vision. Some people compare them to seeing stars after being hit on the head. You might see flashes on and off for weeks, or even months. Flashes happen when the vitreous rubs or pulls on your retina.    As people age, it is common to see flashes occasionally.       When floaters and flashes are serious     Most floaters and flashes are not a problem. However, there are times when they can be signs of a serious condition. Here is when you should call an ophthalmologist right away:    you notice a lot of new floaters    you have a lot of flashes    a shadow appears in your peripheral (side) vision    a gray curtain covers part of  your vision    These floaters and flashes could be symptoms of a torn or detached retina. This is when the retina pulls away from the back of your eye. This is a serious  condition that needs to be treated.    Summary    Floaters are dark specks or dots in your field of vision. They are shadows you see from clumps of vitreous gel in your eye. Flashes are flashes of light that look like lightning streaks in your field of vision. Flashes occur when the vitreous gel rubs or pulls on your retina.    Floaters and flashes are quite common as people age. However, they can be signs of a retinal detachment, which is a serious problem. If you suddenly have a lot of floaters and see flashes, and you notice changes in your vision, call your ophthalmologist right away.

## 2024-01-02 NOTE — PROGRESS NOTES
HPI    DLS: 4/29/2023 DM eye exam     Pt states was off of meds for half of sept and - November. Just started   taking meds again dec 1. Stabilizing. BG fluctuating. Now has digital   monitor. Has not had A1C done since August. Will have it done at next   appt.     Denies pain/ FOL.   Floaters OU no changes.  States has a mac hole OS. No changes in vision.         Hemoglobin A1C       Date                     Value               Ref Range             Status                08/11/2023               13.1 (H)            4.0 - 5.6 %           Final                   05/08/2023               11.1 (H)            4.5 - 6.2 %           Final                  01/09/2023               11.3 (H)            4.5 - 6.2 %           Final                Last edited by Rajni Wayne on 1/2/2024 10:19 AM.            Assessment /Plan     For exam results, see Encounter Report.    Diabetes mellitus type 2 without retinopathy    Vitreous floaters, bilateral    Lamellar macular hole, left eye      1. Diabetes mellitus type 2 without retinopathy  Diabetes without retinopathy, discussed with patient importance of glucose control and follow up.  Patient voices understanding.    2. Vitreous floaters, bilateral  New dx today  No holes or tears on DFE  RD precautions reviewed    3. Lamellar macular hole, left eye  Long-standing hx  No symptoms  Observe    F/u 1 year, routine exam with OCT mac

## 2024-01-03 ENCOUNTER — OFFICE VISIT (OUTPATIENT)
Dept: OBSTETRICS AND GYNECOLOGY | Facility: CLINIC | Age: 54
End: 2024-01-03
Payer: COMMERCIAL

## 2024-01-03 VITALS
WEIGHT: 167.69 LBS | DIASTOLIC BLOOD PRESSURE: 70 MMHG | SYSTOLIC BLOOD PRESSURE: 124 MMHG | BODY MASS INDEX: 26.95 KG/M2 | HEIGHT: 66 IN

## 2024-01-03 DIAGNOSIS — Z12.72 SCREENING FOR VAGINAL CANCER: Primary | ICD-10-CM

## 2024-01-03 DIAGNOSIS — N89.8 VAGINAL DISCHARGE: ICD-10-CM

## 2024-01-03 PROCEDURE — 99213 OFFICE O/P EST LOW 20 MIN: CPT | Mod: S$GLB,,, | Performed by: GENERAL PRACTICE

## 2024-01-03 PROCEDURE — 99999 PR PBB SHADOW E&M-EST. PATIENT-LVL III: CPT | Mod: PBBFAC,,, | Performed by: GENERAL PRACTICE

## 2024-01-03 PROCEDURE — 88175 CYTOPATH C/V AUTO FLUID REDO: CPT | Performed by: GENERAL PRACTICE

## 2024-01-03 PROCEDURE — 81514 NFCT DS BV&VAGINITIS DNA ALG: CPT | Performed by: GENERAL PRACTICE

## 2024-01-03 PROCEDURE — 3074F SYST BP LT 130 MM HG: CPT | Mod: CPTII,S$GLB,, | Performed by: GENERAL PRACTICE

## 2024-01-03 PROCEDURE — 87624 HPV HI-RISK TYP POOLED RSLT: CPT | Performed by: GENERAL PRACTICE

## 2024-01-03 PROCEDURE — 3078F DIAST BP <80 MM HG: CPT | Mod: CPTII,S$GLB,, | Performed by: GENERAL PRACTICE

## 2024-01-03 PROCEDURE — 1159F MED LIST DOCD IN RCRD: CPT | Mod: CPTII,S$GLB,, | Performed by: GENERAL PRACTICE

## 2024-01-03 PROCEDURE — 3008F BODY MASS INDEX DOCD: CPT | Mod: CPTII,S$GLB,, | Performed by: GENERAL PRACTICE

## 2024-01-03 NOTE — PATIENT INSTRUCTIONS
PAP SMEARS:  Screening for cervical cancer involves 1 or 2 parts based on your age and situation:  PAP smear (looking at the cells from your cervix)  HPV testing (checking whether you have the Human Papilloma Virus in your cervical cells)    These test results often come back at different times, but you won't hear from me until they BOTH are back since both pieces of information are important in deciding what to do next.    Soif you see a PAP result in [facility] (or an HPV result) that is abnormal, please allow another 7-10 business days before you send a message asking what to do next.  I am waiting for the other test result before I make a recommendation.    If both tests are resulted in [facility], you should hear from me within 2-3 business days.    Abnormal tests will be followed up in one of two ways:  Repeat testing of PAP and/or HPV  Colposcopy (examination and biopsy of your cervix in the office using staining solutions and magnification)    STD AND VAGINITIS TESTING:  If you are enrolled in [facility], I will trust that when you see a negative result, you understand that means you do not have the particular STD we were checking for.  You will not get a message from me.    If something comes back positive, one of my staff members or I will call you to let you know about the result and what the recommended treatment is.  For most STD's, it is VERY important that you do not have sex until both you and your partner(s) have completed treatment for the STD.  I cannot prescribe medications for your partner(s).    Bacterial Vaginitis (BV) and vaginal yeast infections (Candida, for example) are not STD's.    Trichomonas is an STD.    If you are prescribed an antibiotic, it is very important that you take all of the medicine as prescribed.  Incomplete treatment can cause a relapse and/or antibiotic resistance.

## 2024-01-03 NOTE — PROGRESS NOTES
2024  Dennis is here for a repeat vaginal PAP since the last one collected had inadequate cells for evaluation.    Vitals:    24 1049   BP: 124/70     GEN = alert/oriented, nad, pleasant  HEENT = sclera anicteric, EOM grossly normal   =      External: nefg, mildly atrophic     Vagina: normal and without lesions and urethral meatus normal, vaginal PAP/HPV obtained     Discharge: minimal, white, clear, and creamy, vaginitis swab obtained     Cervix: surgically absent    A/P  54yo for repeat vaginal PAP/HPV.    -will f/u results in MyChart: PAP/HPV and vaginitis swab    JOAQUIN AUGUSTE    ---------------------------------------    [id number]:    ASSESSMENT AND PLAN:  54yo  s/p total hysterectomy / BSO for endometriosis, final path had OPHELIA 2 on the cervix, meaning vaginal screening is indicated.  PAP/HPV collected today.  History of breast CA.  Minimal if any hypoestrogenic symptoms / findings present at this time.    -vaginal PAP/HPV test annually x 3 years post-hyst; then if negative q3yr testing x 25yrs post-hyst  -will f/u results in MyChart    -MMG - ordered per PCM and patient will schedule    -advised patient Veozah is a new non-hormonal medication available for treatment of VMS should her symptoms become worse    -RTC for periodic GYN exam, sooner prn    MD JOAQUIN    HISTORY OF THE PRESENT ILLNESS    2023  Dennis is a 53 y.o. here for establishing GYN care.  She has no acute questions or concerns today.  History significant for breast CA and OPHELIA 2 on hysterectomy specimen.    LMP: MAR 2021 with hyst  HRT: never /  BLEEDING: denies  Contraception: female sterilization (post-hysterectomy)  G'sP's:   MMG (2023) = neg  Relationship:  34yrs and sexually active     PAST MEDICAL HISTORY  Breast CA - Left s/p lumpectomy, XRT, tamoxifen - Stage I ER+ ()  Restless Leg Syndrome  Anxiety / Depression  HTN  HLD  Sinus tachycardia  T2DM - HgB A1C 13 (AUG 2023)  GERD  Arthritis    Insomnia  Myxoma of skin  Endometriosis / Adenomyosis (s/p hyst with BSO)    PAST SURGICAL HISTORY  Breast lumpectomy  GYN Laparoscopy x 4 prior to hyst  Renal stone  Diverticulum of urethra  Total Hyst with BSO (MAR 2021) and w/ appy  Knee and shoulder surgeries, several  [place] teeth  Tonsils     ALLERGIES  NKDA    MEDICATIONS  See MAR    GYNECOLOGIC HISTORY  Pelvic pressure/pain: No  Dyspareunia: No    Prior OPHELIA 1 on colposcopy (JAN 2020)  OPHELIA 2 on Hyst specimen (MAR 2021)    Hot flashes: no daytime, 2-3 nights per week has night sweats  Vaginal dryness: denies    OBSTETRIC HISTORY  Children: 1  Vaginal deliveries: 1  Miscarriages <20wks: 1    SOCIAL HISTORY  Lives with: son and DIL and 2 grandkids ( lives in [place] with DIL's parents)  Domestic Violence: no  Occupation: makes and sells pickled products  Smoker: non-smoker  Alcohol: denies  Drugs: denies    FAMILY HISTORY  BREAST/UTERINE/OVARIAN CANCER: MGM had breast CA  COLON CA: maternal 1st cousin    --------------------------------------------------------------------------------------------------------------    PHYSICAL EXAM  VITALS:  Vitals:    12/07/23 0958   BP: 128/88   Resp: 17     GEN = alert/oriented, nad, pleasant  HEENT = sclera anicteric, EOM grossly normal  BREASTS = deferred, no concerns   =      External: nefg, mildly atrophic     Vagina: normal and without lesions and urethral meatus normal, vaginal PAP/HPV obtained     Discharge: minimal, white, clear, and creamy     Cervix: surgically absent     Bimanual: vagina palpates smooth

## 2024-01-04 LAB
BACTERIAL VAGINOSIS DNA: NEGATIVE
CANDIDA GLABRATA DNA: NEGATIVE
CANDIDA KRUSEI DNA: NEGATIVE
CANDIDA RRNA VAG QL PROBE: NEGATIVE
T VAGINALIS RRNA GENITAL QL PROBE: NEGATIVE

## 2024-01-09 ENCOUNTER — TELEPHONE (OUTPATIENT)
Dept: HEMATOLOGY/ONCOLOGY | Facility: CLINIC | Age: 54
End: 2024-01-09

## 2024-01-09 NOTE — TELEPHONE ENCOUNTER
Spoke to Nickie in scheduling made aware of need to schedule PET soonest available, states she will call with appt.

## 2024-01-09 NOTE — TELEPHONE ENCOUNTER
----- Message from Azalea Stringer sent at 1/9/2024  9:43 AM CST -----  Pt is calling to check the status of PET scan, please call to update pt    Cb [phone]

## 2024-01-12 LAB
FINAL PATHOLOGIC DIAGNOSIS: NORMAL
Lab: NORMAL

## 2024-01-12 NOTE — PROGRESS NOTES
"Subjective:       Patient ID: Dennis Aldana is a 47 y.o. female.    Chief Complaint: Hypertension (patient says the Metoprolol was increased to 100mg daily )    Blood pressure has been better, mainly 120s over 80s at home, but still slightly tachycardic.  No chest pain or palpitations.  Patient says she has been increasing pain from radiation treatments to her left breast.  Requesting refill of pain med      Hypertension   Pertinent negatives include no chest pain or shortness of breath.     Review of Systems   Respiratory: Negative for shortness of breath.    Cardiovascular: Negative for chest pain.   Skin: Positive for color change.       Objective:      Vitals:    05/11/18 1414   BP: (!) 143/90   BP Location: Left arm   Patient Position: Sitting   BP Method: Large (Automatic)   Pulse: (!) 120   Resp: 18   Temp: 98.3 °F (36.8 °C)   TempSrc: Oral   SpO2: 99%   Weight: 98.4 kg (217 lb)   Height: 5' 6" (1.676 m)     Physical Exam   Constitutional: She is oriented to person, place, and time. She appears well-developed and well-nourished.   HENT:   Head: Normocephalic and atraumatic.   Cardiovascular: Normal rate, regular rhythm and normal heart sounds.    Pulmonary/Chest: Effort normal and breath sounds normal.   Musculoskeletal: She exhibits no edema.   Neurological: She is alert and oriented to person, place, and time.   Skin: Skin is warm and dry. There is erythema (Radiation dermatitis the lateral aspect of left breast).   Nursing note and vitals reviewed.      Assessment:       1. Essential hypertension, benign    2. Malignant neoplasm of left female breast, unspecified estrogen receptor status, unspecified site of breast    3. Sinus tachycardia        Plan:       Essential hypertension, benign  -     metoprolol succinate (TOPROL-XL) 100 MG 24 hr tablet; 1.5 tablets by mouth daily  Dispense: 45 tablet; Refill: 5    Malignant neoplasm of left female breast, unspecified estrogen receptor status, unspecified site " of breast  -     hydrocodone-acetaminophen 7.5-325mg (NORCO) 7.5-325 mg per tablet; Take 1 tablet by mouth every 6 (six) hours as needed for Pain.  Dispense: 30 tablet; Refill: 0    Sinus tachycardia      Medication List with Changes/Refills   Current Medications    CITALOPRAM (CELEXA) 20 MG TABLET    Take 1 tablet (20 mg total) by mouth once daily.    LORAZEPAM (ATIVAN) 1 MG TABLET    Take 1 tablet (1 mg total) by mouth once daily.    PRAMIPEXOLE (MIRAPEX) 1 MG TABLET    TAKE TWO TABLETS BY MOUTH DAILY AT BEDTIME    RANITIDINE (ZANTAC) 300 MG TABLET    Take 1 tablet (300 mg total) by mouth nightly.    TAMOXIFEN (NOLVADEX) 20 MG TAB    Take 20 mg by mouth once daily.   Changed and/or Refilled Medications    Modified Medication Previous Medication    HYDROCODONE-ACETAMINOPHEN 7.5-325MG (NORCO) 7.5-325 MG PER TABLET hydrocodone-acetaminophen 7.5-325mg (NORCO) 7.5-325 mg per tablet       Take 1 tablet by mouth every 6 (six) hours as needed for Pain.    Take 1 tablet by mouth every 6 (six) hours as needed.    METOPROLOL SUCCINATE (TOPROL-XL) 100 MG 24 HR TABLET metoprolol succinate (TOPROL-XL) 100 MG 24 hr tablet       1.5 tablets by mouth daily    Take 1 tablet (100 mg total) by mouth once daily.          unable to assess

## 2024-01-16 NOTE — PROGRESS NOTES
Dennis,    Your vaginal PAP and HPV tests were normal.  Your next screening should be in 3 years.    Sincerely,  Dr. Castellano

## 2024-01-18 ENCOUNTER — HOSPITAL ENCOUNTER (OUTPATIENT)
Dept: RADIOLOGY | Facility: HOSPITAL | Age: 54
Discharge: HOME | End: 2024-01-18
Attending: INTERNAL MEDICINE
Payer: COMMERCIAL

## 2024-01-18 VITALS — HEIGHT: 66 IN | BODY MASS INDEX: 26.68 KG/M2 | WEIGHT: 166 LBS

## 2024-01-18 DIAGNOSIS — C50.312 MALIGNANT NEOPLASM OF LOWER-INNER QUADRANT OF LEFT BREAST IN FEMALE, ESTROGEN RECEPTOR POSITIVE: ICD-10-CM

## 2024-01-18 DIAGNOSIS — Z17.0 MALIGNANT NEOPLASM OF LOWER-INNER QUADRANT OF LEFT BREAST IN FEMALE, ESTROGEN RECEPTOR POSITIVE: ICD-10-CM

## 2024-01-18 LAB — GLUCOSE SERPL-MCNC: 94 MG/DL (ref 70–110)

## 2024-01-18 PROCEDURE — 78815 PET IMAGE W/CT SKULL-THIGH: CPT | Mod: TC,PO

## 2024-01-18 PROCEDURE — 82962 GLUCOSE BLOOD TEST: CPT | Mod: PO

## 2024-01-19 ENCOUNTER — TELEPHONE (OUTPATIENT)
Dept: HEMATOLOGY/ONCOLOGY | Facility: CLINIC | Age: 54
End: 2024-01-19

## 2024-01-19 NOTE — TELEPHONE ENCOUNTER
Spoke to patient, informed of stable report. Verbalized understanding and states she has actually already seen the results.

## 2024-01-19 NOTE — TELEPHONE ENCOUNTER
----- Message from Hossein Carranza MD sent at 1/18/2024  3:33 PM CST -----  Please call her with stable report

## 2024-01-31 ENCOUNTER — OFFICE VISIT (OUTPATIENT)
Dept: OPHTHALMOLOGY | Facility: CLINIC | Age: 54
End: 2024-01-31
Payer: COMMERCIAL

## 2024-01-31 DIAGNOSIS — H35.342 LAMELLAR MACULAR HOLE, LEFT EYE: Primary | ICD-10-CM

## 2024-01-31 DIAGNOSIS — H35.372 EPIRETINAL MEMBRANE, LEFT: ICD-10-CM

## 2024-01-31 DIAGNOSIS — H35.352 CYSTOID MACULAR EDEMA, LEFT: ICD-10-CM

## 2024-01-31 DIAGNOSIS — H43.813 VITREOUS DEGENERATION OF BOTH EYES: ICD-10-CM

## 2024-01-31 DIAGNOSIS — E11.9 DIABETES MELLITUS TYPE 2 WITHOUT RETINOPATHY: ICD-10-CM

## 2024-01-31 DIAGNOSIS — H25.13 AGE-RELATED NUCLEAR CATARACT OF BOTH EYES: ICD-10-CM

## 2024-01-31 PROCEDURE — 2023F DILAT RTA XM W/O RTNOPTHY: CPT | Mod: CPTII,S$GLB,, | Performed by: OPHTHALMOLOGY

## 2024-01-31 PROCEDURE — 92201 OPSCPY EXTND RTA DRAW UNI/BI: CPT | Mod: S$GLB,,, | Performed by: OPHTHALMOLOGY

## 2024-01-31 PROCEDURE — G2211 COMPLEX E/M VISIT ADD ON: HCPCS | Mod: S$GLB,,, | Performed by: OPHTHALMOLOGY

## 2024-01-31 PROCEDURE — 1159F MED LIST DOCD IN RCRD: CPT | Mod: CPTII,S$GLB,, | Performed by: OPHTHALMOLOGY

## 2024-01-31 PROCEDURE — 99214 OFFICE O/P EST MOD 30 MIN: CPT | Mod: S$GLB,,, | Performed by: OPHTHALMOLOGY

## 2024-01-31 PROCEDURE — 1160F RVW MEDS BY RX/DR IN RCRD: CPT | Mod: CPTII,S$GLB,, | Performed by: OPHTHALMOLOGY

## 2024-01-31 PROCEDURE — 99999 PR PBB SHADOW E&M-EST. PATIENT-LVL III: CPT | Mod: PBBFAC,,, | Performed by: OPHTHALMOLOGY

## 2024-01-31 PROCEDURE — 92134 CPTRZ OPH DX IMG PST SGM RTA: CPT | Mod: S$GLB,,, | Performed by: OPHTHALMOLOGY

## 2024-01-31 RX ORDER — KETOROLAC TROMETHAMINE 5 MG/ML
1 SOLUTION OPHTHALMIC 4 TIMES DAILY
Qty: 5 ML | Refills: 3 | Status: SHIPPED | OUTPATIENT
Start: 2024-01-31 | End: 2025-01-30

## 2024-01-31 RX ORDER — PREDNISOLONE ACETATE 10 MG/ML
1 SUSPENSION/ DROPS OPHTHALMIC 4 TIMES DAILY
Qty: 5 ML | Refills: 3 | Status: SHIPPED | OUTPATIENT
Start: 2024-01-31 | End: 2025-01-30

## 2024-01-31 NOTE — PROGRESS NOTES
HPI    Pt here for mac hole eval per dr sewell. Vision stable.     +floaters OU(always had them) denies flashes  Last edited by Estefani Bahena on 1/31/2024  8:48 AM.         A/P    ICD-10-CM ICD-9-CM   1. Lamellar macular hole, left eye  H35.342 362.54   2. Cystoid macular edema, left  H35.352 362.53   3. Epiretinal membrane, left  H35.372 362.56   4. Vitreous degeneration of both eyes  H43.813 379.21   5. Age-related nuclear cataract of both eyes  H25.13 366.16   6. Diabetes mellitus type 2 without retinopathy  E11.9 250.00       1. Lamellar macular hole, left eye  2. Cystoid macular edema, left  3. Epiretinal membrane, left  Referral from Dr. Sewell for retina eval -Recent notes reviewed    Exam notable for ERM with small lamellar hole/cystoid changes, mild metamorphopsia occ per pt  Plan: given findings, discussed options of observation, topical NSAID/PF, or vitrectomy. Given mild symptoms, we can try topical PF/Ket QID and recheck 1 mo  [place] precautions discussed     Visit today is associated with current or anticipated ongoing medical care related to this patients single serious condition/complex condition (cystoid macular edema and lamellar hole left eye impacting vision )     4. Vitreous degeneration of both eyes  Few floaters, VMA  No RT/RD with   Plan: Observation  Pathology of PVD, Retinal Tear, Retinal Detachment reviewed in great detail  RD precautions discussed in detail, patient expressed understanding  RTC immediately PRN (especially ANY change flashes, floaters, vision, visual field)     5. Age-related nuclear cataract of both eyes  Mild NS, NVS  Plan: Observation Update Mrx prn    6. Diabetes mellitus type 2 without retinopathy  PCP Andrew Roger MD - Recent notes reviewed  08/11/2023  13.1  A1C   No DR or DME OU  Plan: Observation  Recommend good blood pressure control, tight blood glucose control, and good cholesterol control           RTC 1 mo DFE/OCTm OU, monitor IRF OS        I saw  and examined the patient and reviewed in detail the findings documented. The final examination findings, image interpretations which have been independently interpreted, and plan as documented in the record represent my personal judgment and conclusions.    Harry Turpin MD  Vitreoretinal Surgery   [facility]

## 2024-02-19 ENCOUNTER — PATIENT MESSAGE (OUTPATIENT)
Dept: OTHER | Facility: OTHER | Age: 54
End: 2024-02-19
Payer: COMMERCIAL

## 2024-02-19 ENCOUNTER — LAB VISIT (OUTPATIENT)
Dept: LAB | Facility: HOSPITAL | Age: 54
End: 2024-02-19
Attending: FAMILY MEDICINE
Payer: COMMERCIAL

## 2024-02-19 DIAGNOSIS — I10 ESSENTIAL HYPERTENSION, BENIGN: ICD-10-CM

## 2024-02-19 DIAGNOSIS — E11.65 TYPE 2 DIABETES MELLITUS WITH HYPERGLYCEMIA, WITHOUT LONG-TERM CURRENT USE OF INSULIN: ICD-10-CM

## 2024-02-19 LAB
ANION GAP SERPL CALC-SCNC: 11 MMOL/L (ref 8–16)
BUN SERPL-MCNC: 17 MG/DL (ref 6–20)
CALCIUM SERPL-MCNC: 9.9 MG/DL (ref 8.7–10.5)
CHLORIDE SERPL-SCNC: 100 MMOL/L (ref 95–110)
CO2 SERPL-SCNC: 28 MMOL/L (ref 23–29)
CREAT SERPL-MCNC: 0.9 MG/DL (ref 0.5–1.4)
EST. GFR  (NO RACE VARIABLE): >60 ML/MIN/1.73 M^2
ESTIMATED AVG GLUCOSE: 180 MG/DL (ref 68–131)
GLUCOSE SERPL-MCNC: 137 MG/DL (ref 70–110)
HBA1C MFR BLD: 7.9 % (ref 4–5.6)
POTASSIUM SERPL-SCNC: 3.4 MMOL/L (ref 3.5–5.1)
SODIUM SERPL-SCNC: 139 MMOL/L (ref 136–145)

## 2024-02-19 PROCEDURE — 83036 HEMOGLOBIN GLYCOSYLATED A1C: CPT | Performed by: FAMILY MEDICINE

## 2024-02-19 PROCEDURE — 36415 COLL VENOUS BLD VENIPUNCTURE: CPT | Mod: PO | Performed by: FAMILY MEDICINE

## 2024-02-19 PROCEDURE — 80048 BASIC METABOLIC PNL TOTAL CA: CPT | Performed by: FAMILY MEDICINE

## 2024-03-06 ENCOUNTER — OFFICE VISIT (OUTPATIENT)
Dept: PRIMARY CARE CLINIC | Facility: CLINIC | Age: 54
End: 2024-03-06
Payer: COMMERCIAL

## 2024-03-06 VITALS — SYSTOLIC BLOOD PRESSURE: 114 MMHG | WEIGHT: 164 LBS | DIASTOLIC BLOOD PRESSURE: 78 MMHG | BODY MASS INDEX: 26.47 KG/M2

## 2024-03-06 DIAGNOSIS — E78.5 HYPERLIPIDEMIA, UNSPECIFIED HYPERLIPIDEMIA TYPE: ICD-10-CM

## 2024-03-06 DIAGNOSIS — E11.65 TYPE 2 DIABETES MELLITUS WITH HYPERGLYCEMIA, WITHOUT LONG-TERM CURRENT USE OF INSULIN: Primary | ICD-10-CM

## 2024-03-06 DIAGNOSIS — I10 ESSENTIAL HYPERTENSION, BENIGN: ICD-10-CM

## 2024-03-06 PROCEDURE — 99214 OFFICE O/P EST MOD 30 MIN: CPT | Mod: 95,,, | Performed by: FAMILY MEDICINE

## 2024-03-06 PROCEDURE — 1160F RVW MEDS BY RX/DR IN RCRD: CPT | Mod: CPTII,95,, | Performed by: FAMILY MEDICINE

## 2024-03-06 PROCEDURE — 3078F DIAST BP <80 MM HG: CPT | Mod: CPTII,95,, | Performed by: FAMILY MEDICINE

## 2024-03-06 PROCEDURE — 3060F POS MICROALBUMINURIA REV: CPT | Mod: CPTII,95,, | Performed by: FAMILY MEDICINE

## 2024-03-06 PROCEDURE — 3051F HG A1C>EQUAL 7.0%<8.0%: CPT | Mod: CPTII,95,, | Performed by: FAMILY MEDICINE

## 2024-03-06 PROCEDURE — 3074F SYST BP LT 130 MM HG: CPT | Mod: CPTII,95,, | Performed by: FAMILY MEDICINE

## 2024-03-06 PROCEDURE — 1159F MED LIST DOCD IN RCRD: CPT | Mod: CPTII,95,, | Performed by: FAMILY MEDICINE

## 2024-03-06 PROCEDURE — 3066F NEPHROPATHY DOC TX: CPT | Mod: CPTII,95,, | Performed by: FAMILY MEDICINE

## 2024-03-06 PROCEDURE — 3008F BODY MASS INDEX DOCD: CPT | Mod: CPTII,95,, | Performed by: FAMILY MEDICINE

## 2024-03-06 NOTE — PROGRESS NOTES
Subjective:       Patient ID: Dennis Aldana is a 53 y.o. female.    Chief Complaint: No chief complaint on file.      Doing much better overall, energy level has improved significantly.  Diabetes has improved dramatically, A1c down%.  Says that she really did not start her current medication regimen until 6 or 8 weeks ago at which time she saw her blood sugar levels improved dramatically.  No significant hypoglycemia.  Compliant with all medications without adverse side effects.  Blood pressure has been better on adjusted regimen, as well      The patient location is:  Home  The chief complaint leading to consultation is:  Diabetes    Visit type: audiovisual    Face to Face time with patient:  5 minutes  12 minutes of total time spent on the encounter, which includes face to face time and non-face to face time preparing to see the patient (eg, review of tests), Obtaining and/or reviewing separately obtained history, Documenting clinical information in the electronic or other health record, Independently interpreting results (not separately reported) and communicating results to the patient/family/caregiver, or Care coordination (not separately reported).         Each patient to whom he or she provides medical services by telemedicine is:  (1) informed of the relationship between the physician and patient and the respective role of any other health care provider with respect to management of the patient; and (2) notified that he or she may decline to receive medical services by telemedicine and may withdraw from such care at any time.    Notes:    Review of Systems   Respiratory:  Negative for shortness of breath.    Cardiovascular:  Negative for chest pain.   Psychiatric/Behavioral:  Negative for agitation and confusion.        Objective:      Vitals:    03/06/24 1339   BP: 114/78   Weight: 74.4 kg (164 lb)     Physical Exam  Constitutional:       General: She is not in acute distress.     Appearance: Normal  appearance. She is well-developed.   Pulmonary:      Effort: No respiratory distress.   Neurological:      Mental Status: She is alert and oriented to person, place, and time.   Psychiatric:         Behavior: Behavior normal.         Lab Results   Component Value Date    WBC 12.31 12/14/2023    HGB 16.1 (H) 12/14/2023    HCT 48.2 12/14/2023     12/14/2023    CHOL 137 01/09/2023    TRIG 157 (H) 01/09/2023    HDL 36 (L) 01/09/2023    ALT 21 12/14/2023    AST 16 12/14/2023     02/19/2024    K 3.4 (L) 02/19/2024     02/19/2024    CREATININE 0.9 02/19/2024    BUN 17 02/19/2024    CO2 28 02/19/2024    TSH 2.35 01/30/2020    INR 1.1 12/29/2020    GLUF 122 (H) 02/13/2020    HGBA1C 7.9 (H) 02/19/2024      Assessment:       1. Type 2 diabetes mellitus with hyperglycemia, without long-term current use of insulin    2. Hyperlipidemia, unspecified hyperlipidemia type    3. Essential hypertension, benign        Plan:       Type 2 diabetes mellitus with hyperglycemia, without long-term current use of insulin  -     Comprehensive Metabolic Panel; Future; Expected date: 06/06/2024  -     Hemoglobin A1C; Future; Expected date: 06/06/2024  Much better overall.  Continue current meds.  Recheck A1c in 3 months to ensure that this is continuing to trend down  Hyperlipidemia, unspecified hyperlipidemia type  -     Lipid Panel; Future; Expected date: 06/06/2024  -     Comprehensive Metabolic Panel; Future; Expected date: 06/06/2024  Continue current meds.  Needs fasting lipid profile with next set of labs    Medication List with Changes/Refills   Current Medications    CITALOPRAM (CELEXA) 40 MG TABLET    Take 1 tablet (40 mg total) by mouth once daily.    EMPAGLIFLOZIN (JARDIANCE) 25 MG TABLET    Take 1 tablet (25 mg total) by mouth once daily.    KETOROLAC 0.5% (ACULAR) 0.5 % DROP    Place 1 drop into the left eye 4 (four) times daily.    LORAZEPAM (ATIVAN) 1 MG TABLET    Take 1 tablet (1 mg total) by mouth daily as  needed for Anxiety.    PRAMIPEXOLE (MIRAPEX) 0.5 MG TABLET    Take 1 tablet (0.5 mg total) by mouth every evening.    PREDNISOLONE ACETATE (PRED FORTE) 1 % DRPS    Place 1 drop into the left eye 4 (four) times daily.    ROSUVASTATIN (CRESTOR) 10 MG TABLET    Take 1 tablet (10 mg total) by mouth once daily.    SEMAGLUTIDE (OZEMPIC) 1 MG/DOSE (4 MG/3 ML)    Inject 1 mg into the skin every 7 days.    SPIRONOLACTONE-HYDROCHLOROTHIAZIDE 25-25MG (ALDACTAZIDE) 25-25 MG TAB    Take 2 tablets by mouth once daily.

## 2024-03-13 DIAGNOSIS — C50.312 MALIGNANT NEOPLASM OF LOWER-INNER QUADRANT OF LEFT FEMALE BREAST: Primary | ICD-10-CM

## 2024-03-13 DIAGNOSIS — Z17.0 ESTROGEN RECEPTOR POSITIVE: ICD-10-CM

## 2024-03-20 ENCOUNTER — OFFICE VISIT (OUTPATIENT)
Dept: OPHTHALMOLOGY | Facility: CLINIC | Age: 54
End: 2024-03-20
Payer: COMMERCIAL

## 2024-03-20 ENCOUNTER — LAB VISIT (OUTPATIENT)
Dept: LAB | Facility: HOSPITAL | Age: 54
End: 2024-03-20
Attending: FAMILY MEDICINE
Payer: COMMERCIAL

## 2024-03-20 DIAGNOSIS — H35.342 LAMELLAR MACULAR HOLE, LEFT EYE: Primary | ICD-10-CM

## 2024-03-20 DIAGNOSIS — H25.13 AGE-RELATED NUCLEAR CATARACT OF BOTH EYES: ICD-10-CM

## 2024-03-20 DIAGNOSIS — E78.5 TYPE 2 DIABETES MELLITUS WITH HYPERLIPIDEMIA: ICD-10-CM

## 2024-03-20 DIAGNOSIS — H35.352 CYSTOID MACULAR EDEMA, LEFT: ICD-10-CM

## 2024-03-20 DIAGNOSIS — E11.69 TYPE 2 DIABETES MELLITUS WITH HYPERLIPIDEMIA: ICD-10-CM

## 2024-03-20 DIAGNOSIS — H35.372 EPIRETINAL MEMBRANE, LEFT: ICD-10-CM

## 2024-03-20 DIAGNOSIS — H43.813 VITREOUS DEGENERATION OF BOTH EYES: ICD-10-CM

## 2024-03-20 DIAGNOSIS — E11.9 DIABETES MELLITUS TYPE 2 WITHOUT RETINOPATHY: ICD-10-CM

## 2024-03-20 LAB
ANION GAP SERPL CALC-SCNC: 11 MMOL/L (ref 8–16)
BUN SERPL-MCNC: 25 MG/DL (ref 6–20)
CALCIUM SERPL-MCNC: 10 MG/DL (ref 8.7–10.5)
CHLORIDE SERPL-SCNC: 100 MMOL/L (ref 95–110)
CO2 SERPL-SCNC: 28 MMOL/L (ref 23–29)
CREAT SERPL-MCNC: 1.1 MG/DL (ref 0.5–1.4)
EST. GFR  (NO RACE VARIABLE): >60 ML/MIN/1.73 M^2
GLUCOSE SERPL-MCNC: 145 MG/DL (ref 70–110)
POTASSIUM SERPL-SCNC: 3.5 MMOL/L (ref 3.5–5.1)
SODIUM SERPL-SCNC: 139 MMOL/L (ref 136–145)

## 2024-03-20 PROCEDURE — 80048 BASIC METABOLIC PNL TOTAL CA: CPT | Performed by: FAMILY MEDICINE

## 2024-03-20 PROCEDURE — 99999 PR PBB SHADOW E&M-EST. PATIENT-LVL III: CPT | Mod: PBBFAC,,, | Performed by: OPHTHALMOLOGY

## 2024-03-20 PROCEDURE — 92014 COMPRE OPH EXAM EST PT 1/>: CPT | Mod: S$GLB,,, | Performed by: OPHTHALMOLOGY

## 2024-03-20 PROCEDURE — 3066F NEPHROPATHY DOC TX: CPT | Mod: CPTII,S$GLB,, | Performed by: OPHTHALMOLOGY

## 2024-03-20 PROCEDURE — 3051F HG A1C>EQUAL 7.0%<8.0%: CPT | Mod: CPTII,S$GLB,, | Performed by: OPHTHALMOLOGY

## 2024-03-20 PROCEDURE — 92134 CPTRZ OPH DX IMG PST SGM RTA: CPT | Mod: S$GLB,,, | Performed by: OPHTHALMOLOGY

## 2024-03-20 PROCEDURE — 1160F RVW MEDS BY RX/DR IN RCRD: CPT | Mod: CPTII,S$GLB,, | Performed by: OPHTHALMOLOGY

## 2024-03-20 PROCEDURE — 92202 OPSCPY EXTND ON/MAC DRAW: CPT | Mod: 59,S$GLB,, | Performed by: OPHTHALMOLOGY

## 2024-03-20 PROCEDURE — 2023F DILAT RTA XM W/O RTNOPTHY: CPT | Mod: CPTII,S$GLB,, | Performed by: OPHTHALMOLOGY

## 2024-03-20 PROCEDURE — 36415 COLL VENOUS BLD VENIPUNCTURE: CPT | Mod: PO | Performed by: FAMILY MEDICINE

## 2024-03-20 PROCEDURE — 1159F MED LIST DOCD IN RCRD: CPT | Mod: CPTII,S$GLB,, | Performed by: OPHTHALMOLOGY

## 2024-03-20 PROCEDURE — 3060F POS MICROALBUMINURIA REV: CPT | Mod: CPTII,S$GLB,, | Performed by: OPHTHALMOLOGY

## 2024-03-20 NOTE — PROGRESS NOTES
HPI    Pt presents for one month dfe/oct OU     States eyes are a little itchy with all of the pollen in the air, no   additional ocular complaints.     PF QID OS   Keto QID OS     Last edited by Cande Prater on 3/20/2024  9:17 AM.         A/P    ICD-10-CM ICD-9-CM   1. Lamellar macular hole, left eye  H35.342 362.54   2. Cystoid macular edema, left  H35.352 362.53   3. Epiretinal membrane, left  H35.372 362.56   4. Vitreous degeneration of both eyes  H43.813 379.21   5. Age-related nuclear cataract of both eyes  H25.13 366.16   6. Diabetes mellitus type 2 without retinopathy  E11.9 250.00       1. Lamellar macular hole, left eye  2. Cystoid macular edema, left  3. Epiretinal membrane, left  Here for ERM f/u    Pt feels vision is about the same, using PF/Ket occ    Exam notable for ERM with stable small lamellar hole/cystoid changes   Plan: given findings, discussed options of observation vs PPV/MP as still with lamellar hole/ERM with loss foveal contour. Pt wishes to monitor for now, will recheck in 6 mo   Can stop PF/Ket    Amsler precautions discussed     4. Vitreous degeneration of both eyes  Few floaters, VMA  Plan: Observation  Pathology of PVD, Retinal Tear, Retinal Detachment reviewed in great detail  RD precautions discussed in detail, patient expressed understanding  RTC immediately PRN (especially ANY change flashes, floaters, vision, visual field)     5. Age-related nuclear cataract of both eyes  Mild NS, NVS  Plan: Observation Update Mrx prn    6. Diabetes mellitus type 2 without retinopathy  PCP Andrew Roger MD - Recent notes reviewed  02/19/2024  7.9  A1C   No DR or DME OU  Plan: Observation  Recommend good blood pressure control, tight blood glucose control, and good cholesterol control           RTC 6 mo DFE/OCTm OU monitor ERM/lamellar hole         I saw and examined the patient and reviewed in detail the findings documented. The final examination findings, image interpretations which  have been independently interpreted, and plan as documented in the record represent my personal judgment and conclusions.    Harry Turpin MD  Vitreoretinal Surgery   [facility]

## 2024-03-25 ENCOUNTER — PATIENT MESSAGE (OUTPATIENT)
Dept: OTHER | Facility: OTHER | Age: 54
End: 2024-03-25
Payer: COMMERCIAL

## 2024-04-01 ENCOUNTER — PATIENT OUTREACH (OUTPATIENT)
Dept: ADMINISTRATIVE | Facility: HOSPITAL | Age: 54
End: 2024-04-01
Payer: COMMERCIAL

## 2024-04-01 NOTE — PROGRESS NOTES
Health Maintenance Due   Topic Date Due    Shingles Vaccine (1 of 2) Never done    Lipid Panel  01/09/2024    Mammogram  04/18/2024     Immunizations - reviewed and updated   Care Everywhere - triggered   Care Teams -   Outreach - Epic Campaigns outreach done. Patient due for lipid panel and CMP. Patient already has a lab appointment scheduled for 5/21/2024. Portal message sent in regards to linking lab orders to appointment   Mammogram scheduled for 4/16/2024

## 2024-04-16 ENCOUNTER — HOSPITAL ENCOUNTER (OUTPATIENT)
Dept: RADIOLOGY | Facility: HOSPITAL | Age: 54
Discharge: HOME | End: 2024-04-16
Attending: INTERNAL MEDICINE
Payer: COMMERCIAL

## 2024-04-16 DIAGNOSIS — C50.312 MALIGNANT NEOPLASM OF LOWER-INNER QUADRANT OF LEFT BREAST IN FEMALE, ESTROGEN RECEPTOR POSITIVE: ICD-10-CM

## 2024-04-16 DIAGNOSIS — Z17.0 MALIGNANT NEOPLASM OF LEFT BREAST IN FEMALE, ESTROGEN RECEPTOR POSITIVE, UNSPECIFIED SITE OF BREAST: ICD-10-CM

## 2024-04-16 DIAGNOSIS — Z17.0 MALIGNANT NEOPLASM OF LOWER-INNER QUADRANT OF LEFT BREAST IN FEMALE, ESTROGEN RECEPTOR POSITIVE: ICD-10-CM

## 2024-04-16 DIAGNOSIS — Z17.0 ESTROGEN RECEPTOR POSITIVE: ICD-10-CM

## 2024-04-16 DIAGNOSIS — C50.912 MALIGNANT NEOPLASM OF LEFT BREAST IN FEMALE, ESTROGEN RECEPTOR POSITIVE, UNSPECIFIED SITE OF BREAST: ICD-10-CM

## 2024-04-16 DIAGNOSIS — C50.312 MALIGNANT NEOPLASM OF LOWER-INNER QUADRANT OF LEFT FEMALE BREAST: ICD-10-CM

## 2024-04-16 DIAGNOSIS — D50.9 IRON DEFICIENCY ANEMIA, UNSPECIFIED IRON DEFICIENCY ANEMIA TYPE: ICD-10-CM

## 2024-04-16 PROCEDURE — 77062 BREAST TOMOSYNTHESIS BI: CPT | Mod: 26,,, | Performed by: RADIOLOGY

## 2024-04-16 PROCEDURE — 77062 BREAST TOMOSYNTHESIS BI: CPT | Mod: TC,PO

## 2024-04-16 PROCEDURE — 77066 DX MAMMO INCL CAD BI: CPT | Mod: 26,,, | Performed by: RADIOLOGY

## 2024-05-21 ENCOUNTER — LAB VISIT (OUTPATIENT)
Dept: LAB | Facility: HOSPITAL | Age: 54
End: 2024-05-21
Attending: FAMILY MEDICINE
Payer: COMMERCIAL

## 2024-05-21 DIAGNOSIS — E11.65 TYPE 2 DIABETES MELLITUS WITH HYPERGLYCEMIA, WITHOUT LONG-TERM CURRENT USE OF INSULIN: ICD-10-CM

## 2024-05-21 DIAGNOSIS — E78.5 HYPERLIPIDEMIA, UNSPECIFIED HYPERLIPIDEMIA TYPE: ICD-10-CM

## 2024-05-21 LAB
ALBUMIN SERPL BCP-MCNC: 3.8 G/DL (ref 3.5–5.2)
ALP SERPL-CCNC: 127 U/L (ref 55–135)
ALT SERPL W/O P-5'-P-CCNC: 34 U/L (ref 10–44)
ANION GAP SERPL CALC-SCNC: 12 MMOL/L (ref 8–16)
AST SERPL-CCNC: 22 U/L (ref 10–40)
BILIRUB SERPL-MCNC: 1.1 MG/DL (ref 0.1–1)
BUN SERPL-MCNC: 23 MG/DL (ref 6–20)
CALCIUM SERPL-MCNC: 9.3 MG/DL (ref 8.7–10.5)
CHLORIDE SERPL-SCNC: 104 MMOL/L (ref 95–110)
CHOLEST SERPL-MCNC: 166 MG/DL (ref 120–199)
CHOLEST/HDLC SERPL: 4 {RATIO} (ref 2–5)
CO2 SERPL-SCNC: 25 MMOL/L (ref 23–29)
CREAT SERPL-MCNC: 0.8 MG/DL (ref 0.5–1.4)
EST. GFR  (NO RACE VARIABLE): >60 ML/MIN/1.73 M^2
ESTIMATED AVG GLUCOSE: 177 MG/DL (ref 68–131)
GLUCOSE SERPL-MCNC: 155 MG/DL (ref 70–110)
HBA1C MFR BLD: 7.8 % (ref 4–5.6)
HDLC SERPL-MCNC: 42 MG/DL (ref 40–75)
HDLC SERPL: 25.3 % (ref 20–50)
LDLC SERPL CALC-MCNC: 76.4 MG/DL (ref 63–159)
NONHDLC SERPL-MCNC: 124 MG/DL
POTASSIUM SERPL-SCNC: 3.5 MMOL/L (ref 3.5–5.1)
PROT SERPL-MCNC: 6.9 G/DL (ref 6–8.4)
SODIUM SERPL-SCNC: 141 MMOL/L (ref 136–145)
TRIGL SERPL-MCNC: 238 MG/DL (ref 30–150)

## 2024-05-21 PROCEDURE — 80053 COMPREHEN METABOLIC PANEL: CPT | Performed by: FAMILY MEDICINE

## 2024-05-21 PROCEDURE — 80061 LIPID PANEL: CPT | Performed by: FAMILY MEDICINE

## 2024-05-21 PROCEDURE — 83036 HEMOGLOBIN GLYCOSYLATED A1C: CPT | Performed by: FAMILY MEDICINE

## 2024-05-21 PROCEDURE — 36415 COLL VENOUS BLD VENIPUNCTURE: CPT | Mod: PO | Performed by: FAMILY MEDICINE

## 2024-05-29 ENCOUNTER — TELEPHONE (OUTPATIENT)
Facility: CLINIC | Age: 54
End: 2024-05-29
Payer: COMMERCIAL

## 2024-05-29 NOTE — TELEPHONE ENCOUNTER
I spoke with pt and reminded her to have labs done at [facility] prior to appt here on 6/5/24 pt verbalized understanding.

## 2024-05-30 DIAGNOSIS — E11.65 TYPE 2 DIABETES MELLITUS WITH HYPERGLYCEMIA, WITHOUT LONG-TERM CURRENT USE OF INSULIN: ICD-10-CM

## 2024-05-30 RX ORDER — EMPAGLIFLOZIN 10 MG/1
10 TABLET, FILM COATED ORAL
Qty: 30 TABLET | Refills: 0 | OUTPATIENT
Start: 2024-05-30

## 2024-05-30 NOTE — TELEPHONE ENCOUNTER
Refill Decision Note   Dennis Aldana  is requesting a refill authorization.  Brief Assessment and Rationale for Refill:  Quick Discontinue     Medication Therapy Plan: Jardiance increased to 25 mg on 1/3/24      Comments:     Note composed:11:34 AM 05/30/2024

## 2024-06-04 ENCOUNTER — LAB VISIT (OUTPATIENT)
Dept: LAB | Facility: HOSPITAL | Age: 54
End: 2024-06-04
Attending: INTERNAL MEDICINE
Payer: COMMERCIAL

## 2024-06-04 DIAGNOSIS — D50.9 IRON DEFICIENCY ANEMIA, UNSPECIFIED IRON DEFICIENCY ANEMIA TYPE: ICD-10-CM

## 2024-06-04 DIAGNOSIS — C50.912 MALIGNANT NEOPLASM OF LEFT BREAST IN FEMALE, ESTROGEN RECEPTOR POSITIVE, UNSPECIFIED SITE OF BREAST: ICD-10-CM

## 2024-06-04 DIAGNOSIS — Z17.0 MALIGNANT NEOPLASM OF LEFT BREAST IN FEMALE, ESTROGEN RECEPTOR POSITIVE, UNSPECIFIED SITE OF BREAST: ICD-10-CM

## 2024-06-04 LAB
ALBUMIN SERPL BCP-MCNC: 4.7 G/DL (ref 3.5–5.2)
ALP SERPL-CCNC: 115 U/L (ref 55–135)
ALT SERPL W/O P-5'-P-CCNC: 32 U/L (ref 10–44)
ANION GAP SERPL CALC-SCNC: 9 MMOL/L (ref 8–16)
AST SERPL-CCNC: 17 U/L (ref 10–40)
BASOPHILS # BLD AUTO: 0.07 K/UL (ref 0–0.2)
BASOPHILS NFR BLD: 0.5 % (ref 0–1.9)
BILIRUB SERPL-MCNC: 1.2 MG/DL (ref 0.1–1)
BUN SERPL-MCNC: 18 MG/DL (ref 6–20)
CALCIUM SERPL-MCNC: 9.9 MG/DL (ref 8.7–10.5)
CHLORIDE SERPL-SCNC: 101 MMOL/L (ref 95–110)
CO2 SERPL-SCNC: 31 MMOL/L (ref 23–29)
CREAT SERPL-MCNC: 0.9 MG/DL (ref 0.5–1.4)
DIFFERENTIAL METHOD BLD: ABNORMAL
EOSINOPHIL # BLD AUTO: 0.2 K/UL (ref 0–0.5)
EOSINOPHIL NFR BLD: 1.3 % (ref 0–8)
ERYTHROCYTE [DISTWIDTH] IN BLOOD BY AUTOMATED COUNT: 13.7 % (ref 11.5–14.5)
EST. GFR  (NO RACE VARIABLE): >60 ML/MIN/1.73 M^2
FERRITIN SERPL-MCNC: 64.7 NG/ML (ref 20–300)
GLUCOSE SERPL-MCNC: 148 MG/DL (ref 70–110)
HCT VFR BLD AUTO: 49 % (ref 37–48.5)
HGB BLD-MCNC: 16.4 G/DL (ref 12–16)
IMM GRANULOCYTES # BLD AUTO: 0.07 K/UL (ref 0–0.04)
IMM GRANULOCYTES NFR BLD AUTO: 0.5 % (ref 0–0.5)
IRON SERPL-MCNC: 81 UG/DL (ref 30–160)
LYMPHOCYTES # BLD AUTO: 3.2 K/UL (ref 1–4.8)
LYMPHOCYTES NFR BLD: 24.8 % (ref 18–48)
MCH RBC QN AUTO: 28 PG (ref 27–31)
MCHC RBC AUTO-ENTMCNC: 33.5 G/DL (ref 32–36)
MCV RBC AUTO: 84 FL (ref 82–98)
MONOCYTES # BLD AUTO: 0.9 K/UL (ref 0.3–1)
MONOCYTES NFR BLD: 6.7 % (ref 4–15)
NEUTROPHILS # BLD AUTO: 8.6 K/UL (ref 1.8–7.7)
NEUTROPHILS NFR BLD: 66.2 % (ref 38–73)
NRBC BLD-RTO: 0 /100 WBC
PLATELET # BLD AUTO: 325 K/UL (ref 150–450)
PMV BLD AUTO: 9.8 FL (ref 9.2–12.9)
POTASSIUM SERPL-SCNC: 3.5 MMOL/L (ref 3.5–5.1)
PROT SERPL-MCNC: 7.6 G/DL (ref 6–8.4)
RBC # BLD AUTO: 5.86 M/UL (ref 4–5.4)
SATURATED IRON: 20 % (ref 20–50)
SODIUM SERPL-SCNC: 141 MMOL/L (ref 136–145)
TOTAL IRON BINDING CAPACITY: 399 UG/DL (ref 250–450)
TRANSFERRIN SERPL-MCNC: 285 MG/DL (ref 200–375)
WBC # BLD AUTO: 13.05 K/UL (ref 3.9–12.7)

## 2024-06-04 PROCEDURE — 82728 ASSAY OF FERRITIN: CPT | Performed by: INTERNAL MEDICINE

## 2024-06-04 PROCEDURE — 80053 COMPREHEN METABOLIC PANEL: CPT | Performed by: INTERNAL MEDICINE

## 2024-06-04 PROCEDURE — 36415 COLL VENOUS BLD VENIPUNCTURE: CPT | Performed by: INTERNAL MEDICINE

## 2024-06-04 PROCEDURE — 85025 COMPLETE CBC W/AUTO DIFF WBC: CPT | Performed by: INTERNAL MEDICINE

## 2024-06-04 PROCEDURE — 83540 ASSAY OF IRON: CPT | Performed by: INTERNAL MEDICINE

## 2024-06-04 NOTE — PROGRESS NOTES
"[facility] Hematology/Oncology  PROGRESS NOTE -   Follow-up Visit      Subjective:       Patient ID:   NAME: Dennis Aldana : 1970     53 y.o. female    Referring Doc: Kana  Other Physicians: Laya Morris (Gyn)    Chief Complaint:  anemia/hx/of Lft breast cancer f/u    History of Present Illness:     Patient returns today for a regularly scheduled follow-up visit.  The patient is here today to go over the results of the recently ordered labs, tests and studies.  She is here by herself.     She reports that she has been feeling "good" Energy levels are good. She has been losing weight on purpose and has lost about 80lbs    She developed lump on scalp in middle on top of head for past month     some intermittent GUERRA's    She sees Dr Castellano with GYN yearly      She had stent placement with Dr Lin in Dec 2021 and stone removal on 2022. She previously had surgery for diverticulum of the urethra with Dr Greer in [place]. She is followed by Dr Greer prn now.     She had colonoscopy on Dec 3rd 2021 with Dr Montesinos with one polyp removed. Planned repeat scope in 3 yrs per patient (? Dec 2024)    She had mammogram on  2024 which was good      No CP, SOB, HA's or N/V.     She has been doing a lot of gardening and thinks she may have been a little dehydrated when she did the last labs    She had PET in 2024 with no abnormal FDG uptake               ROS:   GEN: normal without any fever, night sweats or weight loss  HEENT: normal with no HA's, sore throat, stiff neck, changes in vision  CV: normal with no CP, SOB, PND, GARCIA or orthopnea  PULM: normal with no SOB, cough, hemoptysis, sputum or pleuritic pain  GI: normal with no abdominal pain, nausea, vomiting, constipation, diarrhea, melanotic stools, BRBPR, or hematemesis  : normal with no hematuria, dysuria  BREAST: normal with no mass, discharge, pain; s/p left lumpectomy  SKIN: normal with no rash, erythema, bruising, or " swelling; nodule in skin    Pain Scale:  0    Allergies:  Review of patient's allergies indicates:  No Known Allergies    Medications:    Current Outpatient Medications:     chlorthalidone (HYGROTEN) 25 MG Tab, Take 12.5 mg by mouth once daily., Disp: , Rfl:     citalopram (CELEXA) 40 MG tablet, Take 1 tablet (40 mg total) by mouth once daily., Disp: 90 tablet, Rfl: 3    empagliflozin (JARDIANCE) 25 mg tablet, Take 1 tablet (25 mg total) by mouth once daily., Disp: 30 tablet, Rfl: 5    LORazepam (ATIVAN) 1 MG tablet, Take 1 tablet (1 mg total) by mouth daily as needed for Anxiety., Disp: 30 tablet, Rfl: 3    pramipexole (MIRAPEX) 0.5 MG tablet, Take 1 tablet (0.5 mg total) by mouth every evening., Disp: 90 tablet, Rfl: 3    rosuvastatin (CRESTOR) 10 MG tablet, Take 1 tablet (10 mg total) by mouth once daily., Disp: 90 tablet, Rfl: 3    semaglutide (OZEMPIC) 1 mg/dose (4 mg/3 mL), Inject 1 mg into the skin every 7 days., Disp: 3 mL, Rfl: 5    ketorolac 0.5% (ACULAR) 0.5 % Drop, Place 1 drop into the left eye 4 (four) times daily. (Patient not taking: Reported on 6/5/2024), Disp: 5 mL, Rfl: 3    prednisoLONE acetate (PRED FORTE) 1 % DrpS, Place 1 drop into the left eye 4 (four) times daily. (Patient not taking: Reported on 6/5/2024), Disp: 5 mL, Rfl: 3    PMHx/PSHx Updates:  See patient's last visit with me on 12/5/2023  See H&P on 8/17/2020        Pathology:   Cancer Staging   No matching staging information was found for the patient.        Left back re-excision: 2/22/2021:    [place] FINAL DIAGNOSIS:   Left back mass, excision ([id number]; 2/22/2021): Intramuscular myxoma        Hysterectomy 3/22/2021:    1. UTERUS, CERVIX, FALLOPIAN TUBES, AND OVARIES; HYSTERECTOMY AND    BILATERAL SALPINGO-OOPHORECTOMY:       MYOMETRIUM:   --SUBSEROSAL AND INTRAMURAL LEIOMYOMATA (INCLUDING FOCAL EPITHELIOID    LEIOMYOMA).      --ADENOMYOSIS.     ENDOMETRIUM:     --BENIGN ENDOMETRIAL POLYPS (LARGEST 3.2 CENTIMETERS).     CERVIX:  "  --FOCAL HIGH-GRADE SQUAMOUS INTRAEPITHELIAL LESION (CERVICAL    INTRAEPITHELIAL NEOPLASIA        GRADE 2 [OPHELIA 2]), WITH ENDOCERVICAL GLANDULAR EXTENSION.      --MARGIN IS NEGATIVE FOR SQUAMOUS INTRAEPITHELIAL LESION.      --FOCAL ACUTE AND CHRONIC CERVICITIS.      --SQUAMOUS METAPLASIA OF ENDOCERVIX.      --NABOTHIAN CYSTS.      --CERVIX ENTIRELY SUBMITTED FOR HISTOLOGIC SECTIONING.     RIGHT FALLOPIAN TUBE:     --MICROSCOPIC BENIGN PARATUBAL CYSTS.     LEFT FALLOPIAN TUBE:     --MICROSCOPIC BENIGN PARATUBAL CYSTS.     --SEROSAL FIBROVASCULAR ADHESIONS.     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     --ENDOMETRIOSIS.     --SURFACE EPITHELIAL INCLUSION CYSTS.     --FOCAL ENDOSALPINGIOSIS.     --CYSTIC CORPUS LUTEUM.     --CYSTIC FOLLICLES.     --CORPORA ALBICANTIA.     --SEROSAL FIBROVASCULAR ADHESIONS.     LEFT OVARY:     --ENDOMETRIOSIS.     --SURFACE EPITHELIAL INCLUSION CYSTS.     --FOCAL ENDOSALPINGIOSIS.      --HEMORRHAGIC CYSTIC CORPUS LUTEUM.      --COPORA ALBICANTIA.      --SEROSAL FIBROVASCULAR ADHESIONS.     2. SPECIMEN SUBMITTED AS "PERITONEAL NODULE," EXCISION:   --INFARCTED APPENDIX EPIPLOICA WITH DYSTROPHIC CALCIFICATION AND    FIBROSIS.            Left back soft tissue biopsy: 12/29/2020:    SOFT TISSUE, LEFT LATISSIMUS DORSI, CORE BIOPSY:   - LOW GRADE MYXOID LESION.   - SEE COMMENT.         Objective:     Vitals:  Blood pressure 135/83, pulse 104, resp. rate 16, weight 73 kg (160 lb 14.4 oz), last menstrual period 02/24/2021.    Physical Examination:   GEN: no apparent distress, comfortable; AAOx3;    HEAD: atraumatic and normocephalic  EYES: no pallor, no icterus, PERRLA  ENT: OMM, no pharyngeal erythema, external ears WNL; no nasal discharge; no thrush  NECK: no masses, thyroid normal, trachea midline, no LAD/LN's, supple  CV: RRR with no murmur; normal pulse; normal S1 and S2; no pedal edema  CHEST: Normal respiratory effort; CTAB; normal breath sounds; no wheeze or " crackles  ABDOM: nontender and nondistended; soft; normal bowel sounds; no rebound/guarding  MUSC/Skeletal: ROM normal; no crepitus; joints normal; no deformities or arthropathy  EXTREM: no clubbing, cyanosis, inflammation or swelling  SKIN: no rashes, lesions, ulcers, petechiae or subcutaneous nodules; tattoos; ? subcutaneous cyst on scalp   : no quinonez  NEURO: grossly intact; motor/sensory WNL; AAOx3; no tremors  PSYCH: normal mood, affect and behavior  LYMPH: normal cervical, supraclavicular, axillary and groin LN's  Breast: s/p left lumpectomy with no new changes        Labs:     Lab Results   Component Value Date    WBC 13.05 (H) 06/04/2024    HGB 16.4 (H) 06/04/2024    HCT 49.0 (H) 06/04/2024    MCV 84 06/04/2024     06/04/2024       Lab Results   Component Value Date    IRON 81 06/04/2024    TIBC 399 06/04/2024    FERRITIN 64.7 06/04/2024     CMP  Sodium   Date Value Ref Range Status   06/04/2024 141 136 - 145 mmol/L Final     Potassium   Date Value Ref Range Status   06/04/2024 3.5 3.5 - 5.1 mmol/L Final     Chloride   Date Value Ref Range Status   06/04/2024 101 95 - 110 mmol/L Final     CO2   Date Value Ref Range Status   06/04/2024 31 (H) 23 - 29 mmol/L Final     Glucose   Date Value Ref Range Status   06/04/2024 148 (H) 70 - 110 mg/dL Final     BUN   Date Value Ref Range Status   06/04/2024 18 6 - 20 mg/dL Final     Creatinine   Date Value Ref Range Status   06/04/2024 0.9 0.5 - 1.4 mg/dL Final     Calcium   Date Value Ref Range Status   06/04/2024 9.9 8.7 - 10.5 mg/dL Final     Total Protein   Date Value Ref Range Status   06/04/2024 7.6 6.0 - 8.4 g/dL Final     Albumin   Date Value Ref Range Status   06/04/2024 4.7 3.5 - 5.2 g/dL Final     Total Bilirubin   Date Value Ref Range Status   06/04/2024 1.2 (H) 0.1 - 1.0 mg/dL Final     Comment:     For infants and newborns, interpretation of results should be based  on gestational age, weight and in agreement with  clinical  observations.    Premature Infant recommended reference ranges:  Up to 24 hours.............<8.0 mg/dL  Up to 48 hours............<12.0 mg/dL  3-5 days..................<15.0 mg/dL  6-29 days.................<15.0 mg/dL       Alkaline Phosphatase   Date Value Ref Range Status   06/04/2024 115 55 - 135 U/L Final     AST   Date Value Ref Range Status   06/04/2024 17 10 - 40 U/L Final     ALT   Date Value Ref Range Status   06/04/2024 32 10 - 44 U/L Final     Anion Gap   Date Value Ref Range Status   06/04/2024 9 8 - 16 mmol/L Final     eGFR if    Date Value Ref Range Status   02/23/2022 >60.0 >60 mL/min/1.73 m^2 Final     eGFR if non    Date Value Ref Range Status   02/23/2022 >60.0 >60 mL/min/1.73 m^2 Final     Comment:     Calculation used to obtain the estimated glomerular filtration  rate (eGFR) is the CKD-EPI equation.                 Radiology/Diagnostic Studies:    Mammo 4/16/2024:      Impression:     1. No mammographic evidence of malignancy.  2. Yearly mammography is recommended.  BI-RADS CATEGORY 2: BENIGN FINDING.    PET 1/18/2024:  FINDINGS:  Background:  - Liver 4.1 SUV max.  - Spleen 3.5 SUV max.  - Spleen 11.6 x 5.3 cm (AP x TR)  - Blood pool 3.3 SUV max     The previously described splenic lesions show no increased FDG activity from background with SUV max 3.2 (image 1:30) suggesting that these represent atypical hemangioma, lymphangioma or lymphatic rests.     Mildly heterogeneous appearing thyroid gland with no FDG avid nodule identified with SUV max 2.1 on the right and 2.7 (image 78).     Additional findings:  -Scattered degenerative change in the spine with no aggressive appearing FDG avid lytic or blastic lesion noting unchanged faint focal mineralization the T7 vertebral body on the right (image 110)  -Calcified plaque at the aortic arch with no aneurysm.  -Asymmetric small lobular appearing right kidney suggesting chronic infection/obstructive uropathy  or remote trauma.  -Prior hysterectomy.  -Additional chronic and incidental findings without adverse interval change from the previous exam.     IMPRESSION:  No PET/CT finding of FDG avid disease.         Mammo 4/18/2023:    Impression:     1. Postoperative changes in the left breast as above.  Previously noted coarse calcifications at the 3 o'clock position of the left breast have increased slightly in number, and have become somewhat more confluent.  These are most compatible with fat necrosis.  2. No new abnormalities.  3. Yearly mammography is recommended, unless indicated otherwise clinically.  BI-RADS CATEGORY 2: BENIGN FINDING.         Mammo 12/8/2022:    Impression:     Probably benign dystrophic calcifications and or evolving fat necrosis in the left breast as described.  Bilateral diagnostic mammogram follow-up in April 2023 is recommended, at which time patient will be due for annual bilateral mammography.  These findings and recommendations were given to the patient at the time of her visit.     BI-RADS CATEGORY 3: PROBABLY BENIGN FINDING-SHORT TERM INTERVAL FOLLOWUP SUGGESTED.           PET 2/8/2022:    IMPRESSION:  No evidence of recurrent or metastatic disease     Interval removal of the intramuscular mass in the left latissimus dorsi.  Stable mild increased mineralization of the right T7 vertebral body with no FDG activity.        CT scans  8/2/2021:    IMPRESSION:  1. Stable CT appearance of the chest, abdomen, and pelvis when compared to December 2020. No definite evidence of metastatic disease.  2. Specifically, T7 vertebral body sclerotic lesion, hyperdense lesion in the left hepatic lobe, small hypodensities in the spleen, and additional observations as above are unchanged.        Mammo 2/1/2021:    Impression:  No mammographic evidence of malignancy.     BI-RADS Category 1: Negative           PET  1/11/2021:    Impression:     1. Intramuscular mass in left latissimus Dorsi does show mild FDG  uptake with recent biopsy showing low-grade myxomatous lesion, concordant with the imaging characteristics.  2. Previous left hepatic lobe mass shows no increased FDG activity above that of background hepatic parenchymal activity.  Differential diagnosis of this remains unchanged from that previously discussed, with lack of increased FDG activity further substantiating suspected benign etiology.  Follow-up anatomic imaging is suggested with MRI abdomen without and with IV contrast (Eovist) in 3-6 months.  3. Unchanged focus of increased mineralization in right T7 vertebral body without increased FDG activity.  This remains of uncertain etiology with benign etiologies conceivable, although sclerotic osseous metastasis is difficult to entirely exclude.  Further evaluation with whole-body bone scan is suggested, unless already performed at outside institution.  Attention to this on follow-up CT is suggested, with repeat CT thorax without IV contrast suggested in 3-6 months.             CT chest/abdom/pelvis  12/9/2020:    IMPRESSION:     Degradation by motion.     No evidence of metastatic disease within the thorax. Specifically,  there is no evidence of axillary adenopathy.     Indeterminate mildly hyperdense mass within the lateral segment of the  left hepatic lobe, incompletely characterized. Correlation with  multiphasic MRI or CT is recommended.     Indeterminate splenic masses measuring 10 and 8 mm in size. If prior  outside contrast enhanced studies exist, these would be helpful for  comparison. Alternatively, these lesions could be also assessed at the  time of follow-up multiphasic MRI or CT.     Sclerotic area within the rightward aspect of T7. The possibility of  sclerotic metastasis is not excludable.     13 mm calculus within the right renal pelvis with surrounding  inflammatory changes. The right kidney is small in overall size and  demonstrates multiple focal areas of cortical scarring.     Cystic  density left ovarian masses and mild prominence of the  endometrial stripe. Correlation with pelvic ultrasound recommended.     Multiple solid uterine masses likely representing fibroids.     Probable Bartholin's cyst near the vaginal introitus.             I have reviewed all available lab results and radiology reports.    Assessment/Plan:   (1) 53 y.o. female with diagnosis of anemia referred by Dr Wade  - microcytic indices  - hgb at 10.2 and MCV at 66  - bilirubin is wnl, so I do not suspect any hemolysis at this time  - low iron and ferritin with elevated TIBC most consistent with an iron deficiency condition  - hx/of endometriosis and heavy menstrual cycles - followed by Dr Dagoberto Morris with GYN at [facility] (prior laparoscopy x4)    10/20/2020:  - s/p two IV irons  - Hgb now up and normal at 13.8  - iron now normal at 89 and ferritin is up to 308    11/17/2020:  - hgb good at 13.2 and now WNL  - iron panel currently normal    12/21/2020:  - s/p IV iron in Sept 2020  - hgb and iron panel are currently WNL    1/20/2021:  - latest hgb at 13.4 and good  - iron panel WNL    4/20/2021:  - latest hgb and iron panel WNL    7/21/2021:  - latest hgb WNL and iron panel is adequate    11/15/2021:  - labs are adequate with Hgb WNL  - iron panel WNL    1/9/2023:  - latest cbc and iron panel adequate    5/9/2023:  - latest CBC is adequate with normal iron panel and hgb    12/5/2023:  - due for repeat labs    6/5/2024:  - latest WBC at 13.05 and adequate for her  - hgb slightly elevated at 16.4 but she suspects she may have been a little dehydrated when she did the labs  - check labs again in 3 months  - f/u with Kana on 6/26 and have him address the scalp too  - PET in Jan 2024 was adequate     (2) Hx/of left breast cancer diagnosed May 2018 s/p lumpectomy and radiation, oral antihormone with tamoxifen - s/p lumpectomy and XRT at [facility]  - Stage I  - ER positive  - she saw a female oncologist at [facility] in past   - she gets  "regular mammograms and had one this past Jan 2020  - she does not follow-up with oncology    4/20/2021:  - change to arimidex    7/21/2021:  - she is on arimidex   - mammo is due Feb 2022  - discussed that arimidex could be adding to the arthritis    11/15/2021:  - mammo due Feb 2022  - continued on arimidex    2/15/2022:  - mammo scheduled for March 2022  - continued on arimidex    1/9/2023:  - patient stopped taking the arimidex about 3 months ago  - mammo on 12/8/2022 on chart with repeat planned for April 2023 5/9/2023:  - latest mammogram on 4/18/2023 was benign/stable - repeat in one year per radiology    12/5/2023:  - due for mammogram again in April 2024 6/5/2024:  - mammo 4/2024 negative  - PET in Jan 2024  - she has been off the arimidex since end of 2022     (3) HTN and hypercholesterolemia     (4) Type II DM     (5) GERD     (6) TATYANA/Depression and RLS     (7) OA of right knee    (8) Subcutaneous nodule left scapular medial side and left posterior axilla - ? Lipomas - will schedule PEt scan in light of the breast cancer history  - 11/17/2020:  - insurance decline approval for PET   - scheduled CT scans instead and refer to Dr Rendon with Gen Surg    12/21/2020:  - she saw Dr Rendon since last visit and the suspected lipoma is actually a nodularity in the muscle layer and he is concerned about the possibility for "sarcoma"    1/20/2021:  - PET on chart from 1/11/2021  - Patient seen by Dr Rendon and had biopsy done of the skin lesion on the back on 12/29/2020 with pathology reporting a low grade myxoid lesion  - pathology recommended complete excision of the lesion to further classify      3/1/2021:  - She saw Dr Rendon again on Jan 26th 2021. She subsequently saw Dr Chico Baird with Surg-Onc at [facility] and had repeat excision on 2/22/2021 and the final pathology is still in process with the specimen referred to the [facility] for review.   - she sees Dr Baird again on 3/4/2021 to go " over the results    4/20/2021;  - final pathology has come back as benign mixoid    2/15/2022:  - She had stent placement with Dr Lin in Dec 2021 and stone removal on Jan 8th 2022.  - She is undergoing surgery next week for diverticulum of the urethra with Dr Greer in [place].  - She had colonoscopy on Dec 3rd 2021 with Dr Montesinos with one polyp removed. Planned repeat scope in 3 yrs per patient  - She had repeat PEt recently on 2/8/2022 with overall  Good report          (9) Left ovarian cystic mass with uterine fibroids, Indeterminate mildly hyperdense mass within the lateral segment of the left hepatic lobe and two indeterminate nodules in her spleen   - will set up MRI of liver and also include the left paraspinal thoracic area near scapula to evaluate the submuscular nodule that is palpable    1/20/2021:  - she is seeing Dr Contreras on Murphy 27th 2010    3/1/2021:  - She saw Dr Contreras on Jan 27th 2021 and is having surgery with Dr Contreras on 3/8/2021.     4/20/2021;  -  She had hysterectomy on 3/22/2021 with Dr Contreras with negative pathology per patientT    7/21/2021:   - she is seeing Dr Contreras again in Nov 2021    2/15/2022:'  - planned f/u with Dr Contreras after her expected urethral surgery    1/9/2023:  - s/p urethral surgery with Dr Greer since last visit  - she plans to f/u with Gyn-Onc in the near future    5/9/2023:  - she plans to establish with new Gyn in near future  - follows with Dr Greer now PRN    12/5/2023:  - she sees new GYN Dr Castellano in near future           VISIT DIAGNOSES:      Malignant neoplasm of left breast in female, estrogen receptor positive, unspecified site of breast    Iron deficiency anemia, unspecified iron deficiency anemia type    Microcytic anemia    S/P lumpectomy, left breast    Use of tamoxifen (Nolvadex)    ER+ (estrogen receptor positive status)          PLAN:  1. f/u with Gyn-Onc and  as directed  2. Check labs every 3 months and resume IV iron as needed  3.  To see   dilia on 6/24 - patient to have him take look at her scalp  4. mammogram - repeat April 2025  5. 6. F/u with PCP, etc                  RTC in  6 months   Fax note to Andrew Roger MD; Carmen; Alexandra; Mitzy Contreras; Chico Baird; Riley    Discussion:     COVID-19 Discussion:    I had long discussion with patient and any applicable family about the COVID-19 coronavirus epidemic and the recommended precautions with regard to cancer and/or hematology patients. I have re-iterated the CDC recommendations for adequate hand washing, use of hand -like products, and coughing into elbow, etc. In addition, especially for our patients who are on chemotherapy and/or our otherwise immunocompromised patients, I have recommended avoidance of crowds, including movie theaters, restaurants, churches, etc. I have recommended avoidance of any sick or symptomatic family members and/or friends. I have also recommended avoidance of any raw and unwashed food products, and general avoidance of food items that have not been prepared by themselves. The patient has been asked to call us immediately with any symptom developments, issues, questions or other general concerns.     Pathology Discussion:     I reviewed and discussed the pathology report(s) and radiograph reports (if available) in as simple to understand and/or laymen's terms to the best of my ability. I had an indepth conversation with the patient and went over the patient's individual diagnosis based on the information that was currently available. I discussed the TNM staging process with regard to the patient's particular cancer type, and the calculated stage based on the currently available TNM data and literature. I discussed the available prognostic data with regard to the current staging information and how it relates to the prognosis of their particular neoplastic process.          NCCN Guidelines:     I discussed the available treatment option(s) in  "accordance with the latest literature from the NCCN Clinical Practice Guidelines for the patient's particular type of cancer disorder. The NCCN Guidelines provide a "document evidence-based (and) consensus-driven management" of the care of oncology patients. The treatment recommendations were made not only in accordance to the NCCN guidelines, but also factored in to account the patient's overall age, condition, performance status and their medical co-morbidities. I went over the risks and benefits of the the treatment options (if any could be made) with regard to their particular cancer type, their cancer stage, their age, and their co-morbidities.          Antihormone Therapy Discussion:    I discussed the advantages of antihormone therapy with the patient with regard to their particular neoplastic or carcinoma in situ condition. I went over the side-effect profile of the medication including risk for potential development of endometrial cancer and/or hyperplasia in women who still have a uterus and the need for yearly GYN evaluation and follow-up. I discussed the risks for thromboembolic events such as DVT's, pulmonary emboli, CVA's, retinal vascular clots, phlebitis, and TIA's. I discussed the potential risks for development of ocular disturbances, retinopathy, cataracts, corneal changes, flushing, hot flashes, amenorrhea, altered menses, fluid retention, weight changes, elevations in LFT's, liver damage, and mood disturbances. I discussed the potential risk of arthropathy and joint pains/aches which could be chronic and debilitating. I discussed potential adverse effects on bone mineralization and the risk of osteopenia and/or osteoporosis which could led to increase risk of fractures.   A consent form was obtained and a copy was provided to the patient.    Iron Infusion Therapy Discussion:     I provided literature/learning materials on the particular IV iron regimen and discussed the potential side-effect " profiles of the drug(s). I discussed the importance of compliance with obtaining and monitoring requested lab work, and went over the potential risk for the development of anaphylactic shock, bronchospasm, dysrhythmia, liver and/or kidney damage, and respiratory/cardiovascular arrest and/or failure. I discussed the potential risks for development of alopecia, fevers, itching, chills and/or rigors, cold sensory issues, ringing in ears, vertigo and neuropathy, all of which are usually acute but sometimes could end up being chronic and life-long. I discussed the risks of hand-foot syndrome and rashes, and development of other autoimmune mediated processes such as pneumonitis and colitis which could be life threatening.     The patient's consent has been obtained to proceed with the IV iron therapy.The patient will be referred to [facility] for training and education on IV iron therapy, use of antiemetics and/or anti-diarrheals, use of NSAID's, potential IV iron therapy side-effects, and any specific recommendations and precautions with the particular IV iron agents.      I answered all of the patient's (and family's, if applicable) questions to the best of my ability and to their complete satisfaction. The patient acknowledged full understanding of the risks, recommendations and plan(s).         I spent over 25 mins of time with the patient. Reviewed results of the recently ordered labs, tests and studies; made directives with regards to the results. Over half of this time was spent couseling and coordinating care.    I have explained all of the above in detail and the patient understands all of the current recommendation(s). I have answered all of their questions to the best of my ability and to their complete satisfaction.   The patient is to continue with the current management plan.            Electronically signed by Hossein Carranza MD                               Answers submitted  by the patient for this visit:  Review of Systems Questionnaire (Submitted on 6/3/2024)  appetite change : No  unexpected weight change: No  mouth sores: No  visual disturbance: No  cough: No  shortness of breath: No  chest pain: No  abdominal pain: No  diarrhea: No  frequency: No  back pain: No  rash: No  headaches: Yes  adenopathy: No  nervous/ anxious: No

## 2024-06-05 ENCOUNTER — OFFICE VISIT (OUTPATIENT)
Facility: CLINIC | Age: 54
End: 2024-06-05
Payer: COMMERCIAL

## 2024-06-05 VITALS
WEIGHT: 160.88 LBS | BODY MASS INDEX: 25.97 KG/M2 | RESPIRATION RATE: 16 BRPM | DIASTOLIC BLOOD PRESSURE: 83 MMHG | SYSTOLIC BLOOD PRESSURE: 135 MMHG | HEART RATE: 104 BPM

## 2024-06-05 DIAGNOSIS — Z17.0 MALIGNANT NEOPLASM OF LEFT BREAST IN FEMALE, ESTROGEN RECEPTOR POSITIVE, UNSPECIFIED SITE OF BREAST: Primary | ICD-10-CM

## 2024-06-05 DIAGNOSIS — Z17.0 ER+ (ESTROGEN RECEPTOR POSITIVE STATUS): ICD-10-CM

## 2024-06-05 DIAGNOSIS — D50.9 IRON DEFICIENCY ANEMIA, UNSPECIFIED IRON DEFICIENCY ANEMIA TYPE: ICD-10-CM

## 2024-06-05 DIAGNOSIS — D50.9 MICROCYTIC ANEMIA: ICD-10-CM

## 2024-06-05 DIAGNOSIS — Z98.890 S/P LUMPECTOMY, LEFT BREAST: ICD-10-CM

## 2024-06-05 DIAGNOSIS — Z79.810 USE OF TAMOXIFEN (NOLVADEX): ICD-10-CM

## 2024-06-05 DIAGNOSIS — F41.1 GAD (GENERALIZED ANXIETY DISORDER): ICD-10-CM

## 2024-06-05 DIAGNOSIS — C50.912 MALIGNANT NEOPLASM OF LEFT BREAST IN FEMALE, ESTROGEN RECEPTOR POSITIVE, UNSPECIFIED SITE OF BREAST: Primary | ICD-10-CM

## 2024-06-05 PROCEDURE — 3075F SYST BP GE 130 - 139MM HG: CPT | Mod: CPTII,S$GLB,, | Performed by: INTERNAL MEDICINE

## 2024-06-05 PROCEDURE — 3079F DIAST BP 80-89 MM HG: CPT | Mod: CPTII,S$GLB,, | Performed by: INTERNAL MEDICINE

## 2024-06-05 PROCEDURE — 99215 OFFICE O/P EST HI 40 MIN: CPT | Mod: S$GLB,,, | Performed by: INTERNAL MEDICINE

## 2024-06-05 PROCEDURE — 3051F HG A1C>EQUAL 7.0%<8.0%: CPT | Mod: CPTII,S$GLB,, | Performed by: INTERNAL MEDICINE

## 2024-06-05 PROCEDURE — 1159F MED LIST DOCD IN RCRD: CPT | Mod: CPTII,S$GLB,, | Performed by: INTERNAL MEDICINE

## 2024-06-05 PROCEDURE — 3066F NEPHROPATHY DOC TX: CPT | Mod: CPTII,S$GLB,, | Performed by: INTERNAL MEDICINE

## 2024-06-05 PROCEDURE — 1160F RVW MEDS BY RX/DR IN RCRD: CPT | Mod: CPTII,S$GLB,, | Performed by: INTERNAL MEDICINE

## 2024-06-05 PROCEDURE — 99999 PR PBB SHADOW E&M-EST. PATIENT-LVL III: CPT | Mod: PBBFAC,,, | Performed by: INTERNAL MEDICINE

## 2024-06-05 PROCEDURE — G2211 COMPLEX E/M VISIT ADD ON: HCPCS | Mod: S$GLB,,, | Performed by: INTERNAL MEDICINE

## 2024-06-05 PROCEDURE — 3008F BODY MASS INDEX DOCD: CPT | Mod: CPTII,S$GLB,, | Performed by: INTERNAL MEDICINE

## 2024-06-05 PROCEDURE — 3060F POS MICROALBUMINURIA REV: CPT | Mod: CPTII,S$GLB,, | Performed by: INTERNAL MEDICINE

## 2024-06-05 RX ORDER — LORAZEPAM 1 MG/1
1 TABLET ORAL DAILY PRN
Qty: 30 TABLET | Refills: 1 | Status: SHIPPED | OUTPATIENT
Start: 2024-06-05

## 2024-06-05 NOTE — TELEPHONE ENCOUNTER
No care due was identified.  [facility] Embedded Care Due Messages. Reference number: [id number].   6/05/2024 11:06:41 AM CDT

## 2024-06-26 ENCOUNTER — PATIENT MESSAGE (OUTPATIENT)
Dept: OTHER | Facility: OTHER | Age: 54
End: 2024-06-26
Payer: COMMERCIAL

## 2024-06-26 ENCOUNTER — OFFICE VISIT (OUTPATIENT)
Dept: PRIMARY CARE CLINIC | Facility: CLINIC | Age: 54
End: 2024-06-26
Payer: COMMERCIAL

## 2024-06-26 VITALS
RESPIRATION RATE: 18 BRPM | WEIGHT: 162.94 LBS | TEMPERATURE: 98 F | BODY MASS INDEX: 26.19 KG/M2 | HEIGHT: 66 IN | SYSTOLIC BLOOD PRESSURE: 118 MMHG | DIASTOLIC BLOOD PRESSURE: 78 MMHG | OXYGEN SATURATION: 99 % | HEART RATE: 104 BPM

## 2024-06-26 DIAGNOSIS — E11.69 TYPE 2 DIABETES MELLITUS WITH HYPERLIPIDEMIA: Primary | ICD-10-CM

## 2024-06-26 DIAGNOSIS — E11.65 TYPE 2 DIABETES MELLITUS WITH HYPERGLYCEMIA, WITHOUT LONG-TERM CURRENT USE OF INSULIN: ICD-10-CM

## 2024-06-26 DIAGNOSIS — E78.5 TYPE 2 DIABETES MELLITUS WITH HYPERLIPIDEMIA: Primary | ICD-10-CM

## 2024-06-26 DIAGNOSIS — F51.01 PRIMARY INSOMNIA: ICD-10-CM

## 2024-06-26 PROCEDURE — 1159F MED LIST DOCD IN RCRD: CPT | Mod: CPTII,S$GLB,, | Performed by: FAMILY MEDICINE

## 2024-06-26 PROCEDURE — 1160F RVW MEDS BY RX/DR IN RCRD: CPT | Mod: CPTII,S$GLB,, | Performed by: FAMILY MEDICINE

## 2024-06-26 PROCEDURE — 99214 OFFICE O/P EST MOD 30 MIN: CPT | Mod: S$GLB,,, | Performed by: FAMILY MEDICINE

## 2024-06-26 PROCEDURE — 3078F DIAST BP <80 MM HG: CPT | Mod: CPTII,S$GLB,, | Performed by: FAMILY MEDICINE

## 2024-06-26 PROCEDURE — 3008F BODY MASS INDEX DOCD: CPT | Mod: CPTII,S$GLB,, | Performed by: FAMILY MEDICINE

## 2024-06-26 PROCEDURE — 3074F SYST BP LT 130 MM HG: CPT | Mod: CPTII,S$GLB,, | Performed by: FAMILY MEDICINE

## 2024-06-26 PROCEDURE — 3066F NEPHROPATHY DOC TX: CPT | Mod: CPTII,S$GLB,, | Performed by: FAMILY MEDICINE

## 2024-06-26 PROCEDURE — 3051F HG A1C>EQUAL 7.0%<8.0%: CPT | Mod: CPTII,S$GLB,, | Performed by: FAMILY MEDICINE

## 2024-06-26 PROCEDURE — 3060F POS MICROALBUMINURIA REV: CPT | Mod: CPTII,S$GLB,, | Performed by: FAMILY MEDICINE

## 2024-06-26 PROCEDURE — 99999 PR PBB SHADOW E&M-EST. PATIENT-LVL IV: CPT | Mod: PBBFAC,,, | Performed by: FAMILY MEDICINE

## 2024-06-26 RX ORDER — ZOLPIDEM TARTRATE 5 MG/1
5 TABLET ORAL NIGHTLY PRN
Qty: 20 TABLET | Refills: 2 | Status: SHIPPED | OUTPATIENT
Start: 2024-06-26

## 2024-06-26 NOTE — PROGRESS NOTES
"Subjective:       Patient ID: Dennis Aldana is a 53 y.o. female.    Chief Complaint: Diabetes (3 month follow up)    DM - A1C down minimally, but recently eating better, home CBG trending down. No hypoglycemia, compliant with meds  Insomnia - requesting to get back on Ambien, used to take PRN    Diabetes  Hypoglycemia symptoms include headaches. Pertinent negatives for hypoglycemia include no confusion. Pertinent negatives for diabetes include no chest pain, no polydipsia, no polyuria and no weakness.     Review of Systems   Constitutional:  Negative for activity change and unexpected weight change.   HENT:  Negative for hearing loss, rhinorrhea and trouble swallowing.    Eyes:  Negative for discharge and visual disturbance.   Respiratory:  Negative for chest tightness and wheezing.    Cardiovascular:  Negative for chest pain and palpitations.   Gastrointestinal:  Negative for blood in stool, constipation, diarrhea and vomiting.   Endocrine: Negative for polydipsia and polyuria.   Genitourinary:  Negative for difficulty urinating, dysuria, hematuria and menstrual problem.   Musculoskeletal:  Positive for arthralgias. Negative for joint swelling and neck pain.   Neurological:  Positive for headaches. Negative for weakness.   Psychiatric/Behavioral:  Positive for sleep disturbance. Negative for confusion and dysphoric mood.        Objective:      Vitals:    06/26/24 1130   BP: 118/78   BP Location: Left arm   Patient Position: Sitting   BP Method: Medium (Manual)   Pulse: 104   Resp: 18   Temp: 97.8 °F (36.6 °C)   TempSrc: Temporal   SpO2: 99%   Weight: 73.9 kg (162 lb 14.7 oz)   Height: 5' 6" (1.676 m)     BP Readings from Last 5 Encounters:   06/26/24 118/78   06/05/24 135/83   03/06/24 114/78   01/03/24 124/70   12/07/23 128/88     Wt Readings from Last 5 Encounters:   06/26/24 73.9 kg (162 lb 14.7 oz)   06/05/24 73 kg (160 lb 14.4 oz)   03/06/24 74.4 kg (164 lb)   01/18/24 75.3 kg (166 lb)   01/03/24 76.1 " kg (167 lb 10.6 oz)     Physical Exam  Vitals and nursing note reviewed.   Constitutional:       General: She is not in acute distress.     Appearance: Normal appearance. She is well-developed.   HENT:      Head: Normocephalic and atraumatic.   Cardiovascular:      Rate and Rhythm: Normal rate and regular rhythm.      Heart sounds: Normal heart sounds.   Pulmonary:      Effort: Pulmonary effort is normal.      Breath sounds: Normal breath sounds.   Musculoskeletal:      Right lower leg: No edema.      Left lower leg: No edema.   Skin:     General: Skin is warm and dry.   Neurological:      Mental Status: She is alert and oriented to person, place, and time.   Psychiatric:         Mood and Affect: Mood normal.         Behavior: Behavior normal.         Lab Results   Component Value Date    WBC 13.05 (H) 06/04/2024    HGB 16.4 (H) 06/04/2024    HCT 49.0 (H) 06/04/2024     06/04/2024    CHOL 166 05/21/2024    TRIG 238 (H) 05/21/2024    HDL 42 05/21/2024    ALT 32 06/04/2024    AST 17 06/04/2024     06/04/2024    K 3.5 06/04/2024     06/04/2024    CREATININE 0.9 06/04/2024    BUN 18 06/04/2024    CO2 31 (H) 06/04/2024    TSH 2.35 01/30/2020    INR 1.1 12/29/2020    GLUF 122 (H) 02/13/2020    HGBA1C 7.8 (H) 05/21/2024      Assessment:       1. Type 2 diabetes mellitus with hyperlipidemia    2. Type 2 diabetes mellitus with hyperglycemia, without long-term current use of insulin    3. Primary insomnia        Plan:       Type 2 diabetes mellitus with hyperlipidemia  Scheduled for A1C in August. If better, will have pt f/u 8 months from now  Type 2 diabetes mellitus with hyperglycemia, without long-term current use of insulin    Primary insomnia  -     zolpidem (AMBIEN) 5 MG Tab; Take 1 tablet (5 mg total) by mouth nightly as needed (insomnia).  Dispense: 20 tablet; Refill: 2  Advised to use meds sparingly    Medication List with Changes/Refills   New Medications    ZOLPIDEM (AMBIEN) 5 MG TAB    Take 1  tablet (5 mg total) by mouth nightly as needed (insomnia).   Current Medications    CITALOPRAM (CELEXA) 40 MG TABLET    Take 1 tablet (40 mg total) by mouth once daily.    EMPAGLIFLOZIN (JARDIANCE) 25 MG TABLET    Take 1 tablet (25 mg total) by mouth once daily.    LORAZEPAM (ATIVAN) 1 MG TABLET    TAKE 1 TABLET BY MOUTH EVERY DAY AS NEEDED FOR ANXIETY    PRAMIPEXOLE (MIRAPEX) 0.5 MG TABLET    Take 1 tablet (0.5 mg total) by mouth every evening.    ROSUVASTATIN (CRESTOR) 10 MG TABLET    Take 1 tablet (10 mg total) by mouth once daily.    SEMAGLUTIDE (OZEMPIC) 1 MG/DOSE (4 MG/3 ML)    Inject 1 mg into the skin every 7 days.   Discontinued Medications    CHLORTHALIDONE (HYGROTEN) 25 MG TAB    Take 12.5 mg by mouth once daily.    KETOROLAC 0.5% (ACULAR) 0.5 % DROP    Place 1 drop into the left eye 4 (four) times daily.    PREDNISOLONE ACETATE (PRED FORTE) 1 % DRPS    Place 1 drop into the left eye 4 (four) times daily.

## 2024-06-27 DIAGNOSIS — E11.65 TYPE 2 DIABETES MELLITUS WITH HYPERGLYCEMIA, WITHOUT LONG-TERM CURRENT USE OF INSULIN: ICD-10-CM

## 2024-06-27 RX ORDER — SEMAGLUTIDE 1.34 MG/ML
1 INJECTION, SOLUTION SUBCUTANEOUS
Qty: 9 ML | Refills: 1 | Status: SHIPPED | OUTPATIENT
Start: 2024-06-27

## 2024-06-27 NOTE — TELEPHONE ENCOUNTER
No care due was identified.  [facility] Embedded Care Due Messages. Reference number: [id number].   6/27/2024 2:29:17 PM CDT

## 2024-06-27 NOTE — TELEPHONE ENCOUNTER
Dennis Aldana  is requesting a refill authorization.  Brief Assessment and Rationale for Refill:  Approve     Medication Therapy Plan:         Comments:     Note composed:2:42 PM 06/27/2024

## 2024-08-21 ENCOUNTER — LAB VISIT (OUTPATIENT)
Dept: LAB | Facility: HOSPITAL | Age: 54
End: 2024-08-21
Attending: FAMILY MEDICINE
Payer: COMMERCIAL

## 2024-08-21 DIAGNOSIS — E11.69 TYPE 2 DIABETES MELLITUS WITH HYPERLIPIDEMIA: ICD-10-CM

## 2024-08-21 DIAGNOSIS — E78.5 TYPE 2 DIABETES MELLITUS WITH HYPERLIPIDEMIA: ICD-10-CM

## 2024-08-21 LAB
ESTIMATED AVG GLUCOSE: 189 MG/DL (ref 68–131)
HBA1C MFR BLD: 8.2 % (ref 4–5.6)

## 2024-08-21 PROCEDURE — 36415 COLL VENOUS BLD VENIPUNCTURE: CPT | Mod: PO | Performed by: FAMILY MEDICINE

## 2024-08-21 PROCEDURE — 83036 HEMOGLOBIN GLYCOSYLATED A1C: CPT | Performed by: FAMILY MEDICINE

## 2024-08-22 ENCOUNTER — TELEPHONE (OUTPATIENT)
Dept: PRIMARY CARE CLINIC | Facility: CLINIC | Age: 54
End: 2024-08-22
Payer: COMMERCIAL

## 2024-08-22 ENCOUNTER — PATIENT MESSAGE (OUTPATIENT)
Dept: PRIMARY CARE CLINIC | Facility: CLINIC | Age: 54
End: 2024-08-22
Payer: COMMERCIAL

## 2024-08-22 DIAGNOSIS — E11.65 TYPE 2 DIABETES MELLITUS WITH HYPERGLYCEMIA, WITHOUT LONG-TERM CURRENT USE OF INSULIN: Primary | ICD-10-CM

## 2024-08-24 DIAGNOSIS — F41.1 GAD (GENERALIZED ANXIETY DISORDER): ICD-10-CM

## 2024-08-24 NOTE — TELEPHONE ENCOUNTER
No care due was identified.  [facility] Embedded Care Due Messages. Reference number: [id number].   8/24/2024 1:11:06 AM CDT

## 2024-08-26 RX ORDER — LORAZEPAM 1 MG/1
1 TABLET ORAL DAILY PRN
Qty: 30 TABLET | Refills: 1 | Status: SHIPPED | OUTPATIENT
Start: 2024-08-26

## 2024-09-19 ENCOUNTER — PATIENT MESSAGE (OUTPATIENT)
Dept: PRIMARY CARE CLINIC | Facility: CLINIC | Age: 54
End: 2024-09-19
Payer: COMMERCIAL

## 2024-09-23 ENCOUNTER — PATIENT OUTREACH (OUTPATIENT)
Dept: ADMINISTRATIVE | Facility: HOSPITAL | Age: 54
End: 2024-09-23
Payer: COMMERCIAL

## 2024-09-23 DIAGNOSIS — Z12.11 SCREENING FOR COLON CANCER: Primary | ICD-10-CM

## 2024-09-23 NOTE — PROGRESS NOTES
Health Maintenance Due   Topic Date Due    Shingles Vaccine (1 of 2) Never done    Foot Exam  08/28/2024    Influenza Vaccine (1) 09/01/2024    Colorectal Cancer Screening  12/03/2024     Immunizations - reviewed and updated   Care Everywhere - triggered   Care Teams -   Outreach - Epic campaigns outreach done. Colon cancer screening due as of 12/3/2024. GI case request for [place] placed. Patient notified via patient portal

## 2024-09-27 ENCOUNTER — PATIENT MESSAGE (OUTPATIENT)
Dept: ADMINISTRATIVE | Facility: OTHER | Age: 54
End: 2024-09-27
Payer: COMMERCIAL

## 2024-09-29 DIAGNOSIS — F51.01 PRIMARY INSOMNIA: ICD-10-CM

## 2024-09-30 RX ORDER — ZOLPIDEM TARTRATE 5 MG/1
TABLET ORAL
Qty: 20 TABLET | Refills: 2 | Status: SHIPPED | OUTPATIENT
Start: 2024-09-30

## 2024-09-30 NOTE — TELEPHONE ENCOUNTER
No care due was identified.  [facility] Embedded Care Due Messages. Reference number: [id number].   9/29/2024 7:12:26 PM CDT

## 2024-10-15 ENCOUNTER — PATIENT MESSAGE (OUTPATIENT)
Dept: OTHER | Facility: OTHER | Age: 54
End: 2024-10-15
Payer: COMMERCIAL

## 2024-10-23 ENCOUNTER — OFFICE VISIT (OUTPATIENT)
Dept: PODIATRY | Facility: CLINIC | Age: 54
End: 2024-10-23
Payer: COMMERCIAL

## 2024-10-23 VITALS — RESPIRATION RATE: 18 BRPM | BODY MASS INDEX: 26.01 KG/M2 | WEIGHT: 161.81 LBS | HEIGHT: 66 IN

## 2024-10-23 DIAGNOSIS — E11.9 ENCOUNTER FOR DIABETIC FOOT EXAM: ICD-10-CM

## 2024-10-23 DIAGNOSIS — M79.671 PAIN IN BOTH FEET: ICD-10-CM

## 2024-10-23 DIAGNOSIS — M79.672 PAIN IN BOTH FEET: ICD-10-CM

## 2024-10-23 DIAGNOSIS — E11.42 DIABETIC POLYNEUROPATHY ASSOCIATED WITH TYPE 2 DIABETES MELLITUS: Primary | ICD-10-CM

## 2024-10-23 DIAGNOSIS — M72.2 PLANTAR FASCIITIS: ICD-10-CM

## 2024-10-23 PROCEDURE — 99214 OFFICE O/P EST MOD 30 MIN: CPT | Mod: S$GLB,,, | Performed by: PODIATRIST

## 2024-10-23 PROCEDURE — 3060F POS MICROALBUMINURIA REV: CPT | Mod: CPTII,S$GLB,, | Performed by: PODIATRIST

## 2024-10-23 PROCEDURE — 3066F NEPHROPATHY DOC TX: CPT | Mod: CPTII,S$GLB,, | Performed by: PODIATRIST

## 2024-10-23 PROCEDURE — 1160F RVW MEDS BY RX/DR IN RCRD: CPT | Mod: CPTII,S$GLB,, | Performed by: PODIATRIST

## 2024-10-23 PROCEDURE — 99999 PR PBB SHADOW E&M-EST. PATIENT-LVL III: CPT | Mod: PBBFAC,,, | Performed by: PODIATRIST

## 2024-10-23 PROCEDURE — G2211 COMPLEX E/M VISIT ADD ON: HCPCS | Mod: S$GLB,,, | Performed by: PODIATRIST

## 2024-10-23 PROCEDURE — 1159F MED LIST DOCD IN RCRD: CPT | Mod: CPTII,S$GLB,, | Performed by: PODIATRIST

## 2024-10-23 PROCEDURE — 3052F HG A1C>EQUAL 8.0%<EQUAL 9.0%: CPT | Mod: CPTII,S$GLB,, | Performed by: PODIATRIST

## 2024-10-23 PROCEDURE — 3008F BODY MASS INDEX DOCD: CPT | Mod: CPTII,S$GLB,, | Performed by: PODIATRIST

## 2024-10-23 RX ORDER — ALCOHOL 2.38 KG/3.79L
1 GEL TOPICAL DAILY
Qty: 30 CAPSULE | Refills: 6 | Status: SHIPPED | OUTPATIENT
Start: 2024-10-23

## 2024-10-23 RX ORDER — MELOXICAM 15 MG/1
15 TABLET ORAL DAILY
Qty: 30 TABLET | Refills: 3 | Status: SHIPPED | OUTPATIENT
Start: 2024-10-23

## 2024-10-23 NOTE — PROGRESS NOTES
"  [address] [place] [address]  Phone: [phone]   [phone]    Patient's PCP:  Referring Provider: No ref. provider found    Subjective:      Chief Complaint:: Diabetic Foot Exam (Yearly exam) and Foot Pain (Bilateral foot pain history of neuropathy and plantar fasciitis )    JÚNIOR Aldana is a 54 y.o. female who presents today with a complaint of bilateral foot pain.   The symptoms include pain ranging from burning, shocking stabbing to tingling, cramping, and like walking on marble under great toes. The symptoms are aggravated by walking, pressure increased pain at night. The problem has worsened. Treatment to date have included stretching, KT tape, massage, and aleve which provided some relief. who presents to the clinic for a diabetic foot exam.   Pt has seen Andrew Roger MD on 06/28/2024 who treats them for their diabetes.  Pt has been a diabetic for over six years.  Taking Jardiance, Ozempic to treat diabetes.    Blood sugar: has not checked today  Hemoglobin A1C: 8.2       Vitals:    10/23/24 0839   Resp: 18   Weight: 73.4 kg (161 lb 13.1 oz)   Height: 5' 6" (1.676 m)   PainSc:   7      Shoe Size: 8    Past Surgical History:   Procedure Laterality Date    BIOPSY N/A 12/29/2020    Procedure: us guided muscle biopsy (latissimus Dorsi);  [facility];  Location: [facility] OR;  Service: Interventional Radiology;  Laterality: N/A;    BREAST LUMPECTOMY Left 2018    COLONOSCOPY N/A 12/3/2021    Procedure: COLONOSCOPY;  Surgeon: Silvio Montesinos MD;  Location: [facility] ENDO;  Service: Endoscopy;  Laterality: N/A;    CYSTOSCOPY      CYSTOSCOPY N/A 3/22/2021    Procedure: CYSTOSCOPY;  Surgeon: Jannie Contreras MD;  Location: [facility] OR;  Service: OB/GYN;  Laterality: N/A;    CYSTOSCOPY N/A 2/22/2022    Procedure: CYSTOSCOPY;  Surgeon: Anali Greer MD;  [facility];  Service: Urology;  Laterality: N/A;  2hrs    CYSTOSCOPY W/ URETERAL STENT " PLACEMENT Right 12/17/2021    Procedure: CYSTOSCOPY, WITH URETERAL STENT INSERTION;  Surgeon: Esthela Lin MD;  Location: [facility] OR;  Service: Urology;  Laterality: Right;    CYSTOSCOPY WITH URETEROSCOPY, RETROGRADE PYELOGRAPHY, AND INSERTION OF STENT Right 1/7/2022    Procedure: CYSTOSCOPY, WITH RETROGRADE PYELOGRAM AND URETERAL STENT INSERTION;  Surgeon: Esthela Lin MD;  Location: [facility] OR;  Service: Urology;  Laterality: Right;    EXCISION OF MASS OF BACK N/A 2/22/2021    Procedure: EXCISION, MASS, BACK  LEFT;  Surgeon: Chico Baird MD;  Location: [facility] OR 2ND FLR;  Service: General;  Laterality: N/A;  Specimen sent to pathology via  for research     EXCISION OF MASS OF BACK  02/22/2021    KNEE SURGERY      LASER LITHOTRIPSY Right 1/7/2022    Procedure: LITHOTRIPSY, USING LASER;  Surgeon: Esthela Lin MD;  Location: [facility] OR;  Service: Urology;  Laterality: Right;    REPAIR OF URETHRAL DIVERTICULUM N/A 2/22/2022    Procedure: REPAIR, DIVERTICULUM, URETHRA;  Surgeon: Anali Greer MD;  Location: [facility] OR 2ND FLR;  Service: Urology;  Laterality: N/A;    ROBOT-ASSISTED LAPAROSCOPIC HYSTERECTOMY N/A 3/22/2021    Procedure: ROBOTIC HYSTERECTOMY;  Surgeon: Jannie Contreras MD;  Location: [facility] OR;  Service: OB/GYN;  Laterality: N/A;    ROBOT-ASSISTED LAPAROSCOPIC SALPINGO-OOPHORECTOMY Bilateral 3/22/2021    Procedure: ROBOTIC SALPINGO-OOPHORECTOMY;  Surgeon: Jannie Contreras MD;  Location: [facility] OR;  Service: OB/GYN;  Laterality: Bilateral;    SHOULDER SURGERY      TONSILLECTOMY      URETEROSCOPIC REMOVAL OF URETERIC CALCULUS Right 1/7/2022    Procedure: REMOVAL, CALCULUS, URETER, URETEROSCOPIC;  Surgeon: Esthela Lin MD;  Location: [facility] OR;  Service: Urology;  Laterality: Right;    VOIDING URETHROCYSTOGRAPHY  3/8/2022    Procedure: CYSTOURETHROGRAM, VOIDING;  Surgeon: Anali Greer MD;  Location: [facility] OR 1ST FLR;  Service: Urology;;     Past Medical History:   Diagnosis  Date    Abnormal CT of liver 12/21/2020    Abnormal CT of the abdomen 12/21/2020    Anemia     Anxiety     Arthritis     Breast cancer 2018    left w/ radiation    COVID-19 virus infection 9/6/2021    Depression     Diabetes mellitus     ER+ (estrogen receptor positive status) 8/16/2020    Essential hypertension, benign 1/3/2018    Excessive menstruation 8/16/2020    Hypertension     Kidney stone     Liver mass     Malignant neoplasm of left breast in female, estrogen receptor positive 4/11/2018    S/p lumpectomy and radiation, discharge May 2018    Microcytic anemia 6/21/2020    Myxoma of skin determined by biopsy (low grade) 1/19/2021    Pneumonia due to COVID-19 virus 9/6/2021    PONV (postoperative nausea and vomiting)     Zofran helps with PONV per patient(every surgery)    RLS (restless legs syndrome)     S/P lumpectomy, left breast 6/21/2020    Use of tamoxifen (Nolvadex) 6/21/2020     Family History   Problem Relation Name Age of Onset    Hypertension Mother      Diabetes Mother      Diabetes Father      Breast cancer Maternal Grandmother      Cancer Paternal Grandfather      Colon cancer Neg Hx      Ovarian cancer Neg Hx      Glaucoma Neg Hx      Macular degeneration Neg Hx      Anesthesia problems Neg Hx          Social History:   Marital Status:   Alcohol History:  reports current alcohol use.  Tobacco History:  reports that she has never smoked. She has never used smokeless tobacco.  Drug History:  reports no history of drug use.    Review of patient's allergies indicates:  No Known Allergies    Current Outpatient Medications   Medication Sig Dispense Refill    chlorthalidone (HYGROTEN) 25 MG Tab Take 1 tablet (25 mg total) by mouth once daily. 90 tablet 1    citalopram (CELEXA) 40 MG tablet Take 1 tablet (40 mg total) by mouth once daily. 90 tablet 3    JARDIANCE 25 mg tablet TAKE 1 TABLET BY MOUTH EVERY DAY 90 tablet 1    [id number]-algal oil (METANX, ALGAL OIL,) 3 mg-35 mg-2 mg -90.314  mg Cap Take 1 capsule by mouth once daily. 30 capsule 6    LORazepam (ATIVAN) 1 MG tablet TAKE 1 TABLET BY MOUTH EVERY DAY AS NEEDED FOR ANXIETY 30 tablet 1    meloxicam (MOBIC) 15 MG tablet Take 1 tablet (15 mg total) by mouth once daily. 30 tablet 3    pramipexole (MIRAPEX) 0.5 MG tablet Take 1 tablet (0.5 mg total) by mouth every evening. 90 tablet 3    rosuvastatin (CRESTOR) 10 MG tablet Take 1 tablet (10 mg total) by mouth once daily. 90 tablet 3    semaglutide (OZEMPIC) 2 mg/dose (8 mg/3 mL) PnIj Inject 2 mg into the skin every 7 days. 3 mL 2    zolpidem (AMBIEN) 5 MG Tab TAKE 1 TABLET BY MOUTH NIGHTLY AS NEEDED (INSOMNIA). 20 tablet 2     No current facility-administered medications for this visit.       Review of Systems   Constitutional:  Negative for chills, fatigue, fever and unexpected weight change.   HENT:  Negative for hearing loss and trouble swallowing.    Eyes:  Negative for photophobia and visual disturbance.   Respiratory:  Negative for cough, shortness of breath and wheezing.    Cardiovascular:  Negative for chest pain, palpitations and leg swelling.   Gastrointestinal:  Negative for abdominal pain and nausea.   Genitourinary:  Negative for dysuria and frequency.   Musculoskeletal:  Positive for arthralgias. Negative for back pain, gait problem and neck pain.   Skin:  Negative for rash and wound.   Neurological:  Negative for tremors, seizures, weakness and headaches.   Hematological:  Does not bruise/bleed easily.         Objective:        Physical Exam:   Foot Exam    General  Orientation: alert and oriented to person, place, and time   Affect: appropriate   Gait: antalgic       Right Foot/Ankle     Inspection and Palpation  Ecchymosis: none  Tenderness: plantar fascia (Mild generalized tenderness to the foot)  Swelling: none   Arch: normal  Skin Exam: skin intact; no drainage, no ulcer and no erythema   Fungus Toenails: present    Neurovascular  Dorsalis pedis: 2+  Posterior tibial:  2+  Capillary Refill: 2+  Varicose veins: not present  Saphenous nerve sensation: normal  Tibial nerve sensation: normal  Superficial peroneal nerve sensation: normal  Deep peroneal nerve sensation: normal  Sural nerve sensation: normal    Edema  Type of edema: non-pitting    Muscle Strength  Ankle dorsiflexion: 5  Ankle plantar flexion: 5  Ankle inversion: 5  Ankle eversion: 5  Great toe extension: 5  Great toe flexion: 5    Range of Motion    Normal right ankle ROM    Tests  Anterior drawer: negative   Talar tilt: negative   PT Tinel's sign: negative    Paresthesia: positive    Left Foot/Ankle      Inspection and Palpation  Ecchymosis: none  Tenderness: plantar fascia (Mild generalized tenderness to the foot)  Swelling: none   Arch: normal  Skin Exam: skin intact; no drainage, no ulcer and no erythema   Fungus Toenails: present    Neurovascular  Dorsalis pedis: 2+  Posterior tibial: 2+  Capillary refill: 2+  Varicose veins: not present  Saphenous nerve sensation: normal  Tibial nerve sensation: normal  Superficial peroneal nerve sensation: normal  Deep peroneal nerve sensation: normal  Sural nerve sensation: normal    Edema  Type of edema: non-pitting    Muscle Strength  Ankle dorsiflexion: 5  Ankle plantar flexion: 5  Ankle inversion: 5  Ankle eversion: 5  Great toe extension: 5  Great toe flexion: 5    Range of Motion    Normal left ankle ROM    Tests  Anterior drawer: negative   Talar tilt: negative   PT Tinel's sign: negative  Paresthesia: positive      Physical Exam  Cardiovascular:      Pulses:           Dorsalis pedis pulses are 2+ on the right side and 2+ on the left side.        Posterior tibial pulses are 2+ on the right side and 2+ on the left side.   Feet:      Right foot:      Skin integrity: No ulcer or erythema.      Toenail Condition: Fungal disease present.     Left foot:      Skin integrity: No ulcer or erythema.      Toenail Condition: Fungal disease present.              Right Ankle/Foot Exam      Range of Motion   The patient has normal right ankle ROM.    Left Ankle/Foot Exam     Range of Motion   The patient has normal left ankle ROM.       Muscle Strength   Right Lower Extremity   Ankle Dorsiflexion:  5   Plantar flexion:  5/5  Left Lower Extremity   Ankle Dorsiflexion:  5   Plantar flexion:  5/5     Reflexes     Left Side  Paresthesia: present    Right Side   Paresthesia: present    Vascular Exam     Right Pulses  Dorsalis Pedis:      2+  Posterior Tibial:      2+        Left Pulses  Dorsalis Pedis:      2+  Posterior Tibial:      2+           Imaging: none            Assessment:       1. Diabetic polyneuropathy associated with type 2 diabetes mellitus    2. Pain in both feet    3. Encounter for diabetic foot exam    4. Plantar fasciitis      Plan:   Diabetic polyneuropathy associated with type 2 diabetes mellitus  -      DIABETES FOOT EXAM  -     vlihljeeo-[id number]-algal oil (METANX, ALGAL OIL,) 3 mg-35 mg-2 mg -90.314 mg Cap; Take 1 capsule by mouth once daily.  Dispense: 30 capsule; Refill: 6    Pain in both feet  -     meloxicam (MOBIC) 15 MG tablet; Take 1 tablet (15 mg total) by mouth once daily.  Dispense: 30 tablet; Refill: 3    Encounter for diabetic foot exam  -      DIABETES FOOT EXAM    Plantar fasciitis      Follow up in about 1 year (around 10/23/2025), or if symptoms worsen or fail to improve.    Procedures        Counseled patient on the aspects of diabetes and how it pertains to the feet.  I explained the importance of proper diabetic foot care and how it is essential for the health of their feet.    I discussed the importance of knowing their HGA1c and that the level needs to be as close to 6 as possible.  I discussed the increase complications of high blood sugar including stroke, blindness, heart attack, kidney failure and loss of limb secondary to neuropathy and PVD.    Patient  was made aware of inspecting their feet.  Patient was told to be aware of any breaks in the skin  or redness.  With neuropathy, these areas are not recognized early due to the numbness.  I discussed different treatments available to control the symptoms but whcih may not cure the problem.      Shoe inspection. Patient instructed on proper foot hygeine. We discussed wearing proper shoe gear, daily foot inspections, never walking without protective shoe gear, never putting sharp instruments to feet.    I did discuss with the patient B complex vitamin daily for nerve health.  I am going to send in metanx for her but recommend she get this over-the-counter if it is too expensive.    Also reviewed proper shoe gear to help limit stress on the feet and her plantar fascia.    Counseling:     I provided patient education verbally regarding:   Patient diagnosis, treatment options, as well as alternatives, risks, and benefits.     This note was created using Dragon voice recognition software that occasionally misinterpreted phrases or words.

## 2024-10-28 ENCOUNTER — OCCUPATIONAL HEALTH (OUTPATIENT)
Dept: URGENT CARE | Facility: CLINIC | Age: 54
End: 2024-10-28

## 2024-10-28 PROCEDURE — 80305 DRUG TEST PRSMV DIR OPT OBS: CPT | Mod: S$GLB,,, | Performed by: EMERGENCY MEDICINE

## 2024-11-07 DIAGNOSIS — F41.1 GAD (GENERALIZED ANXIETY DISORDER): ICD-10-CM

## 2024-11-07 RX ORDER — LORAZEPAM 1 MG/1
1 TABLET ORAL DAILY PRN
Qty: 30 TABLET | Refills: 1 | Status: SHIPPED | OUTPATIENT
Start: 2024-11-07

## 2024-11-07 NOTE — TELEPHONE ENCOUNTER
No care due was identified.  [facility] Embedded Care Due Messages. Reference number: [id number].   11/07/2024 7:01:48 AM CST

## 2024-11-18 DIAGNOSIS — G25.81 RLS (RESTLESS LEGS SYNDROME): ICD-10-CM

## 2024-11-18 RX ORDER — PRAMIPEXOLE DIHYDROCHLORIDE 0.5 MG/1
0.5 TABLET ORAL NIGHTLY
Qty: 90 TABLET | Refills: 2 | Status: SHIPPED | OUTPATIENT
Start: 2024-11-18

## 2024-11-18 NOTE — TELEPHONE ENCOUNTER
No care due was identified.  Health [facility] Embedded Care Due Messages. Reference number: [id number].   11/18/2024 12:04:49 AM CST

## 2024-11-19 DIAGNOSIS — F32.A DEPRESSION, UNSPECIFIED DEPRESSION TYPE: ICD-10-CM

## 2024-11-19 RX ORDER — CITALOPRAM 40 MG/1
40 TABLET, FILM COATED ORAL DAILY
Qty: 90 TABLET | Refills: 2 | Status: SHIPPED | OUTPATIENT
Start: 2024-11-19

## 2024-11-19 NOTE — TELEPHONE ENCOUNTER
No care due was identified.  Health [facility] Embedded Care Due Messages. Reference number: [id number].   11/19/2024 12:16:25 AM CST

## 2024-11-19 NOTE — TELEPHONE ENCOUNTER
Dennis Aldana  is requesting a refill authorization.  Brief Assessment and Rationale for Refill:  Approve     Medication Therapy Plan:         Comments:     Note composed:5:09 AM 11/19/2024

## 2024-12-02 ENCOUNTER — PATIENT MESSAGE (OUTPATIENT)
Dept: PRIMARY CARE CLINIC | Facility: CLINIC | Age: 54
End: 2024-12-02
Payer: COMMERCIAL

## 2024-12-03 ENCOUNTER — PATIENT MESSAGE (OUTPATIENT)
Facility: CLINIC | Age: 54
End: 2024-12-03
Payer: COMMERCIAL

## 2024-12-03 DIAGNOSIS — E78.5 TYPE 2 DIABETES MELLITUS WITH HYPERLIPIDEMIA: ICD-10-CM

## 2024-12-03 DIAGNOSIS — E11.69 TYPE 2 DIABETES MELLITUS WITH HYPERLIPIDEMIA: ICD-10-CM

## 2024-12-03 RX ORDER — ROSUVASTATIN CALCIUM 10 MG/1
10 TABLET, COATED ORAL
Qty: 90 TABLET | Refills: 1 | Status: SHIPPED | OUTPATIENT
Start: 2024-12-03

## 2024-12-03 NOTE — TELEPHONE ENCOUNTER
Care Due:                  Date            Visit Type   Department     Provider  --------------------------------------------------------------------------------                                MYCHART                              FOLLOWUP/OF  [facility]  Last Visit: 06-      FICE VISIT   PRIMARY CARE   Andrew Roger  Next Visit: None Scheduled  None         None Found                                                            Last  Test          Frequency    Reason                     Performed    Due Date  --------------------------------------------------------------------------------    HBA1C.......  6 months...  JARDIANCE................  08- 02-    Health [facility] Embedded Care Due Messages. Reference number: [id number].   12/03/2024 12:17:33 AM CST

## 2024-12-03 NOTE — TELEPHONE ENCOUNTER
Provider Staff:  Action required for this patient    Requires labs      Please see care gap opportunities below in Care Due Message.    Thanks!  [facility]     Appointments      Date Provider   Last Visit   6/26/2024 Andrew Roger MD   Next Visit   Visit date not found Andrew Roger MD     Refill Decision Note   Dennis Aldana  is requesting a refill authorization.  Brief Assessment and Rationale for Refill:  Approve     Medication Therapy Plan:         Comments:     Note composed:1:36 PM 12/03/2024

## 2024-12-10 ENCOUNTER — PATIENT MESSAGE (OUTPATIENT)
Dept: PRIMARY CARE CLINIC | Facility: CLINIC | Age: 54
End: 2024-12-10
Payer: COMMERCIAL

## 2024-12-11 NOTE — TELEPHONE ENCOUNTER
Called [employer] regarding Ozempic PA   # [phone]. [employer] stated a appeal is required. PA has been denied.

## 2025-01-07 RX ORDER — SEMAGLUTIDE 2.68 MG/ML
2 INJECTION, SOLUTION SUBCUTANEOUS
Qty: 3 EACH | Refills: 5 | Status: SHIPPED | OUTPATIENT
Start: 2025-01-07

## 2025-01-07 NOTE — TELEPHONE ENCOUNTER
Refill Routing Note   Medication(s) are not appropriate for processing by [facility] for the following reason(s):        No active prescription written by provider    [facility] action(s):  Defer               Appointments  past 12m or future 3m with PCP    Date Provider   Last Visit   6/26/2024 Andrew Roger MD   Next Visit   1/28/2025 Andrew Roger MD   ED visits in past 90 days: 0        Note composed:11:10 AM 01/07/2025

## 2025-01-13 ENCOUNTER — E-VISIT (OUTPATIENT)
Dept: PRIMARY CARE CLINIC | Facility: CLINIC | Age: 55
End: 2025-01-13
Payer: COMMERCIAL

## 2025-01-13 DIAGNOSIS — J01.00 ACUTE MAXILLARY SINUSITIS, RECURRENCE NOT SPECIFIED: Primary | ICD-10-CM

## 2025-01-13 PROCEDURE — 99421 OL DIG E/M SVC 5-10 MIN: CPT | Mod: ,,, | Performed by: FAMILY MEDICINE

## 2025-01-13 RX ORDER — PROMETHAZINE HYDROCHLORIDE AND DEXTROMETHORPHAN HYDROBROMIDE 6.25; 15 MG/5ML; MG/5ML
5 SYRUP ORAL EVERY 6 HOURS PRN
Qty: 180 ML | Refills: 1 | Status: SHIPPED | OUTPATIENT
Start: 2025-01-13

## 2025-01-13 RX ORDER — AMOXICILLIN AND CLAVULANATE POTASSIUM 875; 125 MG/1; MG/1
1 TABLET, FILM COATED ORAL 2 TIMES DAILY
Qty: 14 TABLET | Refills: 0 | Status: SHIPPED | OUTPATIENT
Start: 2025-01-13 | End: 2025-01-28 | Stop reason: ALTCHOICE

## 2025-01-13 NOTE — PROGRESS NOTES
@Patient ID: Dennis Aldana is a 54 y.o. female.    Chief Complaint: General Illness (Entered automatically based on patient selection in [employer].)    The patient initiated a request through [employer] on 1/13/2025 for evaluation and management with a chief complaint of General Illness (Entered automatically based on patient selection in [employer].)     I evaluated the questionnaire submission on 1/13/25.    [facility] Evisit [facility]group-Cough And Cold    1/13/2025 11:35 AM CST - Filed by Patient   What do you need help with? Cough, Cold, Sore Throat   Do you agree to participate in an E-Visit? Yes   If you have any of the following symptoms, go to your local emergency room or call 911: I acknowledge   Do you have any of the following pregnancy-related conditions? None   What is the main issue you would like addressed today? Cough, sore throat, congestion   Do you think you might have COVID or the Flu? No   Have you tested positive for COVID or Flu? No   What symptoms do you currently have?  Cough;  Fatigue;  Headache;  Nasal Congestion;  Sore throat   Describe your cough: Productive (containing mucus)   Describe the mucus: Green;  Yellow;  Thick   Have you had any of the following? Trouble swallowing   Please enter a few details about your swallowing, breathing, or visual problems. Hurts to swallow, watery eyes   Have you ever smoked? I have never smoked   Have you had a fever? No   When did your symptoms first appear? 1/10/2025   In the last two weeks, have you been in close contact with someone who has COVID-19 or the Flu? No   List what you have done or taken to help your symptoms. OTC cold and cough meds   How severe are your symptoms? Severe   Have your symptoms gotten better or worse since they started?  No change   Do you have transportation to get testing if it is needed and ordered for you at an [facility] location? Yes   Provide any additional information you feel is important.    Please attach any relevant  images or files    Are you able to take your vital signs? No         Encounter Diagnosis   Name Primary?    Acute maxillary sinusitis, recurrence not specified Yes        No orders of the defined types were placed in this encounter.     Medications Ordered This Encounter   Medications    amoxicillin-clavulanate 875-125mg (AUGMENTIN) 875-125 mg per tablet     Sig: Take 1 tablet by mouth 2 (two) times daily. for 7 days     Dispense:  14 tablet     Refill:  0    promethazine-dextromethorphan (PROMETHAZINE-DM) 6.25-15 mg/5 mL Syrp     Sig: Take 5 mLs by mouth every 6 (six) hours as needed (cough).     Dispense:  180 mL     Refill:  1        No follow-ups on file.      E-Visit Time Tracking:    Day 1 Time (in minutes): 5    Total Time (in minutes): 5

## 2025-01-20 DIAGNOSIS — F51.01 PRIMARY INSOMNIA: ICD-10-CM

## 2025-01-20 NOTE — TELEPHONE ENCOUNTER
No care due was identified.  [facility] Embedded Care Due Messages. Reference number: [id number].   1/20/2025 11:03:53 AM CST

## 2025-01-21 RX ORDER — ZOLPIDEM TARTRATE 5 MG/1
TABLET ORAL
Qty: 20 TABLET | Refills: 2 | Status: SHIPPED | OUTPATIENT
Start: 2025-01-21

## 2025-01-27 ENCOUNTER — LAB VISIT (OUTPATIENT)
Dept: LAB | Facility: HOSPITAL | Age: 55
End: 2025-01-27
Attending: INTERNAL MEDICINE
Payer: COMMERCIAL

## 2025-01-27 DIAGNOSIS — D50.9 IRON DEFICIENCY ANEMIA, UNSPECIFIED IRON DEFICIENCY ANEMIA TYPE: ICD-10-CM

## 2025-01-27 DIAGNOSIS — C50.912 MALIGNANT NEOPLASM OF LEFT BREAST IN FEMALE, ESTROGEN RECEPTOR POSITIVE, UNSPECIFIED SITE OF BREAST: ICD-10-CM

## 2025-01-27 DIAGNOSIS — E78.5 TYPE 2 DIABETES MELLITUS WITH HYPERLIPIDEMIA: ICD-10-CM

## 2025-01-27 DIAGNOSIS — Z17.0 MALIGNANT NEOPLASM OF LEFT BREAST IN FEMALE, ESTROGEN RECEPTOR POSITIVE, UNSPECIFIED SITE OF BREAST: ICD-10-CM

## 2025-01-27 DIAGNOSIS — E11.69 TYPE 2 DIABETES MELLITUS WITH HYPERLIPIDEMIA: ICD-10-CM

## 2025-01-27 DIAGNOSIS — D50.9 MICROCYTIC ANEMIA: ICD-10-CM

## 2025-01-27 LAB
ALBUMIN SERPL BCP-MCNC: 3.9 G/DL (ref 3.5–5.2)
ALP SERPL-CCNC: 119 U/L (ref 40–150)
ALT SERPL W/O P-5'-P-CCNC: 46 U/L (ref 10–44)
ANION GAP SERPL CALC-SCNC: 15 MMOL/L (ref 8–16)
AST SERPL-CCNC: 36 U/L (ref 10–40)
BASOPHILS # BLD AUTO: 0.07 K/UL (ref 0–0.2)
BASOPHILS NFR BLD: 0.7 % (ref 0–1.9)
BILIRUB SERPL-MCNC: 1.3 MG/DL (ref 0.1–1)
BUN SERPL-MCNC: 17 MG/DL (ref 6–20)
CALCIUM SERPL-MCNC: 9.2 MG/DL (ref 8.7–10.5)
CHLORIDE SERPL-SCNC: 101 MMOL/L (ref 95–110)
CO2 SERPL-SCNC: 25 MMOL/L (ref 23–29)
CREAT SERPL-MCNC: 0.8 MG/DL (ref 0.5–1.4)
DIFFERENTIAL METHOD BLD: ABNORMAL
EOSINOPHIL # BLD AUTO: 0.2 K/UL (ref 0–0.5)
EOSINOPHIL NFR BLD: 1.7 % (ref 0–8)
ERYTHROCYTE [DISTWIDTH] IN BLOOD BY AUTOMATED COUNT: 14 % (ref 11.5–14.5)
EST. GFR  (NO RACE VARIABLE): >60 ML/MIN/1.73 M^2
ESTIMATED AVG GLUCOSE: 186 MG/DL (ref 68–131)
FERRITIN SERPL-MCNC: 130 NG/ML (ref 20–300)
GLUCOSE SERPL-MCNC: 173 MG/DL (ref 70–110)
HBA1C MFR BLD: 8.1 % (ref 4–5.6)
HCT VFR BLD AUTO: 47.3 % (ref 37–48.5)
HGB BLD-MCNC: 15.9 G/DL (ref 12–16)
IMM GRANULOCYTES # BLD AUTO: 0.06 K/UL (ref 0–0.04)
IMM GRANULOCYTES NFR BLD AUTO: 0.6 % (ref 0–0.5)
IRON SERPL-MCNC: 73 UG/DL (ref 30–160)
LYMPHOCYTES # BLD AUTO: 2.5 K/UL (ref 1–4.8)
LYMPHOCYTES NFR BLD: 24.1 % (ref 18–48)
MCH RBC QN AUTO: 28.9 PG (ref 27–31)
MCHC RBC AUTO-ENTMCNC: 33.6 G/DL (ref 32–36)
MCV RBC AUTO: 86 FL (ref 82–98)
MONOCYTES # BLD AUTO: 0.7 K/UL (ref 0.3–1)
MONOCYTES NFR BLD: 7.1 % (ref 4–15)
NEUTROPHILS # BLD AUTO: 6.8 K/UL (ref 1.8–7.7)
NEUTROPHILS NFR BLD: 65.8 % (ref 38–73)
NRBC BLD-RTO: 0 /100 WBC
PLATELET # BLD AUTO: 306 K/UL (ref 150–450)
PMV BLD AUTO: 10.5 FL (ref 9.2–12.9)
POTASSIUM SERPL-SCNC: 3.1 MMOL/L (ref 3.5–5.1)
PROT SERPL-MCNC: 7.1 G/DL (ref 6–8.4)
RBC # BLD AUTO: 5.51 M/UL (ref 4–5.4)
SATURATED IRON: 20 % (ref 20–50)
SODIUM SERPL-SCNC: 141 MMOL/L (ref 136–145)
TOTAL IRON BINDING CAPACITY: 364 UG/DL (ref 250–450)
TRANSFERRIN SERPL-MCNC: 246 MG/DL (ref 200–375)
WBC # BLD AUTO: 10.37 K/UL (ref 3.9–12.7)

## 2025-01-27 PROCEDURE — 82728 ASSAY OF FERRITIN: CPT | Performed by: INTERNAL MEDICINE

## 2025-01-27 PROCEDURE — 83540 ASSAY OF IRON: CPT | Performed by: INTERNAL MEDICINE

## 2025-01-27 PROCEDURE — 85025 COMPLETE CBC W/AUTO DIFF WBC: CPT | Performed by: INTERNAL MEDICINE

## 2025-01-27 PROCEDURE — 83036 HEMOGLOBIN GLYCOSYLATED A1C: CPT | Performed by: FAMILY MEDICINE

## 2025-01-27 PROCEDURE — 80053 COMPREHEN METABOLIC PANEL: CPT | Performed by: INTERNAL MEDICINE

## 2025-01-27 PROCEDURE — 36415 COLL VENOUS BLD VENIPUNCTURE: CPT | Mod: PO | Performed by: FAMILY MEDICINE

## 2025-01-28 ENCOUNTER — OFFICE VISIT (OUTPATIENT)
Dept: PRIMARY CARE CLINIC | Facility: CLINIC | Age: 55
End: 2025-01-28
Payer: COMMERCIAL

## 2025-01-28 VITALS
HEART RATE: 109 BPM | TEMPERATURE: 97 F | DIASTOLIC BLOOD PRESSURE: 70 MMHG | OXYGEN SATURATION: 98 % | WEIGHT: 153.44 LBS | SYSTOLIC BLOOD PRESSURE: 102 MMHG | HEIGHT: 66 IN | RESPIRATION RATE: 18 BRPM | BODY MASS INDEX: 24.66 KG/M2

## 2025-01-28 DIAGNOSIS — R80.9 MICROALBUMINURIA DUE TO TYPE 2 DIABETES MELLITUS: ICD-10-CM

## 2025-01-28 DIAGNOSIS — E11.29 MICROALBUMINURIA DUE TO TYPE 2 DIABETES MELLITUS: ICD-10-CM

## 2025-01-28 DIAGNOSIS — E78.5 HYPERLIPIDEMIA, UNSPECIFIED HYPERLIPIDEMIA TYPE: ICD-10-CM

## 2025-01-28 DIAGNOSIS — Z12.11 ENCOUNTER FOR COLORECTAL CANCER SCREENING: ICD-10-CM

## 2025-01-28 DIAGNOSIS — Z17.0 MALIGNANT NEOPLASM OF LEFT BREAST IN FEMALE, ESTROGEN RECEPTOR POSITIVE, UNSPECIFIED SITE OF BREAST: ICD-10-CM

## 2025-01-28 DIAGNOSIS — E11.65 TYPE 2 DIABETES MELLITUS WITH HYPERGLYCEMIA, WITHOUT LONG-TERM CURRENT USE OF INSULIN: Primary | ICD-10-CM

## 2025-01-28 DIAGNOSIS — C50.912 MALIGNANT NEOPLASM OF LEFT BREAST IN FEMALE, ESTROGEN RECEPTOR POSITIVE, UNSPECIFIED SITE OF BREAST: ICD-10-CM

## 2025-01-28 DIAGNOSIS — Z12.12 ENCOUNTER FOR COLORECTAL CANCER SCREENING: ICD-10-CM

## 2025-01-28 PROCEDURE — 99999 PR PBB SHADOW E&M-EST. PATIENT-LVL IV: CPT | Mod: PBBFAC,,, | Performed by: FAMILY MEDICINE

## 2025-01-28 PROCEDURE — 1160F RVW MEDS BY RX/DR IN RCRD: CPT | Mod: CPTII,S$GLB,, | Performed by: FAMILY MEDICINE

## 2025-01-28 PROCEDURE — 3072F LOW RISK FOR RETINOPATHY: CPT | Mod: CPTII,S$GLB,, | Performed by: FAMILY MEDICINE

## 2025-01-28 PROCEDURE — 99214 OFFICE O/P EST MOD 30 MIN: CPT | Mod: S$GLB,,, | Performed by: FAMILY MEDICINE

## 2025-01-28 PROCEDURE — 1159F MED LIST DOCD IN RCRD: CPT | Mod: CPTII,S$GLB,, | Performed by: FAMILY MEDICINE

## 2025-01-28 PROCEDURE — 3074F SYST BP LT 130 MM HG: CPT | Mod: CPTII,S$GLB,, | Performed by: FAMILY MEDICINE

## 2025-01-28 PROCEDURE — 3008F BODY MASS INDEX DOCD: CPT | Mod: CPTII,S$GLB,, | Performed by: FAMILY MEDICINE

## 2025-01-28 PROCEDURE — 3078F DIAST BP <80 MM HG: CPT | Mod: CPTII,S$GLB,, | Performed by: FAMILY MEDICINE

## 2025-01-28 PROCEDURE — 3052F HG A1C>EQUAL 8.0%<EQUAL 9.0%: CPT | Mod: CPTII,S$GLB,, | Performed by: FAMILY MEDICINE

## 2025-01-28 NOTE — PROGRESS NOTES
Assessment:       1. Type 2 diabetes mellitus with hyperglycemia, without long-term current use of insulin    2. Microalbuminuria due to type 2 diabetes mellitus    3. Malignant neoplasm of left breast in female, estrogen receptor positive, unspecified site of breast    4. Encounter for colorectal cancer screening    5. Hyperlipidemia, unspecified hyperlipidemia type         Plan:       Type 2 diabetes mellitus with hyperglycemia, without long-term current use of insulin  -     Comprehensive Metabolic Panel; Future; Expected date: 04/28/2025  -     Hemoglobin A1C; Future; Expected date: 04/28/2025  -     Microalbumin/Creatinine Ratio, Urine; Future; Expected date: 04/28/2025    Microalbuminuria due to type 2 diabetes mellitus  -     Comprehensive Metabolic Panel; Future; Expected date: 04/28/2025  -     Hemoglobin A1C; Future; Expected date: 04/28/2025  -     Microalbumin/Creatinine Ratio, Urine; Future; Expected date: 04/28/2025    Malignant neoplasm of left breast in female, estrogen receptor positive, unspecified site of breast    Encounter for colorectal cancer screening  -     Case Request Endoscopy: COLONOSCOPY    Hyperlipidemia, unspecified hyperlipidemia type  -     Lipid Panel; Future; Expected date: 04/28/2025      Assessment & Plan    - Assessed patient's diabetes management:  - - Noted A1C improvement to 8.1 despite 2-month interruption in Ozempic  - - Evaluated glucose readings  - - Will maintain current regimen and reassess in 3 months  - Considered breast cancer follow-up:  - - Deferred to upcoming appointment with Dr. Gomez for evaluation of reported pain  - Noted patient due for colonoscopy, initiated referral process    DIABETES:   Noted improvement in patient's A1C to 8.1, though still elevated.   Evaluated home glucose readings: post-meal readings in 150s-160s range about 1 hour after eating, fasting sugars between 120 and 130.   Discussed impact of diet changes due to patient's new work  schedule.   Continued Ozempic 2 mg.   Instructed patient to monitor glucose more consistently: alternate between fasting readings and 2-hour post-meal readings.   Ordered fasting labs for 3 months: metabolic panel, lipid panel, A1C, urine protein test.   Scheduled follow up in 3 months for diabetes management.    BREAST CANCER:   Noted patient is not currently on any treatment for breast cancer.   Patient reported experiencing similar pain as before in the affected area.   Patient reported feeling potential growth in the affected area, but not as prominent as before, possibly scar tissue.   Acknowledged patient's concerns and recommended follow-up with Dr. Gomez.   Confirmed patient has a follow-up visit scheduled with Dr. Gomez on Thursday for evaluation of the breast area.   Noted patient's history of previous growth classified as myxoid.    HEAD LUMP:   Noted persistent lump on patient's head causing pain.   Evaluated lump on patient's head: still present and causing pain, no definitive information about changes in size.   Acknowledged persistent lump and recommended evaluation by Dr. Gomez.   Confirmed patient has a follow-up visit scheduled with Dr. Gomez on Thursday for evaluation of the lump on the head.    COLONOSCOPY:   Noted patient was due for colonoscopy in December but has not had it scheduled yet.   Referred patient to gastroenterology for colonoscopy.   Initiated process to schedule colonoscopy for patient with Dr. Solitario at [facility] or the facility in [place].   Instructed patient to contact the office if no response about colonoscopy referral within 2 weeks.    WEIGHT MANAGEMENT:   Addressed late-night eating, emphasizing importance of food choices over timing.   Recommend continuing efforts to prepare meals ahead of time for work.   Patient to maintain current dietary choices when eating out, such as removing bread from sandwiches.       Medication List with Changes/Refills    Current Medications    CHLORTHALIDONE (HYGROTEN) 25 MG TAB    Take 1 tablet (25 mg total) by mouth once daily.    CITALOPRAM (CELEXA) 40 MG TABLET    Take 1 tablet (40 mg total) by mouth once daily.    JARDIANCE 25 MG TABLET    TAKE 1 TABLET BY MOUTH EVERY DAY    LORAZEPAM (ATIVAN) 1 MG TABLET    TAKE 1 TABLET BY MOUTH EVERY DAY AS NEEDED FOR ANXIETY    MELOXICAM (MOBIC) 15 MG TABLET    Take 1 tablet (15 mg total) by mouth once daily.    OZEMPIC 2 MG/DOSE (8 MG/3 ML) PNIJ    INJECT 2 MG INTO THE SKIN EVERY 7 DAYS    PRAMIPEXOLE (MIRAPEX) 0.5 MG TABLET    TAKE 1 TABLET BY MOUTH EVERY EVENING.    PROMETHAZINE-DEXTROMETHORPHAN (PROMETHAZINE-DM) 6.25-15 MG/5 ML SYRP    Take 5 mLs by mouth every 6 (six) hours as needed (cough).    ROSUVASTATIN (CRESTOR) 10 MG TABLET    TAKE 1 TABLET BY MOUTH EVERY DAY    ZOLPIDEM (AMBIEN) 5 MG TAB    TAKE 1 TABLET BY MOUTH NIGHTLY AS NEEDED (INSOMNIA).   Discontinued Medications    AMOXICILLIN-CLAVULANATE 875-125MG (AUGMENTIN) 875-125 MG PER TABLET    Take 1 tablet by mouth 2 (two) times daily. for 7 days    [id number]-ALGAL OIL (METANX, ALGAL OIL,) 3 MG-35 MG-2 MG -90.314 MG CAP    Take 1 capsule by mouth once daily.         Subjective:    Patient ID: Dennis Aldana is a 54 y.o. female.  Chief Complaint: Follow-up (8 month/reg check up)    Follow-up  Pertinent negatives include no arthralgias, chest pain, headaches, joint swelling, neck pain, vomiting or weakness.     History of Present Illness    CHIEF COMPLAINT:  Patient presents today for follow up of diabetes management.    DIABETES MANAGEMENT:  She was off Ozempic for two months due to insurance authorization delay. She resumed Ozempic 2mg 4-5 weeks ago. Home blood sugar monitoring shows post-meal sugars typically 150s-160s at 2 hours and fasting sugars 120-130. She reports difficulty with diet management due to her night shift work schedule.    BREAST CANCER HISTORY:  She is not currently on treatment for breast  "cancer. She reports pain in the same area as her previous cancer, though less prominent than initial presentation when she described it as feeling like "a half golf ball." She is uncertain if the current sensation is due to new changes or scar tissue. She has scheduled follow-up with Dr. Gomez.    HEAD CONCERNS:  She reports a persistent painful lump on her head with possible slight increase in size.    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      ROS:  Head: +head pain  Musculoskeletal: +bone pain       Review of Systems   Constitutional:  Negative for activity change and unexpected weight change.   HENT:  Negative for hearing loss, rhinorrhea and trouble swallowing.    Eyes:  Negative for discharge and visual disturbance.   Respiratory:  Negative for chest tightness and wheezing.    Cardiovascular:  Negative for chest pain and palpitations.   Gastrointestinal:  Negative for blood in stool, constipation, diarrhea and vomiting.   Endocrine: Negative for polydipsia and polyuria.   Genitourinary:  Negative for difficulty urinating, dysuria, hematuria and menstrual problem.   Musculoskeletal:  Negative for arthralgias, joint swelling and neck pain.   Neurological:  Negative for weakness and headaches.   Psychiatric/Behavioral:  Negative for confusion and dysphoric mood.        Objective:      Vitals:    01/28/25 1058   BP: 102/70   BP Location: Right arm   Patient Position: Sitting   Pulse: 109   Resp: 18   Temp: 97.4 °F (36.3 °C)   TempSrc: Temporal   SpO2: 98%   Weight: 69.6 kg (153 lb 7 oz)   Height: 5' 6" (1.676 m)     BP Readings from Last 5 Encounters:   01/28/25 102/70   06/26/24 118/78   06/05/24 135/83   03/06/24 114/78   01/03/24 124/70     Wt Readings from Last 5 Encounters:   01/28/25 69.6 kg (153 lb 7 oz)   10/23/24 73.4 kg (161 lb 13.1 oz)   06/26/24 73.9 kg (162 lb 14.7 oz)   06/05/24 73 kg (160 lb " 14.4 oz)   03/06/24 74.4 kg (164 lb)     Physical Exam  Physical Exam    General: Well-developed. Well-nourished. No acute distress.  Eyes: EOMI. Sclerae anicteric.  HENT: Normocephalic. Atraumatic. Nares patent. Moist oral mucosa.  Cardiovascular: Regular rate. Regular rhythm. No murmurs. No rubs. No gallops. Normal S1, S2.  Respiratory: Normal respiratory effort. Clear to auscultation bilaterally. No rales. No rhonchi. No wheezing.  Musculoskeletal: No  obvious deformity.  Extremities: No lower extremity edema.  Neurological: Alert & oriented x3. No slurred speech. Normal gait.  Psychiatric: Normal mood. Normal affect. Good insight. Good judgment.  Skin: Warm. Dry. No rash.         Lab Results   Component Value Date    WBC 10.37 01/27/2025    HGB 15.9 01/27/2025    HCT 47.3 01/27/2025     01/27/2025    CHOL 166 05/21/2024    TRIG 238 (H) 05/21/2024    HDL 42 05/21/2024    ALT 46 (H) 01/27/2025    AST 36 01/27/2025     01/27/2025    K 3.1 (L) 01/27/2025     01/27/2025    CREATININE 0.8 01/27/2025    BUN 17 01/27/2025    CO2 25 01/27/2025    TSH 2.35 01/30/2020    INR 1.1 12/29/2020    GLUF 122 (H) 02/13/2020    HGBA1C 8.1 (H) 01/27/2025      This note was generated with the assistance of ambient listening technology. Verbal consent was obtained by the patient and accompanying visitor(s) for the recording of patient appointment to facilitate this note. I attest to having reviewed and edited the generated note for accuracy, though some syntax or spelling errors may persist. Please contact the author of this note for any clarification.

## 2025-01-29 NOTE — PROGRESS NOTES
"[facility]  PROGRESS NOTE -   Follow-up Visit      Subjective:       Patient ID:   NAME: Dennis Aldana : 1970     54 y.o. female    Referring Doc: Kana  Other Physicians: Laya Morris (Gyn)    Chief Complaint:  anemia/hx/of Lft breast cancer f/u    History of Present Illness:     Patient returns today for a regularly scheduled follow-up visit.  The patient is here today to go over the results of the recently ordered labs, tests and studies.  She is here by herself.     She reports that she has been feeling "good" Energy levels are good. She has been losing weight on purpose with use of Ozempic and has lost about 90lbs    She has residual lump on scalp in middle on top of head with some intermittent HA's; she has been having back pain for the past 2 months ass well    She sees Dr Castellano with GYN every 3 yrs now    She saw Dr Roger the other day on 2025    She is due for repeat colonoscopy with Dr Montesinos    She had mammogram on  2024 which was good      No CP, SOB,  or N/V.                      ROS:   GEN: normal without any fever, night sweats or weight loss; purposeful weight loss; back pain; cyst on scalp  HEENT: normal with no HA's, sore throat, stiff neck, changes in vision  CV: normal with no CP, SOB, PND, GARCIA or orthopnea  PULM: normal with no SOB, cough, hemoptysis, sputum or pleuritic pain  GI: normal with no abdominal pain, nausea, vomiting, constipation, diarrhea, melanotic stools, BRBPR, or hematemesis  : normal with no hematuria, dysuria  BREAST: normal with no mass, discharge, pain; s/p left lumpectomy  SKIN: normal with no rash, erythema, bruising, or swelling; nodule in skin    Pain Scale:  3 on scalp; 0-1 on back    Allergies:  Review of patient's allergies indicates:  No Known Allergies    Medications:    Current Outpatient Medications:     chlorthalidone (HYGROTEN) 25 MG Tab, Take 1 tablet (25 mg total) by mouth once daily., Disp: 90 " tablet, Rfl: 1    citalopram (CELEXA) 40 MG tablet, Take 1 tablet (40 mg total) by mouth once daily., Disp: 90 tablet, Rfl: 2    JARDIANCE 25 mg tablet, TAKE 1 TABLET BY MOUTH EVERY DAY, Disp: 90 tablet, Rfl: 0    LORazepam (ATIVAN) 1 MG tablet, TAKE 1 TABLET BY MOUTH EVERY DAY AS NEEDED FOR ANXIETY, Disp: 30 tablet, Rfl: 1    meloxicam (MOBIC) 15 MG tablet, Take 1 tablet (15 mg total) by mouth once daily., Disp: 30 tablet, Rfl: 3    OZEMPIC 2 mg/dose (8 mg/3 mL) PnIj, INJECT 2 MG INTO THE SKIN EVERY 7 DAYS, Disp: 3 each, Rfl: 5    pramipexole (MIRAPEX) 0.5 MG tablet, TAKE 1 TABLET BY MOUTH EVERY EVENING., Disp: 90 tablet, Rfl: 2    promethazine-dextromethorphan (PROMETHAZINE-DM) 6.25-15 mg/5 mL Syrp, Take 5 mLs by mouth every 6 (six) hours as needed (cough)., Disp: 180 mL, Rfl: 1    rosuvastatin (CRESTOR) 10 MG tablet, TAKE 1 TABLET BY MOUTH EVERY DAY, Disp: 90 tablet, Rfl: 1    zolpidem (AMBIEN) 5 MG Tab, TAKE 1 TABLET BY MOUTH NIGHTLY AS NEEDED (INSOMNIA)., Disp: 20 tablet, Rfl: 2    PMHx/PSHx Updates:  See patient's last visit with me on 6/5/2024  See H&P on 8/17/2020        Pathology:   Cancer Staging   No matching staging information was found for the patient.        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 UTERUS, CERVIX, FALLOPIAN TUBES, AND OVARIES; HYSTERECTOMY AND    BILATERAL SALPINGO-OOPHORECTOMY:       MYOMETRIUM:   --SUBSEROSAL AND INTRAMURAL LEIOMYOMATA (INCLUDING FOCAL EPITHELIOID    LEIOMYOMA).      --ADENOMYOSIS.     ENDOMETRIUM:     --BENIGN ENDOMETRIAL POLYPS (LARGEST 3.2 CENTIMETERS).     CERVIX:   --FOCAL HIGH-GRADE SQUAMOUS INTRAEPITHELIAL LESION (CERVICAL    INTRAEPITHELIAL NEOPLASIA        GRADE 2 [OPHELIA 2]), WITH ENDOCERVICAL GLANDULAR EXTENSION.      --MARGIN IS NEGATIVE FOR SQUAMOUS INTRAEPITHELIAL LESION.      --FOCAL ACUTE AND CHRONIC CERVICITIS.       "--SQUAMOUS METAPLASIA OF ENDOCERVIX.      --NABOTHIAN CYSTS.      --CERVIX ENTIRELY SUBMITTED FOR HISTOLOGIC SECTIONING.     RIGHT FALLOPIAN TUBE:     --MICROSCOPIC BENIGN PARATUBAL CYSTS.     LEFT FALLOPIAN TUBE:     --MICROSCOPIC BENIGN PARATUBAL CYSTS.     --SEROSAL FIBROVASCULAR ADHESIONS.     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     --ENDOMETRIOSIS.     --SURFACE EPITHELIAL INCLUSION CYSTS.     --FOCAL ENDOSALPINGIOSIS.     --CYSTIC CORPUS LUTEUM.     --CYSTIC FOLLICLES.     --CORPORA ALBICANTIA.     --SEROSAL FIBROVASCULAR ADHESIONS.     LEFT OVARY:     --ENDOMETRIOSIS.     --SURFACE EPITHELIAL INCLUSION CYSTS.     --FOCAL ENDOSALPINGIOSIS.      --HEMORRHAGIC CYSTIC CORPUS LUTEUM.      --COPORA ALBICANTIA.      --SEROSAL FIBROVASCULAR ADHESIONS.     2. SPECIMEN SUBMITTED AS "PERITONEAL NODULE," EXCISION:   --INFARCTED APPENDIX EPIPLOICA WITH DYSTROPHIC CALCIFICATION AND    FIBROSIS.            Left back soft tissue biopsy: 12/29/2020:    SOFT TISSUE, LEFT LATISSIMUS DORSI, CORE BIOPSY:   - LOW GRADE MYXOID LESION.   - SEE COMMENT.         Objective:     Vitals:  Blood pressure 114/82, pulse 105, temperature 98.6 °F (37 °C), temperature source Temporal, resp. rate 16, height 5' 6" (1.676 m), weight 69 kg (152 lb 3.2 oz), last menstrual period 02/24/2021.    Physical Examination:   GEN: no apparent distress, comfortable; AAOx3;    HEAD: atraumatic and normocephalic  EYES: no pallor, no icterus, PERRLA  ENT: OMM, no pharyngeal erythema, external ears WNL; no nasal discharge; no thrush  NECK: no masses, thyroid normal, trachea midline, no LAD/LN's, supple  CV: RRR with no murmur; normal pulse; normal S1 and S2; no pedal edema  CHEST: Normal respiratory effort; CTAB; normal breath sounds; no wheeze or crackles  ABDOM: nontender and nondistended; soft; normal bowel sounds; no rebound/guarding  MUSC/Skeletal: ROM normal; no crepitus; joints normal; no deformities or arthropathy  EXTREM: no " clubbing, cyanosis, inflammation or swelling  SKIN: no rashes, lesions, ulcers, petechiae or subcutaneous nodules; tattoos; ? subcutaneous cyst on scalp   : no quinonez  NEURO: grossly intact; motor/sensory WNL; AAOx3; no tremors  PSYCH: normal mood, affect and behavior  LYMPH: normal cervical, supraclavicular, axillary and groin LN's  Breast: s/p left lumpectomy with no new changes        Labs:     Lab Results   Component Value Date    WBC 10.37 01/27/2025    HGB 15.9 01/27/2025    HCT 47.3 01/27/2025    MCV 86 01/27/2025     01/27/2025       Lab Results   Component Value Date    IRON 73 01/27/2025    TIBC 364 01/27/2025    FERRITIN 130 01/27/2025     CMP  Sodium   Date Value Ref Range Status   01/27/2025 141 136 - 145 mmol/L Final     Potassium   Date Value Ref Range Status   01/27/2025 3.1 (L) 3.5 - 5.1 mmol/L Final     Chloride   Date Value Ref Range Status   01/27/2025 101 95 - 110 mmol/L Final     CO2   Date Value Ref Range Status   01/27/2025 25 23 - 29 mmol/L Final     Glucose   Date Value Ref Range Status   01/27/2025 173 (H) 70 - 110 mg/dL Final     BUN   Date Value Ref Range Status   01/27/2025 17 6 - 20 mg/dL Final     Creatinine   Date Value Ref Range Status   01/27/2025 0.8 0.5 - 1.4 mg/dL Final     Calcium   Date Value Ref Range Status   01/27/2025 9.2 8.7 - 10.5 mg/dL Final     Total Protein   Date Value Ref Range Status   01/27/2025 7.1 6.0 - 8.4 g/dL Final     Albumin   Date Value Ref Range Status   01/27/2025 3.9 3.5 - 5.2 g/dL Final     Total Bilirubin   Date Value Ref Range Status   01/27/2025 1.3 (H) 0.1 - 1.0 mg/dL Final     Comment:     For infants and newborns, interpretation of results should be based  on gestational age, weight and in agreement with clinical  observations.    Premature Infant recommended reference ranges:  Up to 24 hours.............<8.0 mg/dL  Up to 48 hours............<12.0 mg/dL  3-5 days..................<15.0 mg/dL  6-29 days.................<15.0 mg/dL        Alkaline Phosphatase   Date Value Ref Range Status   01/27/2025 119 40 - 150 U/L Final     AST   Date Value Ref Range Status   01/27/2025 36 10 - 40 U/L Final     ALT   Date Value Ref Range Status   01/27/2025 46 (H) 10 - 44 U/L Final     Anion Gap   Date Value Ref Range Status   01/27/2025 15 8 - 16 mmol/L Final     eGFR if    Date Value Ref Range Status   02/23/2022 >60.0 >60 mL/min/1.73 m^2 Final     eGFR if non    Date Value Ref Range Status   02/23/2022 >60.0 >60 mL/min/1.73 m^2 Final     Comment:     Calculation used to obtain the estimated glomerular filtration  rate (eGFR) is the CKD-EPI equation.                 Radiology/Diagnostic Studies:    Mammo 4/16/2024:      Impression:     1. No mammographic evidence of malignancy.  2. Yearly mammography is recommended.  BI-RADS CATEGORY 2: BENIGN FINDING.    PET 1/18/2024:  FINDINGS:  Background:  - Liver 4.1 SUV max.  - Spleen 3.5 SUV max.  - Spleen 11.6 x 5.3 cm (AP x TR)  - Blood pool 3.3 SUV max     The previously described splenic lesions show no increased FDG activity from background with SUV max 3.2 (image 1:30) suggesting that these represent atypical hemangioma, lymphangioma or lymphatic rests.     Mildly heterogeneous appearing thyroid gland with no FDG avid nodule identified with SUV max 2.1 on the right and 2.7 (image 78).     Additional findings:  -Scattered degenerative change in the spine with no aggressive appearing FDG avid lytic or blastic lesion noting unchanged faint focal mineralization the T7 vertebral body on the right (image 110)  -Calcified plaque at the aortic arch with no aneurysm.  -Asymmetric small lobular appearing right kidney suggesting chronic infection/obstructive uropathy or remote trauma.  -Prior hysterectomy.  -Additional chronic and incidental findings without adverse interval change from the previous exam.     IMPRESSION:  No PET/CT finding of FDG avid disease.         Mammo  4/18/2023:    Impression:     1. Postoperative changes in the left breast as above.  Previously noted coarse calcifications at the 3 o'clock position of the left breast have increased slightly in number, and have become somewhat more confluent.  These are most compatible with fat necrosis.  2. No new abnormalities.  3. Yearly mammography is recommended, unless indicated otherwise clinically.  BI-RADS CATEGORY 2: BENIGN FINDING.         Mammo 12/8/2022:    Impression:     Probably benign dystrophic calcifications and or evolving fat necrosis in the left breast as described.  Bilateral diagnostic mammogram follow-up in April 2023 is recommended, at which time patient will be due for annual bilateral mammography.  These findings and recommendations were given to the patient at the time of her visit.     BI-RADS CATEGORY 3: PROBABLY BENIGN FINDING-SHORT TERM INTERVAL FOLLOWUP SUGGESTED.           PET 2/8/2022:    IMPRESSION:  No evidence of recurrent or metastatic disease     Interval removal of the intramuscular mass in the left latissimus dorsi.  Stable mild increased mineralization of the right T7 vertebral body with no FDG activity.        CT scans  8/2/2021:    IMPRESSION:  1. Stable CT appearance of the chest, abdomen, and pelvis when compared to December 2020. No definite evidence of metastatic disease.  2. Specifically, T7 vertebral body sclerotic lesion, hyperdense lesion in the left hepatic lobe, small hypodensities in the spleen, and additional observations as above are unchanged.        Mammo 2/1/2021:    Impression:  No mammographic evidence of malignancy.     BI-RADS Category 1: Negative           PET  1/11/2021:    Impression:     1. Intramuscular mass in left latissimus Dorsi does show mild FDG uptake with recent biopsy showing low-grade myxomatous lesion, concordant with the imaging characteristics.  2. Previous left hepatic lobe mass shows no increased FDG activity above that of background hepatic  parenchymal activity.  Differential diagnosis of this remains unchanged from that previously discussed, with lack of increased FDG activity further substantiating suspected benign etiology.  Follow-up anatomic imaging is suggested with MRI abdomen without and with IV contrast (Eovist) in 3-6 months.  3. Unchanged focus of increased mineralization in right T7 vertebral body without increased FDG activity.  This remains of uncertain etiology with benign etiologies conceivable, although sclerotic osseous metastasis is difficult to entirely exclude.  Further evaluation with whole-body bone scan is suggested, unless already performed at outside institution.  Attention to this on follow-up CT is suggested, with repeat CT thorax without IV contrast suggested in 3-6 months.             CT chest/abdom/pelvis  12/9/2020:    IMPRESSION:     Degradation by motion.     No evidence of metastatic disease within the thorax. Specifically,  there is no evidence of axillary adenopathy.     Indeterminate mildly hyperdense mass within the lateral segment of the  left hepatic lobe, incompletely characterized. Correlation with  multiphasic MRI or CT is recommended.     Indeterminate splenic masses measuring 10 and 8 mm in size. If prior  outside contrast enhanced studies exist, these would be helpful for  comparison. Alternatively, these lesions could be also assessed at the  time of follow-up multiphasic MRI or CT.     Sclerotic area within the rightward aspect of T7. The possibility of  sclerotic metastasis is not excludable.     13 mm calculus within the right renal pelvis with surrounding  inflammatory changes. The right kidney is small in overall size and  demonstrates multiple focal areas of cortical scarring.     Cystic density left ovarian masses and mild prominence of the  endometrial stripe. Correlation with pelvic ultrasound recommended.     Multiple solid uterine masses likely representing fibroids.     Probable Bartholin's  cyst near the vaginal introitus.             I have reviewed all available lab results and radiology reports.    Assessment/Plan:   (1) 54 y.o. female with diagnosis of anemia referred by Dr Wade  - microcytic indices  - hgb at 10.2 and MCV at 66  - bilirubin is wnl, so I do not suspect any hemolysis at this time  - low iron and ferritin with elevated TIBC most consistent with an iron deficiency condition  - hx/of endometriosis and heavy menstrual cycles - followed by Dr Dagoberto Morris with GYN at [facility] [place], [place] (prior laparoscopy x4)    10/20/2020:  - s/p two IV irons  - Hgb now up and normal at 13.8  - iron now normal at 89 and ferritin is up to 308    11/17/2020:  - hgb good at 13.2 and now WNL  - iron panel currently normal    12/21/2020:  - s/p IV iron in Sept 2020  - hgb and iron panel are currently WNL    1/20/2021:  - latest hgb at 13.4 and good  - iron panel WNL    4/20/2021:  - latest hgb and iron panel WNL    7/21/2021:  - latest hgb WNL and iron panel is adequate    11/15/2021:  - labs are adequate with Hgb WNL  - iron panel WNL    1/9/2023:  - latest cbc and iron panel adequate    5/9/2023:  - latest CBC is adequate with normal iron panel and hgb    12/5/2023:  - due for repeat labs    6/5/2024:  - latest WBC at 13.05 and adequate for her  - hgb slightly elevated at 16.4 but she suspects she may have been a little dehydrated when she did the labs  - check labs again in 3 months  - f/u with Kana on 6/26 and have him address the scalp too  - PET in Jan 2024 was adequate    1/30/2025:  - latest CBC is adequate     (2) Hx/of left breast cancer diagnosed May 2018 s/p lumpectomy and radiation, oral antihormone with tamoxifen - s/p lumpectomy and XRT at [facility]  - Stage I  - ER positive  - she saw a female oncologist at [facility] in past   - she gets regular mammograms and had one this past Jan 2020  - she does not follow-up with oncology    4/20/2021:  - change to arimidex    7/21/2021:  - she is on arimidex   - mammo is  "due Feb 2022  - discussed that arimidex could be adding to the arthritis    11/15/2021:  - mammo due Feb 2022  - continued on arimidex    2/15/2022:  - mammo scheduled for March 2022  - continued on arimidex    1/9/2023:  - patient stopped taking the arimidex about 3 months ago  - mammo on 12/8/2022 on chart with repeat planned for April 2023 5/9/2023:  - latest mammogram on 4/18/2023 was benign/stable - repeat in one year per radiology    12/5/2023:  - due for mammogram again in April 2024 6/5/2024:  - mammo 4/2024 negative  - PET in Jan 2024  - she has been off the arimidex since end of 2022 1/30/'2025:  - she has been off arimidex since 2022  - new back pain and residual scalp issues  - schedule PET scan out of abundance of precaution     (3) HTN and hypercholesterolemia     (4) Type II DM     (5) GERD     (6) TATYANA/Depression and RLS     (7) OA of right knee    (8) Subcutaneous nodule left scapular medial side and left posterior axilla - ? Lipomas - will schedule PEt scan in light of the breast cancer history  - 11/17/2020:  - insurance decline approval for PET   - scheduled CT scans instead and refer to Dr Rendon with Gen Surg    12/21/2020:  - she saw Dr Rendon since last visit and the suspected lipoma is actually a nodularity in the muscle layer and he is concerned about the possibility for "sarcoma"    1/20/2021:  - PET on chart from 1/11/2021  - Patient seen by Dr Rendon and had biopsy done of the skin lesion on the back on 12/29/2020 with pathology reporting a low grade myxoid lesion  - pathology recommended complete excision of the lesion to further classify      3/1/2021:  - She saw Dr Rendon again on Jan 26th 2021. She subsequently saw Dr Chico Baird with Surg-Onc at [facility] and had repeat excision on 2/22/2021 and the final pathology is still in process with the specimen referred to the [facility] for review.   - she sees Dr Baird again on 3/4/2021 to go over the " results    4/20/2021;  - final pathology has come back as benign mixoid    2/15/2022:  - She had stent placement with Dr Lin in Dec 2021 and stone removal on Jan 8th 2022.  - She is undergoing surgery next week for diverticulum of the urethra with Dr Greer in [place].  - She had colonoscopy on Dec 3rd 2021 with Dr Montesinos with one polyp removed. Planned repeat scope in 3 yrs per patient  - She had repeat PEt recently on 2/8/2022 with overall  Good report          (9) Left ovarian cystic mass with uterine fibroids, Indeterminate mildly hyperdense mass within the lateral segment of the left hepatic lobe and two indeterminate nodules in her spleen   - will set up MRI of liver and also include the left paraspinal thoracic area near scapula to evaluate the submuscular nodule that is palpable    1/20/2021:  - she is seeing Dr Contreras on Murphy 27th 2010    3/1/2021:  - She saw Dr Contreras on Jan 27th 2021 and is having surgery with Dr Contreras on 3/8/2021.     4/20/2021;  -  She had hysterectomy on 3/22/2021 with Dr Contreras with negative pathology per patientT    7/21/2021:   - she is seeing Dr Contreras again in Nov 2021    2/15/2022:'  - planned f/u with Dr Contreras after her expected urethral surgery    1/9/2023:  - s/p urethral surgery with Dr Greer since last visit  - she plans to f/u with Gyn-Onc in the near future    5/9/2023:  - she plans to establish with new Gyn in near future  - follows with Dr Greer now PRN    12/5/2023:  - she sees new GYN Dr Castellano in near future           VISIT DIAGNOSES:      Malignant neoplasm of left breast in female, estrogen receptor positive, unspecified site of breast    Iron deficiency anemia, unspecified iron deficiency anemia type    Microcytic anemia    S/P lumpectomy, left breast    Use of tamoxifen (Nolvadex)    ER+ (estrogen receptor positive status)          PLAN:  1. f/u with GYN and  as directed - sees Dr Castellano every 3 yrs now  2. Check labs every 3 months and resume IV iron as  needed  3.  To see Dr Roger in Apr 2025  4. Mammogram - set up repeat April 2025  5. Set up PET   6. F/u with PCP, etc                  RTC in  3 months   Fax note to Andrew Roger MD; Carmen; Alexandra; Mitzy Contreras; Chico Baird; Riley    Discussion:     COVID-19 Discussion:    I had long discussion with patient and any applicable family about the COVID-19 coronavirus epidemic and the recommended precautions with regard to cancer and/or hematology patients. I have re-iterated the CDC recommendations for adequate hand washing, use of hand -like products, and coughing into elbow, etc. In addition, especially for our patients who are on chemotherapy and/or our otherwise immunocompromised patients, I have recommended avoidance of crowds, including movie theaters, restaurants, churches, etc. I have recommended avoidance of any sick or symptomatic family members and/or friends. I have also recommended avoidance of any raw and unwashed food products, and general avoidance of food items that have not been prepared by themselves. The patient has been asked to call us immediately with any symptom developments, issues, questions or other general concerns.     Pathology Discussion:     I reviewed and discussed the pathology report(s) and radiograph reports (if available) in as simple to understand and/or laymen's terms to the best of my ability. I had an indepth conversation with the patient and went over the patient's individual diagnosis based on the information that was currently available. I discussed the TNM staging process with regard to the patient's particular cancer type, and the calculated stage based on the currently available TNM data and literature. I discussed the available prognostic data with regard to the current staging information and how it relates to the prognosis of their particular neoplastic process.          NCCN Guidelines:     I discussed the available treatment option(s) in  "accordance with the latest literature from the NCCN Clinical Practice Guidelines for the patient's particular type of cancer disorder. The NCCN Guidelines provide a "document evidence-based (and) consensus-driven management" of the care of oncology patients. The treatment recommendations were made not only in accordance to the NCCN guidelines, but also factored in to account the patient's overall age, condition, performance status and their medical co-morbidities. I went over the risks and benefits of the the treatment options (if any could be made) with regard to their particular cancer type, their cancer stage, their age, and their co-morbidities.          Antihormone Therapy Discussion:    I discussed the advantages of antihormone therapy with the patient with regard to their particular neoplastic or carcinoma in situ condition. I went over the side-effect profile of the medication including risk for potential development of endometrial cancer and/or hyperplasia in women who still have a uterus and the need for yearly GYN evaluation and follow-up. I discussed the risks for thromboembolic events such as DVT's, pulmonary emboli, CVA's, retinal vascular clots, phlebitis, and TIA's. I discussed the potential risks for development of ocular disturbances, retinopathy, cataracts, corneal changes, flushing, hot flashes, amenorrhea, altered menses, fluid retention, weight changes, elevations in LFT's, liver damage, and mood disturbances. I discussed the potential risk of arthropathy and joint pains/aches which could be chronic and debilitating. I discussed potential adverse effects on bone mineralization and the risk of osteopenia and/or osteoporosis which could led to increase risk of fractures.   A consent form was obtained and a copy was provided to the patient.    Iron Infusion Therapy Discussion:     I provided literature/learning materials on the particular IV iron regimen and discussed the potential side-effect " profiles of the drug(s). I discussed the importance of compliance with obtaining and monitoring requested lab work, and went over the potential risk for the development of anaphylactic shock, bronchospasm, dysrhythmia, liver and/or kidney damage, and respiratory/cardiovascular arrest and/or failure. I discussed the potential risks for development of alopecia, fevers, itching, chills and/or rigors, cold sensory issues, ringing in ears, vertigo and neuropathy, all of which are usually acute but sometimes could end up being chronic and life-long. I discussed the risks of hand-foot syndrome and rashes, and development of other autoimmune mediated processes such as pneumonitis and colitis which could be life threatening.     The patient's consent has been obtained to proceed with the IV iron therapy.The patient will be referred to [facility] for training and education on IV iron therapy, use of antiemetics and/or anti-diarrheals, use of NSAID's, potential IV iron therapy side-effects, and any specific recommendations and precautions with the particular IV iron agents.      I answered all of the patient's (and family's, if applicable) questions to the best of my ability and to their complete satisfaction. The patient acknowledged full understanding of the risks, recommendations and plan(s).         I spent over 25 mins of time with the patient. Reviewed results of the recently ordered labs, tests and studies; made directives with regards to the results. Over half of this time was spent couseling and coordinating care.    I have explained all of the above in detail and the patient understands all of the current recommendation(s). I have answered all of their questions to the best of my ability and to their complete satisfaction.   The patient is to continue with the current management plan.            Electronically signed by Hossein Carranza MD                                  Answers  submitted by the patient for this visit:  Review of Systems Questionnaire (Submitted on 1/23/2025)  appetite change : No  unexpected weight change: No  mouth sores: No  visual disturbance: No  cough: No  shortness of breath: No  chest pain: No  abdominal pain: No  diarrhea: No  frequency: No  back pain: No  rash: No  headaches: No  adenopathy: No  nervous/ anxious: No

## 2025-01-30 ENCOUNTER — OFFICE VISIT (OUTPATIENT)
Facility: CLINIC | Age: 55
End: 2025-01-30
Payer: COMMERCIAL

## 2025-01-30 VITALS
DIASTOLIC BLOOD PRESSURE: 82 MMHG | HEIGHT: 66 IN | BODY MASS INDEX: 24.46 KG/M2 | HEART RATE: 105 BPM | WEIGHT: 152.19 LBS | TEMPERATURE: 99 F | RESPIRATION RATE: 16 BRPM | SYSTOLIC BLOOD PRESSURE: 114 MMHG

## 2025-01-30 DIAGNOSIS — D50.9 MICROCYTIC ANEMIA: ICD-10-CM

## 2025-01-30 DIAGNOSIS — Z79.810 USE OF TAMOXIFEN (NOLVADEX): ICD-10-CM

## 2025-01-30 DIAGNOSIS — Z17.0 ER+ (ESTROGEN RECEPTOR POSITIVE STATUS): ICD-10-CM

## 2025-01-30 DIAGNOSIS — Z17.0 MALIGNANT NEOPLASM OF LEFT BREAST IN FEMALE, ESTROGEN RECEPTOR POSITIVE, UNSPECIFIED SITE OF BREAST: Primary | ICD-10-CM

## 2025-01-30 DIAGNOSIS — C50.912 MALIGNANT NEOPLASM OF LEFT BREAST IN FEMALE, ESTROGEN RECEPTOR POSITIVE, UNSPECIFIED SITE OF BREAST: Primary | ICD-10-CM

## 2025-01-30 DIAGNOSIS — D50.9 IRON DEFICIENCY ANEMIA, UNSPECIFIED IRON DEFICIENCY ANEMIA TYPE: ICD-10-CM

## 2025-01-30 DIAGNOSIS — Z98.890 S/P LUMPECTOMY, LEFT BREAST: ICD-10-CM

## 2025-01-30 PROCEDURE — 1159F MED LIST DOCD IN RCRD: CPT | Mod: CPTII,S$GLB,, | Performed by: INTERNAL MEDICINE

## 2025-01-30 PROCEDURE — 3008F BODY MASS INDEX DOCD: CPT | Mod: CPTII,S$GLB,, | Performed by: INTERNAL MEDICINE

## 2025-01-30 PROCEDURE — 3052F HG A1C>EQUAL 8.0%<EQUAL 9.0%: CPT | Mod: CPTII,S$GLB,, | Performed by: INTERNAL MEDICINE

## 2025-01-30 PROCEDURE — 1160F RVW MEDS BY RX/DR IN RCRD: CPT | Mod: CPTII,S$GLB,, | Performed by: INTERNAL MEDICINE

## 2025-01-30 PROCEDURE — 3079F DIAST BP 80-89 MM HG: CPT | Mod: CPTII,S$GLB,, | Performed by: INTERNAL MEDICINE

## 2025-01-30 PROCEDURE — 99999 PR PBB SHADOW E&M-EST. PATIENT-LVL IV: CPT | Mod: PBBFAC,,, | Performed by: INTERNAL MEDICINE

## 2025-01-30 PROCEDURE — 3072F LOW RISK FOR RETINOPATHY: CPT | Mod: CPTII,S$GLB,, | Performed by: INTERNAL MEDICINE

## 2025-01-30 PROCEDURE — G2211 COMPLEX E/M VISIT ADD ON: HCPCS | Mod: S$GLB,,, | Performed by: INTERNAL MEDICINE

## 2025-01-30 PROCEDURE — 3074F SYST BP LT 130 MM HG: CPT | Mod: CPTII,S$GLB,, | Performed by: INTERNAL MEDICINE

## 2025-01-30 PROCEDURE — 99215 OFFICE O/P EST HI 40 MIN: CPT | Mod: S$GLB,,, | Performed by: INTERNAL MEDICINE

## 2025-02-17 DIAGNOSIS — M79.672 PAIN IN BOTH FEET: ICD-10-CM

## 2025-02-17 DIAGNOSIS — M79.671 PAIN IN BOTH FEET: ICD-10-CM

## 2025-02-17 RX ORDER — MELOXICAM 15 MG/1
15 TABLET ORAL
Qty: 30 TABLET | Refills: 3 | Status: SHIPPED | OUTPATIENT
Start: 2025-02-17

## 2025-02-18 ENCOUNTER — RESULTS FOLLOW-UP (OUTPATIENT)
Facility: CLINIC | Age: 55
End: 2025-02-18
Payer: COMMERCIAL

## 2025-02-18 ENCOUNTER — HOSPITAL ENCOUNTER (OUTPATIENT)
Dept: RADIOLOGY | Facility: HOSPITAL | Age: 55
Discharge: HOME | End: 2025-02-18
Attending: INTERNAL MEDICINE
Payer: COMMERCIAL

## 2025-02-18 VITALS — HEIGHT: 65 IN | BODY MASS INDEX: 25.16 KG/M2 | WEIGHT: 151 LBS

## 2025-02-18 DIAGNOSIS — Z98.890 S/P LUMPECTOMY, LEFT BREAST: ICD-10-CM

## 2025-02-18 DIAGNOSIS — Z17.0 MALIGNANT NEOPLASM OF LEFT BREAST IN FEMALE, ESTROGEN RECEPTOR POSITIVE, UNSPECIFIED SITE OF BREAST: ICD-10-CM

## 2025-02-18 DIAGNOSIS — C50.912 MALIGNANT NEOPLASM OF LEFT BREAST IN FEMALE, ESTROGEN RECEPTOR POSITIVE, UNSPECIFIED SITE OF BREAST: ICD-10-CM

## 2025-02-18 LAB — POCT GLUCOSE: 121 MG/DL (ref 70–110)

## 2025-02-18 PROCEDURE — A9552 F18 FDG: HCPCS | Mod: PO | Performed by: INTERNAL MEDICINE

## 2025-02-18 PROCEDURE — 78815 PET IMAGE W/CT SKULL-THIGH: CPT | Mod: TC,PO

## 2025-02-18 RX ORDER — FLUDEOXYGLUCOSE F18 500 MCI/ML
12.9 INJECTION INTRAVENOUS
Status: COMPLETED | OUTPATIENT
Start: 2025-02-18 | End: 2025-02-18

## 2025-02-18 RX ADMIN — FLUDEOXYGLUCOSE F-18 12.9 MILLICURIE: 500 INJECTION INTRAVENOUS at 11:02

## 2025-02-24 NOTE — TELEPHONE ENCOUNTER
----- Message from Hossein Carranza MD sent at 2/18/2025  3:47 PM CST -----  Call her with good report  ----- Message -----  From: Rajeev, Rad Results In  Sent: 2/18/2025  12:56 PM CST  To: Hossein Carranza MD

## 2025-02-24 NOTE — TELEPHONE ENCOUNTER
Patient made aware of good PET report, instructed to keep scheduled f/u. Verbalized understanding.

## 2025-03-05 DIAGNOSIS — F41.1 GAD (GENERALIZED ANXIETY DISORDER): ICD-10-CM

## 2025-03-05 NOTE — TELEPHONE ENCOUNTER
Care Due:                  Date            Visit Type   Department     Provider  --------------------------------------------------------------------------------                                MYCHART                              [place]  [facility]  Last Visit: 01-      FICE VISIT   PRIMARY CARE   Andrew Roger                               -                              PRIMARY      [facility]  Next Visit: 04-      CARE ([facility])   PRIMARY CARE   Andrew Roger                                                            Last  Test          Frequency    Reason                     Performed    Due Date  --------------------------------------------------------------------------------    Lipid Panel.  12 months..  rosuvastatin.............  05- 05-    [facility] Embedded Care Due Messages. Reference number: [id number].   3/05/2025 11:51:07 AM CST

## 2025-03-06 RX ORDER — LORAZEPAM 1 MG/1
1 TABLET ORAL DAILY PRN
Qty: 30 TABLET | Refills: 1 | Status: SHIPPED | OUTPATIENT
Start: 2025-03-06

## 2025-03-17 ENCOUNTER — PATIENT MESSAGE (OUTPATIENT)
Dept: GASTROENTEROLOGY | Facility: CLINIC | Age: 55
End: 2025-03-17
Payer: COMMERCIAL

## 2025-03-17 ENCOUNTER — PATIENT MESSAGE (OUTPATIENT)
Dept: PRIMARY CARE CLINIC | Facility: CLINIC | Age: 55
End: 2025-03-17
Payer: COMMERCIAL

## 2025-03-17 ENCOUNTER — PATIENT OUTREACH (OUTPATIENT)
Dept: ADMINISTRATIVE | Facility: HOSPITAL | Age: 55
End: 2025-03-17
Payer: COMMERCIAL

## 2025-03-17 VITALS — DIASTOLIC BLOOD PRESSURE: 81 MMHG | SYSTOLIC BLOOD PRESSURE: 107 MMHG

## 2025-03-17 DIAGNOSIS — C50.912 MALIGNANT NEOPLASM OF LEFT BREAST: Primary | ICD-10-CM

## 2025-03-17 DIAGNOSIS — E11.65 TYPE 2 DIABETES MELLITUS WITH HYPERGLYCEMIA, WITHOUT LONG-TERM CURRENT USE OF INSULIN: Primary | ICD-10-CM

## 2025-03-17 DIAGNOSIS — Z12.11 SCREENING FOR COLON CANCER: ICD-10-CM

## 2025-03-17 DIAGNOSIS — Z98.890 S/P BREAST LUMPECTOMY: ICD-10-CM

## 2025-03-17 NOTE — PROGRESS NOTES
Health Maintenance Due   Topic Date Due    Shingles Vaccine (1 of 2) Never done    Colorectal Cancer Screening  12/03/2024    Diabetes Urine Screening  02/19/2025    Diabetic Eye Exam  03/20/2025    Mammogram  04/16/2025     Care Coordination Encounter Details:       MyChart Portal Status:         [x]  Reviewed MyChart Portal Status offered / enrolled if applicable        Additional Notes:     MyChart Outcomes: Pt is enrolled & active          Updates Requested / Reviewed:        Updated Care Coordination Note, Care Everywhere, and Immunizations Reconciliation Completed or Queried: [place]         Health Maintenance Screening(s) Due:      Health Maintenance Topics Overdue:      VBHM Score: 2     Colon Cancer Screening  Urine Screening                   Health Maintenance Topic(s) Outreach Outcomes & Actions Taken:    Colorectal Cancer Screening - Outreach Outcomes & Actions Taken  : Colonoscopy Case Request / Referral / Home Test Order Placed. Case request for screening colonoscopy [facility] [place] [facility] placed     Breast Cancer Screening - Outreach Outcomes & Actions Taken  : Mammogram Screening Scheduled. Diagnostic mammogram scheduled for 4/16/2025    Lab(s) - Outreach Outcomes & Actions Taken  : A1C gap reviewed. Hemoglobin A1C done 1/27/2025. Due 4/27/2025 . Ordered by PCP     Chronic Disease Management:     Diabetes Measures        Lab Results   Component Value Date    HGBA1C 8.1 (H) 01/27/2025           [x]  Reviewed chart for active Diabetes diagnosis     []  Scheduled necessary follow up appointments if needed         Additional Notes:             Hypertension Measures        BP Readings from Last 1 Encounters:   01/30/25 114/82           [x]  Reviewed chart for active Hypertension diagnosis     []  Reviewed & documented Home BP Cuff     []  Documented a Remote BP if needed & applicable     []  Scheduled necessary follow up appointments with Primary Care if needed         Additional Notes:             Provider  Team Continuity:     Last PCP Visit Date: 1/28/2025          [x]  Reviewed Primary Care Provider Visits, Annual Wellness Visit, and Future          Appointments to ensure appointments have been scheduled and/or           completed        Additional Notes:             Social Determinants of Health          [x]  Reviewed, completed, and/or updated the following sections:                  Food Insecurity, Transportation Needs, Financial Resource Strain,                 Tobacco Use        Additional Notes:             Care Management, Digital Medicine, and/or Education Referrals    OPCM Risk Score: 14.4         Next Steps - Referral Actions: Patient is active in digital medicine         Additional Notes:

## 2025-03-18 ENCOUNTER — LAB VISIT (OUTPATIENT)
Dept: LAB | Facility: HOSPITAL | Age: 55
End: 2025-03-18
Attending: FAMILY MEDICINE
Payer: COMMERCIAL

## 2025-03-18 DIAGNOSIS — I10 ESSENTIAL HYPERTENSION, BENIGN: ICD-10-CM

## 2025-03-18 LAB
ANION GAP SERPL CALC-SCNC: 10 MMOL/L (ref 8–16)
BUN SERPL-MCNC: 16 MG/DL (ref 6–20)
CALCIUM SERPL-MCNC: 8.9 MG/DL (ref 8.7–10.5)
CHLORIDE SERPL-SCNC: 101 MMOL/L (ref 95–110)
CO2 SERPL-SCNC: 28 MMOL/L (ref 23–29)
CREAT SERPL-MCNC: 0.8 MG/DL (ref 0.5–1.4)
EST. GFR  (NO RACE VARIABLE): >60 ML/MIN/1.73 M^2
GLUCOSE SERPL-MCNC: 93 MG/DL (ref 70–110)
POTASSIUM SERPL-SCNC: 3 MMOL/L (ref 3.5–5.1)
SODIUM SERPL-SCNC: 139 MMOL/L (ref 136–145)

## 2025-03-18 PROCEDURE — 36415 COLL VENOUS BLD VENIPUNCTURE: CPT | Mod: PO | Performed by: FAMILY MEDICINE

## 2025-03-18 PROCEDURE — 80048 BASIC METABOLIC PNL TOTAL CA: CPT | Performed by: FAMILY MEDICINE

## 2025-03-18 NOTE — TELEPHONE ENCOUNTER
Per chart review, pt was sent a portal msg regarding scheduling her colonoscopy from dept. Msg states to pt she is scheduled on 4/25/25

## 2025-03-19 ENCOUNTER — RESULTS FOLLOW-UP (OUTPATIENT)
Dept: PRIMARY CARE CLINIC | Facility: CLINIC | Age: 55
End: 2025-03-19

## 2025-03-19 DIAGNOSIS — E87.6 HYPOKALEMIA: Primary | ICD-10-CM

## 2025-03-19 RX ORDER — POTASSIUM CHLORIDE 20 MEQ/1
20 TABLET, EXTENDED RELEASE ORAL DAILY
Qty: 5 TABLET | Refills: 0 | Status: SHIPPED | OUTPATIENT
Start: 2025-03-19 | End: 2025-03-24

## 2025-03-24 ENCOUNTER — PATIENT MESSAGE (OUTPATIENT)
Dept: ADMINISTRATIVE | Facility: OTHER | Age: 55
End: 2025-03-24
Payer: COMMERCIAL

## 2025-04-05 DIAGNOSIS — E11.65 TYPE 2 DIABETES MELLITUS WITH HYPERGLYCEMIA, WITHOUT LONG-TERM CURRENT USE OF INSULIN: ICD-10-CM

## 2025-04-07 RX ORDER — EMPAGLIFLOZIN 25 MG/1
25 TABLET, FILM COATED ORAL
Qty: 90 TABLET | Refills: 1 | Status: SHIPPED | OUTPATIENT
Start: 2025-04-07

## 2025-04-07 NOTE — TELEPHONE ENCOUNTER
Care Due:                  Date            Visit Type   Department     Provider  --------------------------------------------------------------------------------                                MYCHART                              [place]  [facility] [place] [facility]  Last Visit: 01-      FICE VISIT   PRIMARY CARE   Andrew Roger                               -                              PRIMARY      [facility] [place] [facility]  Next Visit: 04-      CARE ([facility])   PRIMARY CARE   Andrew Roger                                                            Last  Test          Frequency    Reason                     Performed    Due Date  --------------------------------------------------------------------------------    eGFR........  12 months..  JARDIANCE................  Not Found    Overdue    [employer] Embedded Care Due Messages. Reference number: [id number].   4/07/2025 11:08:59 AM CDT

## 2025-04-07 NOTE — TELEPHONE ENCOUNTER
Provider Staff:  Action required for this patient     Please see care gap opportunities below in Care Due Message.    Thanks!  [facility]     Appointments      Date Provider   Last Visit   1/28/2025 Andrew Roger MD   Next Visit   4/28/2025 Andrew Roger MD      Refill Decision Note   Dennis Aldana  is requesting a refill authorization.  Brief Assessment and Rationale for Refill:  Approve     Medication Therapy Plan:         Comments:     Note composed:11:22 AM 04/07/2025

## 2025-04-09 NOTE — TELEPHONE ENCOUNTER
Refill Decision Note   Dennis Aldana  is requesting a refill authorization.    Brief Assessment and Rationale for Refill:   Approve       Medication Therapy Plan:          Comments:     Note composed:1:30 PM 11/18/2024            21.2

## 2025-04-16 ENCOUNTER — HOSPITAL ENCOUNTER (OUTPATIENT)
Dept: RADIOLOGY | Facility: HOSPITAL | Age: 55
Discharge: HOME | End: 2025-04-16
Attending: INTERNAL MEDICINE
Payer: COMMERCIAL

## 2025-04-16 ENCOUNTER — TELEPHONE (OUTPATIENT)
Facility: CLINIC | Age: 55
End: 2025-04-16
Payer: COMMERCIAL

## 2025-04-16 DIAGNOSIS — C50.912 MALIGNANT NEOPLASM OF LEFT BREAST: ICD-10-CM

## 2025-04-16 DIAGNOSIS — C50.912 MALIGNANT NEOPLASM OF LEFT BREAST IN FEMALE, ESTROGEN RECEPTOR POSITIVE, UNSPECIFIED SITE OF BREAST: ICD-10-CM

## 2025-04-16 DIAGNOSIS — Z98.890 S/P LUMPECTOMY, LEFT BREAST: ICD-10-CM

## 2025-04-16 DIAGNOSIS — Z98.890 S/P BREAST LUMPECTOMY: ICD-10-CM

## 2025-04-16 DIAGNOSIS — Z17.0 MALIGNANT NEOPLASM OF LEFT BREAST IN FEMALE, ESTROGEN RECEPTOR POSITIVE, UNSPECIFIED SITE OF BREAST: ICD-10-CM

## 2025-04-16 PROCEDURE — 77062 BREAST TOMOSYNTHESIS BI: CPT | Mod: 26,,, | Performed by: RADIOLOGY

## 2025-04-16 PROCEDURE — 77066 DX MAMMO INCL CAD BI: CPT | Mod: 26,,, | Performed by: RADIOLOGY

## 2025-04-16 PROCEDURE — 77066 DX MAMMO INCL CAD BI: CPT | Mod: TC,PO

## 2025-04-16 NOTE — TELEPHONE ENCOUNTER
----- Message from Hossein Carranza MD sent at 4/16/2025 10:19 AM CDT -----  Does she have a PCP? Her potassium is low   ----- Message -----  From: Lab, Background User  Sent: 4/16/2025   8:36 AM CDT  To: Hossein Carranza MD

## 2025-04-23 ENCOUNTER — TELEPHONE (OUTPATIENT)
Facility: CLINIC | Age: 55
End: 2025-04-23
Payer: COMMERCIAL

## 2025-04-23 ENCOUNTER — RESULTS FOLLOW-UP (OUTPATIENT)
Dept: PRIMARY CARE CLINIC | Facility: CLINIC | Age: 55
End: 2025-04-23

## 2025-04-23 ENCOUNTER — LAB VISIT (OUTPATIENT)
Dept: LAB | Facility: HOSPITAL | Age: 55
End: 2025-04-23
Attending: FAMILY MEDICINE
Payer: COMMERCIAL

## 2025-04-23 DIAGNOSIS — R80.9 MICROALBUMINURIA DUE TO TYPE 2 DIABETES MELLITUS: ICD-10-CM

## 2025-04-23 DIAGNOSIS — E78.5 HYPERLIPIDEMIA, UNSPECIFIED HYPERLIPIDEMIA TYPE: ICD-10-CM

## 2025-04-23 DIAGNOSIS — E11.65 TYPE 2 DIABETES MELLITUS WITH HYPERGLYCEMIA, WITHOUT LONG-TERM CURRENT USE OF INSULIN: ICD-10-CM

## 2025-04-23 DIAGNOSIS — E11.29 MICROALBUMINURIA DUE TO TYPE 2 DIABETES MELLITUS: ICD-10-CM

## 2025-04-23 LAB
ALBUMIN SERPL-MCNC: 4.6 G/DL (ref 3.5–5.2)
ALBUMIN/CREAT UR: 35.3 UG/MG
ALP SERPL-CCNC: 107 UNIT/L (ref 55–135)
ALT SERPL-CCNC: 29 UNIT/L (ref 10–44)
ANION GAP (SMH): 8 MMOL/L (ref 8–16)
AST SERPL-CCNC: 18 UNIT/L (ref 10–40)
BILIRUB SERPL-MCNC: 1.3 MG/DL (ref 0.1–1)
BUN SERPL-MCNC: 14 MG/DL (ref 6–20)
CALCIUM SERPL-MCNC: 9.7 MG/DL (ref 8.7–10.5)
CHLORIDE SERPL-SCNC: 98 MMOL/L (ref 95–110)
CHOLEST SERPL-MCNC: 137 MG/DL (ref 120–199)
CHOLEST/HDLC SERPL: 2.6 {RATIO} (ref 2–5)
CO2 SERPL-SCNC: 35 MMOL/L (ref 23–29)
CREAT SERPL-MCNC: 0.9 MG/DL (ref 0.5–1.4)
CREAT UR-MCNC: 48.5 MG/DL (ref 15–325)
GFR SERPLBLD CREATININE-BSD FMLA CKD-EPI: >60 ML/MIN/1.73/M2
GLUCOSE SERPL-MCNC: 149 MG/DL (ref 70–110)
HDLC SERPL-MCNC: 52 MG/DL (ref 40–75)
HDLC SERPL: 38 % (ref 20–50)
LDLC SERPL CALC-MCNC: 62.2 MG/DL (ref 63–159)
MICROALBUMIN UR-MCNC: 17.1 UG/ML
NONHDLC SERPL-MCNC: 85 MG/DL
POTASSIUM SERPL-SCNC: 3.8 MMOL/L (ref 3.5–5.1)
PROT SERPL-MCNC: 7.3 GM/DL (ref 6–8.4)
SODIUM SERPL-SCNC: 141 MMOL/L (ref 136–145)
TRIGL SERPL-MCNC: 114 MG/DL (ref 30–150)

## 2025-04-23 PROCEDURE — 80061 LIPID PANEL: CPT

## 2025-04-23 PROCEDURE — 84075 ASSAY ALKALINE PHOSPHATASE: CPT

## 2025-04-23 PROCEDURE — 36415 COLL VENOUS BLD VENIPUNCTURE: CPT

## 2025-04-23 PROCEDURE — 82043 UR ALBUMIN QUANTITATIVE: CPT

## 2025-04-23 NOTE — TELEPHONE ENCOUNTER
I left voice message for pt regarding confirming appt w/ Dr. Carranza, on 4/30/2025. Left number for pt to contact the office, if they have any questions, would like to confirm or would like to reschedule appt

## 2025-04-25 ENCOUNTER — ANESTHESIA EVENT (OUTPATIENT)
Dept: ENDOSCOPY | Facility: HOSPITAL | Age: 55
End: 2025-04-25
Payer: COMMERCIAL

## 2025-04-25 ENCOUNTER — HOSPITAL ENCOUNTER (OUTPATIENT)
Facility: HOSPITAL | Age: 55
Discharge: HOME | End: 2025-04-25
Attending: INTERNAL MEDICINE | Admitting: INTERNAL MEDICINE
Payer: COMMERCIAL

## 2025-04-25 ENCOUNTER — ANESTHESIA (OUTPATIENT)
Dept: ENDOSCOPY | Facility: HOSPITAL | Age: 55
End: 2025-04-25
Payer: COMMERCIAL

## 2025-04-25 DIAGNOSIS — K64.8 INTERNAL HEMORRHOIDS: ICD-10-CM

## 2025-04-25 DIAGNOSIS — Z86.0100 HISTORY OF COLON POLYPS: ICD-10-CM

## 2025-04-25 DIAGNOSIS — K63.5 POLYP OF COLON, UNSPECIFIED PART OF COLON, UNSPECIFIED TYPE: Primary | ICD-10-CM

## 2025-04-25 PROCEDURE — 45385 COLONOSCOPY W/LESION REMOVAL: CPT | Mod: 33,,, | Performed by: INTERNAL MEDICINE

## 2025-04-25 PROCEDURE — 27201089 HC SNARE, DISP (ANY): Performed by: INTERNAL MEDICINE

## 2025-04-25 PROCEDURE — 63600175 PHARM REV CODE 636 W HCPCS: Performed by: NURSE ANESTHETIST, CERTIFIED REGISTERED

## 2025-04-25 PROCEDURE — 37000009 HC ANESTHESIA EA ADD 15 MINS: Performed by: INTERNAL MEDICINE

## 2025-04-25 PROCEDURE — 25000003 PHARM REV CODE 250: Performed by: INTERNAL MEDICINE

## 2025-04-25 PROCEDURE — 45385 COLONOSCOPY W/LESION REMOVAL: CPT | Mod: PT | Performed by: INTERNAL MEDICINE

## 2025-04-25 PROCEDURE — 37000008 HC ANESTHESIA 1ST 15 MINUTES: Performed by: INTERNAL MEDICINE

## 2025-04-25 RX ORDER — PROPOFOL 10 MG/ML
VIAL (ML) INTRAVENOUS
Status: DISCONTINUED | OUTPATIENT
Start: 2025-04-25 | End: 2025-04-25

## 2025-04-25 RX ORDER — SODIUM CHLORIDE 9 MG/ML
INJECTION, SOLUTION INTRAVENOUS CONTINUOUS
Status: DISCONTINUED | OUTPATIENT
Start: 2025-04-25 | End: 2025-04-25 | Stop reason: HOSPADM

## 2025-04-25 RX ORDER — LIDOCAINE HYDROCHLORIDE 20 MG/ML
INJECTION INTRAVENOUS
Status: DISCONTINUED | OUTPATIENT
Start: 2025-04-25 | End: 2025-04-25

## 2025-04-25 RX ADMIN — LIDOCAINE HYDROCHLORIDE 100 MG: 20 INJECTION, SOLUTION INTRAVENOUS at 11:04

## 2025-04-25 RX ADMIN — PROPOFOL 50 MG: 10 INJECTION, EMULSION INTRAVENOUS at 11:04

## 2025-04-25 RX ADMIN — PROPOFOL 100 MG: 10 INJECTION, EMULSION INTRAVENOUS at 11:04

## 2025-04-25 RX ADMIN — SODIUM CHLORIDE: 9 INJECTION, SOLUTION INTRAVENOUS at 10:04

## 2025-04-25 NOTE — ANESTHESIA PREPROCEDURE EVALUATION
04/25/2025  Dennis Aldana is a 54 y.o., female.      Pre-op Assessment    I have reviewed the Patient Summary Reports.     I have reviewed the Nursing Notes. I have reviewed the NPO Status.   I have reviewed the Medications.     Review of Systems  Anesthesia Hx:  No problems with previous Anesthesia  PONV                Social:  Non-Smoker       Hematology/Oncology:       -- Anemia:                    --  Cancer in past history:       Breast              Cardiovascular:     Hypertension                                          Pulmonary:  Pneumonia                      Renal/:  Chronic Renal Disease renal calculi               Hepatic/GI:     GERD   Liver mass               Musculoskeletal:  Arthritis               Neurological:  Neurology Normal                                      Endocrine:  Diabetes, well controlled, type 2           Psych:  Psychiatric History anxiety depression              Physical Exam  General: Well nourished, Cooperative, Alert and Oriented    Airway:  Mallampati: II   Mouth Opening: Normal  TM Distance: Normal  Neck ROM: Normal ROM    Anesthesia Plan  Type of Anesthesia, risks & benefits discussed:    Anesthesia Type: Gen ETT, Gen Supraglottic Airway, Gen Natural Airway, MAC  Intra-op Monitoring Plan: Standard ASA Monitors  Post Op Pain Control Plan: multimodal analgesia  Induction:  IV  Airway Plan: Direct, Video and Fiberoptic, Post-Induction  Informed Consent: Informed consent signed with the Patient and all parties understand the risks and agree with anesthesia plan.  All questions answered.   ASA Score: 3    Ready For Surgery From Anesthesia Perspective.   .

## 2025-04-25 NOTE — ANESTHESIA POSTPROCEDURE EVALUATION
Anesthesia Post Evaluation    Patient: Dennis Aldana    Procedure(s) Performed: Procedure(s) (LRB):  COLONOSCOPY, SCREENING, HIGH RISK PATIENT (N/A)    Final Anesthesia Type: general      Patient location during evaluation: PACU  Patient participation: Yes- Able to Participate  Level of consciousness: awake and alert and oriented  Post-procedure vital signs: reviewed and stable  Pain management: adequate  Airway patency: patent    PONV status at discharge: No PONV  Anesthetic complications: no      Cardiovascular status: blood pressure returned to baseline and stable  Respiratory status: unassisted and spontaneous ventilation  Hydration status: euvolemic  Follow-up not needed.              Vitals Value Taken Time   /74 04/25/25 11:51   Temp 36.5 °C (97.7 °F) 04/25/25 11:35   Pulse 90 04/25/25 11:55   Resp 24 04/25/25 11:55   SpO2 95 % 04/25/25 11:55         Event Time   Out of Recovery 12:04:00         Pain/Cheyenne Score: Cheyenne Score: 10 (4/25/2025 12:00 PM)

## 2025-04-25 NOTE — H&P
CC: History of colon polyps    54 year old female with above. States that symptoms are absent, no alleviating/exacerbating factors. No family history of colorectal CA. Positive personal history of polyps. No bleeding or weight loss.     ROS:  No headache, no fever/chills, no chest pain/SOB, no nausea/vomiting/diarrhea/constipation/GI bleeding/abdominal pain, no dysuria/hematuria.    VSSAF   Exam:   Alert and oriented x 3; no apparent distress   PERRLA, sclera anicteric  CV: Regular rate/rhythm, normal PMI   Lungs: Clear bilaterally with no wheeze/rales   Abdomen: Soft, NT/ND, normal bowel sounds   Ext: No cyanosis, clubbing     Impression:   As above    Plan:   Proceed with endoscopy. Further recs to follow.

## 2025-04-25 NOTE — [employer] PATIENT INSTRUCTIONS
Discharge Summary/Instructions after an Endoscopic Procedure  Patient Name: Dennis Aldana  Patient MRN: [id number]  Patient YOB: 1970 Friday, April 25, 2025  Silvio Sifuentes MD  Dear patient,  As a result of recent federal legislation (The Federal Cures Act), you may   receive lab or pathology results from your procedure in your [facility]   account before your physician is able to contact you. Your physician or   their representative will relay the results to you with their   recommendations at their soonest availability.  Thank you,  RESTRICTIONS:  During your procedure today, you received medications for sedation.  These   medications may affect your judgment, balance and coordination.  Therefore,   for 24 hours, you have the following restrictions:   - DO NOT drive a car, operate machinery, make legal/financial decisions,   sign important papers or drink alcohol.    ACTIVITY:  Today: no heavy lifting, straining or running due to procedural   sedation/anesthesia.  The following day: return to full activity including work.  DIET:  Eat and drink normally unless instructed otherwise.     TREATMENT FOR COMMON SIDE EFFECTS:  - Mild abdominal pain, nausea, belching, bloating or excessive gas:  rest,   eat lightly and use a heating pad.  - Sore Throat: treat with throat lozenges and/or gargle with warm salt   water.  - Because air was used during the procedure, expelling large amounts of air   from your rectum or belching is normal.  - If a bowel prep was taken, you may not have a bowel movement for 1-3 days.    This is normal.  SYMPTOMS TO WATCH FOR AND REPORT TO YOUR PHYSICIAN:  1. Abdominal pain or bloating, other than gas cramps.  2. Chest pain.  3. Back pain.  4. Signs of infection such as: chills or fever occurring within 24 hours   after the procedure.  5. Rectal bleeding, which would show as bright red, maroon, or black stools.   (A tablespoon of blood from the rectum is not serious, especially if    hemorrhoids are present.)  6. Vomiting.  7. Weakness or dizziness.  GO DIRECTLY TO THE NEAREST EMERGENCY ROOM IF YOU HAVE ANY OF THE FOLLOWING:      Difficulty breathing              Chills and/or fever over 101 F   Persistent vomiting and/or vomiting blood   Severe abdominal pain   Severe chest pain   Black, tarry stools   Bleeding- more than one tablespoon   Any other symptom or condition that you feel may need urgent attention  Your doctor recommends these additional instructions:  If any biopsies were taken, your doctors clinic will contact you in 1 to 2   weeks with any results.  - Patient has a contact number available for emergencies.  The signs and   symptoms of potential delayed complications were discussed with the   patient.  Return to normal activities tomorrow.  Written discharge   instructions were provided to the patient.   - High fiber diet.   - Continue present medications.   - Await pathology results.   - Repeat colonoscopy in 5 years for surveillance.   - Discharge patient to home (ambulatory).   - Return to GI office PRN.  For questions, problems or results please call your physician - Silvio Sifuentes MD at Work:  [phone].  [facility], EMERGENCY ROOM PHONE NUMBER: [phone]  IF A COMPLICATION OR EMERGENCY SITUATION ARISES AND YOU ARE UNABLE TO REACH   YOUR PHYSICIAN - GO DIRECTLY TO THE EMERGENCY ROOM.  Silvio Sifuentes MD  4/25/2025 11:26:38 AM  This report has been verified and signed electronically.  Dear patient,  As a result of recent federal legislation (The Federal Cures Act), you may   receive lab or pathology results from your procedure in your [facility]   account before your physician is able to contact you. Your physician or   their representative will relay the results to you with their   recommendations at their soonest availability.  Thank you,  [employer]ATION

## 2025-04-28 ENCOUNTER — OFFICE VISIT (OUTPATIENT)
Dept: PRIMARY CARE CLINIC | Facility: CLINIC | Age: 55
End: 2025-04-28
Payer: COMMERCIAL

## 2025-04-28 VITALS
OXYGEN SATURATION: 96 % | HEART RATE: 106 BPM | BODY MASS INDEX: 24.41 KG/M2 | DIASTOLIC BLOOD PRESSURE: 80 MMHG | RESPIRATION RATE: 16 BRPM | HEIGHT: 66 IN | TEMPERATURE: 98 F | SYSTOLIC BLOOD PRESSURE: 116 MMHG | WEIGHT: 151.88 LBS

## 2025-04-28 VITALS
SYSTOLIC BLOOD PRESSURE: 107 MMHG | RESPIRATION RATE: 24 BRPM | HEART RATE: 90 BPM | WEIGHT: 150 LBS | TEMPERATURE: 98 F | BODY MASS INDEX: 24.11 KG/M2 | DIASTOLIC BLOOD PRESSURE: 74 MMHG | HEIGHT: 66 IN | OXYGEN SATURATION: 95 %

## 2025-04-28 DIAGNOSIS — E11.69 TYPE 2 DIABETES MELLITUS WITH HYPERLIPIDEMIA: Primary | ICD-10-CM

## 2025-04-28 DIAGNOSIS — E78.5 TYPE 2 DIABETES MELLITUS WITH HYPERLIPIDEMIA: Primary | ICD-10-CM

## 2025-04-28 DIAGNOSIS — E78.5 HYPERLIPIDEMIA, UNSPECIFIED HYPERLIPIDEMIA TYPE: ICD-10-CM

## 2025-04-28 DIAGNOSIS — I10 ESSENTIAL HYPERTENSION, BENIGN: ICD-10-CM

## 2025-04-28 PROBLEM — Z91.148 NONCOMPLIANCE WITH MEDICATION TREATMENT DUE TO INTERMITTENT USE OF MEDICATION: Status: RESOLVED | Noted: 2023-01-25 | Resolved: 2025-04-28

## 2025-04-28 PROCEDURE — 1160F RVW MEDS BY RX/DR IN RCRD: CPT | Mod: CPTII,S$GLB,, | Performed by: FAMILY MEDICINE

## 2025-04-28 PROCEDURE — 3074F SYST BP LT 130 MM HG: CPT | Mod: CPTII,S$GLB,, | Performed by: FAMILY MEDICINE

## 2025-04-28 PROCEDURE — 1159F MED LIST DOCD IN RCRD: CPT | Mod: CPTII,S$GLB,, | Performed by: FAMILY MEDICINE

## 2025-04-28 PROCEDURE — 3079F DIAST BP 80-89 MM HG: CPT | Mod: CPTII,S$GLB,, | Performed by: FAMILY MEDICINE

## 2025-04-28 PROCEDURE — 3008F BODY MASS INDEX DOCD: CPT | Mod: CPTII,S$GLB,, | Performed by: FAMILY MEDICINE

## 2025-04-28 PROCEDURE — 3044F HG A1C LEVEL LT 7.0%: CPT | Mod: CPTII,S$GLB,, | Performed by: FAMILY MEDICINE

## 2025-04-28 PROCEDURE — 99214 OFFICE O/P EST MOD 30 MIN: CPT | Mod: S$GLB,,, | Performed by: FAMILY MEDICINE

## 2025-04-28 PROCEDURE — 99999 PR PBB SHADOW E&M-EST. PATIENT-LVL IV: CPT | Mod: PBBFAC,,, | Performed by: FAMILY MEDICINE

## 2025-04-28 PROCEDURE — 3072F LOW RISK FOR RETINOPATHY: CPT | Mod: CPTII,S$GLB,, | Performed by: FAMILY MEDICINE

## 2025-04-28 PROCEDURE — 3066F NEPHROPATHY DOC TX: CPT | Mod: CPTII,S$GLB,, | Performed by: FAMILY MEDICINE

## 2025-04-28 PROCEDURE — 3061F NEG MICROALBUMINURIA REV: CPT | Mod: CPTII,S$GLB,, | Performed by: FAMILY MEDICINE

## 2025-04-28 NOTE — PROGRESS NOTES
Assessment:       1. Type 2 diabetes mellitus with hyperlipidemia    2. Hyperlipidemia, unspecified hyperlipidemia type    3. Essential hypertension, benign         Plan:       Type 2 diabetes mellitus with hyperlipidemia  -     Comprehensive Metabolic Panel; Future; Expected date: 10/28/2025  -     Hemoglobin A1C; Future; Expected date: 10/28/2025    Hyperlipidemia, unspecified hyperlipidemia type    Essential hypertension, benign      Assessment & Plan    - A1C significantly improved from 13.1 in August 2023 to 6.7 currently, without medication changes.  - Current diabetes management with Jardiance and Ozempic appears effective.  - Self-reported mental shift in approach to diabetes management seems to be contributing to improved glycemic control.    TYPE 2 DIABETES MELLITUS:   Monitored the patient's A1C, which has decreased from 13.1 to 6.7 over time, with intermediate values of 7.9, 7.8, 8.2, and 8.1.   Noted that the patient stopped checking glucose regularly, which paradoxically led to better glycemic control.   Evaluated the current A1C of 6.7, which is a significant improvement from previous values.   Acknowledged the significant improvement in A1C and discussed the patient's approach to managing their diabetes.   Continued the current treatment regimen of Jardiance and Ozempic for diabetes management.   Ordered metabolic panel and A1C to be rechecked in 6 months.    RETINAL ISSUES:   Noted the patient's history of retinal issues.   Evaluated the patient's previous retinal condition, confirming that a retinal hole has healed.   Performed retinal camera screening during the current visit due to diabetic history and time since last ophthalmological exam.   Recommend the patient schedule a follow-up visit with an ophthalmologist for comprehensive eye exam.    WEIGHT LOSS:   Inquired about any unexplained weight loss.   Noted that the patient has lost approximately 100 lbs.   Evaluated the patient's current  weight at 151 lbs, down from 153 lbs in January.   Acknowledged and expressed approval of the patient's weight loss.       Medication List with Changes/Refills   Current Medications    CHLORTHALIDONE (HYGROTEN) 25 MG TAB    TAKE 1 TABLET BY MOUTH EVERY DAY    CITALOPRAM (CELEXA) 40 MG TABLET    Take 1 tablet (40 mg total) by mouth once daily.    JARDIANCE 25 MG TABLET    TAKE 1 TABLET BY MOUTH EVERY DAY    LORAZEPAM (ATIVAN) 1 MG TABLET    TAKE 1 TABLET BY MOUTH EVERY DAY AS NEEDED FOR ANXIETY    MELOXICAM (MOBIC) 15 MG TABLET    TAKE 1 TABLET BY MOUTH EVERY DAY    OZEMPIC 2 MG/DOSE (8 MG/3 ML) PNIJ    INJECT 2 MG INTO THE SKIN EVERY 7 DAYS    PRAMIPEXOLE (MIRAPEX) 0.5 MG TABLET    TAKE 1 TABLET BY MOUTH EVERY EVENING.    ROSUVASTATIN (CRESTOR) 10 MG TABLET    TAKE 1 TABLET BY MOUTH EVERY DAY    ZOLPIDEM (AMBIEN) 5 MG TAB    TAKE 1 TABLET BY MOUTH NIGHTLY AS NEEDED (INSOMNIA).   Discontinued Medications    PROMETHAZINE-DEXTROMETHORPHAN (PROMETHAZINE-DM) 6.25-15 MG/5 ML SYRP    Take 5 mLs by mouth every 6 (six) hours as needed (cough).         Subjective:    Patient ID: Dennis Aldana is a 54 y.o. female.  Chief Complaint: Follow-up (3 month reg check up. )    HPI  History of Present Illness    CHIEF COMPLAINT:  Patient presents today for diabetes follow-up.    DIABETES MANAGEMENT:  A1C has improved to 6.7 from a peak of 13.1 in August 2023, with intermediate values of 7.9, 7.8, 8.2, and 8.1. She has stopped regular blood sugar checks, noting that normal or low readings sometimes led to complacency with diet. She continues Jardiance and Ozempic, though missed last week's Ozempic dose due to a scheduled colonoscopy with plans to resume on Wednesday. She reports approximately 100 lbs of weight loss.    OCULAR:  Her last eye exam was in March of previous year. She has a history of a retinal hole that has healed, appearing like a popped bubble.      ROS:  Constitutional: -fevers, -chills, -weight gain, -night  "sweats  Cardiovascular: -chest pain, -palpitations       Review of Systems    Objective:      Vitals:    04/28/25 0915   BP: 116/80   BP Location: Left arm   Patient Position: Sitting   Pulse: 106   Resp: 16   Temp: 97.7 °F (36.5 °C)   TempSrc: Temporal   SpO2: 96%   Weight: 68.9 kg (151 lb 14.4 oz)   Height: 5' 6" (1.676 m)     BP Readings from Last 5 Encounters:   04/28/25 116/80   04/25/25 107/74   03/07/25 107/81   01/30/25 114/82   01/28/25 102/70     Wt Readings from Last 5 Encounters:   04/28/25 68.9 kg (151 lb 14.4 oz)   04/25/25 68 kg (150 lb)   02/18/25 68.5 kg (151 lb)   01/30/25 69 kg (152 lb 3.2 oz)   01/28/25 69.6 kg (153 lb 7 oz)     Physical Exam  Physical Exam    Vitals: Weight: 151 lbs.  General: Well-developed. Well-nourished. No acute distress.  Eyes: EOMI. Sclerae anicteric.  HENT: Normocephalic. Atraumatic. Nares patent. Moist oral mucosa.  Cardiovascular: Regular rate. Regular rhythm. No murmurs. No rubs. No gallops. Normal S1, S2.  Respiratory: Normal respiratory effort. Clear to auscultation bilaterally. No rales. No rhonchi. No wheezing.  Musculoskeletal: No  obvious deformity.  Extremities: No lower extremity edema.  Neurological: Alert & oriented x3. No slurred speech. Normal gait.  Psychiatric: Normal mood. Normal affect. Good insight. Good judgment.  Skin: Warm. Dry. No rash.         Lab Results   Component Value Date    WBC 10.04 04/16/2025    HGB 16.2 (H) 04/16/2025    HCT 47.5 04/16/2025     04/16/2025    CHOL 137 04/23/2025    TRIG 114 04/23/2025    HDL 52 04/23/2025    ALT 29 04/23/2025    AST 18 04/23/2025     04/23/2025    K 3.8 04/23/2025     03/18/2025    CREATININE 0.9 04/23/2025    BUN 14 04/23/2025    CO2 35 (H) 04/23/2025    TSH 2.35 01/30/2020    INR 1.1 12/29/2020    GLUF 122 (H) 02/13/2020    HGBA1C 6.7 (H) 04/16/2025      This note was generated with the assistance of ambient listening technology. Verbal consent was obtained by the patient and " accompanying visitor(s) for the recording of patient appointment to facilitate this note. I attest to having reviewed and edited the generated note for accuracy, though some syntax or spelling errors may persist. Please contact the author of this note for any clarification.

## 2025-04-29 ENCOUNTER — RESULTS FOLLOW-UP (OUTPATIENT)
Dept: GASTROENTEROLOGY | Facility: CLINIC | Age: 55
End: 2025-04-29

## 2025-05-28 DIAGNOSIS — F51.01 PRIMARY INSOMNIA: ICD-10-CM

## 2025-05-28 DIAGNOSIS — F41.1 GAD (GENERALIZED ANXIETY DISORDER): ICD-10-CM

## 2025-05-28 RX ORDER — ZOLPIDEM TARTRATE 5 MG/1
TABLET ORAL
Qty: 20 TABLET | Refills: 2 | Status: SHIPPED | OUTPATIENT
Start: 2025-05-28

## 2025-05-28 RX ORDER — LORAZEPAM 1 MG/1
1 TABLET ORAL DAILY PRN
Qty: 30 TABLET | Refills: 1 | Status: SHIPPED | OUTPATIENT
Start: 2025-05-28

## 2025-05-28 NOTE — TELEPHONE ENCOUNTER
No care due was identified.  [facility] Embedded Care Due Messages. Reference number: [id number].   5/28/2025 10:07:43 AM CDT

## 2025-05-31 DIAGNOSIS — E78.5 TYPE 2 DIABETES MELLITUS WITH HYPERLIPIDEMIA: ICD-10-CM

## 2025-05-31 DIAGNOSIS — E11.69 TYPE 2 DIABETES MELLITUS WITH HYPERLIPIDEMIA: ICD-10-CM

## 2025-05-31 DIAGNOSIS — G25.81 RLS (RESTLESS LEGS SYNDROME): ICD-10-CM

## 2025-05-31 DIAGNOSIS — F32.A DEPRESSION, UNSPECIFIED DEPRESSION TYPE: ICD-10-CM

## 2025-06-01 RX ORDER — PRAMIPEXOLE DIHYDROCHLORIDE 0.5 MG/1
0.5 TABLET ORAL NIGHTLY
Qty: 90 TABLET | Refills: 3 | Status: SHIPPED | OUTPATIENT
Start: 2025-06-01

## 2025-06-01 RX ORDER — ROSUVASTATIN CALCIUM 10 MG/1
10 TABLET, COATED ORAL DAILY
Qty: 90 TABLET | Refills: 3 | Status: SHIPPED | OUTPATIENT
Start: 2025-06-01

## 2025-06-02 RX ORDER — CITALOPRAM 40 MG/1
40 TABLET ORAL DAILY
Qty: 90 TABLET | Refills: 3 | Status: SHIPPED | OUTPATIENT
Start: 2025-06-02

## 2025-06-18 ENCOUNTER — OFFICE VISIT (OUTPATIENT)
Facility: CLINIC | Age: 55
End: 2025-06-18
Payer: COMMERCIAL

## 2025-06-18 VITALS
TEMPERATURE: 99 F | BODY MASS INDEX: 24.91 KG/M2 | DIASTOLIC BLOOD PRESSURE: 66 MMHG | OXYGEN SATURATION: 95 % | HEART RATE: 88 BPM | HEIGHT: 66 IN | SYSTOLIC BLOOD PRESSURE: 102 MMHG | WEIGHT: 155 LBS | RESPIRATION RATE: 16 BRPM

## 2025-06-18 DIAGNOSIS — D50.9 IRON DEFICIENCY ANEMIA, UNSPECIFIED IRON DEFICIENCY ANEMIA TYPE: ICD-10-CM

## 2025-06-18 DIAGNOSIS — Z17.0 ER+ (ESTROGEN RECEPTOR POSITIVE STATUS): ICD-10-CM

## 2025-06-18 DIAGNOSIS — D50.9 MICROCYTIC ANEMIA: ICD-10-CM

## 2025-06-18 DIAGNOSIS — Z86.018 HISTORY OF MYXOMA: ICD-10-CM

## 2025-06-18 DIAGNOSIS — Z79.810 USE OF TAMOXIFEN (NOLVADEX): ICD-10-CM

## 2025-06-18 DIAGNOSIS — Z17.0 MALIGNANT NEOPLASM OF LEFT BREAST IN FEMALE, ESTROGEN RECEPTOR POSITIVE, UNSPECIFIED SITE OF BREAST: Primary | ICD-10-CM

## 2025-06-18 DIAGNOSIS — Z98.890 S/P LUMPECTOMY, LEFT BREAST: ICD-10-CM

## 2025-06-18 DIAGNOSIS — R93.2 ABNORMAL CT OF LIVER: ICD-10-CM

## 2025-06-18 DIAGNOSIS — C50.912 MALIGNANT NEOPLASM OF LEFT BREAST IN FEMALE, ESTROGEN RECEPTOR POSITIVE, UNSPECIFIED SITE OF BREAST: Primary | ICD-10-CM

## 2025-06-18 PROCEDURE — 3044F HG A1C LEVEL LT 7.0%: CPT | Mod: CPTII,S$GLB,, | Performed by: INTERNAL MEDICINE

## 2025-06-18 PROCEDURE — G2211 COMPLEX E/M VISIT ADD ON: HCPCS | Mod: S$GLB,,, | Performed by: INTERNAL MEDICINE

## 2025-06-18 PROCEDURE — 3074F SYST BP LT 130 MM HG: CPT | Mod: CPTII,S$GLB,, | Performed by: INTERNAL MEDICINE

## 2025-06-18 PROCEDURE — 1160F RVW MEDS BY RX/DR IN RCRD: CPT | Mod: CPTII,S$GLB,, | Performed by: INTERNAL MEDICINE

## 2025-06-18 PROCEDURE — 99214 OFFICE O/P EST MOD 30 MIN: CPT | Mod: S$GLB,,, | Performed by: INTERNAL MEDICINE

## 2025-06-18 PROCEDURE — 3078F DIAST BP <80 MM HG: CPT | Mod: CPTII,S$GLB,, | Performed by: INTERNAL MEDICINE

## 2025-06-18 PROCEDURE — 3072F LOW RISK FOR RETINOPATHY: CPT | Mod: CPTII,S$GLB,, | Performed by: INTERNAL MEDICINE

## 2025-06-18 PROCEDURE — 3066F NEPHROPATHY DOC TX: CPT | Mod: CPTII,S$GLB,, | Performed by: INTERNAL MEDICINE

## 2025-06-18 PROCEDURE — 3008F BODY MASS INDEX DOCD: CPT | Mod: CPTII,S$GLB,, | Performed by: INTERNAL MEDICINE

## 2025-06-18 PROCEDURE — 3061F NEG MICROALBUMINURIA REV: CPT | Mod: CPTII,S$GLB,, | Performed by: INTERNAL MEDICINE

## 2025-06-18 PROCEDURE — 99999 PR PBB SHADOW E&M-EST. PATIENT-LVL IV: CPT | Mod: PBBFAC,,, | Performed by: INTERNAL MEDICINE

## 2025-06-18 PROCEDURE — 1159F MED LIST DOCD IN RCRD: CPT | Mod: CPTII,S$GLB,, | Performed by: INTERNAL MEDICINE

## 2025-06-18 NOTE — PROGRESS NOTES
"[facility] Hematology/Oncology  PROGRESS NOTE -   Follow-up Visit      Subjective:       Patient ID:   NAME: Dennis Aldana : 1970     54 y.o. female    Referring Doc: Kana  Other Physicians: Laya Morris (Gyn)    Chief Complaint:  anemia/hx/of Lft breast cancer f/u    History of Present Illness:     Patient returns today for a regularly scheduled follow-up visit.  The patient is here today to go over the results of the recently ordered labs, tests and studies.  She is here by herself.     She reports that she has been feeling "good" and her energy levels are good.     She has been losing weight on purpose with use of Ozempic and has lost about 100lbs overall         She sees Dr Castellano with GYN every 3 yrs now    She saw Dr Roger the other day on 2025    She had repeat colonoscopy on  with Dr Montesinos with just one polyp detected and removed with planned f/u in 5 yrs per patient    She had mammogram 2025 which was good      No CP, SOB,  or N/V.                      ROS:   GEN: normal without any fever, night sweats or weight loss; purposeful weight loss; back pain; cyst on scalp  HEENT: normal with no HA's, sore throat, stiff neck, changes in vision  CV: normal with no CP, SOB, PND, GARCIA or orthopnea  PULM: normal with no SOB, cough, hemoptysis, sputum or pleuritic pain  GI: normal with no abdominal pain, nausea, vomiting, constipation, diarrhea, melanotic stools, BRBPR, or hematemesis  : normal with no hematuria, dysuria  BREAST: normal with no mass, discharge, pain; s/p left lumpectomy  SKIN: normal with no rash, erythema, bruising, or swelling; nodule in skin    Pain Scale:  3 on scalp; 0-1 on back    Allergies:  Review of patient's allergies indicates:  No Known Allergies    Medications:    Current Outpatient Medications:     chlorthalidone (HYGROTEN) 25 MG Tab, TAKE 1 TABLET BY MOUTH EVERY DAY, Disp: 90 tablet, Rfl: 1    citalopram (CELEXA) 40 MG tablet, Take 1 tablet (40 mg " total) by mouth once daily., Disp: 90 tablet, Rfl: 3    JARDIANCE 25 mg tablet, TAKE 1 TABLET BY MOUTH EVERY DAY, Disp: 90 tablet, Rfl: 1    LORazepam (ATIVAN) 1 MG tablet, TAKE 1 TABLET BY MOUTH EVERY DAY AS NEEDED FOR ANXIETY, Disp: 30 tablet, Rfl: 1    meloxicam (MOBIC) 15 MG tablet, TAKE 1 TABLET BY MOUTH EVERY DAY, Disp: 30 tablet, Rfl: 3    OZEMPIC 2 mg/dose (8 mg/3 mL) PnIj, INJECT 2 MG INTO THE SKIN EVERY 7 DAYS, Disp: 3 each, Rfl: 5    pramipexole (MIRAPEX) 0.5 MG tablet, Take 1 tablet (0.5 mg total) by mouth every evening., Disp: 90 tablet, Rfl: 3    rosuvastatin (CRESTOR) 10 MG tablet, Take 1 tablet (10 mg total) by mouth once daily., Disp: 90 tablet, Rfl: 3    zolpidem (AMBIEN) 5 MG Tab, TAKE 1 TABLET BY MOUTH NIGHTLY AS NEEDED (INSOMNIA)., Disp: 20 tablet, Rfl: 2    PMHx/PSHx Updates:  See patient's last visit with me on 1/30/2025  See H&P on 8/17/2020        Pathology:   Cancer Staging   No matching staging information was found for the patient.        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 UTERUS, CERVIX, FALLOPIAN TUBES, AND OVARIES; HYSTERECTOMY AND    BILATERAL SALPINGO-OOPHORECTOMY:       MYOMETRIUM:   --SUBSEROSAL AND INTRAMURAL LEIOMYOMATA (INCLUDING FOCAL EPITHELIOID    LEIOMYOMA).      --ADENOMYOSIS.     ENDOMETRIUM:     --BENIGN ENDOMETRIAL POLYPS (LARGEST 3.2 CENTIMETERS).     CERVIX:   --FOCAL HIGH-GRADE SQUAMOUS INTRAEPITHELIAL LESION (CERVICAL    INTRAEPITHELIAL NEOPLASIA        GRADE 2 [OPHELIA 2]), WITH ENDOCERVICAL GLANDULAR EXTENSION.      --MARGIN IS NEGATIVE FOR SQUAMOUS INTRAEPITHELIAL LESION.      --FOCAL ACUTE AND CHRONIC CERVICITIS.      --SQUAMOUS METAPLASIA OF ENDOCERVIX.      --NABOTHIAN CYSTS.      --CERVIX ENTIRELY SUBMITTED FOR HISTOLOGIC SECTIONING.     RIGHT FALLOPIAN TUBE:     --MICROSCOPIC BENIGN PARATUBAL CYSTS.     LEFT FALLOPIAN TUBE:      "--MICROSCOPIC BENIGN PARATUBAL CYSTS.     --SEROSAL FIBROVASCULAR ADHESIONS.     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     --ENDOMETRIOSIS.     --SURFACE EPITHELIAL INCLUSION CYSTS.     --FOCAL ENDOSALPINGIOSIS.     --CYSTIC CORPUS LUTEUM.     --CYSTIC FOLLICLES.     --CORPORA ALBICANTIA.     --SEROSAL FIBROVASCULAR ADHESIONS.     LEFT OVARY:     --ENDOMETRIOSIS.     --SURFACE EPITHELIAL INCLUSION CYSTS.     --FOCAL ENDOSALPINGIOSIS.      --HEMORRHAGIC CYSTIC CORPUS LUTEUM.      --COPORA ALBICANTIA.      --SEROSAL FIBROVASCULAR ADHESIONS.     2. SPECIMEN SUBMITTED AS "PERITONEAL NODULE," EXCISION:   --INFARCTED APPENDIX EPIPLOICA WITH DYSTROPHIC CALCIFICATION AND    FIBROSIS.            Left back soft tissue biopsy: 12/29/2020:    SOFT TISSUE, LEFT LATISSIMUS DORSI, CORE BIOPSY:   - LOW GRADE MYXOID LESION.   - SEE COMMENT.         Objective:     Vitals:  Blood pressure 102/66, pulse 88, temperature 98.6 °F (37 °C), temperature source Temporal, resp. rate 16, height 5' 6" (1.676 m), weight 70.3 kg (155 lb), last menstrual period 02/24/2021, SpO2 95%.    Physical Examination:   GEN: no apparent distress, comfortable; AAOx3;    HEAD: atraumatic and normocephalic  EYES: no pallor, no icterus, PERRLA  ENT: OMM, no pharyngeal erythema, external ears WNL; no nasal discharge; no thrush  NECK: no masses, thyroid normal, trachea midline, no LAD/LN's, supple  CV: RRR with no murmur; normal pulse; normal S1 and S2; no pedal edema  CHEST: Normal respiratory effort; CTAB; normal breath sounds; no wheeze or crackles  ABDOM: nontender and nondistended; soft; normal bowel sounds; no rebound/guarding  MUSC/Skeletal: ROM normal; no crepitus; joints normal; no deformities or arthropathy  EXTREM: no clubbing, cyanosis, inflammation or swelling  SKIN: no rashes, lesions, ulcers, petechiae or subcutaneous nodules; tattoos; ? subcutaneous cyst on scalp   : no quinonez  NEURO: grossly intact; motor/sensory WNL; AAOx3; no " tremors  PSYCH: normal mood, affect and behavior  LYMPH: normal cervical, supraclavicular, axillary and groin LN's  Breast: s/p left lumpectomy with no new changes        Labs:     Lab Results   Component Value Date    WBC 10.04 04/16/2025    HGB 16.2 (H) 04/16/2025    HCT 47.5 04/16/2025    MCV 84 04/16/2025     04/16/2025       Lab Results   Component Value Date    IRON 51 04/16/2025    TIBC 347 04/16/2025    FERRITIN 84.9 04/16/2025     CMP  Sodium   Date Value Ref Range Status   04/23/2025 141 136 - 145 mmol/L Final     Potassium   Date Value Ref Range Status   04/23/2025 3.8 3.5 - 5.1 mmol/L Final     Chloride   Date Value Ref Range Status   04/23/2025 98 95 - 110 mmol/L Final     CO2   Date Value Ref Range Status   04/23/2025 35 (H) 23 - 29 mmol/L Final     Glucose   Date Value Ref Range Status   04/23/2025 149 (H) 70 - 110 mg/dL Final     BUN   Date Value Ref Range Status   04/23/2025 14 6 - 20 mg/dL Final     Creatinine   Date Value Ref Range Status   04/23/2025 0.9 0.5 - 1.4 mg/dL Final     Calcium   Date Value Ref Range Status   04/23/2025 9.7 8.7 - 10.5 mg/dL Final     Protein Total   Date Value Ref Range Status   04/23/2025 7.3 6.0 - 8.4 gm/dL Final     Albumin   Date Value Ref Range Status   04/23/2025 4.6 3.5 - 5.2 g/dL Final     Bilirubin Total   Date Value Ref Range Status   04/23/2025 1.3 (H) 0.1 - 1.0 mg/dL Final     Comment:     For infants and newborns, interpretation of results should be based   on gestational age, weight and in agreement with clinical   observations.    Premature Infant recommended reference ranges:   0-24 hours:  <8.0 mg/dL   24-48 hours: <12.0 mg/dL   3-5 days:    <15.0 mg/dL   6-29 days:   <15.0 mg/dL     ALP   Date Value Ref Range Status   04/23/2025 107 55 - 135 unit/L Final     AST   Date Value Ref Range Status   04/23/2025 18 10 - 40 unit/L Final     ALT   Date Value Ref Range Status   04/23/2025 29 10 - 44 unit/L Final     Anion Gap   Date Value Ref Range Status    04/23/2025 8 8 - 16 mmol/L Final     eGFR if    Date Value Ref Range Status   02/23/2022 >60.0 >60 mL/min/1.73 m^2 Final     eGFR if non    Date Value Ref Range Status   02/23/2022 >60.0 >60 mL/min/1.73 m^2 Final     Comment:     Calculation used to obtain the estimated glomerular filtration  rate (eGFR) is the CKD-EPI equation.                 Radiology/Diagnostic Studies:    Mammo 4/16/2025:    Impression:     Benign. No mammographic evidence of malignancy in either breast.     ASSESSMENT  ACR BI-RADS Category 2  Benign.     RECOMMENDATION:     Annual screening mammography is recommended for both breasts.      PET 2/18/2025:    Impression:     Negative for FDG avid malignancy or metastatic disease.           Mammo 4/16/2024:      Impression:     1. No mammographic evidence of malignancy.  2. Yearly mammography is recommended.  BI-RADS CATEGORY 2: BENIGN FINDING.    PET 1/18/2024:  FINDINGS:  Background:  - Liver 4.1 SUV max.  - Spleen 3.5 SUV max.  - Spleen 11.6 x 5.3 cm (AP x TR)  - Blood pool 3.3 SUV max     The previously described splenic lesions show no increased FDG activity from background with SUV max 3.2 (image 1:30) suggesting that these represent atypical hemangioma, lymphangioma or lymphatic rests.     Mildly heterogeneous appearing thyroid gland with no FDG avid nodule identified with SUV max 2.1 on the right and 2.7 (image 78).     Additional findings:  -Scattered degenerative change in the spine with no aggressive appearing FDG avid lytic or blastic lesion noting unchanged faint focal mineralization the T7 vertebral body on the right (image 110)  -Calcified plaque at the aortic arch with no aneurysm.  -Asymmetric small lobular appearing right kidney suggesting chronic infection/obstructive uropathy or remote trauma.  -Prior hysterectomy.  -Additional chronic and incidental findings without adverse interval change from the previous exam.     IMPRESSION:  No PET/CT  finding of FDG avid disease.         Mammo 4/18/2023:    Impression:     1. Postoperative changes in the left breast as above.  Previously noted coarse calcifications at the 3 o'clock position of the left breast have increased slightly in number, and have become somewhat more confluent.  These are most compatible with fat necrosis.  2. No new abnormalities.  3. Yearly mammography is recommended, unless indicated otherwise clinically.  BI-RADS CATEGORY 2: BENIGN FINDING.         Mammo 12/8/2022:    Impression:     Probably benign dystrophic calcifications and or evolving fat necrosis in the left breast as described.  Bilateral diagnostic mammogram follow-up in April 2023 is recommended, at which time patient will be due for annual bilateral mammography.  These findings and recommendations were given to the patient at the time of her visit.     BI-RADS CATEGORY 3: PROBABLY BENIGN FINDING-SHORT TERM INTERVAL FOLLOWUP SUGGESTED.           PET 2/8/2022:    IMPRESSION:  No evidence of recurrent or metastatic disease     Interval removal of the intramuscular mass in the left latissimus dorsi.  Stable mild increased mineralization of the right T7 vertebral body with no FDG activity.        CT scans  8/2/2021:    IMPRESSION:  1. Stable CT appearance of the chest, abdomen, and pelvis when compared to December 2020. No definite evidence of metastatic disease.  2. Specifically, T7 vertebral body sclerotic lesion, hyperdense lesion in the left hepatic lobe, small hypodensities in the spleen, and additional observations as above are unchanged.        Mammo 2/1/2021:    Impression:  No mammographic evidence of malignancy.     BI-RADS Category 1: Negative           PET  1/11/2021:    Impression:     1. Intramuscular mass in left latissimus Dorsi does show mild FDG uptake with recent biopsy showing low-grade myxomatous lesion, concordant with the imaging characteristics.  2. Previous left hepatic lobe mass shows no increased FDG  activity above that of background hepatic parenchymal activity.  Differential diagnosis of this remains unchanged from that previously discussed, with lack of increased FDG activity further substantiating suspected benign etiology.  Follow-up anatomic imaging is suggested with MRI abdomen without and with IV contrast (Eovist) in 3-6 months.  3. Unchanged focus of increased mineralization in right T7 vertebral body without increased FDG activity.  This remains of uncertain etiology with benign etiologies conceivable, although sclerotic osseous metastasis is difficult to entirely exclude.  Further evaluation with whole-body bone scan is suggested, unless already performed at outside institution.  Attention to this on follow-up CT is suggested, with repeat CT thorax without IV contrast suggested in 3-6 months.             CT chest/abdom/pelvis  12/9/2020:    IMPRESSION:     Degradation by motion.     No evidence of metastatic disease within the thorax. Specifically,  there is no evidence of axillary adenopathy.     Indeterminate mildly hyperdense mass within the lateral segment of the  left hepatic lobe, incompletely characterized. Correlation with  multiphasic MRI or CT is recommended.     Indeterminate splenic masses measuring 10 and 8 mm in size. If prior  outside contrast enhanced studies exist, these would be helpful for  comparison. Alternatively, these lesions could be also assessed at the  time of follow-up multiphasic MRI or CT.     Sclerotic area within the rightward aspect of T7. The possibility of  sclerotic metastasis is not excludable.     13 mm calculus within the right renal pelvis with surrounding  inflammatory changes. The right kidney is small in overall size and  demonstrates multiple focal areas of cortical scarring.     Cystic density left ovarian masses and mild prominence of the  endometrial stripe. Correlation with pelvic ultrasound recommended.     Multiple solid uterine masses likely  representing fibroids.     Probable Bartholin's cyst near the vaginal introitus.             I have reviewed all available lab results and radiology reports.    Assessment/Plan:   (1) 54 y.o. female with diagnosis of anemia referred by Dr Wade  - microcytic indices  - hgb at 10.2 and MCV at 66  - bilirubin is wnl, so I do not suspect any hemolysis at this time  - low iron and ferritin with elevated TIBC most consistent with an iron deficiency condition  - hx/of endometriosis and heavy menstrual cycles - followed by Dr Dagoberto Morris with GYN at [facility] (prior laparoscopy x4)    10/20/2020:  - s/p two IV irons  - Hgb now up and normal at 13.8  - iron now normal at 89 and ferritin is up to 308    11/17/2020:  - hgb good at 13.2 and now WNL  - iron panel currently normal    12/21/2020:  - s/p IV iron in Sept 2020  - hgb and iron panel are currently WNL    1/20/2021:  - latest hgb at 13.4 and good  - iron panel WNL    4/20/2021:  - latest hgb and iron panel WNL    7/21/2021:  - latest hgb WNL and iron panel is adequate    11/15/2021:  - labs are adequate with Hgb WNL  - iron panel WNL    1/9/2023:  - latest cbc and iron panel adequate    5/9/2023:  - latest CBC is adequate with normal iron panel and hgb    12/5/2023:  - due for repeat labs    6/5/2024:  - latest WBC at 13.05 and adequate for her  - hgb slightly elevated at 16.4 but she suspects she may have been a little dehydrated when she did the labs  - check labs again in 3 months  - f/u with Kana on 6/26 and have him address the scalp too  - PET in Jan 2024 was adequate    1/30/2025:  - latest CBC is adequate    6/18/2025:  - latest labs from Apr on chart  - no current anemia     (2) Hx/of left breast cancer diagnosed May 2018 s/p lumpectomy and radiation, oral antihormone with tamoxifen - s/p lumpectomy and XRT at [facility]  - Stage I  - ER positive  - she saw a female oncologist at [facility] in past   - she gets regular mammograms and had one this past Jan 2020  - she  "does not follow-up with oncology    4/20/2021:  - change to arimidex    7/21/2021:  - she is on arimidex   - mammo is due Feb 2022  - discussed that arimidex could be adding to the arthritis    11/15/2021:  - mammo due Feb 2022  - continued on arimidex    2/15/2022:  - mammo scheduled for March 2022  - continued on arimidex    1/9/2023:  - patient stopped taking the arimidex about 3 months ago  - mammo on 12/8/2022 on chart with repeat planned for April 2023 5/9/2023:  - latest mammogram on 4/18/2023 was benign/stable - repeat in one year per radiology    12/5/2023:  - due for mammogram again in April 2024 6/5/2024:  - mammo 4/2024 negative  - PET in Jan 2024  - she has been off the arimidex since end of 2022 1/30/'2025:  - she has been off arimidex since 2022  - new back pain and residual scalp issues  - schedule PET scan out of abundance of precaution    6/18/2025:  - latest mammo in Apr 2025 negative  - PET in Feb 2025 negative     (3) HTN and hypercholesterolemia     (4) Type II DM     (5) GERD     (6) TATYANA/Depression and RLS     (7) OA of right knee    (8) Subcutaneous nodule left scapular medial side and left posterior axilla - ? Lipomas - will schedule PEt scan in light of the breast cancer history  - 11/17/2020:  - insurance decline approval for PET   - scheduled CT scans instead and refer to Dr Rendon with Gen Surg    12/21/2020:  - she saw Dr Rendon since last visit and the suspected lipoma is actually a nodularity in the muscle layer and he is concerned about the possibility for "sarcoma"    1/20/2021:  - PET on chart from 1/11/2021  - Patient seen by Dr Rendon and had biopsy done of the skin lesion on the back on 12/29/2020 with pathology reporting a low grade myxoid lesion  - pathology recommended complete excision of the lesion to further classify      3/1/2021:  - She saw Dr Rendon again on Jan 26th 2021. She subsequently saw Dr Chico Baird with Surg-Onc at [facility] and had repeat " excision on 2/22/2021 and the final pathology is still in process with the specimen referred to the [facility] for review.   - she sees Dr Baird again on 3/4/2021 to go over the results    4/20/2021;  - final pathology has come back as benign mixoid    2/15/2022:  - She had stent placement with Dr Lin in Dec 2021 and stone removal on Jan 8th 2022.  - She is undergoing surgery next week for diverticulum of the urethra with Dr Greer in [employer].  - She had colonoscopy on Dec 3rd 2021 with Dr Montesinos with one polyp removed. Planned repeat scope in 3 yrs per patient  - She had repeat PEt recently on 2/8/2022 with overall  Good report          (9) Left ovarian cystic mass with uterine fibroids, Indeterminate mildly hyperdense mass within the lateral segment of the left hepatic lobe and two indeterminate nodules in her spleen   - will set up MRI of liver and also include the left paraspinal thoracic area near scapula to evaluate the submuscular nodule that is palpable    1/20/2021:  - she is seeing Dr Contreras on Murphy 27th 2010    3/1/2021:  - She saw Dr Contreras on Jan 27th 2021 and is having surgery with Dr Contreras on 3/8/2021.     4/20/2021;  -  She had hysterectomy on 3/22/2021 with Dr Contreras with negative pathology per patientT    7/21/2021:   - she is seeing Dr Contreras again in Nov 2021    2/15/2022:'  - planned f/u with Dr Contreras after her expected urethral surgery    1/9/2023:  - s/p urethral surgery with Dr Greer since last visit  - she plans to f/u with Gyn-Onc in the near future    5/9/2023:  - she plans to establish with new Gyn in near future  - follows with Dr Greer now PRN    12/5/2023:  - she sees new GYN Dr Castellano in near future           VISIT DIAGNOSES:      Malignant neoplasm of left breast in female, estrogen receptor positive, unspecified site of breast    Iron deficiency anemia, unspecified iron deficiency anemia type    ER+ (estrogen receptor positive status)    History of myxoma    Microcytic  anemia    S/P lumpectomy, left breast    Use of tamoxifen (Nolvadex)    Abnormal CT of liver          PLAN:  1. f/u with GYN and  as directed - sees Dr Castellano every 3 yrs now  2. Check labs every 3 months and resume IV iron as needed  3.  F/u with PCP as directed  4. Mammogram - set up repeat April 2026  5.  F/u with GI as directed - planned repeat scope in 5 yrs                 RTC in  6 months   Fax note to Andrew Roger MD; Carmen; Alexandra; Mitzy Contreras; Chico Baird; Riley    Discussion:     COVID-19 Discussion:    I had long discussion with patient and any applicable family about the COVID-19 coronavirus epidemic and the recommended precautions with regard to cancer and/or hematology patients. I have re-iterated the CDC recommendations for adequate hand washing, use of hand -like products, and coughing into elbow, etc. In addition, especially for our patients who are on chemotherapy and/or our otherwise immunocompromised patients, I have recommended avoidance of crowds, including movie theaters, restaurants, churches, etc. I have recommended avoidance of any sick or symptomatic family members and/or friends. I have also recommended avoidance of any raw and unwashed food products, and general avoidance of food items that have not been prepared by themselves. The patient has been asked to call us immediately with any symptom developments, issues, questions or other general concerns.     Pathology Discussion:     I reviewed and discussed the pathology report(s) and radiograph reports (if available) in as simple to understand and/or laymen's terms to the best of my ability. I had an indepth conversation with the patient and went over the patient's individual diagnosis based on the information that was currently available. I discussed the TNM staging process with regard to the patient's particular cancer type, and the calculated stage based on the currently available TNM data and literature. I  "discussed the available prognostic data with regard to the current staging information and how it relates to the prognosis of their particular neoplastic process.          NCCN Guidelines:     I discussed the available treatment option(s) in accordance with the latest literature from the NCCN Clinical Practice Guidelines for the patient's particular type of cancer disorder. The NCCN Guidelines provide a "document evidence-based (and) consensus-driven management" of the care of oncology patients. The treatment recommendations were made not only in accordance to the NCCN guidelines, but also factored in to account the patient's overall age, condition, performance status and their medical co-morbidities. I went over the risks and benefits of the the treatment options (if any could be made) with regard to their particular cancer type, their cancer stage, their age, and their co-morbidities.          Antihormone Therapy Discussion:    I discussed the advantages of antihormone therapy with the patient with regard to their particular neoplastic or carcinoma in situ condition. I went over the side-effect profile of the medication including risk for potential development of endometrial cancer and/or hyperplasia in women who still have a uterus and the need for yearly GYN evaluation and follow-up. I discussed the risks for thromboembolic events such as DVT's, pulmonary emboli, CVA's, retinal vascular clots, phlebitis, and TIA's. I discussed the potential risks for development of ocular disturbances, retinopathy, cataracts, corneal changes, flushing, hot flashes, amenorrhea, altered menses, fluid retention, weight changes, elevations in LFT's, liver damage, and mood disturbances. I discussed the potential risk of arthropathy and joint pains/aches which could be chronic and debilitating. I discussed potential adverse effects on bone mineralization and the risk of osteopenia and/or osteoporosis which could led to increase risk " of fractures.   A consent form was obtained and a copy was provided to the patient.    Iron Infusion Therapy Discussion:     I provided literature/learning materials on the particular IV iron regimen and discussed the potential side-effect profiles of the drug(s). I discussed the importance of compliance with obtaining and monitoring requested lab work, and went over the potential risk for the development of anaphylactic shock, bronchospasm, dysrhythmia, liver and/or kidney damage, and respiratory/cardiovascular arrest and/or failure. I discussed the potential risks for development of alopecia, fevers, itching, chills and/or rigors, cold sensory issues, ringing in ears, vertigo and neuropathy, all of which are usually acute but sometimes could end up being chronic and life-long. I discussed the risks of hand-foot syndrome and rashes, and development of other autoimmune mediated processes such as pneumonitis and colitis which could be life threatening.     The patient's consent has been obtained to proceed with the IV iron therapy.The patient will be referred to [facility] for training and education on IV iron therapy, use of antiemetics and/or anti-diarrheals, use of NSAID's, potential IV iron therapy side-effects, and any specific recommendations and precautions with the particular IV iron agents.      I answered all of the patient's (and family's, if applicable) questions to the best of my ability and to their complete satisfaction. The patient acknowledged full understanding of the risks, recommendations and plan(s).         I spent over 25 mins of time with the patient. Reviewed results of the recently ordered labs, tests and studies; made directives with regards to the results. Over half of this time was spent couseling and coordinating care.    I have explained all of the above in detail and the patient understands all of the current recommendation(s). I have answered all of their  questions to the best of my ability and to their complete satisfaction.   The patient is to continue with the current management plan.            Electronically signed by Hossein Carranza MD                                     Answers submitted by the patient for this visit:  Review of Systems Questionnaire (Submitted on 6/11/2025)  appetite change : No  unexpected weight change: No  mouth sores: No  visual disturbance: No  cough: No  shortness of breath: No  chest pain: No  abdominal pain: No  diarrhea: No  frequency: No  back pain: No  rash: No  headaches: No  adenopathy: No  nervous/ anxious: No

## 2025-08-10 DIAGNOSIS — F41.1 GAD (GENERALIZED ANXIETY DISORDER): ICD-10-CM

## 2025-08-11 RX ORDER — LORAZEPAM 1 MG/1
1 TABLET ORAL DAILY PRN
Qty: 30 TABLET | Refills: 1 | Status: SHIPPED | OUTPATIENT
Start: 2025-08-11

## (undated) DEVICE — DRESSING TRANS 4X4 TEGADERM

## (undated) DEVICE — SYR 10CC LUER LOCK

## (undated) DEVICE — SUT MCRYL PLUS 4-0 PS2 27IN

## (undated) DEVICE — JELLY SURGILUBE LUBE TUBE 2OZ

## (undated) DEVICE — SUT CTD VICRYL VIL BR SH 27

## (undated) DEVICE — SEE MEDLINE ITEM 157185

## (undated) DEVICE — TRAY MINOR GEN SURG

## (undated) DEVICE — SOL IRR NACL .9% 3000ML

## (undated) DEVICE — ELECTRODE REM PLYHSV RETURN 9

## (undated) DEVICE — SCRUB HIBICLENS 4% CHG 4OZ

## (undated) DEVICE — BOWL STERILE LARGE 32OZ

## (undated) DEVICE — PACK LAPSCP/PELVSCPY III TIBRN

## (undated) DEVICE — TRAY FOLEY 16FR INFECTION CONT

## (undated) DEVICE — SUT CTD VICRYL CR/RB-1 4-0

## (undated) DEVICE — TRAY CYSTO BASIN

## (undated) DEVICE — SEE MEDLINE ITEM 152622

## (undated) DEVICE — SUT 2/0 30IN SILK BLK BRAI

## (undated) DEVICE — SOL 9P NACL IRR PIC IL

## (undated) DEVICE — SET IRR URLGY 2LINE UNIV SPIKE

## (undated) DEVICE — SLEEVE SCD EXPRESS KNEE MEDIUM

## (undated) DEVICE — SPONGE GAUZE 16PLY 4X4

## (undated) DEVICE — SUT 2-0 VICRYL / SH (J417)

## (undated) DEVICE — DRAPE STERI INSTRUMENT 1018

## (undated) DEVICE — BAG LINGEMAN DRAIN UROLOGY

## (undated) DEVICE — SUT ETHILON 2-0 PSLX 30IN

## (undated) DEVICE — APPLICATOR CHLORAPREP ORN 26ML

## (undated) DEVICE — SEE MEDLINE ITEM 157117

## (undated) DEVICE — GLOVE SURGEONS ULTRA TOUCH 6.5

## (undated) DEVICE — CONTAINER SPECIMEN OR STER 4OZ

## (undated) DEVICE — LUBRICANT SURGILUBE 2 OZ

## (undated) DEVICE — SEE MEDLINE ITEM 157216

## (undated) DEVICE — SUT 3-0 VICRYL / RB-1

## (undated) DEVICE — SEE MEDLINE ITEM 157116

## (undated) DEVICE — TOWEL OR DISP STRL BLUE 4/PK

## (undated) DEVICE — SOL NS 1000CC

## (undated) DEVICE — SPONGE BULKEE II ABSRB 6X6.75

## (undated) DEVICE — ELECTRODE NEEDLE 2.8IN

## (undated) DEVICE — GAUZE SPONGE 4X4 12PLY

## (undated) DEVICE — SEE MEDLINE ITEM 154981

## (undated) DEVICE — SHEATH FLEXOR ACCESS 12X35

## (undated) DEVICE — GLOVE SURG STRL SZ 6

## (undated) DEVICE — RETRACTOR STERILE PLASTIC G2

## (undated) DEVICE — SEE MEDLINE ITEM 157181

## (undated) DEVICE — PACK PERI/GYN OPTIMA

## (undated) DEVICE — SEAL UROLOGY

## (undated) DEVICE — SYR ONLY LUER LOCK 20CC

## (undated) DEVICE — GOWN SURGICAL X-LARGE

## (undated) DEVICE — HOOK STAY ELAS 5MM 8EA/PK

## (undated) DEVICE — GUIDEWIRE AMPLATZ .035X145 STR

## (undated) DEVICE — SYR 50ML CATH TIP

## (undated) DEVICE — SYS LABEL CORRECT MED

## (undated) DEVICE — GUIDE WIRE MOTION .035 X 150CM

## (undated) DEVICE — STONE EXTRAC N-COMPASS NITINOL

## (undated) DEVICE — SPONGE DERMACEA GAUZE 4X4

## (undated) DEVICE — FIBER LASER HOLMIUM 273 MICRON

## (undated) DEVICE — TUBING SUC UNIV W/CONN 12FT

## (undated) DEVICE — SOL IRR WATER STRL 3000 ML

## (undated) DEVICE — SEE MEDLINE ITEM 146322